# Patient Record
Sex: FEMALE | Race: WHITE | NOT HISPANIC OR LATINO | Employment: OTHER | URBAN - METROPOLITAN AREA
[De-identification: names, ages, dates, MRNs, and addresses within clinical notes are randomized per-mention and may not be internally consistent; named-entity substitution may affect disease eponyms.]

---

## 2017-01-17 RX ORDER — PANTOPRAZOLE SODIUM 40 MG/1
TABLET, DELAYED RELEASE ORAL
Qty: 30 TABLET | Refills: 6 | Status: SHIPPED | OUTPATIENT
Start: 2017-01-17 | End: 2017-08-24 | Stop reason: SDUPTHER

## 2017-03-13 ENCOUNTER — OFFICE VISIT (OUTPATIENT)
Dept: INTERNAL MEDICINE | Facility: CLINIC | Age: 72
End: 2017-03-13
Payer: MEDICARE

## 2017-03-13 ENCOUNTER — HOSPITAL ENCOUNTER (OUTPATIENT)
Dept: RADIOLOGY | Facility: HOSPITAL | Age: 72
Discharge: HOME | End: 2017-03-13
Attending: FAMILY MEDICINE
Payer: MEDICARE

## 2017-03-13 VITALS
BODY MASS INDEX: 37.28 KG/M2 | TEMPERATURE: 96 F | DIASTOLIC BLOOD PRESSURE: 70 MMHG | WEIGHT: 231.94 LBS | HEART RATE: 87 BPM | HEIGHT: 66 IN | SYSTOLIC BLOOD PRESSURE: 126 MMHG

## 2017-03-13 DIAGNOSIS — E11.9 CONTROLLED TYPE 2 DIABETES MELLITUS WITHOUT COMPLICATION, WITHOUT LONG-TERM CURRENT USE OF INSULIN: ICD-10-CM

## 2017-03-13 DIAGNOSIS — M25.562 ACUTE PAIN OF LEFT KNEE: ICD-10-CM

## 2017-03-13 DIAGNOSIS — M79.644 THUMB PAIN, RIGHT: Primary | ICD-10-CM

## 2017-03-13 DIAGNOSIS — E08.41 DIABETIC MONONEUROPATHY ASSOCIATED WITH DIABETES MELLITUS DUE TO UNDERLYING CONDITION: ICD-10-CM

## 2017-03-13 PROCEDURE — 1159F MED LIST DOCD IN RCRD: CPT | Mod: S$GLB,,, | Performed by: FAMILY MEDICINE

## 2017-03-13 PROCEDURE — 4010F ACE/ARB THERAPY RXD/TAKEN: CPT | Mod: S$GLB,,, | Performed by: FAMILY MEDICINE

## 2017-03-13 PROCEDURE — 73560 X-RAY EXAM OF KNEE 1 OR 2: CPT | Mod: TC,59,PO,RT

## 2017-03-13 PROCEDURE — 73562 X-RAY EXAM OF KNEE 3: CPT | Mod: 26,LT,, | Performed by: RADIOLOGY

## 2017-03-13 PROCEDURE — 99214 OFFICE O/P EST MOD 30 MIN: CPT | Mod: S$GLB,,, | Performed by: FAMILY MEDICINE

## 2017-03-13 PROCEDURE — 3044F HG A1C LEVEL LT 7.0%: CPT | Mod: S$GLB,,, | Performed by: FAMILY MEDICINE

## 2017-03-13 PROCEDURE — 3074F SYST BP LT 130 MM HG: CPT | Mod: S$GLB,,, | Performed by: FAMILY MEDICINE

## 2017-03-13 PROCEDURE — 99499 UNLISTED E&M SERVICE: CPT | Mod: S$GLB,,, | Performed by: FAMILY MEDICINE

## 2017-03-13 PROCEDURE — 1160F RVW MEDS BY RX/DR IN RCRD: CPT | Mod: S$GLB,,, | Performed by: FAMILY MEDICINE

## 2017-03-13 PROCEDURE — 3078F DIAST BP <80 MM HG: CPT | Mod: S$GLB,,, | Performed by: FAMILY MEDICINE

## 2017-03-13 PROCEDURE — 73560 X-RAY EXAM OF KNEE 1 OR 2: CPT | Mod: 26,59,RT, | Performed by: RADIOLOGY

## 2017-03-13 PROCEDURE — 99999 PR PBB SHADOW E&M-EST. PATIENT-LVL III: CPT | Mod: PBBFAC,,, | Performed by: FAMILY MEDICINE

## 2017-03-13 PROCEDURE — 1157F ADVNC CARE PLAN IN RCRD: CPT | Mod: S$GLB,,, | Performed by: FAMILY MEDICINE

## 2017-03-13 PROCEDURE — 1126F AMNT PAIN NOTED NONE PRSNT: CPT | Mod: S$GLB,,, | Performed by: FAMILY MEDICINE

## 2017-03-13 RX ORDER — MELOXICAM 15 MG/1
15 TABLET ORAL DAILY
Qty: 30 TABLET | Refills: 3 | Status: SHIPPED | OUTPATIENT
Start: 2017-03-13 | End: 2017-07-16 | Stop reason: SDUPTHER

## 2017-03-13 NOTE — PROGRESS NOTES
Subjective:       Patient ID: Kay Rosas is a 71 y.o. female.    Chief Complaint: Knee Pain (left) and thumb pain (right)    HPI Comments: Right Thumb pain:      Pt has has right thumb pain that when she  something it will elicit discomfort. Pt has not done anything trauma to her right thumb    Knee Pain    The incident occurred more than 1 week ago. The incident occurred at the gym. There was no injury mechanism. The pain is present in the left knee. The quality of the pain is described as aching. The pain is at a severity of 6/10. The pain is moderate. The pain has been constant since onset. Associated symptoms include an inability to bear weight. She reports no foreign bodies present. Nothing aggravates the symptoms. She has tried nothing for the symptoms. The treatment provided moderate relief.     Review of Systems   Constitutional: Negative.    Respiratory: Negative.    Cardiovascular: Negative.    Musculoskeletal: Positive for arthralgias (left knee pain).   Skin: Negative.    Neurological: Negative.    Psychiatric/Behavioral: Negative.        Objective:      Physical Exam   Constitutional: She is oriented to person, place, and time. She appears well-developed and well-nourished.   Cardiovascular: Normal rate and regular rhythm.    Pulmonary/Chest: Effort normal and breath sounds normal.   Musculoskeletal:        Left knee: She exhibits decreased range of motion.   Neurological: She is alert and oriented to person, place, and time.   Skin: Skin is warm and dry.   Psychiatric: Her behavior is normal.       Assessment:       1. Thumb pain, right    2. Acute pain of left knee    3. Controlled type 2 diabetes mellitus without complication, without long-term current use of insulin    4. Diabetic mononeuropathy associated with diabetes mellitus due to underlying condition        Plan:             Thumb pain, right  Comments:  Will do a right wrist brace and if not responding consider EMG    Acute pain of  left knee  Comments:  Will increase the meloxicam and get xray of left knee  Orders:  -     X-ray Knee Ortho Left; Future; Expected date: 3/13/17    Controlled type 2 diabetes mellitus without complication, without long-term current use of insulin  Comments:  Well controlled at this time.     Diabetic mononeuropathy associated with diabetes mellitus due to underlying condition  Comments:  Will continue with the gabapentin    Other orders  -     meloxicam (MOBIC) 15 MG tablet; Take 1 tablet (15 mg total) by mouth once daily.  Dispense: 30 tablet; Refill: 3

## 2017-03-13 NOTE — MR AVS SNAPSHOT
[facility]  [address]  [place] [address]  Phone: [phone]  Fax: [phone]                  Kay Rosas   3/13/2017 1:40 PM   Office Visit    Description:  Female : 1945   Provider:  Norris Swann MD   Department:  [facility]           Reason for Visit     Knee Pain     thumb pain           Diagnoses this Visit        Comments    Thumb pain, right    -  Primary Will do a right wrist brace and if not responding consider EMG    Acute pain of left knee     Will increase the meloxicam and get xray of left knee    Controlled type 2 diabetes mellitus without complication, without long-term current use of insulin     Well controlled at this time.     Diabetic mononeuropathy associated with diabetes mellitus due to underlying condition     Will continue with the gabapentin           To Do List           Future Appointments        Provider Department Dept Phone    3/13/2017 2:30 PM [facility] XR2 [facility] [phone]    2017 10:45 AM VANDANA Fitch OD [facility] Op[facility] [phone]      Goals (5 Years of Data)     None       These Medications        Disp Refills Start End    meloxicam (MOBIC) 15 MG tablet 30 tablet 3 3/13/2017     Take 1 tablet (15 mg total) by mouth once daily. - Oral    Pharmacy: [facility] #[id number] - [place]  [address] [place] [address].  #: [phone]         [facility]     [facility] -  Assistance  Registered nurses in the [facility] provide clinical advisement, health education, appointment booking, and other advisory services.  Call for this free service at [phone].             Medications           Message regarding Medications     Verify the changes and/or additions to your medication regime listed below are the same as discussed with your clinician today.  If any of these changes or additions are incorrect, please notify your healthcare provider.    "     START taking these NEW medications        Refills    meloxicam (MOBIC) 15 MG tablet 3    Sig: Take 1 tablet (15 mg total) by mouth once daily.    Class: Normal    Route: Oral           Verify that the below list of medications is an accurate representation of the medications you are currently taking.  If none reported, the list may be blank. If incorrect, please contact your healthcare provider. Carry this list with you in case of emergency.           Current Medications     albuterol 90 mcg/actuation inhaler Inhale 2 puffs into the lungs every 6 (six) hours as needed.    amoxicillin (AMOXIL) 500 MG Tab 2 tablet by oral route every 12 hours for 14 days    atorvastatin (LIPITOR) 20 MG tablet Take 1 tablet (20 mg total) by mouth once daily.    gabapentin (NEURONTIN) 300 MG capsule Take 1 capsule (300 mg total) by mouth 3 (three) times daily.    hyoscyamine (ANASPAZ,LEVSIN) 0.125 mg Tab Take 0.125 mg by mouth every 4 (four) hours as needed. 1 tablet every four hours as needed for cramping    levothyroxine (SYNTHROID) 50 MCG tablet TAKE 1 TABLET BY MOUTH DAILY.    losartan-hydrochlorothiazide 100-25 mg (HYZAAR) 100-25 mg per tablet Take 1 tablet by mouth once daily.    meloxicam (MOBIC) 7.5 MG tablet Take 1 tablet (7.5 mg total) by mouth once daily.    metformin (GLUCOPHAGE) 1000 MG tablet TAKE 1 TABLET BY MOUTH TWICE A DAY    pantoprazole (PROTONIX) 40 MG tablet TAKE 1 TABLET BY MOUTH DAILY.    sertraline (ZOLOFT) 100 MG tablet TAKE 1 TABLET BY MOUTH ONCE DAILY    trazodone (DESYREL) 100 MG tablet TAKE 1 TABLET (100 MG TOTAL) BY MOUTH EVERY EVENING.    meloxicam (MOBIC) 15 MG tablet Take 1 tablet (15 mg total) by mouth once daily.           Clinical Reference Information           Your Vitals Were     BP Pulse Temp    126/70 (BP Location: Right arm, Patient Position: Sitting, BP Method: Manual) 87 96.3 °F (35.7 °C) (Tympanic)    Height Weight BMI    5' 6" (1.676 m) 105.2 kg (231 lb 14.8 oz) 37.43 kg/m2      Blood " Pressure          Most Recent Value    BP  126/70      Allergies as of 3/13/2017     No Known Allergies      Immunizations Administered on Date of Encounter - 3/13/2017     None      Orders Placed During Today's Visit     Future Labs/Procedures Expected by Expires    X-ray Knee Ortho Left  3/13/2017 3/13/2018      [facility] Sign-Up     Activating your [facility] account is as easy as 1-2-3!     1) Visit [email or web address], select Sign Up Now, enter this activation code and your date of birth, then select Next.  [email or web address]  Expires: 4/27/2017  2:25 PM      2) Create a username and password to use when you visit [facility] in the future and select a security question in case you lose your password and select Next.    3) Enter your e-mail address and click Sign Up!    Additional Information  If you have questions, please e-mail [email or web address] or call [phone] to talk to our [facility] staff. Remember, [facility] is NOT to be used for urgent needs. For medical emergencies, dial 911.         Language Assistance Services     ATTENTION: Language assistance services are available, free of charge. Please call [phone].      ATENCIÓN: Si fransisco arian, tiene a pollard disposición servicios gratuitos de asistencia lingüística. Llame al [phone].     CHÚ Ý: N?u b?n nói Ti?ng Vi?t, có các d?ch v? h? tr? ngôn ng? mi?n phí dành cho b?n. G?i s? [phone].         [facility] complies with applicable Federal civil rights laws and does not discriminate on the basis of race, color, national origin, age, disability, or sex.

## 2017-03-15 ENCOUNTER — TELEPHONE (OUTPATIENT)
Dept: INTERNAL MEDICINE | Facility: CLINIC | Age: 72
End: 2017-03-15

## 2017-03-15 NOTE — TELEPHONE ENCOUNTER
----- Message from Effie Haley sent at 3/15/2017  2:15 PM CDT -----  Contact: pt  States she's returning a nurse call. Please call pt at [phone]. Thank you

## 2017-03-17 DIAGNOSIS — E11.9 TYPE 2 DIABETES MELLITUS WITHOUT COMPLICATION: ICD-10-CM

## 2017-03-27 RX ORDER — GABAPENTIN 300 MG/1
CAPSULE ORAL
Qty: 90 CAPSULE | Refills: 3 | Status: SHIPPED | OUTPATIENT
Start: 2017-03-27 | End: 2017-07-25 | Stop reason: SDUPTHER

## 2017-03-27 NOTE — TELEPHONE ENCOUNTER
----- Message from Jaimie Mercado sent at 3/27/2017  4:43 PM CDT -----  Call [facility] at [phone]//regarding a refill for trazadone and zolflot//tee starks

## 2017-03-28 RX ORDER — TRAZODONE HYDROCHLORIDE 100 MG/1
TABLET ORAL
Qty: 30 TABLET | Refills: 6 | Status: SHIPPED | OUTPATIENT
Start: 2017-03-28 | End: 2017-10-16 | Stop reason: SDUPTHER

## 2017-03-28 RX ORDER — SERTRALINE HYDROCHLORIDE 100 MG/1
100 TABLET, FILM COATED ORAL DAILY
Qty: 30 TABLET | Refills: 6 | Status: SHIPPED | OUTPATIENT
Start: 2017-03-28 | End: 2017-10-16 | Stop reason: SDUPTHER

## 2017-05-19 RX ORDER — LEVOTHYROXINE SODIUM 50 UG/1
50 TABLET ORAL DAILY
Qty: 90 TABLET | Refills: 3 | Status: SHIPPED | OUTPATIENT
Start: 2017-05-19 | End: 2018-05-02 | Stop reason: SDUPTHER

## 2017-06-14 ENCOUNTER — OFFICE VISIT (OUTPATIENT)
Dept: INTERNAL MEDICINE | Facility: CLINIC | Age: 72
End: 2017-06-14
Payer: MEDICARE

## 2017-06-14 ENCOUNTER — LAB VISIT (OUTPATIENT)
Dept: LAB | Facility: HOSPITAL | Age: 72
End: 2017-06-14
Attending: FAMILY MEDICINE
Payer: MEDICARE

## 2017-06-14 VITALS
TEMPERATURE: 98 F | BODY MASS INDEX: 37.28 KG/M2 | HEART RATE: 92 BPM | HEIGHT: 66 IN | OXYGEN SATURATION: 96 % | SYSTOLIC BLOOD PRESSURE: 126 MMHG | DIASTOLIC BLOOD PRESSURE: 64 MMHG | WEIGHT: 231.94 LBS

## 2017-06-14 DIAGNOSIS — R35.0 URINARY FREQUENCY: ICD-10-CM

## 2017-06-14 DIAGNOSIS — E08.41 DIABETIC MONONEUROPATHY ASSOCIATED WITH DIABETES MELLITUS DUE TO UNDERLYING CONDITION: Primary | ICD-10-CM

## 2017-06-14 DIAGNOSIS — E11.9 CONTROLLED TYPE 2 DIABETES MELLITUS WITHOUT COMPLICATION, WITHOUT LONG-TERM CURRENT USE OF INSULIN: ICD-10-CM

## 2017-06-14 DIAGNOSIS — R19.7 DIARRHEA, UNSPECIFIED TYPE: ICD-10-CM

## 2017-06-14 LAB
BILIRUB UR QL STRIP: NEGATIVE
CLARITY UR: CLEAR
COLOR UR: YELLOW
GLUCOSE UR QL STRIP: NEGATIVE
HGB UR QL STRIP: NEGATIVE
KETONES UR QL STRIP: NEGATIVE
LEUKOCYTE ESTERASE UR QL STRIP: ABNORMAL
MICROSCOPIC COMMENT: ABNORMAL
NITRITE UR QL STRIP: NEGATIVE
PH UR STRIP: 5 [PH] (ref 5–8)
PROT UR QL STRIP: NEGATIVE
SP GR UR STRIP: 1.01 (ref 1–1.03)
SQUAMOUS #/AREA URNS HPF: 6 /HPF
URN SPEC COLLECT METH UR: ABNORMAL
WBC #/AREA URNS HPF: 6 /HPF (ref 0–5)
WBC CLUMPS URNS QL MICRO: ABNORMAL

## 2017-06-14 PROCEDURE — 81000 URINALYSIS NONAUTO W/SCOPE: CPT | Mod: PO

## 2017-06-14 PROCEDURE — 1159F MED LIST DOCD IN RCRD: CPT | Mod: S$GLB,,, | Performed by: FAMILY MEDICINE

## 2017-06-14 PROCEDURE — 99999 PR PBB SHADOW E&M-EST. PATIENT-LVL III: CPT | Mod: PBBFAC,,, | Performed by: FAMILY MEDICINE

## 2017-06-14 PROCEDURE — 4010F ACE/ARB THERAPY RXD/TAKEN: CPT | Mod: S$GLB,,, | Performed by: FAMILY MEDICINE

## 2017-06-14 PROCEDURE — 1126F AMNT PAIN NOTED NONE PRSNT: CPT | Mod: S$GLB,,, | Performed by: FAMILY MEDICINE

## 2017-06-14 PROCEDURE — 3044F HG A1C LEVEL LT 7.0%: CPT | Mod: S$GLB,,, | Performed by: FAMILY MEDICINE

## 2017-06-14 PROCEDURE — 99213 OFFICE O/P EST LOW 20 MIN: CPT | Mod: S$GLB,,, | Performed by: FAMILY MEDICINE

## 2017-06-14 PROCEDURE — 87086 URINE CULTURE/COLONY COUNT: CPT

## 2017-06-14 PROCEDURE — 99499 UNLISTED E&M SERVICE: CPT | Mod: S$GLB,,, | Performed by: FAMILY MEDICINE

## 2017-06-14 NOTE — PROGRESS NOTES
Subjective:       Patient ID: Kay Rosas is a 71 y.o. female.    Chief Complaint: No chief complaint on file.    DM:       Pt is a 71 year old controlled DM with last HgA1C at 5.6 Pt is on metformin 1000 mg bid. Pt reports no real change in her sugars    Diarrhea:       Pt is having some loose stools like she had a year ago and found to have a H pylori infections. It is loose stool without blood. Pt is on Protonix 40 mg a day. Pt is reporting no association with food and reports no real abdominal pain.       Review of Systems   Constitutional: Negative.    Respiratory: Negative.    Gastrointestinal: Negative.    Genitourinary: Negative.    Neurological: Negative.    Psychiatric/Behavioral: Negative.        Objective:      Physical Exam   Constitutional: She is oriented to person, place, and time. She appears well-developed and well-nourished.   Cardiovascular: Normal rate and regular rhythm.    Pulmonary/Chest: Effort normal and breath sounds normal.   Neurological: She is alert and oriented to person, place, and time.       Assessment:       1. Diabetic mononeuropathy associated with diabetes mellitus due to underlying condition    2. Controlled type 2 diabetes mellitus without complication, without long-term current use of insulin    3. Diarrhea, unspecified type    4. Urinary frequency        Plan:       Diabetic mononeuropathy associated with diabetes mellitus due to underlying condition  Comments:  Will get a HgA1C and consider backing down on metfromin    Controlled type 2 diabetes mellitus without complication, without long-term current use of insulin  Comments:  Will get a HgA1C and reduce metfromin to 500 mg bid  Orders:  -     Hemoglobin A1c; Future; Expected date: 06/14/2017    Diarrhea, unspecified type  Comments:  Unclear etiology. Will have her back off on the metformin 1/2 twice a day    Urinary frequency  -     Urinalysis; Future; Expected date: 06/14/2017  -     Urine culture; Future; Expected  date: 06/14/2017

## 2017-06-15 RX ORDER — CIPROFLOXACIN 500 MG/1
500 TABLET ORAL EVERY 12 HOURS
Qty: 14 TABLET | Refills: 0 | Status: SHIPPED | OUTPATIENT
Start: 2017-06-15 | End: 2017-10-25 | Stop reason: ALTCHOICE

## 2017-06-16 ENCOUNTER — TELEPHONE (OUTPATIENT)
Dept: INTERNAL MEDICINE | Facility: CLINIC | Age: 72
End: 2017-06-16

## 2017-06-16 LAB — BACTERIA UR CULT: NO GROWTH

## 2017-06-16 NOTE — TELEPHONE ENCOUNTER
----- Message from Norris Swann MD sent at 6/15/2017  4:16 PM CDT -----  Inform pt that I am going to put her on 3 days of cipro 500 mg bid. Sent to pharmacy for UTI. She will be given 7 days worth but take for 3 and if sx gone than stop if not than finish the 7 days

## 2017-06-16 NOTE — TELEPHONE ENCOUNTER
----- Message from Norris Swann MD sent at 6/15/2017  4:15 PM CDT -----  Inform pt that A1C is very good still at 5.8

## 2017-06-23 ENCOUNTER — OFFICE VISIT (OUTPATIENT)
Dept: INTERNAL MEDICINE | Facility: CLINIC | Age: 72
End: 2017-06-23
Payer: MEDICARE

## 2017-06-23 ENCOUNTER — HOSPITAL ENCOUNTER (OUTPATIENT)
Dept: RADIOLOGY | Facility: HOSPITAL | Age: 72
Discharge: HOME | End: 2017-06-23
Attending: NURSE PRACTITIONER
Payer: MEDICARE

## 2017-06-23 VITALS
TEMPERATURE: 96 F | HEART RATE: 75 BPM | OXYGEN SATURATION: 97 % | WEIGHT: 235.44 LBS | BODY MASS INDEX: 37.84 KG/M2 | SYSTOLIC BLOOD PRESSURE: 148 MMHG | DIASTOLIC BLOOD PRESSURE: 72 MMHG | HEIGHT: 66 IN

## 2017-06-23 DIAGNOSIS — Z87.81 HISTORY OF FRACTURE OF RIB: ICD-10-CM

## 2017-06-23 DIAGNOSIS — R07.81 RIB PAIN ON LEFT SIDE: ICD-10-CM

## 2017-06-23 DIAGNOSIS — T14.8XXA PULLED MUSCLE: ICD-10-CM

## 2017-06-23 DIAGNOSIS — N39.0 URINARY TRACT INFECTION WITHOUT HEMATURIA, SITE UNSPECIFIED: ICD-10-CM

## 2017-06-23 DIAGNOSIS — E08.41 DIABETIC MONONEUROPATHY ASSOCIATED WITH DIABETES MELLITUS DUE TO UNDERLYING CONDITION: Primary | ICD-10-CM

## 2017-06-23 PROCEDURE — 1159F MED LIST DOCD IN RCRD: CPT | Mod: S$GLB,,, | Performed by: NURSE PRACTITIONER

## 2017-06-23 PROCEDURE — 71100 X-RAY EXAM RIBS UNI 2 VIEWS: CPT | Mod: TC,PO

## 2017-06-23 PROCEDURE — 99499 UNLISTED E&M SERVICE: CPT | Mod: S$GLB,,, | Performed by: NURSE PRACTITIONER

## 2017-06-23 PROCEDURE — 96372 THER/PROPH/DIAG INJ SC/IM: CPT | Mod: S$GLB,,, | Performed by: NURSE PRACTITIONER

## 2017-06-23 PROCEDURE — 71100 X-RAY EXAM RIBS UNI 2 VIEWS: CPT | Mod: 26,,, | Performed by: RADIOLOGY

## 2017-06-23 PROCEDURE — 71020 XR CHEST PA AND LATERAL: CPT | Mod: TC,PO

## 2017-06-23 PROCEDURE — 99214 OFFICE O/P EST MOD 30 MIN: CPT | Mod: 25,S$GLB,, | Performed by: NURSE PRACTITIONER

## 2017-06-23 PROCEDURE — 99999 PR PBB SHADOW E&M-EST. PATIENT-LVL IV: CPT | Mod: PBBFAC,,, | Performed by: NURSE PRACTITIONER

## 2017-06-23 PROCEDURE — 71020 XR CHEST PA AND LATERAL: CPT | Mod: 26,,, | Performed by: RADIOLOGY

## 2017-06-23 RX ORDER — METHYLPREDNISOLONE ACETATE 40 MG/ML
60 INJECTION, SUSPENSION INTRA-ARTICULAR; INTRALESIONAL; INTRAMUSCULAR; SOFT TISSUE
Status: DISCONTINUED | OUTPATIENT
Start: 2017-06-23 | End: 2017-06-23

## 2017-06-23 RX ORDER — TIZANIDINE 2 MG/1
2 TABLET ORAL NIGHTLY PRN
Qty: 7 TABLET | Refills: 0 | Status: SHIPPED | OUTPATIENT
Start: 2017-06-23 | End: 2017-07-03

## 2017-06-23 RX ORDER — METHYLPREDNISOLONE ACETATE 80 MG/ML
60 INJECTION, SUSPENSION INTRA-ARTICULAR; INTRALESIONAL; INTRAMUSCULAR; SOFT TISSUE
Status: COMPLETED | OUTPATIENT
Start: 2017-06-23 | End: 2017-06-23

## 2017-06-23 RX ADMIN — METHYLPREDNISOLONE ACETATE 60 MG: 80 INJECTION, SUSPENSION INTRA-ARTICULAR; INTRALESIONAL; INTRAMUSCULAR; SOFT TISSUE at 10:06

## 2017-06-23 NOTE — PROGRESS NOTES
"Subjective:       Patient ID: Kay Rosas is a 71 y.o. female.    Chief Complaint: Other (possible left rib fracture or break )    Left rib, left lateral side, left flank pain x 4 days       Pt reports that Tuesday she bent forward to adjust her loveseat and then she twisted while bending forward to adjust a floor rug and began immediately feeling pain in left upper chest and abdomen -radiating to lateral left side and left flank. Pain is 4/10 when taking a deep breath and on certain positioning now. She describes intermittent tightening with certain movement as well and the aforementioned area feeling "sore".  She takes mobic daily. No additional OTC meds have been taken. She has a hx of a left rib fx. No hx of osteopenia/osteoporosis. She did not hear a crack upon bending. She did not fall. She is currently being treated with cipro for a UTI.       Review of Systems   Constitutional: Positive for activity change.   HENT: Negative.    Eyes: Negative.    Respiratory: Negative for apnea, cough, choking, chest tightness, shortness of breath, wheezing and stridor.         Pain in muscular areas mentioned with deep inspiration    Cardiovascular: Negative for chest pain, palpitations and leg swelling.   Gastrointestinal: Negative.    Endocrine: Negative.    Genitourinary: Positive for flank pain (left ).   Musculoskeletal: Positive for myalgias (left chest well, left lateral side ). Negative for arthralgias, back pain, gait problem, joint swelling, neck pain and neck stiffness.   Skin: Negative.    Allergic/Immunologic: Negative.    Neurological: Negative.    Hematological: Negative.    Psychiatric/Behavioral: Negative.        Objective:      Physical Exam   Constitutional: She is oriented to person, place, and time. She appears well-developed and well-nourished.   HENT:   Head: Normocephalic and atraumatic.   Eyes: Conjunctivae and EOM are normal.   Neck: Normal range of motion. Neck supple.   Cardiovascular: " Normal rate, regular rhythm, normal heart sounds and intact distal pulses.    Pulmonary/Chest: Effort normal and breath sounds normal.   Abdominal: Soft. Bowel sounds are normal. There is tenderness in the right upper quadrant. There is CVA tenderness (left side ).       Musculoskeletal: Normal range of motion.   Neurological: She is alert and oriented to person, place, and time.   Skin: Skin is warm.   Psychiatric: She has a normal mood and affect.       Assessment:       1. Diabetic mononeuropathy associated with diabetes mellitus due to underlying condition    2. Urinary tract infection without hematuria, site unspecified    3. Rib pain on left side    4. Pulled muscle    5. History of fracture of rib        Plan:   Diabetic mononeuropathy associated with diabetes mellitus due to underlying condition  Comments:  monitor BG levels since getting steroid injection today     Urinary tract infection without hematuria, site unspecified  Comments:  currently being treated with cipro     Rib pain on left side  -     X-Ray Chest PA And Lateral; Future; Expected date: 06/23/2017  -     CBC auto differential; Future; Expected date: 06/23/2017  -     Comprehensive metabolic panel; Future; Expected date: 06/23/2017  -     X-Ray Ribs 2 View Left; Future; Expected date: 06/23/2017  -     methylPREDNISolone acetate injection 60 mg; Inject 0.75 mLs (60 mg total) into the muscle one time.    Pulled muscle  Comments:  left chest wall to left flank   Orders:  -     Discontinue: methylPREDNISolone acetate injection 60 mg; Inject 1.5 mLs (60 mg total) into the muscle one time.  -     tizanidine (ZANAFLEX) 2 MG tablet; Take 1 tablet (2 mg total) by mouth nightly as needed.  Dispense: 7 tablet; Refill: 0  -     methylPREDNISolone acetate injection 60 mg; Inject 0.75 mLs (60 mg total) into the muscle one time.    History of fracture of rib  -     X-Ray Chest PA And Lateral; Future; Expected date: 06/23/2017  -     X-Ray Ribs 2 View Left;  Future; Expected date: 06/23/2017    Other orders  -     Cancel: Urinalysis; Future; Expected date: 06/23/2017    as above  Chest xray with rib series left  Labs today   Depo medrol now-SE discussed in detail  zanaflex QHS PRN- sedation discussed- hold if excessive  Heat and ice rotate up to TID for 10-20 mins each  Will call with results and further mgt.

## 2017-07-17 RX ORDER — MELOXICAM 15 MG/1
15 TABLET ORAL DAILY
Qty: 90 TABLET | Refills: 3 | Status: SHIPPED | OUTPATIENT
Start: 2017-07-17 | End: 2018-07-02 | Stop reason: SDUPTHER

## 2017-07-20 RX ORDER — METFORMIN HYDROCHLORIDE 1000 MG/1
TABLET ORAL
Qty: 180 TABLET | Refills: 2 | Status: SHIPPED | OUTPATIENT
Start: 2017-07-20 | End: 2018-04-13 | Stop reason: SDUPTHER

## 2017-07-25 RX ORDER — GABAPENTIN 300 MG/1
CAPSULE ORAL
Qty: 90 CAPSULE | Refills: 3 | Status: SHIPPED | OUTPATIENT
Start: 2017-07-25 | End: 2017-11-17 | Stop reason: SDUPTHER

## 2017-08-24 RX ORDER — PANTOPRAZOLE SODIUM 40 MG/1
TABLET, DELAYED RELEASE ORAL
Qty: 30 TABLET | Refills: 6 | Status: SHIPPED | OUTPATIENT
Start: 2017-08-24 | End: 2018-03-06 | Stop reason: SDUPTHER

## 2017-10-17 RX ORDER — SERTRALINE HYDROCHLORIDE 100 MG/1
100 TABLET, FILM COATED ORAL DAILY
Qty: 30 TABLET | Refills: 6 | Status: SHIPPED | OUTPATIENT
Start: 2017-10-17 | End: 2018-04-30 | Stop reason: SDUPTHER

## 2017-10-17 RX ORDER — TRAZODONE HYDROCHLORIDE 100 MG/1
TABLET ORAL
Qty: 30 TABLET | Refills: 6 | Status: SHIPPED | OUTPATIENT
Start: 2017-10-17 | End: 2018-04-30 | Stop reason: SDUPTHER

## 2017-10-25 ENCOUNTER — OFFICE VISIT (OUTPATIENT)
Dept: INTERNAL MEDICINE | Facility: CLINIC | Age: 72
End: 2017-10-25
Payer: MEDICARE

## 2017-10-25 ENCOUNTER — LAB VISIT (OUTPATIENT)
Dept: LAB | Facility: HOSPITAL | Age: 72
End: 2017-10-25
Attending: FAMILY MEDICINE
Payer: MEDICARE

## 2017-10-25 VITALS
SYSTOLIC BLOOD PRESSURE: 116 MMHG | WEIGHT: 226.44 LBS | TEMPERATURE: 96 F | HEART RATE: 88 BPM | HEIGHT: 66 IN | BODY MASS INDEX: 36.39 KG/M2 | DIASTOLIC BLOOD PRESSURE: 62 MMHG

## 2017-10-25 DIAGNOSIS — I10 ESSENTIAL HYPERTENSION: Primary | ICD-10-CM

## 2017-10-25 DIAGNOSIS — I10 ESSENTIAL HYPERTENSION: ICD-10-CM

## 2017-10-25 DIAGNOSIS — R25.2 MUSCLE CRAMPS AT NIGHT: ICD-10-CM

## 2017-10-25 DIAGNOSIS — E78.00 PURE HYPERCHOLESTEROLEMIA: ICD-10-CM

## 2017-10-25 DIAGNOSIS — E08.41 DIABETIC MONONEUROPATHY ASSOCIATED WITH DIABETES MELLITUS DUE TO UNDERLYING CONDITION: ICD-10-CM

## 2017-10-25 LAB
ALBUMIN SERPL BCP-MCNC: 3.7 G/DL
ALP SERPL-CCNC: 71 U/L
ALT SERPL W/O P-5'-P-CCNC: 13 U/L
ANION GAP SERPL CALC-SCNC: 12 MMOL/L
AST SERPL-CCNC: 18 U/L
BASOPHILS # BLD AUTO: 0.04 K/UL
BASOPHILS NFR BLD: 0.6 %
BILIRUB SERPL-MCNC: 0.4 MG/DL
BUN SERPL-MCNC: 23 MG/DL
CALCIUM SERPL-MCNC: 9.7 MG/DL
CHLORIDE SERPL-SCNC: 103 MMOL/L
CHOLEST SERPL-MCNC: 144 MG/DL
CHOLEST/HDLC SERPL: 2.3 {RATIO}
CO2 SERPL-SCNC: 28 MMOL/L
CREAT SERPL-MCNC: 1.4 MG/DL
DIFFERENTIAL METHOD: ABNORMAL
EOSINOPHIL # BLD AUTO: 0.1 K/UL
EOSINOPHIL NFR BLD: 1.9 %
ERYTHROCYTE [DISTWIDTH] IN BLOOD BY AUTOMATED COUNT: 16.4 %
EST. GFR  (AFRICAN AMERICAN): 43.6 ML/MIN/1.73 M^2
EST. GFR  (NON AFRICAN AMERICAN): 37.8 ML/MIN/1.73 M^2
GLUCOSE SERPL-MCNC: 76 MG/DL
HCT VFR BLD AUTO: 32.9 %
HDLC SERPL-MCNC: 62 MG/DL
HDLC SERPL: 43.1 %
HGB BLD-MCNC: 10.3 G/DL
IMM GRANULOCYTES # BLD AUTO: 0.02 K/UL
IMM GRANULOCYTES NFR BLD AUTO: 0.3 %
LDLC SERPL CALC-MCNC: 67.6 MG/DL
LYMPHOCYTES # BLD AUTO: 2.5 K/UL
LYMPHOCYTES NFR BLD: 36.8 %
MCH RBC QN AUTO: 26.8 PG
MCHC RBC AUTO-ENTMCNC: 31.3 G/DL
MCV RBC AUTO: 86 FL
MONOCYTES # BLD AUTO: 0.5 K/UL
MONOCYTES NFR BLD: 7.2 %
NEUTROPHILS # BLD AUTO: 3.6 K/UL
NEUTROPHILS NFR BLD: 53.2 %
NONHDLC SERPL-MCNC: 82 MG/DL
NRBC BLD-RTO: 0 /100 WBC
PLATELET # BLD AUTO: 248 K/UL
PMV BLD AUTO: 11.5 FL
POTASSIUM SERPL-SCNC: 4.5 MMOL/L
PROT SERPL-MCNC: 7.4 G/DL
RBC # BLD AUTO: 3.85 M/UL
SODIUM SERPL-SCNC: 143 MMOL/L
TRIGL SERPL-MCNC: 72 MG/DL
WBC # BLD AUTO: 6.68 K/UL

## 2017-10-25 PROCEDURE — 36415 COLL VENOUS BLD VENIPUNCTURE: CPT | Mod: PO

## 2017-10-25 PROCEDURE — 80053 COMPREHEN METABOLIC PANEL: CPT

## 2017-10-25 PROCEDURE — 99999 PR PBB SHADOW E&M-EST. PATIENT-LVL III: CPT | Mod: PBBFAC,,, | Performed by: FAMILY MEDICINE

## 2017-10-25 PROCEDURE — 99499 UNLISTED E&M SERVICE: CPT | Mod: S$GLB,,, | Performed by: FAMILY MEDICINE

## 2017-10-25 PROCEDURE — 80061 LIPID PANEL: CPT

## 2017-10-25 PROCEDURE — 85025 COMPLETE CBC W/AUTO DIFF WBC: CPT

## 2017-10-25 PROCEDURE — 99214 OFFICE O/P EST MOD 30 MIN: CPT | Mod: S$GLB,,, | Performed by: FAMILY MEDICINE

## 2017-10-25 RX ORDER — TIZANIDINE 4 MG/1
4 TABLET ORAL NIGHTLY
Qty: 90 TABLET | Refills: 1 | Status: SHIPPED | OUTPATIENT
Start: 2017-10-25 | End: 2018-05-05 | Stop reason: SDUPTHER

## 2017-10-25 NOTE — PROGRESS NOTES
Subjective:       Patient ID: Kay Rosas is a 72 y.o. female.    Chief Complaint: leg cramping    Leg Cramp:      Pt is reporting increase leg cramps at night since about June. Pt has DM that is well controlled. She is experiencing cramps at night when in bed. Pt has no Vascular issues.       Review of Systems   Constitutional: Positive for activity change.   Respiratory: Negative.    Cardiovascular: Negative.    Genitourinary: Negative.    Musculoskeletal: Positive for arthralgias.   Neurological: Negative.    Hematological: Negative.    Psychiatric/Behavioral: Negative.        Objective:      Physical Exam   Constitutional: She is oriented to person, place, and time. She appears well-developed and well-nourished.   Cardiovascular: Normal rate and regular rhythm.    Pulmonary/Chest: Effort normal and breath sounds normal.   Abdominal: Soft. Bowel sounds are normal.   Neurological: She is alert and oriented to person, place, and time.   Skin: Skin is warm and dry.   Psychiatric: She has a normal mood and affect. Her behavior is normal.       Assessment:       1. Essential hypertension    2. Muscle cramps at night    3. Diabetic mononeuropathy associated with diabetes mellitus due to underlying condition    4. Pure hypercholesterolemia        Plan:       Essential hypertension  Comments:  BP is well controlled. Will get a CMP  Orders:  -     Comprehensive metabolic panel; Future; Expected date: 10/25/2017  -     CBC auto differential; Future; Expected date: 10/25/2017    Muscle cramps at night  Comments:  Maybe restless leg but Zanaflex helped    Diabetic mononeuropathy associated with diabetes mellitus due to underlying condition  Comments:  Will continue with the gabapentin.    Pure hypercholesterolemia  -     Lipid panel; Future; Expected date: 10/25/2017    Other orders  -     tiZANidine (ZANAFLEX) 4 MG tablet; Take 1 tablet (4 mg total) by mouth every evening.  Dispense: 90 tablet; Refill: 1

## 2017-10-30 RX ORDER — ATORVASTATIN CALCIUM 20 MG/1
20 TABLET, FILM COATED ORAL DAILY
Qty: 90 TABLET | Refills: 3 | Status: SHIPPED | OUTPATIENT
Start: 2017-10-30 | End: 2018-10-14 | Stop reason: SDUPTHER

## 2017-11-17 RX ORDER — GABAPENTIN 300 MG/1
CAPSULE ORAL
Qty: 90 CAPSULE | Refills: 3 | Status: SHIPPED | OUTPATIENT
Start: 2017-11-17 | End: 2018-03-06 | Stop reason: SDUPTHER

## 2017-12-11 RX ORDER — LOSARTAN POTASSIUM AND HYDROCHLOROTHIAZIDE 25; 100 MG/1; MG/1
1 TABLET ORAL DAILY
Qty: 90 TABLET | Refills: 3 | Status: SHIPPED | OUTPATIENT
Start: 2017-12-11 | End: 2018-11-26 | Stop reason: SDUPTHER

## 2017-12-26 ENCOUNTER — TELEPHONE (OUTPATIENT)
Dept: INTERNAL MEDICINE | Facility: CLINIC | Age: 72
End: 2017-12-26

## 2017-12-26 ENCOUNTER — HOSPITAL ENCOUNTER (OUTPATIENT)
Dept: RADIOLOGY | Facility: HOSPITAL | Age: 72
Discharge: HOME | End: 2017-12-26
Attending: FAMILY MEDICINE
Payer: MEDICARE

## 2017-12-26 VITALS — BODY MASS INDEX: 36.32 KG/M2 | HEIGHT: 66 IN | WEIGHT: 226 LBS

## 2017-12-26 DIAGNOSIS — Z12.31 ENCOUNTER FOR SCREENING MAMMOGRAM FOR HIGH-RISK PATIENT: ICD-10-CM

## 2017-12-26 DIAGNOSIS — Z12.31 ENCOUNTER FOR SCREENING MAMMOGRAM FOR HIGH-RISK PATIENT: Primary | ICD-10-CM

## 2017-12-26 PROCEDURE — 77067 SCR MAMMO BI INCL CAD: CPT | Mod: TC,PO

## 2017-12-26 PROCEDURE — 77067 SCR MAMMO BI INCL CAD: CPT | Mod: 26,,, | Performed by: RADIOLOGY

## 2017-12-26 PROCEDURE — 77063 BREAST TOMOSYNTHESIS BI: CPT | Mod: 26,,, | Performed by: RADIOLOGY

## 2018-01-01 ENCOUNTER — NURSE TRIAGE (OUTPATIENT)
Dept: ADMINISTRATIVE | Facility: CLINIC | Age: 73
End: 2018-01-01

## 2018-01-01 ENCOUNTER — OFFICE VISIT (OUTPATIENT)
Dept: URGENT CARE | Facility: CLINIC | Age: 73
End: 2018-01-01
Payer: MEDICARE

## 2018-01-01 VITALS
TEMPERATURE: 99 F | OXYGEN SATURATION: 95 % | DIASTOLIC BLOOD PRESSURE: 82 MMHG | HEIGHT: 66 IN | HEART RATE: 94 BPM | BODY MASS INDEX: 35.22 KG/M2 | SYSTOLIC BLOOD PRESSURE: 144 MMHG | WEIGHT: 219.13 LBS | RESPIRATION RATE: 12 BRPM

## 2018-01-01 DIAGNOSIS — J01.90 ACUTE NON-RECURRENT SINUSITIS, UNSPECIFIED LOCATION: Primary | ICD-10-CM

## 2018-01-01 PROCEDURE — 1159F MED LIST DOCD IN RCRD: CPT | Mod: ,,, | Performed by: PHYSICIAN ASSISTANT

## 2018-01-01 PROCEDURE — 99214 OFFICE O/P EST MOD 30 MIN: CPT | Mod: S$PBB,,, | Performed by: PHYSICIAN ASSISTANT

## 2018-01-01 PROCEDURE — 99999 PR PBB SHADOW E&M-EST. PATIENT-LVL III: CPT | Mod: PBBFAC,,,

## 2018-01-01 PROCEDURE — 1125F AMNT PAIN NOTED PAIN PRSNT: CPT | Mod: ,,, | Performed by: PHYSICIAN ASSISTANT

## 2018-01-01 PROCEDURE — 3008F BODY MASS INDEX DOCD: CPT | Mod: ,,, | Performed by: PHYSICIAN ASSISTANT

## 2018-01-01 RX ORDER — GUAIFENESIN 1200 MG/1
1 TABLET, EXTENDED RELEASE ORAL 2 TIMES DAILY
COMMUNITY
Start: 2018-01-01 | End: 2018-01-11

## 2018-01-01 RX ORDER — FLUTICASONE PROPIONATE 50 MCG
1 SPRAY, SUSPENSION (ML) NASAL DAILY
COMMUNITY
Start: 2018-01-01 | End: 2019-08-15

## 2018-01-01 RX ORDER — AMOXICILLIN AND CLAVULANATE POTASSIUM 875; 125 MG/1; MG/1
1 TABLET, FILM COATED ORAL 2 TIMES DAILY
Qty: 14 TABLET | Refills: 0 | Status: SHIPPED | OUTPATIENT
Start: 2018-01-01 | End: 2018-01-08

## 2018-01-01 RX ORDER — BENZONATATE 200 MG/1
200 CAPSULE ORAL 3 TIMES DAILY PRN
Qty: 30 CAPSULE | Refills: 0 | Status: SHIPPED | OUTPATIENT
Start: 2018-01-01 | End: 2019-04-25 | Stop reason: ALTCHOICE

## 2018-01-01 RX ORDER — PROMETHAZINE HYDROCHLORIDE AND DEXTROMETHORPHAN HYDROBROMIDE 6.25; 15 MG/5ML; MG/5ML
5 SYRUP ORAL EVERY 6 HOURS PRN
Qty: 120 ML | Refills: 0 | Status: SHIPPED | OUTPATIENT
Start: 2018-01-01 | End: 2019-02-18

## 2018-01-01 NOTE — PROGRESS NOTES
"Subjective:       Patient ID: Kay Rosas is a 72 y.o. female.    Chief Complaint: Sinusitis and Cough    Cough   This is a new problem. The current episode started in the past 7 days (3 days). The problem has been unchanged. The problem occurs constantly. The cough is non-productive. Associated symptoms include chills, nasal congestion, postnasal drip, rhinorrhea, a sore throat and sweats. Pertinent negatives include no chest pain, ear pain, fever, headaches, myalgias, rash, shortness of breath or wheezing. Nothing aggravates the symptoms. Risk factors: remote hx. Treatments tried: benadryl. The treatment provided no relief. There is no history of asthma or COPD.     Review of Systems   Constitutional: Positive for chills and fatigue. Negative for fever.   HENT: Positive for congestion, postnasal drip, rhinorrhea and sore throat. Negative for ear discharge, ear pain, sinus pressure and sneezing.    Eyes: Negative for pain and discharge.   Respiratory: Positive for cough. Negative for shortness of breath and wheezing.    Cardiovascular: Negative for chest pain and leg swelling.   Gastrointestinal: Negative for abdominal pain, nausea and vomiting.   Musculoskeletal: Negative for myalgias.   Skin: Negative for rash.   Neurological: Negative for headaches.       Objective:      BP (!) 144/82 (BP Location: Left arm, Patient Position: Sitting)   Pulse 94   Temp 99 °F (37.2 °C) (Tympanic)   Resp 12   Ht 5' 6" (1.676 m)   Wt 99.4 kg (219 lb 2.2 oz)   SpO2 95%   BMI 35.37 kg/m²   Physical Exam   Constitutional: She is oriented to person, place, and time. She appears well-developed and well-nourished. No distress.   HENT:   Head: Normocephalic and atraumatic.   Right Ear: Tympanic membrane, external ear and ear canal normal.   Left Ear: Tympanic membrane, external ear and ear canal normal.   Nose: Nose normal. Right sinus exhibits no maxillary sinus tenderness and no frontal sinus tenderness. Left sinus exhibits " no maxillary sinus tenderness and no frontal sinus tenderness.   Mouth/Throat: Oropharynx is clear and moist. No oropharyngeal exudate or posterior oropharyngeal erythema. No tonsillar exudate.   Eyes: Conjunctivae and EOM are normal. Pupils are equal, round, and reactive to light. Right eye exhibits no discharge. Left eye exhibits no discharge.   Neck: Normal range of motion. Neck supple.   Cardiovascular: Normal rate, regular rhythm, normal heart sounds and intact distal pulses.  Exam reveals no gallop and no friction rub.    No murmur heard.  Pulmonary/Chest: Effort normal and breath sounds normal. No stridor. No respiratory distress. She has no wheezes. She has no rales. She exhibits no tenderness.   Lymphadenopathy:     She has no cervical adenopathy.   Neurological: She is alert and oriented to person, place, and time. Coordination normal.   Skin: Skin is warm and dry. No rash noted. She is not diaphoretic. No erythema. No pallor.   Nursing note and vitals reviewed.      Assessment:       1. Acute non-recurrent sinusitis, unspecified location        Plan:       Acute non-recurrent sinusitis, unspecified location  -     promethazine-dextromethorphan (PROMETHAZINE-DM) 6.25-15 mg/5 mL Syrp; Take 5 mLs by mouth every 6 (six) hours as needed (congestion).  Dispense: 120 mL; Refill: 0  -     guaiFENesin 1,200 mg Ta12; Take 1 tablet by mouth 2 (two) times daily.  -     fluticasone (FLONASE) 50 mcg/actuation nasal spray; 1 spray by Each Nare route once daily.  -     benzonatate (TESSALON) 200 MG capsule; Take 1 capsule (200 mg total) by mouth 3 (three) times daily as needed for Cough.  Dispense: 30 capsule; Refill: 0  -     amoxicillin-clavulanate 875-125mg (AUGMENTIN) 875-125 mg per tablet; Take 1 tablet by mouth 2 (two) times daily.  Dispense: 14 tablet; Refill: 0    Likely viral URI, patient concerned about sinus infection given script for augmentin start in 2 days if no improvement after symptomatic  treatments.      Rest  Drink plenty of clear fluids--at least 64 ounces of water/juice  Normal saline nasal wash (example Ocean spray) to irrigate sinuses and for congestion/runny nose  Flonase or Nasonex to decrease inflammation  Tylenol or Ibuprofen for fever, headache and body aches  Mucinex to help thin secretions and reduce congestion  Tessalon pearls to help with cough during daytime  Promethazine-D to help with cough at night  Cool mist humidifier/vaporizer  Warm salt water gargles for throat comfort  Chloraseptic spray or lozenges for throat comfort  Warm tea with honey  Practice good handwashing      The cough associated with bronchitis can last for a long time, even as long as 2 weeks. However please see your PCP or go to ER if symptoms worsen, you develop fever, or begin to have shortness of breath.       Heather Trant PA-C [facility]

## 2018-01-01 NOTE — PATIENT INSTRUCTIONS
Sinusitis (No Antibiotics)    The sinuses are air-filled spaces within the bones of the face. They connect to the inside of the nose. Sinusitis is an inflammation of the tissue lining the sinus cavity. Sinus inflammation can occur during a cold. It can also be due to allergies to pollens and other particles in the air. It can cause symptoms such as sinus congestion, headache, sore throat, facial swelling and fullness. It may also cause a low-grade fever. No infection is present, and no antibiotic treatment is needed.  Home care  · Drink plenty of water, hot tea, and other liquids. This may help thin mucus. It also may promote sinus drainage.  · Heat may help soothe painful areas of the face. Use a towel soaked in hot water. Or,  the shower and direct the hot spray onto your face. Using a vaporizer along with a menthol rub at night may also help.   · An expectorant containing guaifenesin may help thin the mucus and promote drainage from the sinuses.  · Over-the-counter decongestants may be used unless a similar medicine was prescribed. Nasal sprays work the fastest. Use one that contains phenylephrine or oxymetazoline. First blow the nose gently. Then use the spray. Do not use these medicines more often than directed on the label or symptoms may get worse. You may also use tablets containing pseudoephedrine. Avoid products that combine ingredients, because side effects may be increased. Read labels. You can also ask the pharmacist for help. (NOTE: Persons with high blood pressure should not use decongestants. They can raise blood pressure.)  · Over-the-counter antihistamines may help if allergies contributed to your sinusitis.    · Use acetaminophen or ibuprofen to control pain, unless another pain medicine was prescribed. (If you have chronic liver or kidney disease or ever had a stomach ulcer, talk with your doctor before using these medicines. Aspirin should never be used in anyone under 18 years of age  who is ill with a fever. It may cause severe liver damage.)  · Use nasal rinses or irrigation as instructed by your health care provider.  · Don't smoke. This can worsen symptoms.  Follow-up care  Follow up with your healthcare provider or our staff if you are not improving within the next week.  When to seek medical advice  Call your healthcare provider if any of these occur:  · Green or yellow discharge from the nose or into the throat  · Facial pain or headache becoming more severe  · Stiff neck  · Unusual drowsiness or confusion  · Swelling of the forehead or eyelids  · Vision problems, including blurred or double vision  · Fever of 100.4ºF (38ºC) or higher, or as directed by your healthcare provider  · Seizure  · Breathing problems  · Symptoms not resolving within 10 days  Date Last Reviewed: 4/13/2015  © 3647-2700 [employer]. [address] [place] [address]. All rights reserved. This information is not intended as a substitute for professional medical care. Always follow your healthcare professional's instructions.        Rest  Drink plenty of clear fluids--at least 64 ounces of water/juice  Normal saline nasal wash (example Ocean spray) to irrigate sinuses and for congestion/runny nose  Flonase or Nasonex to decrease inflammation  Tylenol or Ibuprofen for fever, headache and body aches  Mucinex to help thin secretions and reduce congestion  Tessalon pearls to help with cough during daytime  Promethazine-D to help with cough at night  Cool mist humidifier/vaporizer  Warm salt water gargles for throat comfort  Chloraseptic spray or lozenges for throat comfort  Warm tea with honey  Practice good handwashing      The cough associated with bronchitis can last for a long time, even as long as 2 weeks. However please see your PCP or go to ER if symptoms worsen, you develop fever, or begin to have shortness of breath.

## 2018-01-01 NOTE — TELEPHONE ENCOUNTER
Reason for Disposition   Caller requesting a NON-URGENT new prescription or refill and triager unable to refill per unit policy    Protocols used: ST MEDICATION QUESTION CALL-A-    Requesting medication for sinus infection.  Advised the clinics are closed today. Hours for urgent care provided.  Please contact Kay to advise in AM.

## 2018-01-29 ENCOUNTER — LAB VISIT (OUTPATIENT)
Dept: LAB | Facility: HOSPITAL | Age: 73
End: 2018-01-29
Attending: FAMILY MEDICINE
Payer: MEDICARE

## 2018-01-29 ENCOUNTER — TELEPHONE (OUTPATIENT)
Dept: INTERNAL MEDICINE | Facility: CLINIC | Age: 73
End: 2018-01-29

## 2018-01-29 ENCOUNTER — OFFICE VISIT (OUTPATIENT)
Dept: INTERNAL MEDICINE | Facility: CLINIC | Age: 73
End: 2018-01-29
Payer: MEDICARE

## 2018-01-29 VITALS
BODY MASS INDEX: 35.29 KG/M2 | HEIGHT: 66 IN | WEIGHT: 219.56 LBS | DIASTOLIC BLOOD PRESSURE: 74 MMHG | SYSTOLIC BLOOD PRESSURE: 124 MMHG | TEMPERATURE: 96 F | HEART RATE: 69 BPM

## 2018-01-29 DIAGNOSIS — E03.9 ACQUIRED HYPOTHYROIDISM: ICD-10-CM

## 2018-01-29 DIAGNOSIS — E11.9 CONTROLLED TYPE 2 DIABETES MELLITUS WITHOUT COMPLICATION, WITHOUT LONG-TERM CURRENT USE OF INSULIN: Primary | ICD-10-CM

## 2018-01-29 DIAGNOSIS — J01.00 ACUTE NON-RECURRENT MAXILLARY SINUSITIS: ICD-10-CM

## 2018-01-29 DIAGNOSIS — E08.41 DIABETIC MONONEUROPATHY ASSOCIATED WITH DIABETES MELLITUS DUE TO UNDERLYING CONDITION: ICD-10-CM

## 2018-01-29 DIAGNOSIS — H61.23 BILATERAL IMPACTED CERUMEN: ICD-10-CM

## 2018-01-29 DIAGNOSIS — E11.9 CONTROLLED TYPE 2 DIABETES MELLITUS WITHOUT COMPLICATION, WITHOUT LONG-TERM CURRENT USE OF INSULIN: ICD-10-CM

## 2018-01-29 LAB
ESTIMATED AVG GLUCOSE: 105 MG/DL
HBA1C MFR BLD HPLC: 5.3 %
T4 FREE SERPL-MCNC: 1.21 NG/DL
TSH SERPL DL<=0.005 MIU/L-ACNC: 1.32 UIU/ML
VIT B12 SERPL-MCNC: 401 PG/ML

## 2018-01-29 PROCEDURE — 83036 HEMOGLOBIN GLYCOSYLATED A1C: CPT

## 2018-01-29 PROCEDURE — 36415 COLL VENOUS BLD VENIPUNCTURE: CPT | Mod: PO

## 2018-01-29 PROCEDURE — 82607 VITAMIN B-12: CPT

## 2018-01-29 PROCEDURE — 84443 ASSAY THYROID STIM HORMONE: CPT

## 2018-01-29 PROCEDURE — 99499 UNLISTED E&M SERVICE: CPT | Mod: S$GLB,,, | Performed by: FAMILY MEDICINE

## 2018-01-29 PROCEDURE — 99214 OFFICE O/P EST MOD 30 MIN: CPT | Mod: S$GLB,,, | Performed by: FAMILY MEDICINE

## 2018-01-29 PROCEDURE — 84439 ASSAY OF FREE THYROXINE: CPT

## 2018-01-29 PROCEDURE — 99999 PR PBB SHADOW E&M-EST. PATIENT-LVL III: CPT | Mod: PBBFAC,,, | Performed by: FAMILY MEDICINE

## 2018-01-29 RX ORDER — METHYLPREDNISOLONE 4 MG/1
TABLET ORAL
Qty: 1 PACKAGE | Refills: 0 | Status: SHIPPED | OUTPATIENT
Start: 2018-01-29 | End: 2018-02-05

## 2018-01-29 NOTE — PROGRESS NOTES
Subjective:       Patient ID: Kay Rosas is a 72 y.o. female.    Chief Complaint: Sinus Problem    Pt is a 72 year old who has complained of sinus drip that is just not going away.      Sinus Problem   This is a new problem. The current episode started in the past 7 days. The problem is unchanged. There has been no fever. Associated symptoms include congestion and sinus pressure. Pertinent negatives include no ear pain, headaches, shortness of breath or swollen glands. Past treatments include nothing. The treatment provided no relief.     Review of Systems   HENT: Positive for congestion and sinus pressure. Negative for ear pain.    Respiratory: Negative.  Negative for shortness of breath.    Cardiovascular: Negative.    Neurological: Negative for headaches.   Psychiatric/Behavioral: Negative.        Objective:      Physical Exam   Constitutional: She appears well-developed and well-nourished.   HENT:   Right Ear: Tympanic membrane is erythematous. No middle ear effusion.   Left Ear:  No middle ear effusion.   Nose: Mucosal edema and rhinorrhea present.   Mouth/Throat: Posterior oropharyngeal erythema present.   Cardiovascular: Normal rate and regular rhythm.    Pulmonary/Chest: Effort normal and breath sounds normal.       Assessment:       1. Controlled type 2 diabetes mellitus without complication, without long-term current use of insulin    2. Acute non-recurrent maxillary sinusitis    3. Acquired hypothyroidism    4. Diabetic mononeuropathy associated with diabetes mellitus due to underlying condition        Plan:       Controlled type 2 diabetes mellitus without complication, without long-term current use of insulin  Comments:  Will recheck her HgA1C  Orders:  -     Hemoglobin A1c; Future; Expected date: 01/29/2018  -     Vitamin B12; Future; Expected date: 01/29/2018    Acute non-recurrent maxillary sinusitis  Comments:  Stable but will start pt on medrol dose pack.    Acquired  hypothyroidism  Comments:  Will recheck her thyroid  Orders:  -     TSH; Future; Expected date: 01/29/2018  -     T4, free; Future; Expected date: 01/29/2018    Diabetic mononeuropathy associated with diabetes mellitus due to underlying condition  Comments:  stable    Other orders  -     methylPREDNISolone (MEDROL DOSEPACK) 4 mg tablet; use as directed  Dispense: 1 Package; Refill: 0

## 2018-01-29 NOTE — PROGRESS NOTES
Patient, Kay Rosas (MRN #[id number]), presented with a recorded BMI of 35.44 kg/m^2 and a documented comorbidity(s):  - Diabetes Mellitus Type 2  to which the severe obesity is a contributing factor. This is consistent with the definition of severe obesity (BMI 35.0-35.9) with comorbidity (ICD-10 E66.01, Z68.35). The patient's severe obesity was monitored, evaluated, addressed and/or treated. This addendum to the medical record is made on 01/29/2018.

## 2018-02-05 ENCOUNTER — OFFICE VISIT (OUTPATIENT)
Dept: OTOLARYNGOLOGY | Facility: CLINIC | Age: 73
End: 2018-02-05
Payer: MEDICARE

## 2018-02-05 VITALS
SYSTOLIC BLOOD PRESSURE: 130 MMHG | RESPIRATION RATE: 16 BRPM | HEIGHT: 66 IN | DIASTOLIC BLOOD PRESSURE: 64 MMHG | WEIGHT: 220.88 LBS | TEMPERATURE: 97 F | BODY MASS INDEX: 35.5 KG/M2

## 2018-02-05 DIAGNOSIS — J30.89 CHRONIC NON-SEASONAL ALLERGIC RHINITIS, UNSPECIFIED TRIGGER: ICD-10-CM

## 2018-02-05 DIAGNOSIS — H61.22 IMPACTED CERUMEN OF LEFT EAR: Primary | ICD-10-CM

## 2018-02-05 PROCEDURE — 69210 REMOVE IMPACTED EAR WAX UNI: CPT | Mod: S$GLB,,, | Performed by: OTOLARYNGOLOGY

## 2018-02-05 PROCEDURE — 3008F BODY MASS INDEX DOCD: CPT | Mod: S$GLB,,, | Performed by: OTOLARYNGOLOGY

## 2018-02-05 PROCEDURE — 1159F MED LIST DOCD IN RCRD: CPT | Mod: S$GLB,,, | Performed by: OTOLARYNGOLOGY

## 2018-02-05 PROCEDURE — 99999 PR PBB SHADOW E&M-EST. PATIENT-LVL IV: CPT | Mod: PBBFAC,,, | Performed by: OTOLARYNGOLOGY

## 2018-02-05 PROCEDURE — 99203 OFFICE O/P NEW LOW 30 MIN: CPT | Mod: 25,S$GLB,, | Performed by: OTOLARYNGOLOGY

## 2018-02-05 PROCEDURE — 1126F AMNT PAIN NOTED NONE PRSNT: CPT | Mod: S$GLB,,, | Performed by: OTOLARYNGOLOGY

## 2018-02-05 RX ORDER — LEVOCETIRIZINE DIHYDROCHLORIDE 5 MG/1
5 TABLET, FILM COATED ORAL NIGHTLY
Qty: 30 TABLET | Refills: 11 | Status: SHIPPED | OUTPATIENT
Start: 2018-02-05 | End: 2019-01-23 | Stop reason: SDUPTHER

## 2018-02-05 RX ORDER — AZELASTINE 1 MG/ML
2 SPRAY, METERED NASAL 2 TIMES DAILY
Qty: 30 ML | Refills: 11 | Status: SHIPPED | OUTPATIENT
Start: 2018-02-05 | End: 2021-02-01 | Stop reason: SDUPTHER

## 2018-02-05 NOTE — LETTER
February 5, 2018      Norris Swann MD  [address]  [place] [address]           [facility]  [address] [place] [address]  Phone: [phone]  Fax: [phone]          Patient: Kay Rosas   MR Number: [id number]   YOB: 1945   Date of Visit: 2/5/2018       Dear Dr. Norris Swann:    Thank you for referring Kay Rosas to me for evaluation. Attached you will find relevant portions of my assessment and plan of care.    If you have questions, please do not hesitate to call me. I look forward to following Kay Rosas along with you.    Sincerely,    Capo Grant MD    Enclosure  CC:  No Recipients    If you would like to receive this communication electronically, please contact [email or web address] or [phone] to request more information on [employer] Link access.    For providers and/or their staff who would like to refer a patient to [facility], please contact us through our one-stop-shop provider referral line, [facility] , at [phone].    If you feel you have received this communication in error or would no longer like to receive these types of communications, please e-mail [email or web address]

## 2018-02-05 NOTE — PROGRESS NOTES
Referring Provider:    Norris Swann Md  [address] [place] [address]  Subjective:   Patient: Kay Rosas [id number], :1945   Visit date:2018 2:43 PM    Chief Complaint:  Other    HPI:  Kay is a 72 y.o. female who I was asked to see in consultation for evaluation of the following issue(s):     Allergy  History of AR.  Was on SCIT but no longer.  Uses flonase.  Watery drainage.  Bilateral congestion.  No facial pressure or pain.  Not on oral antihistamines.  Worse with weather changes.    Ear-  Left ear pressure/discomfort. No change in hearing.  2 weeks.     Review of Systems:  -     Allergic/Immunologic: has No Known Allergies..  -     Constitutional: Current temp: 96.9 °F (36.1 °C) (Tympanic)      Her meds, allergies, medical, surgical, social & family histories were reviewed & updated:  -     She has a current medication list which includes the following prescription(s): albuterol, atorvastatin, fluticasone, gabapentin, levothyroxine, losartan-hydrochlorothiazide 100-25 mg, meloxicam, metformin, pantoprazole, sertraline, trazodone, azelastine, benzonatate, hyoscyamine, levocetirizine, and promethazine-dextromethorphan.  -     She  has a past medical history of Arthritis; Degenerative disc disease, cervical; Diabetes mellitus, type 2; Emphysema of lung; MVP (mitral valve prolapse); Osteoporosis; and Thyroid condition.   -     She  does not have any pertinent problems on file.   -     She  has a past surgical history that includes cataract surgery  (Bilateral, 10/ 2012); elbow spur removed (Left); Hysterectomy (Left, ); Ovarian cyst removal; Vascular surgery (Right); Back surgery (); and Oophorectomy.  -     She  reports that she quit smoking about 2 years ago. She has never used smokeless tobacco. She reports that she does not drink alcohol or use drugs.  -     Her family history includes Alzheimer's disease in her sister; Arthritis in her mother; Breast cancer in her mother;  "Cancer in her father and sister; Heart disease in her mother; Kidney disease in her mother; Ovarian cancer in her sister.  -     She has No Known Allergies.    Objective:     Physical Exam:  Vitals:  /64 (BP Location: Right arm, Patient Position: Sitting)   Temp 96.9 °F (36.1 °C) (Tympanic)   Resp 16   Ht 5' 6" (1.676 m)   Wt 100.2 kg (220 lb 14.4 oz)   BMI 35.65 kg/m²   General appearance:  Well developed, well nourished    Eyes:  Extraocular motions intact, PERRL    Communication:  no hoarseness, no dysphonia    Ears:  Left EAC completely filled with cerumen.  Right EAC and TM wnl.  Nose:  No masses/lesions of external nose, nasal mucosa, septum, and turbinates were within normal limits.  Mouth:  No mass/lesion of lips, teeth, gums, hard/soft palate, tongue, tonsils, or oropharynx.    Cardiovascular:  No pedal edema; Radial Pulses +2     Neck & Lymphatics:  No cervical lymphadenopathy, no neck mass/crepitus/ asymmetry, trachea is midline, no thyroid enlargement/tenderness/mass.    Psych: Oriented x3,  Alert with normal mood and affect.     Respiration/Chest:  Symmetric expansion during respiration, normal respiratory effort.    Skin:  Warm and intact. No ulcerations of face, scalp, neck.      Assessment & Plan:       Wax removed, instructions given  AR- trial of astelin and xyzal    We discussed her medical conditions, treatments and plan.  Kay should return to clinic if any issues arise (symptoms worsen or persist), otherwise we will see her back in the clinic only as needed.      Thank you for allowing me to participate in the care of Kay.    Capo Grant MD      Patient: Kay Rosas [id number], :1945  Procedure date:2018  Patient's medications, allergies, past medical, surgical, social and family histories were reviewed and updated as appropriate.  Chief Complaint:  Other    HPI:  Kay is a 72 y.o. female with the history of present illness as discussed in the clinic note from " today.  During this unrelated patient encounter I observed impacted cerumen.  The otoscopic examination of the tympanic membrane was not possible due to copious cerumen impaction.      Procedure: The patient was in agreement with the examination and debridement of the ears. Removal of the cerumen required a high level of expertise and use of an operating microscope and multiple micro-instruments.     With the patient in the supine position, we used the operating microscope to examine both ears with the appropriate sized ear speculum.  A variety of sterile, micro-instruments were utilized to remove the cerumen atraumatically.  I performed the procedure which required a significant amount of time and effort. The tympanic membrane was then well visualized.  The patient tolerated the procedure well and there were no complications.    Findings:   Right ear had little wax, the EAC was normal, and the tympanic membrane was intact with no evidence of middle ear fluid.    Left ear had significant wax, the EAC was normal, and the tympanic membrane was intact with no evidence of middle ear fluid.

## 2018-03-06 ENCOUNTER — PATIENT OUTREACH (OUTPATIENT)
Dept: ADMINISTRATIVE | Facility: HOSPITAL | Age: 73
End: 2018-03-06

## 2018-03-06 RX ORDER — GABAPENTIN 300 MG/1
CAPSULE ORAL
Qty: 90 CAPSULE | Refills: 3 | Status: SHIPPED | OUTPATIENT
Start: 2018-03-06 | End: 2018-07-21 | Stop reason: SDUPTHER

## 2018-03-06 RX ORDER — PANTOPRAZOLE SODIUM 40 MG/1
TABLET, DELAYED RELEASE ORAL
Qty: 30 TABLET | Refills: 6 | Status: SHIPPED | OUTPATIENT
Start: 2018-03-06 | End: 2018-10-13 | Stop reason: SDUPTHER

## 2018-03-06 NOTE — PROGRESS NOTES
Attempted to schedule diabetic eye exam and other health maintenance. Patient not available, left voicemail.

## 2018-03-09 DIAGNOSIS — E11.9 TYPE 2 DIABETES MELLITUS WITHOUT COMPLICATION: ICD-10-CM

## 2018-04-13 RX ORDER — METFORMIN HYDROCHLORIDE 1000 MG/1
TABLET ORAL
Qty: 180 TABLET | Refills: 2 | Status: SHIPPED | OUTPATIENT
Start: 2018-04-13 | End: 2019-01-02 | Stop reason: SDUPTHER

## 2018-04-30 PROBLEM — J01.00 ACUTE NON-RECURRENT MAXILLARY SINUSITIS: Status: RESOLVED | Noted: 2018-01-29 | Resolved: 2018-04-30

## 2018-04-30 RX ORDER — SERTRALINE HYDROCHLORIDE 100 MG/1
100 TABLET, FILM COATED ORAL DAILY
Qty: 30 TABLET | Refills: 6 | Status: SHIPPED | OUTPATIENT
Start: 2018-04-30 | End: 2018-11-10 | Stop reason: SDUPTHER

## 2018-04-30 RX ORDER — TRAZODONE HYDROCHLORIDE 100 MG/1
TABLET ORAL
Qty: 30 TABLET | Refills: 6 | Status: SHIPPED | OUTPATIENT
Start: 2018-04-30 | End: 2018-11-13 | Stop reason: SDUPTHER

## 2018-05-03 RX ORDER — LEVOTHYROXINE SODIUM 50 UG/1
50 TABLET ORAL DAILY
Qty: 90 TABLET | Refills: 3 | Status: SHIPPED | OUTPATIENT
Start: 2018-05-03 | End: 2019-04-22 | Stop reason: SDUPTHER

## 2018-05-06 RX ORDER — TIZANIDINE 4 MG/1
4 TABLET ORAL NIGHTLY
Qty: 90 TABLET | Refills: 1 | Status: SHIPPED | OUTPATIENT
Start: 2018-05-06 | End: 2018-10-26 | Stop reason: SDUPTHER

## 2018-07-02 RX ORDER — MELOXICAM 15 MG/1
15 TABLET ORAL DAILY
Qty: 90 TABLET | Refills: 3 | Status: SHIPPED | OUTPATIENT
Start: 2018-07-02 | End: 2019-08-15

## 2018-07-23 RX ORDER — GABAPENTIN 300 MG/1
CAPSULE ORAL
Qty: 90 CAPSULE | Refills: 3 | Status: SHIPPED | OUTPATIENT
Start: 2018-07-23 | End: 2018-11-10 | Stop reason: SDUPTHER

## 2018-08-17 DIAGNOSIS — E11.9 TYPE 2 DIABETES MELLITUS WITHOUT COMPLICATION: ICD-10-CM

## 2018-10-15 RX ORDER — PANTOPRAZOLE SODIUM 40 MG/1
TABLET, DELAYED RELEASE ORAL
Qty: 30 TABLET | Refills: 6 | Status: SHIPPED | OUTPATIENT
Start: 2018-10-15 | End: 2019-05-14 | Stop reason: SDUPTHER

## 2018-10-15 RX ORDER — ATORVASTATIN CALCIUM 20 MG/1
20 TABLET, FILM COATED ORAL DAILY
Qty: 90 TABLET | Refills: 3 | Status: SHIPPED | OUTPATIENT
Start: 2018-10-15 | End: 2019-08-15

## 2018-10-26 DIAGNOSIS — E11.9 TYPE 2 DIABETES MELLITUS WITHOUT COMPLICATION: ICD-10-CM

## 2018-10-26 RX ORDER — TIZANIDINE 4 MG/1
4 TABLET ORAL NIGHTLY
Qty: 90 TABLET | Refills: 1 | Status: SHIPPED | OUTPATIENT
Start: 2018-10-26 | End: 2018-11-05

## 2018-11-10 RX ORDER — GABAPENTIN 300 MG/1
CAPSULE ORAL
Qty: 90 CAPSULE | Refills: 3 | Status: SHIPPED | OUTPATIENT
Start: 2018-11-10 | End: 2019-09-13

## 2018-11-10 RX ORDER — SERTRALINE HYDROCHLORIDE 100 MG/1
100 TABLET, FILM COATED ORAL DAILY
Qty: 30 TABLET | Refills: 6 | Status: SHIPPED | OUTPATIENT
Start: 2018-11-10 | End: 2021-02-01 | Stop reason: SDUPTHER

## 2018-11-13 RX ORDER — TRAZODONE HYDROCHLORIDE 100 MG/1
TABLET ORAL
Qty: 30 TABLET | Refills: 6 | Status: SHIPPED | OUTPATIENT
Start: 2018-11-13 | End: 2020-02-13 | Stop reason: SDUPTHER

## 2018-11-27 RX ORDER — LOSARTAN POTASSIUM AND HYDROCHLOROTHIAZIDE 25; 100 MG/1; MG/1
1 TABLET ORAL DAILY
Qty: 90 TABLET | Refills: 3 | Status: SHIPPED | OUTPATIENT
Start: 2018-11-27 | End: 2019-11-20 | Stop reason: SDUPTHER

## 2019-01-02 RX ORDER — METFORMIN HYDROCHLORIDE 1000 MG/1
TABLET ORAL
Qty: 180 TABLET | Refills: 2 | Status: SHIPPED | OUTPATIENT
Start: 2019-01-02 | End: 2020-02-07

## 2019-01-24 RX ORDER — LEVOCETIRIZINE DIHYDROCHLORIDE 5 MG/1
5 TABLET, FILM COATED ORAL NIGHTLY
Qty: 30 TABLET | Refills: 11 | Status: SHIPPED | OUTPATIENT
Start: 2019-01-24 | End: 2019-03-13 | Stop reason: SDUPTHER

## 2019-02-18 ENCOUNTER — LAB VISIT (OUTPATIENT)
Dept: LAB | Facility: HOSPITAL | Age: 74
End: 2019-02-18
Attending: FAMILY MEDICINE
Payer: MEDICARE

## 2019-02-18 ENCOUNTER — OFFICE VISIT (OUTPATIENT)
Dept: INTERNAL MEDICINE | Facility: CLINIC | Age: 74
End: 2019-02-18
Payer: MEDICARE

## 2019-02-18 VITALS
WEIGHT: 234.13 LBS | SYSTOLIC BLOOD PRESSURE: 134 MMHG | TEMPERATURE: 98 F | HEART RATE: 105 BPM | DIASTOLIC BLOOD PRESSURE: 78 MMHG | HEIGHT: 66 IN | BODY MASS INDEX: 37.63 KG/M2

## 2019-02-18 DIAGNOSIS — E03.9 ACQUIRED HYPOTHYROIDISM: ICD-10-CM

## 2019-02-18 DIAGNOSIS — E78.00 PURE HYPERCHOLESTEROLEMIA: ICD-10-CM

## 2019-02-18 DIAGNOSIS — I10 ESSENTIAL HYPERTENSION: ICD-10-CM

## 2019-02-18 DIAGNOSIS — E11.9 CONTROLLED TYPE 2 DIABETES MELLITUS WITHOUT COMPLICATION, WITHOUT LONG-TERM CURRENT USE OF INSULIN: ICD-10-CM

## 2019-02-18 DIAGNOSIS — E08.41 DIABETIC MONONEUROPATHY ASSOCIATED WITH DIABETES MELLITUS DUE TO UNDERLYING CONDITION: Primary | ICD-10-CM

## 2019-02-18 DIAGNOSIS — R39.15 URINARY URGENCY: ICD-10-CM

## 2019-02-18 DIAGNOSIS — N30.00 ACUTE CYSTITIS WITHOUT HEMATURIA: ICD-10-CM

## 2019-02-18 DIAGNOSIS — E08.41 DIABETIC MONONEUROPATHY ASSOCIATED WITH DIABETES MELLITUS DUE TO UNDERLYING CONDITION: ICD-10-CM

## 2019-02-18 LAB
BASOPHILS # BLD AUTO: 0.04 K/UL
BASOPHILS NFR BLD: 0.7 %
DIFFERENTIAL METHOD: ABNORMAL
EOSINOPHIL # BLD AUTO: 0.1 K/UL
EOSINOPHIL NFR BLD: 2.4 %
ERYTHROCYTE [DISTWIDTH] IN BLOOD BY AUTOMATED COUNT: 16.5 %
ESTIMATED AVG GLUCOSE: 114 MG/DL
HBA1C MFR BLD HPLC: 5.6 %
HCT VFR BLD AUTO: 33.5 %
HGB BLD-MCNC: 10.1 G/DL
IMM GRANULOCYTES # BLD AUTO: 0.01 K/UL
IMM GRANULOCYTES NFR BLD AUTO: 0.2 %
LYMPHOCYTES # BLD AUTO: 2.2 K/UL
LYMPHOCYTES NFR BLD: 38.4 %
MCH RBC QN AUTO: 27 PG
MCHC RBC AUTO-ENTMCNC: 30.1 G/DL
MCV RBC AUTO: 90 FL
MONOCYTES # BLD AUTO: 0.4 K/UL
MONOCYTES NFR BLD: 7.2 %
NEUTROPHILS # BLD AUTO: 3 K/UL
NEUTROPHILS NFR BLD: 51.1 %
NRBC BLD-RTO: 0 /100 WBC
PLATELET # BLD AUTO: 233 K/UL
PMV BLD AUTO: 11.2 FL
RBC # BLD AUTO: 3.74 M/UL
T4 FREE SERPL-MCNC: 0.98 NG/DL
TSH SERPL DL<=0.005 MIU/L-ACNC: 1.84 UIU/ML
WBC # BLD AUTO: 5.83 K/UL

## 2019-02-18 PROCEDURE — 1101F PT FALLS ASSESS-DOCD LE1/YR: CPT | Mod: CPTII,S$GLB,, | Performed by: FAMILY MEDICINE

## 2019-02-18 PROCEDURE — 99214 PR OFFICE/OUTPT VISIT, EST, LEVL IV, 30-39 MIN: ICD-10-PCS | Mod: S$GLB,,, | Performed by: FAMILY MEDICINE

## 2019-02-18 PROCEDURE — 3075F PR MOST RECENT SYSTOLIC BLOOD PRESS GE 130-139MM HG: ICD-10-PCS | Mod: CPTII,S$GLB,, | Performed by: FAMILY MEDICINE

## 2019-02-18 PROCEDURE — 3078F DIAST BP <80 MM HG: CPT | Mod: CPTII,S$GLB,, | Performed by: FAMILY MEDICINE

## 2019-02-18 PROCEDURE — 99999 PR PBB SHADOW E&M-EST. PATIENT-LVL III: ICD-10-PCS | Mod: PBBFAC,,, | Performed by: FAMILY MEDICINE

## 2019-02-18 PROCEDURE — 99499 RISK ADDL DX/OHS AUDIT: ICD-10-PCS | Mod: S$GLB,,, | Performed by: FAMILY MEDICINE

## 2019-02-18 PROCEDURE — 99499 UNLISTED E&M SERVICE: CPT | Mod: S$GLB,,, | Performed by: FAMILY MEDICINE

## 2019-02-18 PROCEDURE — 80053 COMPREHEN METABOLIC PANEL: CPT

## 2019-02-18 PROCEDURE — 84443 ASSAY THYROID STIM HORMONE: CPT

## 2019-02-18 PROCEDURE — 3075F SYST BP GE 130 - 139MM HG: CPT | Mod: CPTII,S$GLB,, | Performed by: FAMILY MEDICINE

## 2019-02-18 PROCEDURE — 99999 PR PBB SHADOW E&M-EST. PATIENT-LVL III: CPT | Mod: PBBFAC,,, | Performed by: FAMILY MEDICINE

## 2019-02-18 PROCEDURE — 1101F PR PT FALLS ASSESS DOC 0-1 FALLS W/OUT INJ PAST YR: ICD-10-PCS | Mod: CPTII,S$GLB,, | Performed by: FAMILY MEDICINE

## 2019-02-18 PROCEDURE — 3078F PR MOST RECENT DIASTOLIC BLOOD PRESSURE < 80 MM HG: ICD-10-PCS | Mod: CPTII,S$GLB,, | Performed by: FAMILY MEDICINE

## 2019-02-18 PROCEDURE — 36415 COLL VENOUS BLD VENIPUNCTURE: CPT

## 2019-02-18 PROCEDURE — 84439 ASSAY OF FREE THYROXINE: CPT

## 2019-02-18 PROCEDURE — 85025 COMPLETE CBC W/AUTO DIFF WBC: CPT

## 2019-02-18 PROCEDURE — 82607 VITAMIN B-12: CPT

## 2019-02-18 PROCEDURE — 83036 HEMOGLOBIN GLYCOSYLATED A1C: CPT

## 2019-02-18 PROCEDURE — 80061 LIPID PANEL: CPT

## 2019-02-18 PROCEDURE — 99214 OFFICE O/P EST MOD 30 MIN: CPT | Mod: S$GLB,,, | Performed by: FAMILY MEDICINE

## 2019-02-18 RX ORDER — ARIPIPRAZOLE 2 MG/1
2 TABLET ORAL DAILY
Qty: 30 TABLET | Refills: 0 | Status: SHIPPED | OUTPATIENT
Start: 2019-02-18 | End: 2019-03-13 | Stop reason: SDUPTHER

## 2019-02-18 NOTE — PROGRESS NOTES
Subjective:       Patient ID: Kay Rosas is a 73 y.o. female.    Chief Complaint: Urinary Tract Infection and Foot Pain    F/U:      Pt is a 73 year old who has multiple medical issues of which all are controlled. Pt is a controlled HgA1C and feels low energy and is just not doing good nutrition.       Review of Systems   Constitutional: Negative.    Respiratory: Negative.    Cardiovascular: Negative.    Genitourinary: Negative.    Musculoskeletal: Negative.    Neurological: Negative.    Hematological: Negative.    Psychiatric/Behavioral: Negative.        Objective:      Physical Exam   Constitutional: She is oriented to person, place, and time. She appears well-developed and well-nourished.   Cardiovascular: Normal rate and regular rhythm. Exam reveals no friction rub.   No murmur heard.  Pulmonary/Chest: Effort normal and breath sounds normal. She has no wheezes.   Neurological: She is alert and oriented to person, place, and time.   Skin: Skin is warm and dry.       Assessment:       1. Diabetic mononeuropathy associated with diabetes mellitus due to underlying condition    2. Essential hypertension    3. Pure hypercholesterolemia    4. Controlled type 2 diabetes mellitus without complication, without long-term current use of insulin    5. Acquired hypothyroidism    6. Acute cystitis without hematuria        Plan:       Diabetic mononeuropathy associated with diabetes mellitus due to underlying condition  Comments:  Will continue with gabapentin and check her HgA1C  Orders:  -     Comprehensive metabolic panel; Future; Expected date: 02/18/2019  -     Hemoglobin A1c; Future; Expected date: 02/18/2019  -     Microalbumin/creatinine urine ratio; Future; Expected date: 02/18/2019    Essential hypertension  Comments:  BP is well controlled  Orders:  -     CBC auto differential; Future; Expected date: 02/18/2019    Pure hypercholesterolemia  Comments:  Will do Lipid panel.   Orders:  -     Lipid panel; Future;  Expected date: 02/18/2019    Controlled type 2 diabetes mellitus without complication, without long-term current use of insulin  Comments:  Will check  Orders:  -     Vitamin B12; Future; Expected date: 02/18/2019    Acquired hypothyroidism  Comments:  Will get TSH and free T4  Orders:  -     TSH; Future; Expected date: 02/18/2019  -     T4, free; Future; Expected date: 02/18/2019    Acute cystitis without hematuria  Comments:  Will UA and culture  Orders:  -     Urine culture; Future; Expected date: 02/18/2019  -     Urinalysis; Future; Expected date: 02/18/2019    Other orders  -     ARIPiprazole (ABILIFY) 2 MG Tab; Take 1 tablet (2 mg total) by mouth once daily.  Dispense: 30 tablet; Refill: 0

## 2019-02-19 LAB
ALBUMIN SERPL BCP-MCNC: 3.8 G/DL
ALP SERPL-CCNC: 80 U/L
ALT SERPL W/O P-5'-P-CCNC: 14 U/L
ANION GAP SERPL CALC-SCNC: 11 MMOL/L
AST SERPL-CCNC: 20 U/L
BILIRUB SERPL-MCNC: 0.3 MG/DL
BUN SERPL-MCNC: 27 MG/DL
CALCIUM SERPL-MCNC: 9.8 MG/DL
CHLORIDE SERPL-SCNC: 108 MMOL/L
CHOLEST SERPL-MCNC: 172 MG/DL
CHOLEST/HDLC SERPL: 3 {RATIO}
CO2 SERPL-SCNC: 24 MMOL/L
CREAT SERPL-MCNC: 1.6 MG/DL
EST. GFR  (AFRICAN AMERICAN): 36.8 ML/MIN/1.73 M^2
EST. GFR  (NON AFRICAN AMERICAN): 32 ML/MIN/1.73 M^2
GLUCOSE SERPL-MCNC: 100 MG/DL
HDLC SERPL-MCNC: 57 MG/DL
HDLC SERPL: 33.1 %
LDLC SERPL CALC-MCNC: 85.2 MG/DL
NONHDLC SERPL-MCNC: 115 MG/DL
POTASSIUM SERPL-SCNC: 4.8 MMOL/L
PROT SERPL-MCNC: 7.3 G/DL
SODIUM SERPL-SCNC: 143 MMOL/L
TRIGL SERPL-MCNC: 149 MG/DL
VIT B12 SERPL-MCNC: 283 PG/ML

## 2019-02-19 NOTE — PROGRESS NOTES
Patient, Kay Rosas (MRN #[id number]), presented with a recent Estimated Glumerular Filtration Rate (EGFR) between 30 and 45 consistent with the definition of chronic kidney disease stage 3 - moderate (ICD10 - N18.3).    eGFR if non    Date Value Ref Range Status   02/18/2019 32.0 (A) >60 mL/min/1.73 m^2 Final     Comment:     Calculation used to obtain the estimated glomerular filtration  rate (eGFR) is the CKD-EPI equation.          The patient's chronic kidney disease stage 3 was monitored, evaluated, addressed and/or treated. This addendum to the medical record is made on 02/19/2019.

## 2019-02-19 NOTE — PROGRESS NOTES
Patient, Kay Rosas (MRN #[id number]), presented with a recorded BMI of 37.79 kg/m^2 and a documented comorbidity(s):  - Diabetes Mellitus Type 2  - Hypertension  - Hyperlipidemia  to which the severe obesity is a contributing factor. This is consistent with the definition of severe obesity (BMI 35.0-39.9) with comorbidity (ICD-10 E66.01, Z68.35). The patient's severe obesity was monitored, evaluated, addressed and/or treated. This addendum to the medical record is made on 02/19/2019.

## 2019-02-20 ENCOUNTER — CLINICAL SUPPORT (OUTPATIENT)
Dept: INTERNAL MEDICINE | Facility: CLINIC | Age: 74
End: 2019-02-20
Payer: MEDICARE

## 2019-02-20 DIAGNOSIS — E53.8 B12 DEFICIENCY: Primary | ICD-10-CM

## 2019-02-20 PROCEDURE — 96372 PR INJECTION,THERAP/PROPH/DIAG2ST, IM OR SUBCUT: ICD-10-PCS | Mod: S$GLB,,, | Performed by: FAMILY MEDICINE

## 2019-02-20 PROCEDURE — 99999 PR PBB SHADOW E&M-EST. PATIENT-LVL II: CPT | Mod: PBBFAC,,,

## 2019-02-20 PROCEDURE — 96372 THER/PROPH/DIAG INJ SC/IM: CPT | Mod: S$GLB,,, | Performed by: FAMILY MEDICINE

## 2019-02-20 PROCEDURE — 99999 PR PBB SHADOW E&M-EST. PATIENT-LVL II: ICD-10-PCS | Mod: PBBFAC,,,

## 2019-02-20 RX ORDER — CYANOCOBALAMIN 1000 UG/ML
1000 INJECTION, SOLUTION INTRAMUSCULAR; SUBCUTANEOUS ONCE
Status: COMPLETED | OUTPATIENT
Start: 2019-02-20 | End: 2019-02-20

## 2019-02-20 RX ADMIN — CYANOCOBALAMIN 1000 MCG: 1000 INJECTION, SOLUTION INTRAMUSCULAR; SUBCUTANEOUS at 01:02

## 2019-02-20 NOTE — PROGRESS NOTES
Patient presents to clinic for B-12 injection IM in the right Deltoid. Patient informed that she will remain in clinic for 15 minutes after injection. Patient tolerated well, voiced no complaints

## 2019-02-27 ENCOUNTER — CLINICAL SUPPORT (OUTPATIENT)
Dept: INTERNAL MEDICINE | Facility: CLINIC | Age: 74
End: 2019-02-27
Payer: MEDICARE

## 2019-02-27 ENCOUNTER — TELEPHONE (OUTPATIENT)
Dept: INTERNAL MEDICINE | Facility: CLINIC | Age: 74
End: 2019-02-27

## 2019-02-27 DIAGNOSIS — E53.8 VITAMIN B12 DEFICIENCY: Primary | ICD-10-CM

## 2019-02-27 PROCEDURE — 96372 THER/PROPH/DIAG INJ SC/IM: CPT | Mod: S$GLB,,, | Performed by: FAMILY MEDICINE

## 2019-02-27 PROCEDURE — 99999 PR PBB SHADOW E&M-EST. PATIENT-LVL II: CPT | Mod: PBBFAC,,,

## 2019-02-27 PROCEDURE — 99999 PR PBB SHADOW E&M-EST. PATIENT-LVL II: ICD-10-PCS | Mod: PBBFAC,,,

## 2019-02-27 PROCEDURE — 96372 PR INJECTION,THERAP/PROPH/DIAG2ST, IM OR SUBCUT: ICD-10-PCS | Mod: S$GLB,,, | Performed by: FAMILY MEDICINE

## 2019-02-27 RX ORDER — CYANOCOBALAMIN 1000 UG/ML
1000 INJECTION, SOLUTION INTRAMUSCULAR; SUBCUTANEOUS ONCE
Status: COMPLETED | OUTPATIENT
Start: 2019-02-27 | End: 2019-02-27

## 2019-02-27 RX ADMIN — CYANOCOBALAMIN 1000 MCG: 1000 INJECTION, SOLUTION INTRAMUSCULAR; SUBCUTANEOUS at 01:02

## 2019-02-27 NOTE — PROGRESS NOTES
Patient here for cyanocobalamin injection 1,000 mcg as per current standing MD order. After reviewing allergies and meds, patient received cyanocobalamin injection 1,000 mcg to right deltoid. Patient was advised to remain in clinic for 15 minutes post administration and to report any signs and symptoms of allergic reaction. Patient verbalized understanding and exited exam room ambulating without assist.

## 2019-03-01 DIAGNOSIS — E11.9 TYPE 2 DIABETES MELLITUS WITHOUT COMPLICATION, UNSPECIFIED WHETHER LONG TERM INSULIN USE: ICD-10-CM

## 2019-03-06 ENCOUNTER — TELEPHONE (OUTPATIENT)
Dept: INTERNAL MEDICINE | Facility: CLINIC | Age: 74
End: 2019-03-06

## 2019-03-06 ENCOUNTER — CLINICAL SUPPORT (OUTPATIENT)
Dept: INTERNAL MEDICINE | Facility: CLINIC | Age: 74
End: 2019-03-06
Payer: MEDICARE

## 2019-03-06 DIAGNOSIS — E53.8 VITAMIN B12 DEFICIENCY: ICD-10-CM

## 2019-03-06 PROCEDURE — 96372 PR INJECTION,THERAP/PROPH/DIAG2ST, IM OR SUBCUT: ICD-10-PCS | Mod: S$GLB,,, | Performed by: FAMILY MEDICINE

## 2019-03-06 PROCEDURE — 99999 PR PBB SHADOW E&M-EST. PATIENT-LVL II: ICD-10-PCS | Mod: PBBFAC,,,

## 2019-03-06 PROCEDURE — 99999 PR PBB SHADOW E&M-EST. PATIENT-LVL II: CPT | Mod: PBBFAC,,,

## 2019-03-06 PROCEDURE — 96372 THER/PROPH/DIAG INJ SC/IM: CPT | Mod: S$GLB,,, | Performed by: FAMILY MEDICINE

## 2019-03-06 RX ORDER — CYANOCOBALAMIN 1000 UG/ML
1000 INJECTION, SOLUTION INTRAMUSCULAR; SUBCUTANEOUS ONCE
Status: COMPLETED | OUTPATIENT
Start: 2019-03-06 | End: 2019-03-06

## 2019-03-06 RX ADMIN — CYANOCOBALAMIN 1000 MCG: 1000 INJECTION, SOLUTION INTRAMUSCULAR; SUBCUTANEOUS at 01:03

## 2019-03-06 NOTE — PROGRESS NOTES
Patient presents to clinic for B-12 injection IM in the right Deltoid. Patient informed that after injection she will remain in clinic for 15 minutes after injection. Patient tolerated well, voiced no complaints

## 2019-03-13 ENCOUNTER — TELEPHONE (OUTPATIENT)
Dept: INTERNAL MEDICINE | Facility: CLINIC | Age: 74
End: 2019-03-13

## 2019-03-13 ENCOUNTER — CLINICAL SUPPORT (OUTPATIENT)
Dept: INTERNAL MEDICINE | Facility: CLINIC | Age: 74
End: 2019-03-13
Payer: MEDICARE

## 2019-03-13 DIAGNOSIS — E53.8 B12 DEFICIENCY: Primary | ICD-10-CM

## 2019-03-13 PROCEDURE — 96372 PR INJECTION,THERAP/PROPH/DIAG2ST, IM OR SUBCUT: ICD-10-PCS | Mod: S$GLB,,, | Performed by: FAMILY MEDICINE

## 2019-03-13 PROCEDURE — 99999 PR PBB SHADOW E&M-EST. PATIENT-LVL II: CPT | Mod: PBBFAC,,,

## 2019-03-13 PROCEDURE — 96372 THER/PROPH/DIAG INJ SC/IM: CPT | Mod: S$GLB,,, | Performed by: FAMILY MEDICINE

## 2019-03-13 PROCEDURE — 99999 PR PBB SHADOW E&M-EST. PATIENT-LVL II: ICD-10-PCS | Mod: PBBFAC,,,

## 2019-03-13 RX ORDER — CYANOCOBALAMIN 1000 UG/ML
1000 INJECTION, SOLUTION INTRAMUSCULAR; SUBCUTANEOUS ONCE
Status: COMPLETED | OUTPATIENT
Start: 2019-03-13 | End: 2019-03-13

## 2019-03-13 RX ADMIN — CYANOCOBALAMIN 1000 MCG: 1000 INJECTION, SOLUTION INTRAMUSCULAR; SUBCUTANEOUS at 01:03

## 2019-03-13 NOTE — PROGRESS NOTES
Patient presents to clinic for 1,000 mcg B-12 injection IM in the Right Deltoid. Patient informed that after injection she will remain in clinic for 15 minutes after. Patient tolerated well voiced no complaints

## 2019-03-14 RX ORDER — LEVOCETIRIZINE DIHYDROCHLORIDE 5 MG/1
5 TABLET, FILM COATED ORAL NIGHTLY
Qty: 90 TABLET | Refills: 2 | Status: SHIPPED | OUTPATIENT
Start: 2019-03-14 | End: 2021-02-01

## 2019-03-14 RX ORDER — ARIPIPRAZOLE 2 MG/1
2 TABLET ORAL DAILY
Qty: 90 TABLET | Refills: 2 | Status: SHIPPED | OUTPATIENT
Start: 2019-03-14 | End: 2019-12-01 | Stop reason: SDUPTHER

## 2019-03-14 RX ORDER — TIZANIDINE 4 MG/1
4 TABLET ORAL DAILY
Qty: 30 TABLET | Refills: 6 | Status: SHIPPED | OUTPATIENT
Start: 2019-03-14 | End: 2019-03-24

## 2019-03-27 ENCOUNTER — LAB VISIT (OUTPATIENT)
Dept: LAB | Facility: HOSPITAL | Age: 74
End: 2019-03-27
Attending: FAMILY MEDICINE
Payer: MEDICARE

## 2019-03-27 DIAGNOSIS — E53.8 B12 DEFICIENCY: ICD-10-CM

## 2019-03-27 LAB — VIT B12 SERPL-MCNC: 874 PG/ML (ref 210–950)

## 2019-03-27 PROCEDURE — 36415 COLL VENOUS BLD VENIPUNCTURE: CPT

## 2019-03-27 PROCEDURE — 82607 VITAMIN B-12: CPT

## 2019-03-28 ENCOUNTER — OFFICE VISIT (OUTPATIENT)
Dept: UROLOGY | Facility: CLINIC | Age: 74
End: 2019-03-28
Payer: MEDICARE

## 2019-03-28 ENCOUNTER — HOSPITAL ENCOUNTER (OUTPATIENT)
Dept: RADIOLOGY | Facility: HOSPITAL | Age: 74
Discharge: HOME | End: 2019-03-28
Attending: UROLOGY
Payer: MEDICARE

## 2019-03-28 VITALS
WEIGHT: 225.44 LBS | SYSTOLIC BLOOD PRESSURE: 128 MMHG | BODY MASS INDEX: 36.23 KG/M2 | HEART RATE: 68 BPM | DIASTOLIC BLOOD PRESSURE: 72 MMHG | HEIGHT: 66 IN

## 2019-03-28 DIAGNOSIS — R35.0 URINARY FREQUENCY: Primary | ICD-10-CM

## 2019-03-28 DIAGNOSIS — R35.0 URINARY FREQUENCY: ICD-10-CM

## 2019-03-28 DIAGNOSIS — G47.10 HYPERSOMNIA: ICD-10-CM

## 2019-03-28 LAB
BACTERIA #/AREA URNS AUTO: NORMAL /HPF
BILIRUB SERPL-MCNC: NORMAL MG/DL
BLOOD URINE, POC: NORMAL
COLOR, POC UA: YELLOW
GLUCOSE UR QL STRIP: NORMAL
HYALINE CASTS UR QL AUTO: 0 /LPF
KETONES UR QL STRIP: NORMAL
LEUKOCYTE ESTERASE URINE, POC: NORMAL
MICROSCOPIC COMMENT: NORMAL
NITRITE, POC UA: NORMAL
PH, POC UA: 8
POC RESIDUAL URINE VOLUME: 0 ML (ref 0–100)
PROTEIN, POC: NORMAL
RBC #/AREA URNS AUTO: 0 /HPF (ref 0–4)
SPECIFIC GRAVITY, POC UA: 1
SQUAMOUS #/AREA URNS AUTO: 0 /HPF
UROBILINOGEN, POC UA: NORMAL
WBC #/AREA URNS AUTO: 0 /HPF (ref 0–5)

## 2019-03-28 PROCEDURE — 99204 OFFICE O/P NEW MOD 45 MIN: CPT | Mod: 25,S$GLB,, | Performed by: UROLOGY

## 2019-03-28 PROCEDURE — 1101F PT FALLS ASSESS-DOCD LE1/YR: CPT | Mod: CPTII,S$GLB,, | Performed by: UROLOGY

## 2019-03-28 PROCEDURE — 87086 URINE CULTURE/COLONY COUNT: CPT

## 2019-03-28 PROCEDURE — 74018 XR ABDOMEN AP 1 VIEW: ICD-10-PCS | Mod: 26,,, | Performed by: RADIOLOGY

## 2019-03-28 PROCEDURE — 3074F SYST BP LT 130 MM HG: CPT | Mod: CPTII,S$GLB,, | Performed by: UROLOGY

## 2019-03-28 PROCEDURE — 3078F PR MOST RECENT DIASTOLIC BLOOD PRESSURE < 80 MM HG: ICD-10-PCS | Mod: CPTII,S$GLB,, | Performed by: UROLOGY

## 2019-03-28 PROCEDURE — 3074F PR MOST RECENT SYSTOLIC BLOOD PRESSURE < 130 MM HG: ICD-10-PCS | Mod: CPTII,S$GLB,, | Performed by: UROLOGY

## 2019-03-28 PROCEDURE — 1101F PR PT FALLS ASSESS DOC 0-1 FALLS W/OUT INJ PAST YR: ICD-10-PCS | Mod: CPTII,S$GLB,, | Performed by: UROLOGY

## 2019-03-28 PROCEDURE — 3078F DIAST BP <80 MM HG: CPT | Mod: CPTII,S$GLB,, | Performed by: UROLOGY

## 2019-03-28 PROCEDURE — 81002 URINALYSIS NONAUTO W/O SCOPE: CPT | Mod: S$GLB,,, | Performed by: UROLOGY

## 2019-03-28 PROCEDURE — 81002 POCT URINE DIPSTICK WITHOUT MICROSCOPE: ICD-10-PCS | Mod: S$GLB,,, | Performed by: UROLOGY

## 2019-03-28 PROCEDURE — 74018 RADEX ABDOMEN 1 VIEW: CPT | Mod: TC

## 2019-03-28 PROCEDURE — 81001 URINALYSIS AUTO W/SCOPE: CPT

## 2019-03-28 PROCEDURE — 99999 PR PBB SHADOW E&M-EST. PATIENT-LVL IV: ICD-10-PCS | Mod: PBBFAC,,, | Performed by: UROLOGY

## 2019-03-28 PROCEDURE — 99999 PR PBB SHADOW E&M-EST. PATIENT-LVL IV: CPT | Mod: PBBFAC,,, | Performed by: UROLOGY

## 2019-03-28 PROCEDURE — 99204 PR OFFICE/OUTPT VISIT, NEW, LEVL IV, 45-59 MIN: ICD-10-PCS | Mod: 25,S$GLB,, | Performed by: UROLOGY

## 2019-03-28 PROCEDURE — 74018 RADEX ABDOMEN 1 VIEW: CPT | Mod: 26,,, | Performed by: RADIOLOGY

## 2019-03-28 RX ORDER — TIZANIDINE HYDROCHLORIDE 4 MG/1
4 CAPSULE, GELATIN COATED ORAL DAILY
COMMUNITY
End: 2019-06-17 | Stop reason: SDUPTHER

## 2019-03-28 NOTE — LETTER
March 28, 2019      Norris Swann MD  [address]  [place] [address]           [facility]  [address] [place] [address]  Phone: [phone]  Fax: [phone]          Patient: Kay Rosas   MR Number: [id number]   YOB: 1945   Date of Visit: 3/28/2019       Dear Dr. Norris Swann:    Thank you for referring Kay Rosas to me for evaluation. Attached you will find relevant portions of my assessment and plan of care.    If you have questions, please do not hesitate to call me. I look forward to following Kay Rosas along with you.    Sincerely,    Mayco Lynn IV, MD    Enclosure  CC:  No Recipients    If you would like to receive this communication electronically, please contact [email or web address] or [phone] to request more information on [employer] access.    For providers and/or their staff who would like to refer a patient to [facility], please contact us through our one-stop-shop provider referral line, [facility], at [phone].    If you feel you have received this communication in error or would no longer like to receive these types of communications, please e-mail [email or web address]

## 2019-03-29 ENCOUNTER — TELEPHONE (OUTPATIENT)
Dept: PULMONOLOGY | Facility: HOSPITAL | Age: 74
End: 2019-03-29

## 2019-03-30 LAB — BACTERIA UR CULT: NO GROWTH

## 2019-04-05 ENCOUNTER — HOSPITAL ENCOUNTER (OUTPATIENT)
Dept: RADIOLOGY | Facility: HOSPITAL | Age: 74
Discharge: HOME | End: 2019-04-05
Attending: UROLOGY
Payer: MEDICARE

## 2019-04-05 DIAGNOSIS — G47.10 HYPERSOMNIA: ICD-10-CM

## 2019-04-05 DIAGNOSIS — R35.0 URINARY FREQUENCY: ICD-10-CM

## 2019-04-05 DIAGNOSIS — Z12.11 COLON CANCER SCREENING: ICD-10-CM

## 2019-04-05 PROCEDURE — 76770 US EXAM ABDO BACK WALL COMP: CPT | Mod: TC

## 2019-04-05 PROCEDURE — 76770 US RETROPERITONEAL COMPLETE: ICD-10-PCS | Mod: 26,,, | Performed by: RADIOLOGY

## 2019-04-05 PROCEDURE — 76770 US EXAM ABDO BACK WALL COMP: CPT | Mod: 26,,, | Performed by: RADIOLOGY

## 2019-04-15 ENCOUNTER — PROCEDURE VISIT (OUTPATIENT)
Dept: SLEEP MEDICINE | Facility: CLINIC | Age: 74
End: 2019-04-15
Payer: MEDICARE

## 2019-04-15 DIAGNOSIS — G47.10 HYPERSOMNIA: ICD-10-CM

## 2019-04-15 DIAGNOSIS — R35.0 URINARY FREQUENCY: ICD-10-CM

## 2019-04-15 DIAGNOSIS — G47.33 OBSTRUCTIVE SLEEP APNEA SYNDROME: Primary | ICD-10-CM

## 2019-04-15 PROCEDURE — 99499 NO LOS: ICD-10-PCS | Mod: S$GLB,,, | Performed by: INTERNAL MEDICINE

## 2019-04-15 PROCEDURE — 95800 SLP STDY UNATTENDED: CPT

## 2019-04-15 PROCEDURE — 99499 UNLISTED E&M SERVICE: CPT | Mod: S$GLB,,, | Performed by: INTERNAL MEDICINE

## 2019-04-17 PROCEDURE — 95800 SLP STDY UNATTENDED: CPT | Mod: 26,,, | Performed by: INTERNAL MEDICINE

## 2019-04-17 PROCEDURE — 95800 PR SLEEP STUDY, UNATTENDED, RECORD HEART RATE/O2 SAT/RESP ANAL/SLEEP TIME: ICD-10-PCS | Mod: 26,,, | Performed by: INTERNAL MEDICINE

## 2019-04-17 NOTE — PROGRESS NOTES
PHYSICIAN INTERPRETATION AND COMMENTS: Findings are consistent with mild, positional obstructive sleep  apnea (JOSEFINA). AHI was 5.0/hr with 6.1 hours sleep. Loud snoring.  CLINICAL HISTORY: 73 year old female presented with: SNORING AND WITNESSED APNEA. 15.8 inch neck, BMI  of 37, an [place] sleepiness score of 6, and history of hypertension, diabetes, depression. Based on the clinical history, the  patient has a high pre-test probability of having moderate JOSEFINA.  SLEEP STUDY FINDINGS: Patient underwent a one night Home Sleep Test and by behavioral criteria, slept for  approximately 6.1 hours, with a sleep latency of 3 minutes and a sleep efficiency of 43.4%. The patient slept supine 42.9% of  the night based on valid recording time of 6.08 hours and is 10 times as likely to have apneas/hypopneas when supine. When  considering more subtle measures of sleep disordered breathing, the overall respiratory disturbance index is mild (RDI=19)  based on a 1% hypopnea desaturation criteria with confirmation by surrogate arousal indicators. The apneas/hypopneas are  accompanied by minimal oxygen desaturation (percent time below 90% SpO2: 3.9%, Min SpO2: 86.8%). The average  desaturation across all sleep disordered breathing events is 3.1%. Snoring occurs for 43.0% (30 dB) of the study, 34.3% is very  loud. The mean pulse rate is 60 BPM, with very frequent pulse rate variability (83 events with >= 6 BPM increase/decrease per  hour).  TREATMENT CONSIDERATIONS: For a patient with mild asymptomatic JOSEFINA, nasal continuous positive airway pressure  (CPAP/AutoPAP) therapy or alternative therapies for treatment should be considered, i.e., Mandibular advancement splint  (MAS), referral to an ENT for surgical modifications to the airway, and/or weight loss or behavioral therapy to reduce the  potential risk of daytime somnolence, hypertension, cardiovascular disease, stroke and diabetes. A Mandibular Advancement  Splint (MAS) will likely  provide treatment benefit independent of JOSEFINA severity. The patient should avoid sleeping supine given  non-supine AHI is in the normal range.  DISEASE MANAGEMENT CONSIDERATIONS: None.

## 2019-04-17 NOTE — PROCEDURES
Home Sleep Studies  Date/Time: 4/17/2019 6:33 AM  Performed by: Jayy Tam MD  Authorized by: Mayco Lynn IV, MD       PHYSICIAN INTERPRETATION AND COMMENTS: Findings are consistent with mild, positional obstructive sleep  apnea (JOSEFINA). AHI was 5.0/hr with 6.1 hours sleep. Loud snoring.  CLINICAL HISTORY: 73 year old female presented with: SNORING AND WITNESSED APNEA. 15.8 inch neck, BMI  of 37, an [place] sleepiness score of 6, and history of hypertension, diabetes, depression. Based on the clinical history, the  patient has a high pre-test probability of having moderate JOSEFINA.  SLEEP STUDY FINDINGS: Patient underwent a one night Home Sleep Test and by behavioral criteria, slept for  approximately 6.1 hours, with a sleep latency of 3 minutes and a sleep efficiency of 43.4%. The patient slept supine 42.9% of  the night based on valid recording time of 6.08 hours and is 10 times as likely to have apneas/hypopneas when supine. When  considering more subtle measures of sleep disordered breathing, the overall respiratory disturbance index is mild (RDI=19)  based on a 1% hypopnea desaturation criteria with confirmation by surrogate arousal indicators. The apneas/hypopneas are  accompanied by minimal oxygen desaturation (percent time below 90% SpO2: 3.9%, Min SpO2: 86.8%). The average  desaturation across all sleep disordered breathing events is 3.1%. Snoring occurs for 43.0% (30 dB) of the study, 34.3% is very  loud. The mean pulse rate is 60 BPM, with very frequent pulse rate variability (83 events with >= 6 BPM increase/decrease per  hour).  TREATMENT CONSIDERATIONS: For a patient with mild asymptomatic JOSEFINA, nasal continuous positive airway pressure  (CPAP/AutoPAP) therapy or alternative therapies for treatment should be considered, i.e., Mandibular advancement splint  (MAS), referral to an ENT for surgical modifications to the airway, and/or weight loss or behavioral therapy to reduce the  potential risk  of daytime somnolence, hypertension, cardiovascular disease, stroke and diabetes. A Mandibular Advancement  Splint (MAS) will likely provide treatment benefit independent of JOSEFINA severity. The patient should avoid sleeping supine given  non-supine AHI is in the normal range.  DISEASE MANAGEMENT CONSIDERATIONS: None.

## 2019-04-23 RX ORDER — LEVOTHYROXINE SODIUM 50 UG/1
50 TABLET ORAL DAILY
Qty: 90 TABLET | Refills: 3 | Status: SHIPPED | OUTPATIENT
Start: 2019-04-23 | End: 2020-06-09 | Stop reason: SDUPTHER

## 2019-04-25 ENCOUNTER — OFFICE VISIT (OUTPATIENT)
Dept: INTERNAL MEDICINE | Facility: CLINIC | Age: 74
End: 2019-04-25
Payer: MEDICARE

## 2019-04-25 VITALS
HEART RATE: 71 BPM | BODY MASS INDEX: 36.72 KG/M2 | TEMPERATURE: 99 F | OXYGEN SATURATION: 95 % | DIASTOLIC BLOOD PRESSURE: 70 MMHG | WEIGHT: 227.5 LBS | SYSTOLIC BLOOD PRESSURE: 139 MMHG

## 2019-04-25 DIAGNOSIS — I10 ESSENTIAL HYPERTENSION: Primary | ICD-10-CM

## 2019-04-25 DIAGNOSIS — F32.A DEPRESSION, UNSPECIFIED DEPRESSION TYPE: ICD-10-CM

## 2019-04-25 DIAGNOSIS — L72.9 CYST OF SKIN: ICD-10-CM

## 2019-04-25 DIAGNOSIS — N30.00 ACUTE CYSTITIS WITHOUT HEMATURIA: ICD-10-CM

## 2019-04-25 PROCEDURE — 3078F DIAST BP <80 MM HG: CPT | Mod: CPTII,S$GLB,, | Performed by: FAMILY MEDICINE

## 2019-04-25 PROCEDURE — 99214 PR OFFICE/OUTPT VISIT, EST, LEVL IV, 30-39 MIN: ICD-10-PCS | Mod: S$GLB,,, | Performed by: FAMILY MEDICINE

## 2019-04-25 PROCEDURE — 3077F PR MOST RECENT SYSTOLIC BLOOD PRESSURE >= 140 MM HG: ICD-10-PCS | Mod: CPTII,S$GLB,, | Performed by: FAMILY MEDICINE

## 2019-04-25 PROCEDURE — 99214 OFFICE O/P EST MOD 30 MIN: CPT | Mod: S$GLB,,, | Performed by: FAMILY MEDICINE

## 2019-04-25 PROCEDURE — 1101F PT FALLS ASSESS-DOCD LE1/YR: CPT | Mod: CPTII,S$GLB,, | Performed by: FAMILY MEDICINE

## 2019-04-25 PROCEDURE — 3078F PR MOST RECENT DIASTOLIC BLOOD PRESSURE < 80 MM HG: ICD-10-PCS | Mod: CPTII,S$GLB,, | Performed by: FAMILY MEDICINE

## 2019-04-25 PROCEDURE — 1101F PR PT FALLS ASSESS DOC 0-1 FALLS W/OUT INJ PAST YR: ICD-10-PCS | Mod: CPTII,S$GLB,, | Performed by: FAMILY MEDICINE

## 2019-04-25 PROCEDURE — 99999 PR PBB SHADOW E&M-EST. PATIENT-LVL III: ICD-10-PCS | Mod: PBBFAC,,, | Performed by: FAMILY MEDICINE

## 2019-04-25 PROCEDURE — 99999 PR PBB SHADOW E&M-EST. PATIENT-LVL III: CPT | Mod: PBBFAC,,, | Performed by: FAMILY MEDICINE

## 2019-04-25 PROCEDURE — 3077F SYST BP >= 140 MM HG: CPT | Mod: CPTII,S$GLB,, | Performed by: FAMILY MEDICINE

## 2019-04-25 RX ORDER — SERTRALINE HYDROCHLORIDE 100 MG/1
100 TABLET, FILM COATED ORAL DAILY
Qty: 90 TABLET | Refills: 1 | Status: SHIPPED | OUTPATIENT
Start: 2019-04-25 | End: 2019-08-15 | Stop reason: SDUPTHER

## 2019-04-25 RX ORDER — SERTRALINE HYDROCHLORIDE 50 MG/1
50 TABLET, FILM COATED ORAL DAILY
Qty: 90 TABLET | Refills: 1 | Status: SHIPPED | OUTPATIENT
Start: 2019-04-25 | End: 2019-10-16 | Stop reason: SDUPTHER

## 2019-04-25 NOTE — PROGRESS NOTES
Subjective:       Patient ID: Kay Rosas is a 73 y.o. female.    Chief Complaint: back lesion (lower right back; approx x2 months; drainage absent per pt)    Cyst:      Pt has a right sided cyst in the lower mid back. Hard and at times demonstrated discomfort when laying down.     Review of Systems   Constitutional: Negative.    Respiratory: Negative.    Cardiovascular: Negative.    Genitourinary: Negative.    Musculoskeletal: Negative.    Skin:        Cyst     Neurological: Negative.    Hematological: Negative.    Psychiatric/Behavioral: Negative.        Objective:      Physical Exam   Constitutional: She is oriented to person, place, and time. She appears well-developed and well-nourished.   Cardiovascular: Normal rate and regular rhythm. Exam reveals no friction rub.   No murmur heard.  Pulmonary/Chest: Effort normal and breath sounds normal. No stridor. No respiratory distress.   Abdominal: Soft. Bowel sounds are normal.   Neurological: She is alert and oriented to person, place, and time.   Psychiatric: Her behavior is normal. Judgment and thought content normal. Her mood appears anxious. Cognition and memory are normal. She exhibits a depressed mood.       Assessment:       1. Essential hypertension    2. Cyst of skin    3. Acute cystitis without hematuria    4. Depression, unspecified depression type        Plan:       Essential hypertension  Comments:  BP is well controlled    Cyst of skin  Comments:  Deep enough that like Gen surgery would remove  Orders:  -     Ambulatory consult to General Surgery    Acute cystitis without hematuria  Comments:  Will do Urine and culture  Orders:  -     Urinalysis; Future; Expected date: 04/25/2019  -     Urine culture; Future; Expected date: 04/25/2019    Depression, unspecified depression type  Comments:  Pt has some situational depresion

## 2019-04-26 RX ORDER — SULFAMETHOXAZOLE AND TRIMETHOPRIM 800; 160 MG/1; MG/1
1 TABLET ORAL 2 TIMES DAILY
Qty: 20 TABLET | Refills: 0 | Status: SHIPPED | OUTPATIENT
Start: 2019-04-26 | End: 2019-05-08

## 2019-04-29 ENCOUNTER — OFFICE VISIT (OUTPATIENT)
Dept: SURGERY | Facility: CLINIC | Age: 74
End: 2019-04-29
Payer: MEDICARE

## 2019-04-29 VITALS
HEART RATE: 60 BPM | BODY MASS INDEX: 36.53 KG/M2 | HEIGHT: 66 IN | DIASTOLIC BLOOD PRESSURE: 69 MMHG | WEIGHT: 227.31 LBS | TEMPERATURE: 98 F | SYSTOLIC BLOOD PRESSURE: 123 MMHG

## 2019-04-29 DIAGNOSIS — L72.9 CYST OF SKIN: Primary | ICD-10-CM

## 2019-04-29 PROCEDURE — 3074F PR MOST RECENT SYSTOLIC BLOOD PRESSURE < 130 MM HG: ICD-10-PCS | Mod: CPTII,S$GLB,, | Performed by: SURGERY

## 2019-04-29 PROCEDURE — 99999 PR PBB SHADOW E&M-EST. PATIENT-LVL III: ICD-10-PCS | Mod: PBBFAC,,, | Performed by: SURGERY

## 2019-04-29 PROCEDURE — 3074F SYST BP LT 130 MM HG: CPT | Mod: CPTII,S$GLB,, | Performed by: SURGERY

## 2019-04-29 PROCEDURE — 1101F PR PT FALLS ASSESS DOC 0-1 FALLS W/OUT INJ PAST YR: ICD-10-PCS | Mod: CPTII,S$GLB,, | Performed by: SURGERY

## 2019-04-29 PROCEDURE — 99203 PR OFFICE/OUTPT VISIT, NEW, LEVL III, 30-44 MIN: ICD-10-PCS | Mod: S$GLB,,, | Performed by: SURGERY

## 2019-04-29 PROCEDURE — 99999 PR PBB SHADOW E&M-EST. PATIENT-LVL III: CPT | Mod: PBBFAC,,, | Performed by: SURGERY

## 2019-04-29 PROCEDURE — 3078F PR MOST RECENT DIASTOLIC BLOOD PRESSURE < 80 MM HG: ICD-10-PCS | Mod: CPTII,S$GLB,, | Performed by: SURGERY

## 2019-04-29 PROCEDURE — 1101F PT FALLS ASSESS-DOCD LE1/YR: CPT | Mod: CPTII,S$GLB,, | Performed by: SURGERY

## 2019-04-29 PROCEDURE — 3078F DIAST BP <80 MM HG: CPT | Mod: CPTII,S$GLB,, | Performed by: SURGERY

## 2019-04-29 PROCEDURE — 99203 OFFICE O/P NEW LOW 30 MIN: CPT | Mod: S$GLB,,, | Performed by: SURGERY

## 2019-04-29 NOTE — LETTER
April 29, 2019      Norris Swann MD  [address] [place] [address]           [facility]  [address] [place] [address]  Phone: [phone]  Fax: [phone]          Patient: Kay Rosas   MR Number: [id number]   YOB: 1945   Date of Visit: 4/29/2019       Dear Dr. Norris Swann:    Thank you for referring Kay Rosas to me for evaluation. Attached you will find relevant portions of my assessment and plan of care.    If you have questions, please do not hesitate to call me. I look forward to following Kay Rosas along with you.    Sincerely,    Ton Barnhart MD    Enclosure  CC:  No Recipients    If you would like to receive this communication electronically, please contact [email or web address] or [phone] to request more information on [employer] access.    For providers and/or their staff who would like to refer a patient to [facility], please contact us through our one-stop-shop provider referral line, [facility], at [phone].    If you feel you have received this communication in error or would no longer like to receive these types of communications, please e-mail [email or web address]

## 2019-04-30 NOTE — PROGRESS NOTES
History & Physical    SUBJECTIVE:     History of Present Illness:  Patient is a 73 y.o. female referred for cyst of back and axilla.  Patient reports having cyst under her right axilla that has required drainage previously secondary to infection.  She occasionally is able to squeeze drainage out of it. She also notes a smaller cyst on her back that does not drain.      Chief Complaint   Patient presents with    Consult       Review of patient's allergies indicates:  No Known Allergies    Current Outpatient Medications   Medication Sig Dispense Refill    albuterol 90 mcg/actuation inhaler Inhale 2 puffs into the lungs every 6 (six) hours as needed.      ARIPiprazole (ABILIFY) 2 MG Tab Take 1 tablet (2 mg total) by mouth once daily. 90 tablet 2    atorvastatin (LIPITOR) 20 MG tablet TAKE 1 TABLET (20 MG TOTAL) BY MOUTH ONCE DAILY. 90 tablet 3    azelastine (ASTELIN) 137 mcg (0.1 %) nasal spray 2 sprays (274 mcg total) by Nasal route 2 (two) times daily. 30 mL 11    fluticasone (FLONASE) 50 mcg/actuation nasal spray 1 spray by Each Nare route once daily.      gabapentin (NEURONTIN) 300 MG capsule TAKE 1 CAPSULE (300 MG TOTAL) BY MOUTH 3 (THREE) TIMES DAILY. 90 capsule 3    levocetirizine (XYZAL) 5 MG tablet Take 1 tablet (5 mg total) by mouth every evening. 90 tablet 2    levothyroxine (SYNTHROID) 50 MCG tablet TAKE 1 TABLET (50 MCG TOTAL) BY MOUTH ONCE DAILY. 90 tablet 3    losartan-hydrochlorothiazide 100-25 mg (HYZAAR) 100-25 mg per tablet TAKE 1 TABLET BY MOUTH ONCE DAILY. 90 tablet 3    meloxicam (MOBIC) 15 MG tablet TAKE 1 TABLET (15 MG TOTAL) BY MOUTH ONCE DAILY. 90 tablet 3    metFORMIN (GLUCOPHAGE) 1000 MG tablet TAKE 1 TABLET BY MOUTH TWICE A DAY (Patient taking differently: TAKE 0.5 TABLET BY MOUTH TWICE A DAY) 180 tablet 2    pantoprazole (PROTONIX) 40 MG tablet TAKE 1 TABLET BY MOUTH DAILY. 30 tablet 6    sertraline (ZOLOFT) 100 MG tablet TAKE 1 TABLET (100 MG TOTAL) BY MOUTH ONCE DAILY. 30  tablet 6    sertraline (ZOLOFT) 100 MG tablet Take 1 tablet (100 mg total) by mouth once daily. 90 tablet 1    sertraline (ZOLOFT) 50 MG tablet Take 1 tablet (50 mg total) by mouth once daily. 90 tablet 1    sulfamethoxazole-trimethoprim 800-160mg (BACTRIM DS) 800-160 mg Tab Take 1 tablet by mouth 2 (two) times daily. 20 tablet 0    tiZANidine 4 mg Cap Take 4 mg by mouth once daily.      traZODone (DESYREL) 100 MG tablet TAKE 1 TABLET (100 MG TOTAL) BY MOUTH EVERY EVENING. 30 tablet 6     No current facility-administered medications for this visit.        Past Medical History:   Diagnosis Date    Arthritis     Degenerative disc disease, cervical     Diabetes mellitus, type 2     Emphysema of lung     MVP (mitral valve prolapse)     Osteoporosis     feet    Thyroid condition      Past Surgical History:   Procedure Laterality Date    BACK SURGERY  2012    cataract surgery  Bilateral 10/ 2012    elbow spur removed Left     HYSTERECTOMY Left 1978    OOPHORECTOMY      OVARIAN CYST REMOVAL      two from back, one from axilla    VASCULAR SURGERY Right     high ligation due to blood clot      Family History   Problem Relation Age of Onset    Heart disease Mother     Kidney disease Mother     Arthritis Mother     Breast cancer Mother     Cancer Father     Cancer Sister     Ovarian cancer Sister     Alzheimer's disease Sister      Social History     Tobacco Use    Smoking status: Former Smoker     Last attempt to quit: 6/20/2015     Years since quitting: 3.8    Smokeless tobacco: Never Used   Substance Use Topics    Alcohol use: No     Alcohol/week: 0.0 oz    Drug use: No        Review of Systems:  Review of Systems   Constitutional: Negative for chills, fatigue, fever and unexpected weight change.   Respiratory: Negative for cough, shortness of breath, wheezing and stridor.    Cardiovascular: Negative for chest pain, palpitations and leg swelling.   Gastrointestinal: Negative for abdominal  "distention, abdominal pain, constipation, diarrhea, nausea and vomiting.   Genitourinary: Negative for difficulty urinating, dysuria, frequency, hematuria and urgency.   Skin: Negative for color change, pallor, rash and wound.   Hematological: Does not bruise/bleed easily.       OBJECTIVE:     Vital Signs (Most Recent)  Temp: 98.4 °F (36.9 °C) (04/29/19 1007)  Pulse: 60 (04/29/19 1007)  BP: 123/69 (04/29/19 1007)  5' 6" (1.676 m)  103.1 kg (227 lb 4.7 oz)     Physical Exam:  Physical Exam   Constitutional: She is oriented to person, place, and time. She appears well-developed and well-nourished.   HENT:   Head: Normocephalic and atraumatic.   Eyes: EOM are normal.   Neck: Neck supple.   Cardiovascular: Normal rate and regular rhythm.   Pulmonary/Chest: Effort normal and breath sounds normal.   Abdominal: Soft. Bowel sounds are normal. She exhibits no distension. There is no tenderness.   Neurological: She is alert and oriented to person, place, and time.   Skin: Skin is warm and dry.        Vitals reviewed.        ASSESSMENT/PLAN:     73-year-old female with cyst of the right axilla and back    PLAN:Plan     Schedule for removal in minor procedure room       "

## 2019-05-08 ENCOUNTER — PROCEDURE VISIT (OUTPATIENT)
Dept: SURGERY | Facility: CLINIC | Age: 74
End: 2019-05-08
Payer: MEDICARE

## 2019-05-08 VITALS
BODY MASS INDEX: 36.6 KG/M2 | WEIGHT: 227.75 LBS | TEMPERATURE: 99 F | HEART RATE: 65 BPM | HEIGHT: 66 IN | SYSTOLIC BLOOD PRESSURE: 130 MMHG | DIASTOLIC BLOOD PRESSURE: 63 MMHG

## 2019-05-08 DIAGNOSIS — D17.1 LIPOMA OF TORSO: ICD-10-CM

## 2019-05-08 DIAGNOSIS — L72.0 CYST OF SKIN AND SUBCUTANEOUS TISSUE: Primary | ICD-10-CM

## 2019-05-08 PROCEDURE — 21931 EXC, TUMOR SOFT TISSUE: ICD-10-PCS | Mod: S$GLB,,, | Performed by: SURGERY

## 2019-05-08 PROCEDURE — 11403 EXC TR-EXT B9+MARG 2.1-3CM: CPT | Mod: 51,S$GLB,, | Performed by: SURGERY

## 2019-05-08 PROCEDURE — 12032 INTMD RPR S/A/T/EXT 2.6-7.5: CPT | Mod: 59,S$GLB,, | Performed by: SURGERY

## 2019-05-08 PROCEDURE — 12032 EXC, CYST: ICD-10-PCS | Mod: 59,S$GLB,, | Performed by: SURGERY

## 2019-05-08 PROCEDURE — 21931 EXC BACK LES SC 3 CM/>: CPT | Mod: S$GLB,,, | Performed by: SURGERY

## 2019-05-08 PROCEDURE — 11403 PR EXC SKIN BENIG 2.1-3 CM TRUNK,ARM,LEG: ICD-10-PCS | Mod: 51,S$GLB,, | Performed by: SURGERY

## 2019-05-08 NOTE — PROCEDURES
"Exc, Tumor Soft Tissue  Date/Time: 5/8/2019 10:01 AM  Performed by: Ton Barnhart MD  Authorized by: Ton Barnhart MD     Consent Done?:  Yes (Written)  Time out: Immediately prior to procedure a "time out" was called to verify the correct patient, procedure, equipment, support staff and site/side marked as required.      STAFF:  Assistants?: No    I was present for the entire procedure.  LOCATION:  Body area:  Back / flankright    PREP:Position:  Prone    ANESTHESIA:  Anesthesia:  Local infiltration  Local anesthetic:  Lidocaine 1% without epinephrine    PROCEDURE DETAILS:  Excision type:  Tumor  Excision depth:  Subcutaneous  Excision size (cm):  3  Scalpel size:  15  Incision type:  Single straight  Specimens?: No      Hemostasis was obtained.  Estimated blood loss (cc):  3  Wound closure:  Intermediate layered  Wound repair size (cm):  3  Sutures:  3-0 Vicryl and 4-0 Monocryl  Sterile dressings:  Gauze, Mastisol, Steri-strips and Tegaderm  Needle, instrument, and sponge counts were correct.  Patient tolerated the procedure well with no immediate complications.  Post-operative instructions were provided for the patient.  Patient was discharged and will follow up for wound check.    Exc, Cyst  Date/Time: 5/8/2019 10:03 AM  Performed by: Ton Barnhart MD  Authorized by: Ton Barnhart MD     Consent Done?:  Yes (Written)  Time out: Immediately prior to procedure a "time out" was called to verify the correct patient, procedure, equipment, support staff and site/side marked as required.      STAFF:  Assistants?: No    INDICATIONS:cyst  LOCATION:  Body area:  Chest (axilla)right    PREP:Position:  Supine    ANESTHESIA:  Anesthesia:  Local infiltration    PROCEDURE DETAILS:  Excision type:  Skin  Malignancy:  Benign  Excision size (cm):  3  Scalpel size:  15  Incision type:  Elliptical  Specimens?: No      Hemostasis was obtained.  Estimated blood loss (cc):  3  Wound closure:  Intermediate layered  Wound repair size " (cm):  3  Sutures:  3-0 Vicryl and 4-0 Monocryl  Sterile dressings:  Steri-strips and other (comments) (bandaid)  Post-op diagnosis: Same as pre-op diagnosis.  Needle, instrument, and sponge counts were correct.  Patient tolerated the procedure well with no immediate complications.  Post-operative instructions were provided for the patient.  Patient was discharged and will follow up for wound check.

## 2019-05-14 RX ORDER — PANTOPRAZOLE SODIUM 40 MG/1
TABLET, DELAYED RELEASE ORAL
Qty: 90 TABLET | Refills: 1 | Status: SHIPPED | OUTPATIENT
Start: 2019-05-14 | End: 2019-11-03 | Stop reason: SDUPTHER

## 2019-05-23 ENCOUNTER — OFFICE VISIT (OUTPATIENT)
Dept: SURGERY | Facility: CLINIC | Age: 74
End: 2019-05-23
Payer: MEDICARE

## 2019-05-23 VITALS
TEMPERATURE: 98 F | WEIGHT: 227.94 LBS | DIASTOLIC BLOOD PRESSURE: 62 MMHG | HEART RATE: 78 BPM | BODY MASS INDEX: 36.79 KG/M2 | SYSTOLIC BLOOD PRESSURE: 144 MMHG

## 2019-05-23 DIAGNOSIS — Z98.890 POST-OPERATIVE STATE: Primary | ICD-10-CM

## 2019-05-23 PROCEDURE — 99024 PR POST-OP FOLLOW-UP VISIT: ICD-10-PCS | Mod: S$GLB,,, | Performed by: PHYSICIAN ASSISTANT

## 2019-05-23 PROCEDURE — 99024 POSTOP FOLLOW-UP VISIT: CPT | Mod: S$GLB,,, | Performed by: PHYSICIAN ASSISTANT

## 2019-05-23 PROCEDURE — 99999 PR PBB SHADOW E&M-EST. PATIENT-LVL III: ICD-10-PCS | Mod: PBBFAC,,, | Performed by: PHYSICIAN ASSISTANT

## 2019-05-23 PROCEDURE — 99999 PR PBB SHADOW E&M-EST. PATIENT-LVL III: CPT | Mod: PBBFAC,,, | Performed by: PHYSICIAN ASSISTANT

## 2019-05-23 NOTE — PROGRESS NOTES
Kay Rosas is status post excision of a right axillary and right low back sebaceous cyst and presents today for follow-up care.  She is doing well and has no complaints.     PE: low back incisions clean, dry, and intact. Right axilla incision partially open likely due to suture reaction. Suture knot was removed.     A/P:  Normal post-operative course.  Return to clinic as needed.

## 2019-06-17 ENCOUNTER — OFFICE VISIT (OUTPATIENT)
Dept: INTERNAL MEDICINE | Facility: CLINIC | Age: 74
End: 2019-06-17
Payer: MEDICARE

## 2019-06-17 VITALS
DIASTOLIC BLOOD PRESSURE: 70 MMHG | OXYGEN SATURATION: 97 % | HEART RATE: 69 BPM | WEIGHT: 230.38 LBS | BODY MASS INDEX: 37.18 KG/M2 | TEMPERATURE: 98 F | SYSTOLIC BLOOD PRESSURE: 134 MMHG

## 2019-06-17 DIAGNOSIS — R39.15 URINARY URGENCY: ICD-10-CM

## 2019-06-17 DIAGNOSIS — M19.90 ARTHRITIS: ICD-10-CM

## 2019-06-17 DIAGNOSIS — I10 ESSENTIAL HYPERTENSION: Primary | ICD-10-CM

## 2019-06-17 PROCEDURE — 3075F SYST BP GE 130 - 139MM HG: CPT | Mod: CPTII,S$GLB,, | Performed by: FAMILY MEDICINE

## 2019-06-17 PROCEDURE — 99999 PR PBB SHADOW E&M-EST. PATIENT-LVL III: CPT | Mod: PBBFAC,,, | Performed by: FAMILY MEDICINE

## 2019-06-17 PROCEDURE — 99214 OFFICE O/P EST MOD 30 MIN: CPT | Mod: S$GLB,,, | Performed by: FAMILY MEDICINE

## 2019-06-17 PROCEDURE — 1101F PT FALLS ASSESS-DOCD LE1/YR: CPT | Mod: CPTII,S$GLB,, | Performed by: FAMILY MEDICINE

## 2019-06-17 PROCEDURE — 3075F PR MOST RECENT SYSTOLIC BLOOD PRESS GE 130-139MM HG: ICD-10-PCS | Mod: CPTII,S$GLB,, | Performed by: FAMILY MEDICINE

## 2019-06-17 PROCEDURE — 99214 PR OFFICE/OUTPT VISIT, EST, LEVL IV, 30-39 MIN: ICD-10-PCS | Mod: S$GLB,,, | Performed by: FAMILY MEDICINE

## 2019-06-17 PROCEDURE — 3078F DIAST BP <80 MM HG: CPT | Mod: CPTII,S$GLB,, | Performed by: FAMILY MEDICINE

## 2019-06-17 PROCEDURE — 99999 PR PBB SHADOW E&M-EST. PATIENT-LVL III: ICD-10-PCS | Mod: PBBFAC,,, | Performed by: FAMILY MEDICINE

## 2019-06-17 PROCEDURE — 3078F PR MOST RECENT DIASTOLIC BLOOD PRESSURE < 80 MM HG: ICD-10-PCS | Mod: CPTII,S$GLB,, | Performed by: FAMILY MEDICINE

## 2019-06-17 PROCEDURE — 1101F PR PT FALLS ASSESS DOC 0-1 FALLS W/OUT INJ PAST YR: ICD-10-PCS | Mod: CPTII,S$GLB,, | Performed by: FAMILY MEDICINE

## 2019-06-17 RX ORDER — OXYBUTYNIN CHLORIDE 5 MG/1
5 TABLET, EXTENDED RELEASE ORAL DAILY
Qty: 90 TABLET | Refills: 1 | Status: SHIPPED | OUTPATIENT
Start: 2019-06-17 | End: 2019-12-06 | Stop reason: SDUPTHER

## 2019-06-17 RX ORDER — TIZANIDINE 4 MG/1
TABLET ORAL
Refills: 6 | COMMUNITY
Start: 2019-06-09 | End: 2019-12-05 | Stop reason: SDUPTHER

## 2019-06-17 NOTE — PROGRESS NOTES
Subjective:       Patient ID: Kay Rosas is a 73 y.o. female.    Chief Complaint: Leg Pain (bilateral leg pain); Knee Pain (right knee pain (constant)); Flank Pain (lower mid back pain); Urinary Frequency; and Urinary Urgency    Urine:      Pt was seen for this in April with unclear recommendations for Urology. No follow-up made. Pt is having increase urination throughout the night and day as well.     Myalgia:       Pt is a 73 year old who describes muscle aches a fatigues. Over all pt chronic conditions are intact. Pt is on lipitor.    Review of Systems   Constitutional: Negative.    HENT: Negative.    Respiratory: Negative.    Genitourinary: Negative.    Neurological: Negative.    Hematological: Negative.    Psychiatric/Behavioral: Negative.        Objective:      Physical Exam   Constitutional: She appears well-developed and well-nourished.   Cardiovascular: Normal rate and regular rhythm.   Pulmonary/Chest: Effort normal and breath sounds normal. She has no wheezes.   Abdominal: Soft. Bowel sounds are normal.   Skin: Skin is warm and dry.   Psychiatric: She has a normal mood and affect. Her behavior is normal.       Assessment:       1. Essential hypertension    2. Urinary urgency    3. Arthritis        Plan:       Essential hypertension  Comments:  pt BP is well controlled    Urinary urgency  Comments:  No follow-up appt by urology. Will start on oxybutynin and if not helping will refer to outside Urology  Orders:  -     Urinalysis; Future; Expected date: 06/17/2019  -     Urine culture; Future; Expected date: 06/17/2019    Arthritis  Comments:  Muscle aches may be due to the liptor. Stop lipitor and see if makes a different    Other orders  -     oxybutynin (DITROPAN-XL) 5 MG TR24; Take 1 tablet (5 mg total) by mouth once daily.  Dispense: 90 tablet; Refill: 1

## 2019-07-31 ENCOUNTER — HOSPITAL ENCOUNTER (OUTPATIENT)
Dept: RADIOLOGY | Facility: HOSPITAL | Age: 74
Discharge: HOME | End: 2019-07-31
Attending: FAMILY MEDICINE
Payer: MEDICARE

## 2019-07-31 ENCOUNTER — OFFICE VISIT (OUTPATIENT)
Dept: INTERNAL MEDICINE | Facility: CLINIC | Age: 74
End: 2019-07-31
Payer: MEDICARE

## 2019-07-31 ENCOUNTER — PATIENT MESSAGE (OUTPATIENT)
Dept: INTERNAL MEDICINE | Facility: CLINIC | Age: 74
End: 2019-07-31

## 2019-07-31 VITALS
SYSTOLIC BLOOD PRESSURE: 134 MMHG | DIASTOLIC BLOOD PRESSURE: 76 MMHG | WEIGHT: 235.88 LBS | HEART RATE: 82 BPM | OXYGEN SATURATION: 93 % | HEIGHT: 66 IN | BODY MASS INDEX: 37.91 KG/M2 | TEMPERATURE: 99 F

## 2019-07-31 DIAGNOSIS — N18.30 CKD STAGE 3 DUE TO TYPE 2 DIABETES MELLITUS: ICD-10-CM

## 2019-07-31 DIAGNOSIS — E08.41 DIABETIC MONONEUROPATHY ASSOCIATED WITH DIABETES MELLITUS DUE TO UNDERLYING CONDITION: ICD-10-CM

## 2019-07-31 DIAGNOSIS — M79.605 PAIN IN BOTH LOWER EXTREMITIES: Primary | ICD-10-CM

## 2019-07-31 DIAGNOSIS — M79.604 PAIN IN BOTH LOWER EXTREMITIES: Primary | ICD-10-CM

## 2019-07-31 DIAGNOSIS — J44.9 COPD, MODERATE: ICD-10-CM

## 2019-07-31 DIAGNOSIS — E11.22 CKD STAGE 3 DUE TO TYPE 2 DIABETES MELLITUS: ICD-10-CM

## 2019-07-31 DIAGNOSIS — M79.605 PAIN IN BOTH LOWER EXTREMITIES: ICD-10-CM

## 2019-07-31 DIAGNOSIS — M79.604 PAIN IN BOTH LOWER EXTREMITIES: ICD-10-CM

## 2019-07-31 PROCEDURE — 3078F DIAST BP <80 MM HG: CPT | Mod: CPTII,S$GLB,, | Performed by: FAMILY MEDICINE

## 2019-07-31 PROCEDURE — 3075F SYST BP GE 130 - 139MM HG: CPT | Mod: CPTII,S$GLB,, | Performed by: FAMILY MEDICINE

## 2019-07-31 PROCEDURE — 3044F HG A1C LEVEL LT 7.0%: CPT | Mod: CPTII,S$GLB,, | Performed by: FAMILY MEDICINE

## 2019-07-31 PROCEDURE — 99999 PR PBB SHADOW E&M-EST. PATIENT-LVL IV: ICD-10-PCS | Mod: PBBFAC,,, | Performed by: FAMILY MEDICINE

## 2019-07-31 PROCEDURE — 99214 OFFICE O/P EST MOD 30 MIN: CPT | Mod: S$GLB,,, | Performed by: FAMILY MEDICINE

## 2019-07-31 PROCEDURE — 3075F PR MOST RECENT SYSTOLIC BLOOD PRESS GE 130-139MM HG: ICD-10-PCS | Mod: CPTII,S$GLB,, | Performed by: FAMILY MEDICINE

## 2019-07-31 PROCEDURE — 73560 X-RAY EXAM OF KNEE 1 OR 2: CPT | Mod: 59,TC,LT

## 2019-07-31 PROCEDURE — 99499 RISK ADDL DX/OHS AUDIT: ICD-10-PCS | Mod: S$GLB,,, | Performed by: FAMILY MEDICINE

## 2019-07-31 PROCEDURE — 73560 XR KNEE ORTHO RIGHT: ICD-10-PCS | Mod: 26,59,LT, | Performed by: RADIOLOGY

## 2019-07-31 PROCEDURE — 1101F PR PT FALLS ASSESS DOC 0-1 FALLS W/OUT INJ PAST YR: ICD-10-PCS | Mod: CPTII,S$GLB,, | Performed by: FAMILY MEDICINE

## 2019-07-31 PROCEDURE — 73560 X-RAY EXAM OF KNEE 1 OR 2: CPT | Mod: 26,59,LT, | Performed by: RADIOLOGY

## 2019-07-31 PROCEDURE — 1101F PT FALLS ASSESS-DOCD LE1/YR: CPT | Mod: CPTII,S$GLB,, | Performed by: FAMILY MEDICINE

## 2019-07-31 PROCEDURE — 3078F PR MOST RECENT DIASTOLIC BLOOD PRESSURE < 80 MM HG: ICD-10-PCS | Mod: CPTII,S$GLB,, | Performed by: FAMILY MEDICINE

## 2019-07-31 PROCEDURE — 99999 PR PBB SHADOW E&M-EST. PATIENT-LVL IV: CPT | Mod: PBBFAC,,, | Performed by: FAMILY MEDICINE

## 2019-07-31 PROCEDURE — 73562 X-RAY EXAM OF KNEE 3: CPT | Mod: 26,RT,, | Performed by: RADIOLOGY

## 2019-07-31 PROCEDURE — 99214 PR OFFICE/OUTPT VISIT, EST, LEVL IV, 30-39 MIN: ICD-10-PCS | Mod: S$GLB,,, | Performed by: FAMILY MEDICINE

## 2019-07-31 PROCEDURE — 73562 XR KNEE ORTHO RIGHT: ICD-10-PCS | Mod: 26,RT,, | Performed by: RADIOLOGY

## 2019-07-31 PROCEDURE — 99499 UNLISTED E&M SERVICE: CPT | Mod: S$GLB,,, | Performed by: FAMILY MEDICINE

## 2019-07-31 PROCEDURE — 3044F PR MOST RECENT HEMOGLOBIN A1C LEVEL <7.0%: ICD-10-PCS | Mod: CPTII,S$GLB,, | Performed by: FAMILY MEDICINE

## 2019-07-31 RX ORDER — TRAMADOL HYDROCHLORIDE 50 MG/1
50 TABLET ORAL EVERY 6 HOURS PRN
Qty: 30 TABLET | Refills: 1 | Status: SHIPPED | OUTPATIENT
Start: 2019-07-31 | End: 2021-03-30

## 2019-07-31 NOTE — PROGRESS NOTES
Subjective:       Patient ID: Kay Rosas is a 73 y.o. female.    Chief Complaint: Leg Pain    74 yo female here with pain in both of her legs when she walks from the knee down. States that after a distance, she gets pain in her hips and low back. Today she woke up with severe right knee pain which is new. Tried stopping lipitor which made no difference.     does not have any pertinent problems on file.  Past Medical History:   Diagnosis Date    Arthritis     Degenerative disc disease, cervical     Diabetes mellitus, type 2     Emphysema of lung     MVP (mitral valve prolapse)     Osteoporosis     feet    Thyroid condition      Past Surgical History:   Procedure Laterality Date    BACK SURGERY      cataract surgery  Bilateral 10/ 2012    elbow spur removed Left     HYSTERECTOMY Left 1978    OOPHORECTOMY      OVARIAN CYST REMOVAL      two from back, one from axilla    VASCULAR SURGERY Right     high ligation due to blood clot      Family History   Problem Relation Age of Onset    Heart disease Mother     Kidney disease Mother     Arthritis Mother     Breast cancer Mother     Cancer Father     Cancer Sister     Ovarian cancer Sister     Alzheimer's disease Sister      Social History     Socioeconomic History    Marital status:      Spouse name: Not on file    Number of children: Not on file    Years of education: Not on file    Highest education level: Not on file   Occupational History    Not on file   Social Needs    Financial resource strain: Not on file    Food insecurity:     Worry: Not on file     Inability: Not on file    Transportation needs:     Medical: Not on file     Non-medical: Not on file   Tobacco Use    Smoking status: Former Smoker     Last attempt to quit: 2015     Years since quittin.1    Smokeless tobacco: Never Used   Substance and Sexual Activity    Alcohol use: No     Alcohol/week: 0.0 oz    Drug use: No    Sexual activity: Not on file    Lifestyle    Physical activity:     Days per week: Not on file     Minutes per session: Not on file    Stress: Not on file   Relationships    Social connections:     Talks on phone: Not on file     Gets together: Not on file     Attends [employer] service: Not on file     Active member of club or organization: Not on file     Attends meetings of clubs or organizations: Not on file     Relationship status: Not on file   Other Topics Concern    Not on file   Social History Narrative    Not on file     Review of Systems   Constitutional: Positive for activity change and fatigue. Negative for fever.   Respiratory: Negative for cough and shortness of breath.    Musculoskeletal: Positive for arthralgias, gait problem, joint swelling and myalgias.   Neurological: Negative for dizziness, syncope, weakness and light-headedness.   Hematological: Negative for adenopathy. Does not bruise/bleed easily.   Psychiatric/Behavioral: Negative for dysphoric mood. The patient is not nervous/anxious.    All other systems reviewed and are negative.      Objective:     Vitals:    07/31/19 1221   BP: 134/76   Pulse: 82   Temp: 98.7 °F (37.1 °C)        Physical Exam   Constitutional: She is oriented to person, place, and time. She appears well-developed and well-nourished.   HENT:   Head: Normocephalic and atraumatic.   Eyes: Pupils are equal, round, and reactive to light. EOM are normal.   Neck: Normal range of motion. Neck supple.   Cardiovascular: Normal rate and regular rhythm.   Pulmonary/Chest: Effort normal and breath sounds normal.   Musculoskeletal: Normal range of motion. She exhibits no tenderness or deformity.   No obvious joint effusion; no overlying edema or warmth   Neurological: She is alert and oriented to person, place, and time.   Skin: Skin is warm and dry.   Psychiatric: She has a normal mood and affect. Her behavior is normal.   Nursing note and vitals reviewed.      Assessment:       1. Pain in both lower  extremities    2. Diabetic mononeuropathy associated with diabetes mellitus due to underlying condition    3. CKD stage 3 due to type 2 diabetes mellitus    4. COPD, moderate        Plan:           Problem List Items Addressed This Visit        Neuro    Diabetic mononeuropathy associated with diabetes mellitus due to underlying condition    Relevant Orders    Hemoglobin A1c (Completed)       Pulmonary    COPD, moderate    Current Assessment & Plan     Symptoms stable           Other Visit Diagnoses     Pain in both lower extremities    -  Primary    Relevant Orders    Cardiology Lab Ankle Brachial Indices W Stress    X-ray Knee Ortho Right (Completed)    CKD stage 3 due to type 2 diabetes mellitus        Relevant Orders    Basic metabolic panel (Completed)      F/u results with PCP

## 2019-08-01 ENCOUNTER — HOSPITAL ENCOUNTER (OUTPATIENT)
Dept: RADIOLOGY | Facility: HOSPITAL | Age: 74
Discharge: HOME | End: 2019-08-01
Attending: FAMILY MEDICINE
Payer: MEDICARE

## 2019-08-01 DIAGNOSIS — E11.22 CKD STAGE 3 DUE TO TYPE 2 DIABETES MELLITUS: Primary | ICD-10-CM

## 2019-08-01 DIAGNOSIS — N18.30 CKD STAGE 3 DUE TO TYPE 2 DIABETES MELLITUS: Primary | ICD-10-CM

## 2019-08-01 DIAGNOSIS — Z12.39 BREAST CANCER SCREENING: ICD-10-CM

## 2019-08-01 PROCEDURE — 77063 BREAST TOMOSYNTHESIS BI: CPT | Mod: 26,,, | Performed by: RADIOLOGY

## 2019-08-01 PROCEDURE — 77067 MAMMO DIGITAL SCREENING BILAT WITH TOMOSYNTHESIS_CAD: ICD-10-PCS | Mod: 26,,, | Performed by: RADIOLOGY

## 2019-08-01 PROCEDURE — 77067 SCR MAMMO BI INCL CAD: CPT | Mod: TC

## 2019-08-01 PROCEDURE — 77063 MAMMO DIGITAL SCREENING BILAT WITH TOMOSYNTHESIS_CAD: ICD-10-PCS | Mod: 26,,, | Performed by: RADIOLOGY

## 2019-08-01 PROCEDURE — 77067 SCR MAMMO BI INCL CAD: CPT | Mod: 26,,, | Performed by: RADIOLOGY

## 2019-08-08 NOTE — PROGRESS NOTES
Patient, Kay Rosas (MRN #[id number]), presented with a recent Estimated Glumerular Filtration Rate (EGFR) between 15 and 29 consistent with the definition of chronic kidney disease stage 4 (ICD10 - N18.4).    eGFR if non    Date Value Ref Range Status   07/31/2019 29.7 (A) >60 mL/min/1.73 m^2 Final     Comment:     Calculation used to obtain the estimated glomerular filtration  rate (eGFR) is the CKD-EPI equation.          The patient's chronic kidney disease stage 4 was monitored, evaluated, addressed and/or treated. This addendum to the medical record is made on 08/08/2019.

## 2019-08-08 NOTE — PATIENT INSTRUCTIONS
Reducing Knee Pain and Swelling    Many treatments can help reduce pain and swelling in your knee. Your healthcare provider or physical therapist may suggest one or more of the following treatments:  · Icing your knee helps reduce swelling. You may be asked to ice your knee once a day or more. Apply ice for about 15 to 20 minutes at a time, with at least 40 minutes between sessions. Always keep a towel between the ice and your skin.   · Keeping your leg raised above your heart helps excess fluid flow out of your knee joint. This reduces swelling.  · Compression means wrapping an elastic bandage or neoprene sleeve snugly around your knees. This keeps fluid from collecting in your knee joint.  · Electrical stimulation, done by a physical therapist or , can help reduce excess fluid in your knee joint.  · Anti-inflammatory medicines may be prescribed by your healthcare provider. You may take pills or receive injections in your knee.  · Isometric (rosemary) exercises strengthen the muscles that support your knee joint. They also help reduce excess fluid in your knee.  · Massage helps fluid drain away from your knee.  Date Last Reviewed: 10/13/2015  © 0370-0097 [employer]. [address] [place] [address], [place] [address]. All rights reserved. This information is not intended as a substitute for professional medical care. Always follow your healthcare professional's instructions.

## 2019-08-12 ENCOUNTER — CLINICAL SUPPORT (OUTPATIENT)
Dept: CARDIOLOGY | Facility: CLINIC | Age: 74
End: 2019-08-12
Attending: FAMILY MEDICINE
Payer: MEDICARE

## 2019-08-12 DIAGNOSIS — M79.604 PAIN IN BOTH LOWER EXTREMITIES: ICD-10-CM

## 2019-08-12 DIAGNOSIS — M79.605 PAIN IN BOTH LOWER EXTREMITIES: ICD-10-CM

## 2019-08-12 PROCEDURE — 93924 LWR XTR VASC STDY BILAT: CPT | Mod: S$GLB,,, | Performed by: INTERNAL MEDICINE

## 2019-08-12 PROCEDURE — 93924 CAR ANKLE BRACHIAL INDICES W STRESS: ICD-10-PCS | Mod: S$GLB,,, | Performed by: INTERNAL MEDICINE

## 2019-08-13 LAB — VASCULAR ANKLE BRACHIAL INDEX (ABI) LEFT: 1.16 (ref 0.9–1.2)

## 2019-08-14 DIAGNOSIS — I73.9 PAD (PERIPHERAL ARTERY DISEASE): Primary | ICD-10-CM

## 2019-08-15 ENCOUNTER — OFFICE VISIT (OUTPATIENT)
Dept: NEPHROLOGY | Facility: CLINIC | Age: 74
End: 2019-08-15
Payer: MEDICARE

## 2019-08-15 VITALS
HEIGHT: 66 IN | HEART RATE: 62 BPM | WEIGHT: 236.56 LBS | RESPIRATION RATE: 20 BRPM | BODY MASS INDEX: 38.02 KG/M2 | DIASTOLIC BLOOD PRESSURE: 70 MMHG | SYSTOLIC BLOOD PRESSURE: 136 MMHG

## 2019-08-15 DIAGNOSIS — N18.30 CHRONIC KIDNEY DISEASE (CKD), STAGE III (MODERATE): Primary | ICD-10-CM

## 2019-08-15 PROCEDURE — 99204 OFFICE O/P NEW MOD 45 MIN: CPT | Mod: S$GLB,,, | Performed by: INTERNAL MEDICINE

## 2019-08-15 PROCEDURE — 99999 PR PBB SHADOW E&M-EST. PATIENT-LVL III: ICD-10-PCS | Mod: PBBFAC,,, | Performed by: INTERNAL MEDICINE

## 2019-08-15 PROCEDURE — 99204 PR OFFICE/OUTPT VISIT, NEW, LEVL IV, 45-59 MIN: ICD-10-PCS | Mod: S$GLB,,, | Performed by: INTERNAL MEDICINE

## 2019-08-15 PROCEDURE — 3075F SYST BP GE 130 - 139MM HG: CPT | Mod: CPTII,S$GLB,, | Performed by: INTERNAL MEDICINE

## 2019-08-15 PROCEDURE — 3078F PR MOST RECENT DIASTOLIC BLOOD PRESSURE < 80 MM HG: ICD-10-PCS | Mod: CPTII,S$GLB,, | Performed by: INTERNAL MEDICINE

## 2019-08-15 PROCEDURE — 99999 PR PBB SHADOW E&M-EST. PATIENT-LVL III: CPT | Mod: PBBFAC,,, | Performed by: INTERNAL MEDICINE

## 2019-08-15 PROCEDURE — 1101F PR PT FALLS ASSESS DOC 0-1 FALLS W/OUT INJ PAST YR: ICD-10-PCS | Mod: CPTII,S$GLB,, | Performed by: INTERNAL MEDICINE

## 2019-08-15 PROCEDURE — 3075F PR MOST RECENT SYSTOLIC BLOOD PRESS GE 130-139MM HG: ICD-10-PCS | Mod: CPTII,S$GLB,, | Performed by: INTERNAL MEDICINE

## 2019-08-15 PROCEDURE — 99499 UNLISTED E&M SERVICE: CPT | Mod: S$GLB,,, | Performed by: INTERNAL MEDICINE

## 2019-08-15 PROCEDURE — 3078F DIAST BP <80 MM HG: CPT | Mod: CPTII,S$GLB,, | Performed by: INTERNAL MEDICINE

## 2019-08-15 PROCEDURE — 1101F PT FALLS ASSESS-DOCD LE1/YR: CPT | Mod: CPTII,S$GLB,, | Performed by: INTERNAL MEDICINE

## 2019-08-15 PROCEDURE — 99499 RISK ADDL DX/OHS AUDIT: ICD-10-PCS | Mod: S$GLB,,, | Performed by: INTERNAL MEDICINE

## 2019-08-15 NOTE — PROGRESS NOTES
Subjective:       Patient ID: Kay Rosas is a 73 y.o. White female who presents for new evaluation of BROCK, CKD stage 3 , HTN     Norris Swann MD      HPI:  Kay Rosas is a pleasant 73-year-old  woman with longstanding history of hypertension, type 2 diabetes, chronic kidney disease stage 3, seen in office today in consultation for above medical problems on referral from primary MD.  Pertinent previous provider notes and lab results were reviewed.  Patient's serum creatinine increased from 1.4 mg/dL about a year ago to 1.7 on recent labs.  Patient states she has been taking Mobic on a daily basis for the last 1 year for chronic arthritis.  Worsening renal function can be due to same.  Patient denies recent hospitalizations or ER visits.  She has some peripheral edema due to poor circulation and has been restricting her fluid intake.  So can be due to dehydration.  No family history of kidney disease.          Past Medical History:   Diagnosis Date    Arthritis     Degenerative disc disease, cervical     Diabetes mellitus, type 2     Emphysema of lung     MVP (mitral valve prolapse)     Osteoporosis     feet    Thyroid condition        Past Surgical History:   Procedure Laterality Date    BACK SURGERY  2012    cataract surgery  Bilateral 10/ 2012    elbow spur removed Left     HYSTERECTOMY Left 1978    OOPHORECTOMY      OVARIAN CYST REMOVAL      two from back, one from axilla    VASCULAR SURGERY Right     high ligation due to blood clot        Review of patient's allergies indicates:  No Known Allergies    Current Outpatient Medications on File Prior to Visit   Medication Sig Dispense Refill    albuterol 90 mcg/actuation inhaler Inhale 2 puffs into the lungs every 6 (six) hours as needed.      ARIPiprazole (ABILIFY) 2 MG Tab Take 1 tablet (2 mg total) by mouth once daily. 90 tablet 2    azelastine (ASTELIN) 137 mcg (0.1 %) nasal spray 2 sprays (274 mcg total) by Nasal route 2  (two) times daily. 30 mL 11    gabapentin (NEURONTIN) 300 MG capsule TAKE 1 CAPSULE (300 MG TOTAL) BY MOUTH 3 (THREE) TIMES DAILY. 90 capsule 3    levocetirizine (XYZAL) 5 MG tablet Take 1 tablet (5 mg total) by mouth every evening. 90 tablet 2    levothyroxine (SYNTHROID) 50 MCG tablet TAKE 1 TABLET (50 MCG TOTAL) BY MOUTH ONCE DAILY. 90 tablet 3    losartan-hydrochlorothiazide 100-25 mg (HYZAAR) 100-25 mg per tablet TAKE 1 TABLET BY MOUTH ONCE DAILY. 90 tablet 3    oxybutynin (DITROPAN-XL) 5 MG TR24 Take 1 tablet (5 mg total) by mouth once daily. 90 tablet 1    pantoprazole (PROTONIX) 40 MG tablet TAKE 1 TABLET BY MOUTH DAILY. 90 tablet 1    sertraline (ZOLOFT) 100 MG tablet TAKE 1 TABLET (100 MG TOTAL) BY MOUTH ONCE DAILY. 30 tablet 6    tiZANidine (ZANAFLEX) 4 MG tablet TAKE 1 TABLET (4 MG TOTAL) BY MOUTH DAILY. FOR 10 DAYS  6    traMADol (ULTRAM) 50 mg tablet Take 1 tablet (50 mg total) by mouth every 6 (six) hours as needed for Pain. 30 tablet 1    traZODone (DESYREL) 100 MG tablet TAKE 1 TABLET (100 MG TOTAL) BY MOUTH EVERY EVENING. 30 tablet 6    [DISCONTINUED] atorvastatin (LIPITOR) 20 MG tablet TAKE 1 TABLET (20 MG TOTAL) BY MOUTH ONCE DAILY. 90 tablet 3    [DISCONTINUED] fluticasone (FLONASE) 50 mcg/actuation nasal spray 1 spray by Each Nare route once daily.      [DISCONTINUED] meloxicam (MOBIC) 15 MG tablet TAKE 1 TABLET (15 MG TOTAL) BY MOUTH ONCE DAILY. 90 tablet 3    [DISCONTINUED] sertraline (ZOLOFT) 100 MG tablet Take 1 tablet (100 mg total) by mouth once daily. 90 tablet 1    metFORMIN (GLUCOPHAGE) 1000 MG tablet TAKE 1 TABLET BY MOUTH TWICE A DAY (Patient taking differently: TAKE 0.5 TABLET BY MOUTH TWICE A DAY) 180 tablet 2    sertraline (ZOLOFT) 50 MG tablet Take 1 tablet (50 mg total) by mouth once daily. 90 tablet 1     No current facility-administered medications on file prior to visit.        FH :  Denies family history of kidney disease    SH : Lives at home, reformed  smoker, no alcohol,       Review of Systems   Constitutional: Negative for activity change, appetite change, fatigue and fever.   HENT: Negative for congestion, facial swelling, sore throat, trouble swallowing and voice change.    Eyes: Negative for redness and visual disturbance.   Respiratory: Negative for apnea, cough, chest tightness, shortness of breath and wheezing.    Cardiovascular: Negative for chest pain, palpitations and leg swelling.   Gastrointestinal: Negative for abdominal distention, abdominal pain, blood in stool, constipation, diarrhea, nausea and vomiting.   Genitourinary: Negative for decreased urine volume, difficulty urinating, dysuria, flank pain, frequency, hematuria, pelvic pain and urgency.   Musculoskeletal: Positive for arthralgias. Negative for back pain, gait problem and joint swelling.   Skin: Negative for color change and rash.   Neurological: Negative for dizziness, syncope, weakness and headaches.   Hematological: Does not bruise/bleed easily.   Psychiatric/Behavioral: Negative for agitation, behavioral problems and confusion. The patient is not nervous/anxious.      :            Objective:         Vitals:    08/15/19 1539   BP: 136/70   Pulse: 62   Resp: 20       Weight 236 lbs     Physical Exam   Constitutional: She is oriented to person, place, and time. She appears well-developed and well-nourished. No distress.   HENT:   Head: Normocephalic and atraumatic.   Mouth/Throat: Oropharynx is clear and moist. No oropharyngeal exudate.   Eyes: Pupils are equal, round, and reactive to light. Conjunctivae and EOM are normal. No scleral icterus.   Neck: Normal range of motion. Neck supple. No JVD present. Carotid bruit is not present. No tracheal deviation present. No thyroid mass and no thyromegaly present.   Cardiovascular: Normal rate, regular rhythm, normal heart sounds and intact distal pulses. Exam reveals no gallop and no friction rub.   No murmur heard.  Pulmonary/Chest: Effort  normal and breath sounds normal. No respiratory distress. She has no wheezes. She has no rales. She exhibits no tenderness.   Abdominal: Soft. Bowel sounds are normal. She exhibits no distension, no abdominal bruit, no ascites and no mass. There is no hepatosplenomegaly. There is no tenderness. There is no rebound, no guarding and no CVA tenderness.   Musculoskeletal: Normal range of motion. She exhibits edema. She exhibits no tenderness.   Trace edema noted in right lower extremity,   Lymphadenopathy:     She has no cervical adenopathy.   Neurological: She is alert and oriented to person, place, and time. She has normal reflexes. She displays normal reflexes. No cranial nerve deficit. She exhibits normal muscle tone. Coordination normal.   Skin: Skin is warm and intact. No rash noted. No erythema. No pallor.   Psychiatric: She has a normal mood and affect. Her behavior is normal. Judgment normal.         Labs:    Lab Results   Component Value Date    CREATININE 1.7 (H) 07/31/2019    BUN 40 (H) 07/31/2019     07/31/2019    K 5.1 07/31/2019     07/31/2019    CO2 27 07/31/2019       Lab Results   Component Value Date    WBC 5.83 02/18/2019    HGB 10.1 (L) 02/18/2019    HCT 33.5 (L) 02/18/2019    MCV 90 02/18/2019     02/18/2019       Lab Results   Component Value Date    CALCIUM 10.3 07/31/2019       Lab Results   Component Value Date    ALBUMIN 3.8 02/18/2019     Lab Results   Component Value Date    HGBA1C 5.8 (H) 07/31/2019 4/2019    EXAMINATION:  US RETROPERITONEAL COMPLETE    CLINICAL HISTORY:  Frequency of micturition    TECHNIQUE:  Ultrasound of the kidneys and urinary bladder was performed including color flow and Doppler evaluation of the kidneys.    COMPARISON:  None.    FINDINGS:  The right kidney measures small at 8.3 cm in length.  Normal renal echotexture.  Mild parenchymal thinning is seen.  No hydronephrosis or nephrolithiasis.  Resistive index within a segmental branch  measures 0.83.    Left kidney measures low normal at 9.5 cm in length.  No hydronephrosis or nephrolithiasis.  Normal renal echotexture.  Resistive index measures 0.76 in a segmental branch.    Urinary bladder is unremarkable.      Impression       Right kidney measures small at 8.3 cm with mild parenchymal thinning.  No hydronephrosis or other abnormality.         Impression and Plan :  73-year-old  woman seen in office today in consultation for following medical problems    1. BROCK on CKD stage 3 :  Serum creatinine increased from 1.4 mg/dL about a year ago to 1.7 on recent labs, likely due to daily NSAID use, advised patient to discontinue Mobic, handout on NSAID information given to the patient and advised patient to avoid in future.  Also discussed adequate fluid intake.    2.  Hypertension - discussed adherence with medications, low-salt diet,    3.  Obesity - discuss calorie restriction, exercise, weight loss,    4. Anemia- multifactorial, check labs before next visit    5.  Check urinalysis to evaluate for proteinuria and hematuria    6.  Recent renal ultrasound results discussed with the patient,    Return to clinic in about 4 weeks, more than 40 min of face-to-face time was spent with the patient discussing labs and plan of care.  Also time was spent educating her about chronic kidney disease.  Thank you for involving us in the care of this pleasant woman.    Sincerely,    Dmitri Alonso MD    Cc to Norris Swann MD

## 2019-08-15 NOTE — PATIENT INSTRUCTIONS
Avoid NSAID pain medications such as advil, aleve, motrin, ibuprofen, naprosyn, meloxicam, diclofenac, mobic.     Stop Mobic     Low salt diet as discussed

## 2019-08-20 DIAGNOSIS — I10 HTN (HYPERTENSION): Primary | ICD-10-CM

## 2019-08-23 ENCOUNTER — INITIAL CONSULT (OUTPATIENT)
Dept: CARDIOLOGY | Facility: CLINIC | Age: 74
End: 2019-08-23
Payer: MEDICARE

## 2019-08-23 ENCOUNTER — CLINICAL SUPPORT (OUTPATIENT)
Dept: CARDIOLOGY | Facility: CLINIC | Age: 74
End: 2019-08-23
Payer: MEDICARE

## 2019-08-23 VITALS
DIASTOLIC BLOOD PRESSURE: 66 MMHG | WEIGHT: 235 LBS | BODY MASS INDEX: 37.77 KG/M2 | SYSTOLIC BLOOD PRESSURE: 132 MMHG | HEART RATE: 59 BPM | HEIGHT: 66 IN

## 2019-08-23 DIAGNOSIS — J44.9 COPD, MODERATE: ICD-10-CM

## 2019-08-23 DIAGNOSIS — E78.00 PURE HYPERCHOLESTEROLEMIA: ICD-10-CM

## 2019-08-23 DIAGNOSIS — M19.90 ARTHRITIS: ICD-10-CM

## 2019-08-23 DIAGNOSIS — G47.33 OBSTRUCTIVE SLEEP APNEA SYNDROME: ICD-10-CM

## 2019-08-23 DIAGNOSIS — E03.9 ACQUIRED HYPOTHYROIDISM: ICD-10-CM

## 2019-08-23 DIAGNOSIS — I73.9 PAD (PERIPHERAL ARTERY DISEASE): ICD-10-CM

## 2019-08-23 DIAGNOSIS — I10 ESSENTIAL HYPERTENSION: Primary | ICD-10-CM

## 2019-08-23 DIAGNOSIS — E11.9 CONTROLLED TYPE 2 DIABETES MELLITUS WITHOUT COMPLICATION, WITHOUT LONG-TERM CURRENT USE OF INSULIN: ICD-10-CM

## 2019-08-23 DIAGNOSIS — I10 HTN (HYPERTENSION): ICD-10-CM

## 2019-08-23 DIAGNOSIS — E08.41 DIABETIC MONONEUROPATHY ASSOCIATED WITH DIABETES MELLITUS DUE TO UNDERLYING CONDITION: ICD-10-CM

## 2019-08-23 PROCEDURE — 99999 PR PBB SHADOW E&M-EST. PATIENT-LVL III: ICD-10-PCS | Mod: PBBFAC,,, | Performed by: INTERNAL MEDICINE

## 2019-08-23 PROCEDURE — 99999 PR PBB SHADOW E&M-EST. PATIENT-LVL III: CPT | Mod: PBBFAC,,, | Performed by: INTERNAL MEDICINE

## 2019-08-23 PROCEDURE — 3044F PR MOST RECENT HEMOGLOBIN A1C LEVEL <7.0%: ICD-10-PCS | Mod: CPTII,S$GLB,, | Performed by: INTERNAL MEDICINE

## 2019-08-23 PROCEDURE — 3078F DIAST BP <80 MM HG: CPT | Mod: CPTII,S$GLB,, | Performed by: INTERNAL MEDICINE

## 2019-08-23 PROCEDURE — 99499 UNLISTED E&M SERVICE: CPT | Mod: S$GLB,,, | Performed by: INTERNAL MEDICINE

## 2019-08-23 PROCEDURE — 1101F PR PT FALLS ASSESS DOC 0-1 FALLS W/OUT INJ PAST YR: ICD-10-PCS | Mod: CPTII,S$GLB,, | Performed by: INTERNAL MEDICINE

## 2019-08-23 PROCEDURE — 3075F SYST BP GE 130 - 139MM HG: CPT | Mod: CPTII,S$GLB,, | Performed by: INTERNAL MEDICINE

## 2019-08-23 PROCEDURE — 93010 ELECTROCARDIOGRAM REPORT: CPT | Mod: S$GLB,,, | Performed by: INTERNAL MEDICINE

## 2019-08-23 PROCEDURE — 99204 OFFICE O/P NEW MOD 45 MIN: CPT | Mod: S$GLB,,, | Performed by: INTERNAL MEDICINE

## 2019-08-23 PROCEDURE — 3044F HG A1C LEVEL LT 7.0%: CPT | Mod: CPTII,S$GLB,, | Performed by: INTERNAL MEDICINE

## 2019-08-23 PROCEDURE — 99499 RISK ADDL DX/OHS AUDIT: ICD-10-PCS | Mod: S$GLB,,, | Performed by: INTERNAL MEDICINE

## 2019-08-23 PROCEDURE — 93005 ELECTROCARDIOGRAM TRACING: CPT | Mod: S$GLB,,, | Performed by: INTERNAL MEDICINE

## 2019-08-23 PROCEDURE — 99204 PR OFFICE/OUTPT VISIT, NEW, LEVL IV, 45-59 MIN: ICD-10-PCS | Mod: S$GLB,,, | Performed by: INTERNAL MEDICINE

## 2019-08-23 PROCEDURE — 3078F PR MOST RECENT DIASTOLIC BLOOD PRESSURE < 80 MM HG: ICD-10-PCS | Mod: CPTII,S$GLB,, | Performed by: INTERNAL MEDICINE

## 2019-08-23 PROCEDURE — 93005 EKG 12-LEAD: ICD-10-PCS | Mod: S$GLB,,, | Performed by: INTERNAL MEDICINE

## 2019-08-23 PROCEDURE — 1101F PT FALLS ASSESS-DOCD LE1/YR: CPT | Mod: CPTII,S$GLB,, | Performed by: INTERNAL MEDICINE

## 2019-08-23 PROCEDURE — 93010 EKG 12-LEAD: ICD-10-PCS | Mod: S$GLB,,, | Performed by: INTERNAL MEDICINE

## 2019-08-23 PROCEDURE — 3075F PR MOST RECENT SYSTOLIC BLOOD PRESS GE 130-139MM HG: ICD-10-PCS | Mod: CPTII,S$GLB,, | Performed by: INTERNAL MEDICINE

## 2019-08-23 NOTE — LETTER
August 23, 2019      Loida Christiansen MD  [address]  [place] [address]           [facility]  [address] [place] [address]  Phone: [phone]  Fax: [phone]          Patient: Kay Rosas   MR Number: [id number]   YOB: 1945   Date of Visit: 8/23/2019       Dear Dr. Loida Christiansen:    Thank you for referring Kay Rosas to me for evaluation. Attached you will find relevant portions of my assessment and plan of care.    If you have questions, please do not hesitate to call me. I look forward to following Kay Rosas along with you.    Sincerely,    Vaughn Luna MD    Enclosure  CC:  No Recipients    If you would like to receive this communication electronically, please contact [email or web address] or [phone] to request more information on [employer] access.    For providers and/or their staff who would like to refer a patient to [facility], please contact us through our one-stop-shop provider referral line, [facility] [place], at [phone].    If you feel you have received this communication in error or would no longer like to receive these types of communications, please e-mail [email or web address]

## 2019-08-26 ENCOUNTER — OFFICE VISIT (OUTPATIENT)
Dept: INTERNAL MEDICINE | Facility: CLINIC | Age: 74
End: 2019-08-26
Payer: MEDICARE

## 2019-08-26 ENCOUNTER — HOSPITAL ENCOUNTER (OUTPATIENT)
Dept: RADIOLOGY | Facility: HOSPITAL | Age: 74
Discharge: HOME | End: 2019-08-26
Attending: FAMILY MEDICINE
Payer: MEDICARE

## 2019-08-26 ENCOUNTER — PATIENT OUTREACH (OUTPATIENT)
Dept: ADMINISTRATIVE | Facility: HOSPITAL | Age: 74
End: 2019-08-26

## 2019-08-26 VITALS
SYSTOLIC BLOOD PRESSURE: 132 MMHG | BODY MASS INDEX: 37.6 KG/M2 | TEMPERATURE: 97 F | HEIGHT: 66 IN | DIASTOLIC BLOOD PRESSURE: 52 MMHG | OXYGEN SATURATION: 97 % | WEIGHT: 233.94 LBS | HEART RATE: 80 BPM

## 2019-08-26 DIAGNOSIS — I73.9 PAD (PERIPHERAL ARTERY DISEASE): ICD-10-CM

## 2019-08-26 DIAGNOSIS — G89.29 CHRONIC PAIN OF BOTH KNEES: Primary | ICD-10-CM

## 2019-08-26 DIAGNOSIS — M25.562 CHRONIC PAIN OF BOTH KNEES: Primary | ICD-10-CM

## 2019-08-26 DIAGNOSIS — I10 ESSENTIAL HYPERTENSION: ICD-10-CM

## 2019-08-26 DIAGNOSIS — G89.29 CHRONIC BILATERAL LOW BACK PAIN WITHOUT SCIATICA: ICD-10-CM

## 2019-08-26 DIAGNOSIS — M54.50 CHRONIC BILATERAL LOW BACK PAIN WITHOUT SCIATICA: ICD-10-CM

## 2019-08-26 DIAGNOSIS — M25.561 CHRONIC PAIN OF BOTH KNEES: Primary | ICD-10-CM

## 2019-08-26 PROCEDURE — 1101F PR PT FALLS ASSESS DOC 0-1 FALLS W/OUT INJ PAST YR: ICD-10-PCS | Mod: CPTII,S$GLB,, | Performed by: FAMILY MEDICINE

## 2019-08-26 PROCEDURE — 99214 PR OFFICE/OUTPT VISIT, EST, LEVL IV, 30-39 MIN: ICD-10-PCS | Mod: S$GLB,,, | Performed by: FAMILY MEDICINE

## 2019-08-26 PROCEDURE — 1101F PT FALLS ASSESS-DOCD LE1/YR: CPT | Mod: CPTII,S$GLB,, | Performed by: FAMILY MEDICINE

## 2019-08-26 PROCEDURE — 72110 X-RAY EXAM L-2 SPINE 4/>VWS: CPT | Mod: 26,,, | Performed by: RADIOLOGY

## 2019-08-26 PROCEDURE — 99999 PR PBB SHADOW E&M-EST. PATIENT-LVL IV: ICD-10-PCS | Mod: PBBFAC,,, | Performed by: FAMILY MEDICINE

## 2019-08-26 PROCEDURE — 72110 XR LUMBAR SPINE COMPLETE 5 VIEW: ICD-10-PCS | Mod: 26,,, | Performed by: RADIOLOGY

## 2019-08-26 PROCEDURE — 72110 X-RAY EXAM L-2 SPINE 4/>VWS: CPT | Mod: TC

## 2019-08-26 PROCEDURE — 99214 OFFICE O/P EST MOD 30 MIN: CPT | Mod: S$GLB,,, | Performed by: FAMILY MEDICINE

## 2019-08-26 PROCEDURE — 3075F SYST BP GE 130 - 139MM HG: CPT | Mod: CPTII,S$GLB,, | Performed by: FAMILY MEDICINE

## 2019-08-26 PROCEDURE — 3078F DIAST BP <80 MM HG: CPT | Mod: CPTII,S$GLB,, | Performed by: FAMILY MEDICINE

## 2019-08-26 PROCEDURE — 3075F PR MOST RECENT SYSTOLIC BLOOD PRESS GE 130-139MM HG: ICD-10-PCS | Mod: CPTII,S$GLB,, | Performed by: FAMILY MEDICINE

## 2019-08-26 PROCEDURE — 99999 PR PBB SHADOW E&M-EST. PATIENT-LVL IV: CPT | Mod: PBBFAC,,, | Performed by: FAMILY MEDICINE

## 2019-08-26 PROCEDURE — 3078F PR MOST RECENT DIASTOLIC BLOOD PRESSURE < 80 MM HG: ICD-10-PCS | Mod: CPTII,S$GLB,, | Performed by: FAMILY MEDICINE

## 2019-08-26 NOTE — PROGRESS NOTES
Subjective:       Patient ID: Kay Rosas is a 73 y.o. female.    Chief Complaint: Follow-up (4 mon)    Pt is a 73 year old who has bilateral knee pain. Pt has been told multiple time that PT is mode but pt does not have time. Xray shows minimal arthritis.     Review of Systems   Constitutional: Negative.    Respiratory: Negative.    Cardiovascular: Negative.    Gastrointestinal: Negative.    Musculoskeletal: Negative.    Psychiatric/Behavioral: Negative.        Objective:      Physical Exam   Constitutional: She is oriented to person, place, and time. She appears well-developed and well-nourished.   Cardiovascular: Normal rate and regular rhythm. Exam reveals no friction rub.   No murmur heard.  Pulmonary/Chest: Effort normal and breath sounds normal. No stridor. She has no rales.   Neurological: She is alert and oriented to person, place, and time.       Assessment:       1. Chronic pain of both knees    2. Essential hypertension    3. PAD (peripheral artery disease)    4. Chronic bilateral low back pain without sciatica        Plan:       Chronic pain of both knees  Comments:  Will send pt to ortho. Try cream 3-5 times a day.  Orders:  -     Ambulatory consult to Orthopedics    Essential hypertension  Comments:  Pt BP is well controlled    PAD (peripheral artery disease)  Comments:  Cardiology does not feel there is any signficant PAD    Chronic bilateral low back pain without sciatica  Comments:  Will get xray lower back.  Orders:  -     X-Ray Lumbar Spine Complete 5 View; Future; Expected date: 08/26/2019

## 2019-08-27 DIAGNOSIS — M47.816 LUMBAR SPONDYLOSIS: Primary | ICD-10-CM

## 2019-08-28 ENCOUNTER — OFFICE VISIT (OUTPATIENT)
Dept: ORTHOPEDICS | Facility: CLINIC | Age: 74
End: 2019-08-28
Payer: MEDICARE

## 2019-08-28 ENCOUNTER — HOSPITAL ENCOUNTER (OUTPATIENT)
Dept: RADIOLOGY | Facility: HOSPITAL | Age: 74
Discharge: HOME | End: 2019-08-28
Attending: FAMILY MEDICINE
Payer: MEDICARE

## 2019-08-28 VITALS
HEIGHT: 66 IN | WEIGHT: 233 LBS | HEART RATE: 61 BPM | DIASTOLIC BLOOD PRESSURE: 66 MMHG | BODY MASS INDEX: 37.45 KG/M2 | SYSTOLIC BLOOD PRESSURE: 144 MMHG

## 2019-08-28 DIAGNOSIS — M25.562 PAIN IN BOTH KNEES, UNSPECIFIED CHRONICITY: Primary | ICD-10-CM

## 2019-08-28 DIAGNOSIS — M25.561 PAIN IN BOTH KNEES, UNSPECIFIED CHRONICITY: Primary | ICD-10-CM

## 2019-08-28 DIAGNOSIS — E08.41 DIABETIC MONONEUROPATHY ASSOCIATED WITH DIABETES MELLITUS DUE TO UNDERLYING CONDITION: ICD-10-CM

## 2019-08-28 DIAGNOSIS — M25.562 PAIN IN BOTH KNEES, UNSPECIFIED CHRONICITY: ICD-10-CM

## 2019-08-28 DIAGNOSIS — M47.816 LUMBAR SPONDYLOSIS: ICD-10-CM

## 2019-08-28 DIAGNOSIS — M25.561 PAIN IN BOTH KNEES, UNSPECIFIED CHRONICITY: ICD-10-CM

## 2019-08-28 PROCEDURE — 20610 DRAIN/INJ JOINT/BURSA W/O US: CPT | Mod: RT,S$GLB,, | Performed by: FAMILY MEDICINE

## 2019-08-28 PROCEDURE — 20610 LARGE JOINT ASPIRATION/INJECTION: R KNEE: ICD-10-PCS | Mod: RT,S$GLB,, | Performed by: FAMILY MEDICINE

## 2019-08-28 PROCEDURE — 3078F PR MOST RECENT DIASTOLIC BLOOD PRESSURE < 80 MM HG: ICD-10-PCS | Mod: CPTII,S$GLB,, | Performed by: FAMILY MEDICINE

## 2019-08-28 PROCEDURE — 99499 UNLISTED E&M SERVICE: CPT | Mod: S$GLB,,, | Performed by: FAMILY MEDICINE

## 2019-08-28 PROCEDURE — 99499 RISK ADDL DX/OHS AUDIT: ICD-10-PCS | Mod: S$GLB,,, | Performed by: FAMILY MEDICINE

## 2019-08-28 PROCEDURE — 99214 OFFICE O/P EST MOD 30 MIN: CPT | Mod: 25,S$GLB,, | Performed by: FAMILY MEDICINE

## 2019-08-28 PROCEDURE — 1101F PT FALLS ASSESS-DOCD LE1/YR: CPT | Mod: CPTII,S$GLB,, | Performed by: FAMILY MEDICINE

## 2019-08-28 PROCEDURE — 3077F SYST BP >= 140 MM HG: CPT | Mod: CPTII,S$GLB,, | Performed by: FAMILY MEDICINE

## 2019-08-28 PROCEDURE — 99214 PR OFFICE/OUTPT VISIT, EST, LEVL IV, 30-39 MIN: ICD-10-PCS | Mod: 25,S$GLB,, | Performed by: FAMILY MEDICINE

## 2019-08-28 PROCEDURE — 99999 PR PBB SHADOW E&M-EST. PATIENT-LVL III: ICD-10-PCS | Mod: PBBFAC,,, | Performed by: FAMILY MEDICINE

## 2019-08-28 PROCEDURE — 73564 X-RAY EXAM KNEE 4 OR MORE: CPT | Mod: TC,50

## 2019-08-28 PROCEDURE — 3078F DIAST BP <80 MM HG: CPT | Mod: CPTII,S$GLB,, | Performed by: FAMILY MEDICINE

## 2019-08-28 PROCEDURE — 3077F PR MOST RECENT SYSTOLIC BLOOD PRESSURE >= 140 MM HG: ICD-10-PCS | Mod: CPTII,S$GLB,, | Performed by: FAMILY MEDICINE

## 2019-08-28 PROCEDURE — 73564 XR KNEE ORTHO BILAT WITH FLEXION: ICD-10-PCS | Mod: 26,50,, | Performed by: RADIOLOGY

## 2019-08-28 PROCEDURE — 99999 PR PBB SHADOW E&M-EST. PATIENT-LVL III: CPT | Mod: PBBFAC,,, | Performed by: FAMILY MEDICINE

## 2019-08-28 PROCEDURE — 1101F PR PT FALLS ASSESS DOC 0-1 FALLS W/OUT INJ PAST YR: ICD-10-PCS | Mod: CPTII,S$GLB,, | Performed by: FAMILY MEDICINE

## 2019-08-28 PROCEDURE — 73564 X-RAY EXAM KNEE 4 OR MORE: CPT | Mod: 26,50,, | Performed by: RADIOLOGY

## 2019-08-28 RX ORDER — BETAMETHASONE SODIUM PHOSPHATE AND BETAMETHASONE ACETATE 3; 3 MG/ML; MG/ML
6 INJECTION, SUSPENSION INTRA-ARTICULAR; INTRALESIONAL; INTRAMUSCULAR; SOFT TISSUE
Status: DISCONTINUED | OUTPATIENT
Start: 2019-08-28 | End: 2019-08-28 | Stop reason: HOSPADM

## 2019-08-28 RX ADMIN — BETAMETHASONE SODIUM PHOSPHATE AND BETAMETHASONE ACETATE 6 MG: 3; 3 INJECTION, SUSPENSION INTRA-ARTICULAR; INTRALESIONAL; INTRAMUSCULAR; SOFT TISSUE at 09:08

## 2019-08-28 NOTE — LETTER
August 28, 2019      Norris Swann MD  [address] [place] [address]           [facility]  [address] [place] [address]  Phone: [phone]  Fax: [phone]          Patient: Kay Rosas   MR Number: [id number]   YOB: 1945   Date of Visit: 8/28/2019       Dear Dr. Norris Swann:    Thank you for referring Kay Rosas to me for evaluation. Attached you will find relevant portions of my assessment and plan of care.    If you have questions, please do not hesitate to call me. I look forward to following Kay Rosas along with you.    Sincerely,    Fabio Tiwari MD    Enclosure  CC:  No Recipients    If you would like to receive this communication electronically, please contact [email or web address] or [phone] to request more information on [employer] Link access.    For providers and/or their staff who would like to refer a patient to [facility], please contact us through our one-stop-shop provider referral line, [facility], at [phone].    If you feel you have received this communication in error or would no longer like to receive these types of communications, please e-mail [email or web address]

## 2019-08-28 NOTE — PATIENT INSTRUCTIONS
Ice to right knee for 30 min at a time 3 times a day the next 2 days    Decrease activity on right knee for the next 48 hr and elevate when possible    Follow up with pain management visit next week and discuss possible physical therapy with him and possible referral to spine specialist

## 2019-08-28 NOTE — PROCEDURES
Large Joint Aspiration/Injection: R knee  Date/Time: 8/28/2019 9:28 AM  Performed by: Fabio Tiwari MD  Authorized by: Fabio Tiwari MD     Consent Done?:  Yes (Verbal)  Indications:  Pain  Procedure site marked: Yes    Timeout: Prior to procedure the correct patient, procedure, and site was verified    Anesthesia    Anesthetic: bupivacaine 0.25% without epinephrine and lidocaine 1% without epinephrine (3cc Lidoc. , 2cc Bupiv.)    Location:  Knee  Site:  R knee  Prep: Patient was prepped and draped in usual sterile fashion    Approach:  Anterolateral  Medications:  6 mg betamethasone acetate-betamethasone sodium phosphate 6 mg/mL  Patient tolerance:  Patient tolerated the procedure well with no immediate complications

## 2019-08-28 NOTE — PROGRESS NOTES
Subjective:     Patient ID: Kay Rosas is a 73 y.o. female.    Chief Complaint: Pain of the Right Knee and Pain of the Left Knee      HPI:  Patient states that she has had pain in both knees for several months up to year and that the pain is primarily in the right knee.  She occasionally has swelling in the right knee has given out on her and buckle.  She has not experienced locking of either knee.  She has more pain when walking and sitting and says that the pain in her knees and legs improves after sitting but it does not immediately resolve after walking.    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  The patient does not experience radiating pain but says that her low back does hurt at times sometimes worse than the leg pain and sometimes the leg pain is worse than the back.  She states that she had low back surgery with Dr. Louis Avila about 5 years ago which did help her low back and right leg pain especially the 1st few years after the surgery.      She has not attended physical therapy and says that she is unable to do that because of her responsibilities with her mother.  She occasionally takes Tylenol or ibuprofen and uses heat.    The patient would like to try cortisone injection to the right knee today to hopefully improve pain and function.    Past Medical History:   Diagnosis Date    Arthritis     Degenerative disc disease, cervical     Diabetes mellitus, type 2     Emphysema of lung     Hyperlipidemia     Hypertension     MVP (mitral valve prolapse)     Osteoporosis     feet    Thyroid condition      Past Surgical History:   Procedure Laterality Date    BACK SURGERY  2012    cataract surgery  Bilateral 10/ 2012    elbow spur removed Left     HYSTERECTOMY Left 1978    OOPHORECTOMY      OVARIAN CYST REMOVAL      two from back, one from axilla    VASCULAR SURGERY Right     high ligation due to blood  clot      Family History   Problem Relation Age of Onset    Heart disease Mother     Kidney disease Mother     Arthritis Mother     Breast cancer Mother     Cancer Father     Heart disease Father     Cancer Sister     Ovarian cancer Sister     Alzheimer's disease Sister      Social History     Socioeconomic History    Marital status:      Spouse name: Not on file    Number of children: Not on file    Years of education: Not on file    Highest education level: Not on file   Occupational History    Not on file   Social Needs    Financial resource strain: Not on file    Food insecurity:     Worry: Not on file     Inability: Not on file    Transportation needs:     Medical: Not on file     Non-medical: Not on file   Tobacco Use    Smoking status: Former Smoker     Last attempt to quit: 2015     Years since quittin.1    Smokeless tobacco: Never Used   Substance and Sexual Activity    Alcohol use: No     Alcohol/week: 0.0 oz    Drug use: No    Sexual activity: Not on file   Lifestyle    Physical activity:     Days per week: Not on file     Minutes per session: Not on file    Stress: Not on file   Relationships    Social connections:     Talks on phone: Not on file     Gets together: Not on file     Attends Yazidism service: Not on file     Active member of club or organization: Not on file     Attends meetings of clubs or organizations: Not on file     Relationship status: Not on file   Other Topics Concern    Not on file   Social History Narrative    Not on file       Current Outpatient Medications:     albuterol 90 mcg/actuation inhaler, Inhale 2 puffs into the lungs every 6 (six) hours as needed., Disp: , Rfl:     ARIPiprazole (ABILIFY) 2 MG Tab, Take 1 tablet (2 mg total) by mouth once daily., Disp: 90 tablet, Rfl: 2    gabapentin (NEURONTIN) 300 MG capsule, TAKE 1 CAPSULE (300 MG TOTAL) BY MOUTH 3 (THREE) TIMES DAILY., Disp: 90 capsule, Rfl: 3    levocetirizine (XYZAL)  5 MG tablet, Take 1 tablet (5 mg total) by mouth every evening., Disp: 90 tablet, Rfl: 2    levothyroxine (SYNTHROID) 50 MCG tablet, TAKE 1 TABLET (50 MCG TOTAL) BY MOUTH ONCE DAILY., Disp: 90 tablet, Rfl: 3    losartan-hydrochlorothiazide 100-25 mg (HYZAAR) 100-25 mg per tablet, TAKE 1 TABLET BY MOUTH ONCE DAILY., Disp: 90 tablet, Rfl: 3    metFORMIN (GLUCOPHAGE) 1000 MG tablet, TAKE 1 TABLET BY MOUTH TWICE A DAY (Patient taking differently: TAKE 0.5 TABLET BY MOUTH TWICE A DAY), Disp: 180 tablet, Rfl: 2    oxybutynin (DITROPAN-XL) 5 MG TR24, Take 1 tablet (5 mg total) by mouth once daily., Disp: 90 tablet, Rfl: 1    pantoprazole (PROTONIX) 40 MG tablet, TAKE 1 TABLET BY MOUTH DAILY., Disp: 90 tablet, Rfl: 1    sertraline (ZOLOFT) 100 MG tablet, TAKE 1 TABLET (100 MG TOTAL) BY MOUTH ONCE DAILY., Disp: 30 tablet, Rfl: 6    sertraline (ZOLOFT) 50 MG tablet, Take 1 tablet (50 mg total) by mouth once daily., Disp: 90 tablet, Rfl: 1    tiZANidine (ZANAFLEX) 4 MG tablet, TAKE 1 TABLET DAILY, Disp: , Rfl: 6    traMADol (ULTRAM) 50 mg tablet, Take 1 tablet (50 mg total) by mouth every 6 (six) hours as needed for Pain., Disp: 30 tablet, Rfl: 1    traZODone (DESYREL) 100 MG tablet, TAKE 1 TABLET (100 MG TOTAL) BY MOUTH EVERY EVENING., Disp: 30 tablet, Rfl: 6    azelastine (ASTELIN) 137 mcg (0.1 %) nasal spray, 2 sprays (274 mcg total) by Nasal route 2 (two) times daily., Disp: 30 mL, Rfl: 11  Review of patient's allergies indicates:  No Known Allergies  Review of Systems   Constitutional: Negative for chills, fever and weight loss.   Respiratory: Negative for shortness of breath.    Cardiovascular: Negative for chest pain and palpitations.   Musculoskeletal: Positive for back pain and joint pain.       Objective:   Body mass index is 37.61 kg/m².  Vitals:    08/28/19 0857   BP: (!) 144/66   Pulse: 61           Ortho/SPM Exam-well-nourished well-developed alert and oriented, ambulatory with antalgic gait and in no  acute distress    Lumbar exam-limited range of motion but nontender to palpation  Negative straight leg raise bilaterally to 70°    Knee jerk and ankle jerk reflexes 1+ bilaterally    Right knee exam-chronic arthritic changes to joint noted but no effusion no discoloration or warmth    Moderate tenderness to palpation at the lateral joint line    Range of motion 0-100 degrees with suprapatellar crepitus.  Patient has discomfort trying to flex more than 100°    Neurovascular intact    Strength of history and quadriceps musculature is 3 to 4/5    Joint stability is good with no valgus or valgus laxity and Lachman's negative    IMAGING: X-ray Knee Ortho Bilateral with Flexion  Narrative: EXAMINATION:  XR KNEE ORTHO BILAT WITH FLEXION    CLINICAL HISTORY:  Pain in right knee    TECHNIQUE:  AP standing of both knees, PA flexion standing views of both knees, and Merchant views of both knees were performed.  Lateral views of both knees were also performed.    COMPARISON:  07/31/2019    FINDINGS:  No acute osseous abnormality.  Joint spaces maintained.  Bilateral superior patellar enthesophyte formation noted.  No large joint effusion.  Impression: As above    Electronically signed by: Atif Murphy MD  Date:    08/28/2019  Time:    08:51       Radiographs / Imaging : Relevant imaging results reviewed by me and interpreted by me, discussed with the patient and / or family -mild to moderate degenerative arthritic changes but no acute fracture dislocation    25 min spent face-to-face with patient over half that time spent in consultation starting proper evaluation and management of her knee problem including stretches, heat and ice, over-the-counter medication and cortisone injection.  Patient verbalizes good understanding and appreciation of the care provided today.  Assessment:     Encounter Diagnoses   Name Primary?    Pain in both knees, unspecified chronicity Yes    Lumbar spondylosis     Diabetic mononeuropathy  associated with diabetes mellitus due to underlying condition         Plan:   Pain in both knees, unspecified chronicity  -     Large Joint Aspiration/Injection: R knee    Lumbar spondylosis    Diabetic mononeuropathy associated with diabetes mellitus due to underlying condition        The patient was encouraged to participate in appropriate physical activity.      Fabio Tiwari M.D.  [facility]

## 2019-09-12 ENCOUNTER — TELEPHONE (OUTPATIENT)
Dept: PAIN MEDICINE | Facility: CLINIC | Age: 74
End: 2019-09-12

## 2019-09-13 ENCOUNTER — OFFICE VISIT (OUTPATIENT)
Dept: PAIN MEDICINE | Facility: CLINIC | Age: 74
End: 2019-09-13
Payer: MEDICARE

## 2019-09-13 VITALS
SYSTOLIC BLOOD PRESSURE: 181 MMHG | HEIGHT: 66 IN | BODY MASS INDEX: 38.62 KG/M2 | RESPIRATION RATE: 18 BRPM | WEIGHT: 240.31 LBS | HEART RATE: 65 BPM | DIASTOLIC BLOOD PRESSURE: 78 MMHG

## 2019-09-13 DIAGNOSIS — M47.816 LUMBAR SPONDYLOSIS: Primary | ICD-10-CM

## 2019-09-13 DIAGNOSIS — M54.16 RIGHT LUMBAR RADICULITIS: ICD-10-CM

## 2019-09-13 DIAGNOSIS — M96.1 POSTLAMINECTOMY SYNDROME OF LUMBAR REGION: ICD-10-CM

## 2019-09-13 DIAGNOSIS — M48.061 SPINAL STENOSIS OF LUMBAR REGION WITHOUT NEUROGENIC CLAUDICATION: ICD-10-CM

## 2019-09-13 PROCEDURE — 1101F PT FALLS ASSESS-DOCD LE1/YR: CPT | Mod: CPTII,S$GLB,, | Performed by: PHYSICAL MEDICINE & REHABILITATION

## 2019-09-13 PROCEDURE — 3077F PR MOST RECENT SYSTOLIC BLOOD PRESSURE >= 140 MM HG: ICD-10-PCS | Mod: CPTII,S$GLB,, | Performed by: PHYSICAL MEDICINE & REHABILITATION

## 2019-09-13 PROCEDURE — 99204 PR OFFICE/OUTPT VISIT, NEW, LEVL IV, 45-59 MIN: ICD-10-PCS | Mod: S$GLB,,, | Performed by: PHYSICAL MEDICINE & REHABILITATION

## 2019-09-13 PROCEDURE — 3077F SYST BP >= 140 MM HG: CPT | Mod: CPTII,S$GLB,, | Performed by: PHYSICAL MEDICINE & REHABILITATION

## 2019-09-13 PROCEDURE — 99999 PR PBB SHADOW E&M-EST. PATIENT-LVL III: CPT | Mod: PBBFAC,,, | Performed by: PHYSICAL MEDICINE & REHABILITATION

## 2019-09-13 PROCEDURE — 99204 OFFICE O/P NEW MOD 45 MIN: CPT | Mod: S$GLB,,, | Performed by: PHYSICAL MEDICINE & REHABILITATION

## 2019-09-13 PROCEDURE — 3078F DIAST BP <80 MM HG: CPT | Mod: CPTII,S$GLB,, | Performed by: PHYSICAL MEDICINE & REHABILITATION

## 2019-09-13 PROCEDURE — 3078F PR MOST RECENT DIASTOLIC BLOOD PRESSURE < 80 MM HG: ICD-10-PCS | Mod: CPTII,S$GLB,, | Performed by: PHYSICAL MEDICINE & REHABILITATION

## 2019-09-13 PROCEDURE — 99999 PR PBB SHADOW E&M-EST. PATIENT-LVL III: ICD-10-PCS | Mod: PBBFAC,,, | Performed by: PHYSICAL MEDICINE & REHABILITATION

## 2019-09-13 PROCEDURE — 1101F PR PT FALLS ASSESS DOC 0-1 FALLS W/OUT INJ PAST YR: ICD-10-PCS | Mod: CPTII,S$GLB,, | Performed by: PHYSICAL MEDICINE & REHABILITATION

## 2019-09-13 RX ORDER — GABAPENTIN 300 MG/1
600 CAPSULE ORAL 3 TIMES DAILY
Qty: 90 CAPSULE | Refills: 3 | Status: SHIPPED | OUTPATIENT
Start: 2019-09-13 | End: 2020-04-17 | Stop reason: SDUPTHER

## 2019-09-13 NOTE — PATIENT INSTRUCTIONS
- referral to PT for lumbar exercises - once weekly for 4 weeks for educational purposes and hands on treatment  - increase gabapentin to 600mg TID gradually (AM/MID/PM - 300/300/600 x 3 days 600/300/600 x 3 days and 600/600/600 and continue)  - follow up in 6 weeks

## 2019-09-13 NOTE — LETTER
September 13, 2019      Norris Swann MD  [address] [facility] [place] [address]           [facility]  [address] [facility] [place] [address]  Phone: [phone]  Fax: [phone]          Patient: Kay Rosas   MR Number: [id number]   YOB: 1945   Date of Visit: 9/13/2019       Dear Dr. Norris Swann:    Thank you for referring Kay Rosas to me for evaluation. Attached you will find relevant portions of my assessment and plan of care.    If you have questions, please do not hesitate to call me. I look forward to following Kay Rosas along with you.    Sincerely,    Noe Pleitez MD    Enclosure  CC:  No Recipients    If you would like to receive this communication electronically, please contact [email or web address] or [phone] to request more information on [employer] access.    For providers and/or their staff who would like to refer a patient to [facility], please contact us through our one-stop-shop provider referral line, [facility], at [phone].    If you feel you have received this communication in error or would no longer like to receive these types of communications, please e-mail [email or web address]

## 2019-09-13 NOTE — PROGRESS NOTES
New Patient Chronic Pain Note (Initial Visit)    Referring Physician: Norris Swann MD    PCP: Norris Swann MD    Chief Complaint:   Chief Complaint   Patient presents with    Consult    Knee Pain     bilateral    Leg Pain     bilateral        SUBJECTIVE:    Kay Rosas is a 73 y.o. female who presents to the clinic for the evaluation of low back and knee pain. The pain started 6 years ago following a lumbar spine surgery (fusion at L4-5 in 2013) and symptoms have been worsening.The pain is located in the low back area and radiates to the right leg.  The pain is described as aching and stabbing and is rated as 3/10. The pain is rated with a score of  2/10 on the BEST day and a score of 9/10 on the WORST day.  Symptoms interfere with daily activity. The pain is exacerbated by Walking.  The pain is mitigated by laying down. The patient reports spending 2-4 hours per day reclining. The patient reports 5-7 hours of uninterrupted sleep per night.    Patient denies night fever/night sweats, urinary incontinence, bowel incontinence, significant weight loss and loss of sensations. She does feel that her right leg could give out at times and uses a walker for longer distances while ambulating    Non-Pharmacologic Treatments:  Physical Therapy/Home Exercise: no formal PT  Ice/Heat:no  TENS: no  Acupuncture: no  Massage: no  Chiropractic: no    Other: no      Pain Medications:    - Opioids: Ultram (Tramadol HCL)  - Adjuvant Medications: Neurontin (Gabapentin), Zanaflex ( Tizanidine) and Tylenol     report:  Reviewed and consistent with medication use as prescribed.    Pain Procedures: Lumbar EVY years ago prior to surgery      Imaging:   Narrative     EXAMINATION:  XR LUMBAR SPINE COMPLETE 5 VIEW    CLINICAL HISTORY:  Low back pain, >6wks conservative tx, persistent-progressive sx, surgical candidate;Low back pain    TECHNIQUE:  AP, lateral, spot and bilateral oblique views of the lumbar spine were  performed.    COMPARISON:  None    FINDINGS:  The vertebral bodies demonstrate a normal height.  There is grade 1 spondylolisthesis of L4 on L5.  Pedicle screws and fixation rods noted bilaterally at the L4-5 level with an intradiscal spacer also present at this level.  There is moderate to severe disc space narrowing and spondylosis present at the L5-S1 level.  More mild disc space narrowing and spondylosis noted at the L1-2 through the L3-4 levels.  Vascular calcifications are noted.  Prominent facet arthropathy multilevel bilateral facet arthropathy noted..      Impression       As above      Electronically signed by: Mariano Rojsa DO  Date: 2019  Time: 14:28         Past Medical History:   Diagnosis Date    Arthritis     Degenerative disc disease, cervical     Diabetes mellitus, type 2     Emphysema of lung     Hyperlipidemia     Hypertension     MVP (mitral valve prolapse)     Osteoporosis     feet    Thyroid condition      Past Surgical History:   Procedure Laterality Date    BACK SURGERY      cataract surgery  Bilateral 10/ 2012    elbow spur removed Left     HYSTERECTOMY Left 1978    OOPHORECTOMY      OVARIAN CYST REMOVAL      two from back, one from axilla    VASCULAR SURGERY Right     high ligation due to blood clot      Social History     Socioeconomic History    Marital status:      Spouse name: Not on file    Number of children: Not on file    Years of education: Not on file    Highest education level: Not on file   Occupational History    Not on file   Social Needs    Financial resource strain: Not on file    Food insecurity:     Worry: Not on file     Inability: Not on file    Transportation needs:     Medical: Not on file     Non-medical: Not on file   Tobacco Use    Smoking status: Former Smoker     Last attempt to quit: 2015     Years since quittin.2    Smokeless tobacco: Never Used   Substance and Sexual Activity    Alcohol use: No      Alcohol/week: 0.0 oz    Drug use: No    Sexual activity: Not on file   Lifestyle    Physical activity:     Days per week: Not on file     Minutes per session: Not on file    Stress: Not on file   Relationships    Social connections:     Talks on phone: Not on file     Gets together: Not on file     Attends Holiness service: Not on file     Active member of club or organization: Not on file     Attends meetings of clubs or organizations: Not on file     Relationship status: Not on file   Other Topics Concern    Not on file   Social History Narrative    Not on file     Family History   Problem Relation Age of Onset    Heart disease Mother     Kidney disease Mother     Arthritis Mother     Breast cancer Mother     Cancer Father     Heart disease Father     Cancer Sister     Ovarian cancer Sister     Alzheimer's disease Sister        Review of patient's allergies indicates:  No Known Allergies    Current Outpatient Medications   Medication Sig    albuterol 90 mcg/actuation inhaler Inhale 2 puffs into the lungs every 6 (six) hours as needed.    ARIPiprazole (ABILIFY) 2 MG Tab Take 1 tablet (2 mg total) by mouth once daily.    gabapentin (NEURONTIN) 300 MG capsule Take 2 capsules (600 mg total) by mouth 3 (three) times daily.    levocetirizine (XYZAL) 5 MG tablet Take 1 tablet (5 mg total) by mouth every evening.    levothyroxine (SYNTHROID) 50 MCG tablet TAKE 1 TABLET (50 MCG TOTAL) BY MOUTH ONCE DAILY.    losartan-hydrochlorothiazide 100-25 mg (HYZAAR) 100-25 mg per tablet TAKE 1 TABLET BY MOUTH ONCE DAILY.    metFORMIN (GLUCOPHAGE) 1000 MG tablet TAKE 1 TABLET BY MOUTH TWICE A DAY (Patient taking differently: TAKE 0.5 TABLET BY MOUTH TWICE A DAY)    oxybutynin (DITROPAN-XL) 5 MG TR24 Take 1 tablet (5 mg total) by mouth once daily.    pantoprazole (PROTONIX) 40 MG tablet TAKE 1 TABLET BY MOUTH DAILY.    sertraline (ZOLOFT) 100 MG tablet TAKE 1 TABLET (100 MG TOTAL) BY MOUTH ONCE DAILY.     "sertraline (ZOLOFT) 50 MG tablet Take 1 tablet (50 mg total) by mouth once daily.    tiZANidine (ZANAFLEX) 4 MG tablet TAKE 1 TABLET DAILY    traMADol (ULTRAM) 50 mg tablet Take 1 tablet (50 mg total) by mouth every 6 (six) hours as needed for Pain.    traZODone (DESYREL) 100 MG tablet TAKE 1 TABLET (100 MG TOTAL) BY MOUTH EVERY EVENING.    azelastine (ASTELIN) 137 mcg (0.1 %) nasal spray 2 sprays (274 mcg total) by Nasal route 2 (two) times daily.     No current facility-administered medications for this visit.        Review of Systems       GENERAL:  No weight loss, malaise or fevers.  HEENT:   No recent changes in vision or hearing  NECK:  Negative for lumps, no difficulty with swallowing.  RESPIRATORY:  Negative for cough, wheezing or shortness of breath, patient denies any recent URI.  CARDIOVASCULAR:  Negative for chest pain, leg swelling or palpitations.  GI:  Negative for abdominal discomfort, blood in stools or black stools or change in bowel habits.  MUSCULOSKELETAL:  See HPI.  SKIN:  Negative for lesions, rash, and itching.  PSYCH:  No mood disorder or recent psychosocial stressors.  Patients sleep is not disturbed secondary to pain.  HEMATOLOGY/LYMPHOLOGY:  Negative for prolonged bleeding, bruising easily or swollen nodes.  Patient is not currently taking any anti-coagulants  NEURO:   No history of headaches, syncope, paralysis, seizures or tremors.  All other reviewed and negative other than HPI.    OBJECTIVE:    BP (!) 181/78 (BP Location: Left arm, Patient Position: Sitting, BP Method: Medium (Automatic))   Pulse 65   Resp 18   Ht 5' 6" (1.676 m)   Wt 109 kg (240 lb 4.8 oz)   BMI 38.79 kg/m²         Physical Exam      GENERAL: Well appearing, in no acute distress, alert and oriented x3.  PSYCH:  Mood and affect appropriate.  SKIN: Skin color, texture, turgor normal, no rashes or lesions.  HEAD/FACE:  Normocephalic, atraumatic. Cranial nerves grossly intact.  NECK: No pain to palpation over " the cervical paraspinous muscles. Spurling Negative. No pain with neck flexion, extension, or lateral flexion.   CV: RRR with palpation of the radial artery.  PULM: No evidence of respiratory difficulty, symmetric chest rise.  GI:  Soft and non-tender.  BACK: Straight leg raising in the sitting and supine positions is negative to radicular pain. No pain to palpation over the facet joints of the lumbar spine or spinous processes. Normal range of motion without pain reproduction.  EXTREMITIES: Peripheral joint ROM is full and pain free without obvious instability or laxity in all four extremities. No deformities, edema, or skin discoloration. Good capillary refill.  MUSCULOSKELETAL: Shoulder, hip, and knee provocative maneuvers are negative.  There is no pain with palpation over the sacroiliac joints bilaterally.  Mild tendeness over the GTB on the right. FABERs test is negative.  FADIRs test is negative.   Bilateral upper and lower extremity strength is normal and symmetric.  No atrophy or tone abnormalities are noted.  NEURO: Bilateral upper and lower extremity coordination and muscle stretch reflexes are physiologic and symmetric.  Plantar response are downgoing. No clonus.  No loss of sensation is noted.  GAIT: normal.      LABS:  Lab Results   Component Value Date    WBC 5.83 02/18/2019    HGB 10.1 (L) 02/18/2019    HCT 33.5 (L) 02/18/2019    MCV 90 02/18/2019     02/18/2019       CMP  Sodium   Date Value Ref Range Status   07/31/2019 141 136 - 145 mmol/L Final     Potassium   Date Value Ref Range Status   07/31/2019 5.1 3.5 - 5.1 mmol/L Final     Chloride   Date Value Ref Range Status   07/31/2019 105 95 - 110 mmol/L Final     CO2   Date Value Ref Range Status   07/31/2019 27 23 - 29 mmol/L Final     Glucose   Date Value Ref Range Status   07/31/2019 81 70 - 110 mg/dL Final     BUN, Bld   Date Value Ref Range Status   07/31/2019 40 (H) 8 - 23 mg/dL Final     Creatinine   Date Value Ref Range Status    07/31/2019 1.7 (H) 0.5 - 1.4 mg/dL Final     Calcium   Date Value Ref Range Status   07/31/2019 10.3 8.7 - 10.5 mg/dL Final     Total Protein   Date Value Ref Range Status   02/18/2019 7.3 6.0 - 8.4 g/dL Final     Albumin   Date Value Ref Range Status   02/18/2019 3.8 3.5 - 5.2 g/dL Final     Total Bilirubin   Date Value Ref Range Status   02/18/2019 0.3 0.1 - 1.0 mg/dL Final     Comment:     For infants and newborns, interpretation of results should be based  on gestational age, weight and in agreement with clinical  observations.  Premature Infant recommended reference ranges:  Up to 24 hours.............<8.0 mg/dL  Up to 48 hours............<12.0 mg/dL  3-5 days..................<15.0 mg/dL  6-29 days.................<15.0 mg/dL       Alkaline Phosphatase   Date Value Ref Range Status   02/18/2019 80 55 - 135 U/L Final     AST   Date Value Ref Range Status   02/18/2019 20 10 - 40 U/L Final     ALT   Date Value Ref Range Status   02/18/2019 14 10 - 44 U/L Final     Anion Gap   Date Value Ref Range Status   07/31/2019 9 8 - 16 mmol/L Final     eGFR if    Date Value Ref Range Status   07/31/2019 34.2 (A) >60 mL/min/1.73 m^2 Final     eGFR if non    Date Value Ref Range Status   07/31/2019 29.7 (A) >60 mL/min/1.73 m^2 Final     Comment:     Calculation used to obtain the estimated glomerular filtration  rate (eGFR) is the CKD-EPI equation.          Lab Results   Component Value Date    HGBA1C 5.8 (H) 07/31/2019             ASSESSMENT: 73 y.o. year old female with low back pain, consistent with     1. Lumbar spondylosis  gabapentin (NEURONTIN) 300 MG capsule    Ambulatory referral to Physical Therapy   2. Right lumbar radiculitis  gabapentin (NEURONTIN) 300 MG capsule    Ambulatory referral to Physical Therapy   3. Postlaminectomy syndrome of lumbar region     4. Spinal stenosis of lumbar region without neurogenic claudication           PLAN:   - Interventions: In the future, will  consider SI joint injection on the right if conservative measures fail  - Anticoagulation use: no  - Medications: Increase Neurontin to 600mg gradually to three times a day to help with radicular pain.   - Therapy: Referral to Physical therapy for Lumbar stabilization, core strengthening, and a home exercise program.  - Labs: reviewed  - Imaging: Reviewed available imaging with patient and answered any questions they had regarding study.  - Consults/Referrals: none at this time  - Records: Reviewed  - Follow up visit: return to clinic in 6 weeks  -- Counseled patient regarding the importance of activity modification and physical therapy  - Patient Questions: Answered all of the patient's questions regarding diagnosis, therapy, and treatment    The above plan and management options were discussed at length with patient. Patient is in agreement with the above and verbalized understanding.    I discussed the goals of interventional chronic pain management with the patient on today's visit.  I explained the utility of injections for diagnostic and therapeutic purposes.  We discussed a multimodal approach to pain including treating the patient's given worst pain at any given time.  We will use a systematic approach to addressing pain.  We will also adopt a multimodal approach that includes injections, adjuvant medications, physical therapy, at times psychiatry.  There may be a limited role for opioid use intermittently in the treatment of pain, more particularly for acute pain although no one approach can be used as a sole treatment modality.    I emphasized the importance of regular exercise, core strengthening and stretching, diet and weight loss as a cornerstone of long-term pain management.      Noe Pleitez MD  Interventional Pain Management  [facility]

## 2019-09-25 ENCOUNTER — OFFICE VISIT (OUTPATIENT)
Dept: ORTHOPEDICS | Facility: CLINIC | Age: 74
End: 2019-09-25
Payer: MEDICARE

## 2019-09-25 VITALS
WEIGHT: 240 LBS | DIASTOLIC BLOOD PRESSURE: 62 MMHG | HEIGHT: 66 IN | SYSTOLIC BLOOD PRESSURE: 146 MMHG | HEART RATE: 61 BPM | BODY MASS INDEX: 38.57 KG/M2

## 2019-09-25 DIAGNOSIS — M25.561 RIGHT KNEE PAIN, UNSPECIFIED CHRONICITY: Primary | ICD-10-CM

## 2019-09-25 DIAGNOSIS — E11.9 CONTROLLED TYPE 2 DIABETES MELLITUS WITHOUT COMPLICATION, WITHOUT LONG-TERM CURRENT USE OF INSULIN: ICD-10-CM

## 2019-09-25 DIAGNOSIS — M17.0 OSTEOARTHRITIS OF BOTH KNEES, UNSPECIFIED OSTEOARTHRITIS TYPE: ICD-10-CM

## 2019-09-25 DIAGNOSIS — M17.11 ARTHRITIS OF RIGHT KNEE: Primary | ICD-10-CM

## 2019-09-25 PROCEDURE — 99999 PR PBB SHADOW E&M-EST. PATIENT-LVL III: CPT | Mod: PBBFAC,,, | Performed by: FAMILY MEDICINE

## 2019-09-25 PROCEDURE — 99214 OFFICE O/P EST MOD 30 MIN: CPT | Mod: 25,S$GLB,, | Performed by: FAMILY MEDICINE

## 2019-09-25 PROCEDURE — 3044F PR MOST RECENT HEMOGLOBIN A1C LEVEL <7.0%: ICD-10-PCS | Mod: CPTII,S$GLB,, | Performed by: FAMILY MEDICINE

## 2019-09-25 PROCEDURE — 3077F SYST BP >= 140 MM HG: CPT | Mod: CPTII,S$GLB,, | Performed by: FAMILY MEDICINE

## 2019-09-25 PROCEDURE — 1101F PT FALLS ASSESS-DOCD LE1/YR: CPT | Mod: CPTII,S$GLB,, | Performed by: FAMILY MEDICINE

## 2019-09-25 PROCEDURE — 20610 DRAIN/INJ JOINT/BURSA W/O US: CPT | Mod: RT,S$GLB,, | Performed by: FAMILY MEDICINE

## 2019-09-25 PROCEDURE — 3077F PR MOST RECENT SYSTOLIC BLOOD PRESSURE >= 140 MM HG: ICD-10-PCS | Mod: CPTII,S$GLB,, | Performed by: FAMILY MEDICINE

## 2019-09-25 PROCEDURE — 99499 UNLISTED E&M SERVICE: CPT | Mod: S$GLB,,, | Performed by: FAMILY MEDICINE

## 2019-09-25 PROCEDURE — 3078F DIAST BP <80 MM HG: CPT | Mod: CPTII,S$GLB,, | Performed by: FAMILY MEDICINE

## 2019-09-25 PROCEDURE — 99999 PR PBB SHADOW E&M-EST. PATIENT-LVL III: ICD-10-PCS | Mod: PBBFAC,,, | Performed by: FAMILY MEDICINE

## 2019-09-25 PROCEDURE — 3044F HG A1C LEVEL LT 7.0%: CPT | Mod: CPTII,S$GLB,, | Performed by: FAMILY MEDICINE

## 2019-09-25 PROCEDURE — 3078F PR MOST RECENT DIASTOLIC BLOOD PRESSURE < 80 MM HG: ICD-10-PCS | Mod: CPTII,S$GLB,, | Performed by: FAMILY MEDICINE

## 2019-09-25 PROCEDURE — 99214 PR OFFICE/OUTPT VISIT, EST, LEVL IV, 30-39 MIN: ICD-10-PCS | Mod: 25,S$GLB,, | Performed by: FAMILY MEDICINE

## 2019-09-25 PROCEDURE — 99499 RISK ADDL DX/OHS AUDIT: ICD-10-PCS | Mod: S$GLB,,, | Performed by: FAMILY MEDICINE

## 2019-09-25 PROCEDURE — 20610 LARGE JOINT ASPIRATION/INJECTION: R KNEE: ICD-10-PCS | Mod: RT,S$GLB,, | Performed by: FAMILY MEDICINE

## 2019-09-25 PROCEDURE — 1101F PR PT FALLS ASSESS DOC 0-1 FALLS W/OUT INJ PAST YR: ICD-10-PCS | Mod: CPTII,S$GLB,, | Performed by: FAMILY MEDICINE

## 2019-09-25 RX ORDER — BETAMETHASONE SODIUM PHOSPHATE AND BETAMETHASONE ACETATE 3; 3 MG/ML; MG/ML
12 INJECTION, SUSPENSION INTRA-ARTICULAR; INTRALESIONAL; INTRAMUSCULAR; SOFT TISSUE
Status: DISCONTINUED | OUTPATIENT
Start: 2019-09-25 | End: 2019-09-25 | Stop reason: HOSPADM

## 2019-09-25 RX ORDER — MELOXICAM 7.5 MG/1
TABLET ORAL
COMMUNITY
Start: 2015-08-24 | End: 2019-10-11

## 2019-09-25 RX ORDER — METFORMIN HYDROCHLORIDE 1000 MG/1
TABLET ORAL
COMMUNITY
Start: 2016-02-03 | End: 2019-09-25

## 2019-09-25 RX ADMIN — BETAMETHASONE SODIUM PHOSPHATE AND BETAMETHASONE ACETATE 12 MG: 3; 3 INJECTION, SUSPENSION INTRA-ARTICULAR; INTRALESIONAL; INTRAMUSCULAR; SOFT TISSUE at 11:09

## 2019-09-25 NOTE — LETTER
September 25, 2019      Norris Swann MD  [address] [facility] [place] [address]           [facility]  [address] [facility] [place] [address]  Phone: [phone]  Fax: [phone]          Patient: Kay Rosas   MR Number: [id number]   YOB: 1945   Date of Visit: 9/25/2019       Dear Dr. Norris Swann:    Thank you for referring Kay Rosas to me for evaluation. Attached you will find relevant portions of my assessment and plan of care.    If you have questions, please do not hesitate to call me. I look forward to following Kay Rosas along with you.    Sincerely,    Fabio Tiwari MD    Enclosure  CC:  No Recipients    If you would like to receive this communication electronically, please contact [email or web address] or [phone] to request more information on [employer] Link access.    For providers and/or their staff who would like to refer a patient to [facility], please contact us through our one-stop-shop provider referral line, [facility], at [phone].    If you feel you have received this communication in error or would no longer like to receive these types of communications, please e-mail [email or web address]

## 2019-09-25 NOTE — PROGRESS NOTES
Subjective:     Patient ID: Kay Rosas is a 74 y.o. female.    Chief Complaint: Pain of the Left Knee and Pain of the Right Knee      HPI:  Patient states that the injection of her right knee helped for about 3 weeks from the last visit but that now she has recurring pain which affects her sleep and ADLs.  She continues to have constant left knee pain but it is not as severe as the right.  No visible swelling but she says both knees feel like they are we can want to give way and feel tight although she does not have locking specifically    Has difficulty with going up and down stairs and says that sometimes the pain seems to also be present and the right low back.  No fever weight loss or chills.  Past Medical History:   Diagnosis Date    Arthritis     Degenerative disc disease, cervical     Diabetes mellitus, type 2     Emphysema of lung     Hyperlipidemia     Hypertension     MVP (mitral valve prolapse)     Osteoporosis     feet    Thyroid condition      Past Surgical History:   Procedure Laterality Date    BACK SURGERY  2012    cataract surgery  Bilateral 10/ 2012    elbow spur removed Left     HYSTERECTOMY Left 1978    OOPHORECTOMY      OVARIAN CYST REMOVAL      two from back, one from axilla    VASCULAR SURGERY Right     high ligation due to blood clot      Family History   Problem Relation Age of Onset    Heart disease Mother     Kidney disease Mother     Arthritis Mother     Breast cancer Mother     Cancer Father     Heart disease Father     Cancer Sister     Ovarian cancer Sister     Alzheimer's disease Sister      Social History     Socioeconomic History    Marital status:      Spouse name: Not on file    Number of children: Not on file    Years of education: Not on file    Highest education level: Not on file   Occupational History    Not on file   Social Needs    Financial resource strain: Not on file    Food insecurity:     Worry: Not on file     Inability:  Not on file    Transportation needs:     Medical: Not on file     Non-medical: Not on file   Tobacco Use    Smoking status: Former Smoker     Last attempt to quit: 2015     Years since quittin.2    Smokeless tobacco: Never Used   Substance and Sexual Activity    Alcohol use: No     Alcohol/week: 0.0 standard drinks    Drug use: No    Sexual activity: Not on file   Lifestyle    Physical activity:     Days per week: Not on file     Minutes per session: Not on file    Stress: Not on file   Relationships    Social connections:     Talks on phone: Not on file     Gets together: Not on file     Attends Restorationism service: Not on file     Active member of club or organization: Not on file     Attends meetings of clubs or organizations: Not on file     Relationship status: Not on file   Other Topics Concern    Not on file   Social History Narrative    Not on file       Current Outpatient Medications:     albuterol 90 mcg/actuation inhaler, Inhale 2 puffs into the lungs every 6 (six) hours as needed., Disp: , Rfl:     ARIPiprazole (ABILIFY) 2 MG Tab, Take 1 tablet (2 mg total) by mouth once daily., Disp: 90 tablet, Rfl: 2    gabapentin (NEURONTIN) 300 MG capsule, Take 2 capsules (600 mg total) by mouth 3 (three) times daily., Disp: 90 capsule, Rfl: 3    levocetirizine (XYZAL) 5 MG tablet, Take 1 tablet (5 mg total) by mouth every evening., Disp: 90 tablet, Rfl: 2    levothyroxine (SYNTHROID) 50 MCG tablet, TAKE 1 TABLET (50 MCG TOTAL) BY MOUTH ONCE DAILY., Disp: 90 tablet, Rfl: 3    losartan-hydrochlorothiazide 100-25 mg (HYZAAR) 100-25 mg per tablet, TAKE 1 TABLET BY MOUTH ONCE DAILY., Disp: 90 tablet, Rfl: 3    meloxicam (MOBIC) 7.5 MG tablet, TAKE 1 TABLET BY MOUTH DAILY., Disp: , Rfl:     metFORMIN (GLUCOPHAGE) 1000 MG tablet, TAKE 1 TABLET BY MOUTH TWICE A DAY (Patient taking differently: TAKE 0.5 TABLET BY MOUTH TWICE A DAY), Disp: 180 tablet, Rfl: 2    oxybutynin (DITROPAN-XL) 5 MG TR24,  Take 1 tablet (5 mg total) by mouth once daily., Disp: 90 tablet, Rfl: 1    pantoprazole (PROTONIX) 40 MG tablet, TAKE 1 TABLET BY MOUTH DAILY., Disp: 90 tablet, Rfl: 1    sertraline (ZOLOFT) 100 MG tablet, TAKE 1 TABLET (100 MG TOTAL) BY MOUTH ONCE DAILY., Disp: 30 tablet, Rfl: 6    sertraline (ZOLOFT) 50 MG tablet, Take 1 tablet (50 mg total) by mouth once daily., Disp: 90 tablet, Rfl: 1    tiZANidine (ZANAFLEX) 4 MG tablet, TAKE 1 TABLET DAILY, Disp: , Rfl: 6    traMADol (ULTRAM) 50 mg tablet, Take 1 tablet (50 mg total) by mouth every 6 (six) hours as needed for Pain., Disp: 30 tablet, Rfl: 1    traZODone (DESYREL) 100 MG tablet, TAKE 1 TABLET (100 MG TOTAL) BY MOUTH EVERY EVENING., Disp: 30 tablet, Rfl: 6    azelastine (ASTELIN) 137 mcg (0.1 %) nasal spray, 2 sprays (274 mcg total) by Nasal route 2 (two) times daily., Disp: 30 mL, Rfl: 11  Review of patient's allergies indicates:  No Known Allergies  Review of Systems   Constitutional: Negative for chills, fever and weight loss.   Respiratory: Negative for shortness of breath.    Cardiovascular: Negative for chest pain and palpitations.   Musculoskeletal: Positive for back pain and joint pain.       Objective:   Body mass index is 38.74 kg/m².  Vitals:    09/25/19 1037   BP: (!) 146/62   Pulse: 61           Ortho/SPM Exam alert and oriented well-nourished well-developed ambulatory adult female in no acute distress    Lumbar-nontender to palpation    Negative straight leg raise bilateral    Knee jerk ankle l jerk 1+ bilateral throughout    Normal range of motion for age      Right knee-trace anterior edema    Tenderness to palpation at the medial and lateral joint lines    Range of motion 0-110 degrees with crepitus    Joint is stable with negative Lachman's    Neurovascular intact    Left knee mild chronic bony enlargement  Neurovascular intact  Nontender to palpation  Stable with negative Lachman's    The patient indicates good understanding of the  medical issue of osteoarthritis of the knee and prefers getting a cortisone shot today and in trying Synvisc 3 in the near future.  She verbalizes appreciation for the care today.    25 min spent face-to-face with patient over half that time in counseling regarding the pathophysiology of knee pain and osteoarthritis and the various treatments available.  IMAGING: X-ray Knee Ortho Bilateral with Flexion  Narrative: EXAMINATION:  XR KNEE ORTHO BILAT WITH FLEXION    CLINICAL HISTORY:  Pain in right knee    TECHNIQUE:  AP standing of both knees, PA flexion standing views of both knees, and Merchant views of both knees were performed.  Lateral views of both knees were also performed.    COMPARISON:  07/31/2019    FINDINGS:  No acute osseous abnormality.  Joint spaces maintained.  Bilateral superior patellar enthesophyte formation noted.  No large joint effusion.  Impression: As above    Electronically signed by: Atif Murphy MD  Date:    08/28/2019  Time:    08:51       Radiographs / Imaging : Relevant imaging results reviewed by me and interpreted by me, discussed with the patient and / or family       Assessment:     Encounter Diagnoses   Name Primary?    Right knee pain, unspecified chronicity Yes    Osteoarthritis of both knees, unspecified osteoarthritis type     Controlled type 2 diabetes mellitus without complication, without long-term current use of insulin         Plan:   Right knee pain, unspecified chronicity  -     Large Joint Aspiration/Injection: R knee    Osteoarthritis of both knees, unspecified osteoarthritis type    Controlled type 2 diabetes mellitus without complication, without long-term current use of insulin        The patient was encouraged to participate in appropriate physical activity.      Fabio Tiwari M.D.  [facility]

## 2019-09-25 NOTE — PROCEDURES
Large Joint Aspiration/Injection: R knee  Date/Time: 9/25/2019 11:00 AM  Performed by: Fabio Tiwari MD  Authorized by: Fabio Tiwari MD     Consent Done?:  Yes (Verbal)  Indications:  Pain  Procedure site marked: Yes    Timeout: Prior to procedure the correct patient, procedure, and site was verified    Anesthesia    Anesthetic: bupivacaine 0.25% without epinephrine and lidocaine 1% without epinephrine (3cc Lidoc. , 2cc Bupiv.)    Location:  Knee  Site:  R knee  Prep: Patient was prepped and draped in usual sterile fashion    Approach:  Anterolateral  Medications:  12 mg betamethasone acetate-betamethasone sodium phosphate 6 mg/mL (Celestone 1.5 cc, 9 mg)  Patient tolerance:  Patient tolerated the procedure well with no immediate complications

## 2019-09-29 RX ORDER — ATORVASTATIN CALCIUM 20 MG/1
20 TABLET, FILM COATED ORAL DAILY
Qty: 90 TABLET | Refills: 3 | Status: SHIPPED | OUTPATIENT
Start: 2019-09-29 | End: 2020-02-07

## 2019-10-02 ENCOUNTER — CLINICAL SUPPORT (OUTPATIENT)
Dept: REHABILITATION | Facility: HOSPITAL | Age: 74
End: 2019-10-02
Attending: PHYSICAL MEDICINE & REHABILITATION
Payer: MEDICARE

## 2019-10-02 DIAGNOSIS — M54.41 CHRONIC BILATERAL LOW BACK PAIN WITH RIGHT-SIDED SCIATICA: ICD-10-CM

## 2019-10-02 DIAGNOSIS — M47.816 LUMBAR SPONDYLOSIS: ICD-10-CM

## 2019-10-02 DIAGNOSIS — R26.9 ABNORMALITY OF GAIT: Primary | ICD-10-CM

## 2019-10-02 DIAGNOSIS — M54.16 LUMBAR RADICULOPATHY: ICD-10-CM

## 2019-10-02 DIAGNOSIS — G89.29 CHRONIC BILATERAL LOW BACK PAIN WITH RIGHT-SIDED SCIATICA: ICD-10-CM

## 2019-10-02 PROCEDURE — 97161 PT EVAL LOW COMPLEX 20 MIN: CPT | Performed by: PHYSICAL THERAPIST
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
## 2019-10-07 ENCOUNTER — LAB VISIT (OUTPATIENT)
Dept: LAB | Facility: HOSPITAL | Age: 74
End: 2019-10-07
Attending: INTERNAL MEDICINE
Payer: MEDICARE

## 2019-10-07 DIAGNOSIS — N18.30 CHRONIC KIDNEY DISEASE (CKD), STAGE III (MODERATE): ICD-10-CM

## 2019-10-07 LAB
ALBUMIN SERPL BCP-MCNC: 3.9 G/DL (ref 3.5–5.2)
ANION GAP SERPL CALC-SCNC: 9 MMOL/L (ref 8–16)
BASOPHILS # BLD AUTO: 0.03 K/UL (ref 0–0.2)
BASOPHILS NFR BLD: 0.5 % (ref 0–1.9)
BUN SERPL-MCNC: 51 MG/DL (ref 8–23)
CALCIUM SERPL-MCNC: 9.7 MG/DL (ref 8.7–10.5)
CHLORIDE SERPL-SCNC: 106 MMOL/L (ref 95–110)
CO2 SERPL-SCNC: 28 MMOL/L (ref 23–29)
CREAT SERPL-MCNC: 1.7 MG/DL (ref 0.5–1.4)
DIFFERENTIAL METHOD: ABNORMAL
EOSINOPHIL # BLD AUTO: 0.2 K/UL (ref 0–0.5)
EOSINOPHIL NFR BLD: 3.2 % (ref 0–8)
ERYTHROCYTE [DISTWIDTH] IN BLOOD BY AUTOMATED COUNT: 16.8 % (ref 11.5–14.5)
EST. GFR  (AFRICAN AMERICAN): 34 ML/MIN/1.73 M^2
EST. GFR  (NON AFRICAN AMERICAN): 29.5 ML/MIN/1.73 M^2
GLUCOSE SERPL-MCNC: 81 MG/DL (ref 70–110)
HCT VFR BLD AUTO: 33.4 % (ref 37–48.5)
HGB BLD-MCNC: 9.4 G/DL (ref 12–16)
IMM GRANULOCYTES # BLD AUTO: 0.03 K/UL (ref 0–0.04)
IMM GRANULOCYTES NFR BLD AUTO: 0.5 % (ref 0–0.5)
LYMPHOCYTES # BLD AUTO: 1.9 K/UL (ref 1–4.8)
LYMPHOCYTES NFR BLD: 29.4 % (ref 18–48)
MCH RBC QN AUTO: 23.8 PG (ref 27–31)
MCHC RBC AUTO-ENTMCNC: 28.1 G/DL (ref 32–36)
MCV RBC AUTO: 85 FL (ref 82–98)
MONOCYTES # BLD AUTO: 0.5 K/UL (ref 0.3–1)
MONOCYTES NFR BLD: 7.8 % (ref 4–15)
NEUTROPHILS # BLD AUTO: 3.7 K/UL (ref 1.8–7.7)
NEUTROPHILS NFR BLD: 58.6 % (ref 38–73)
NRBC BLD-RTO: 0 /100 WBC
PHOSPHATE SERPL-MCNC: 3.3 MG/DL (ref 2.7–4.5)
PLATELET # BLD AUTO: 229 K/UL (ref 150–350)
PMV BLD AUTO: 12.1 FL (ref 9.2–12.9)
POTASSIUM SERPL-SCNC: 4.1 MMOL/L (ref 3.5–5.1)
PTH-INTACT SERPL-MCNC: 103 PG/ML (ref 9–77)
RBC # BLD AUTO: 3.95 M/UL (ref 4–5.4)
SODIUM SERPL-SCNC: 143 MMOL/L (ref 136–145)
URATE SERPL-MCNC: 5.1 MG/DL (ref 2.4–5.7)
WBC # BLD AUTO: 6.3 K/UL (ref 3.9–12.7)

## 2019-10-07 PROCEDURE — 83970 ASSAY OF PARATHORMONE: CPT

## 2019-10-07 PROCEDURE — 84550 ASSAY OF BLOOD/URIC ACID: CPT

## 2019-10-07 PROCEDURE — 36415 COLL VENOUS BLD VENIPUNCTURE: CPT

## 2019-10-07 PROCEDURE — 85025 COMPLETE CBC W/AUTO DIFF WBC: CPT

## 2019-10-07 PROCEDURE — 82306 VITAMIN D 25 HYDROXY: CPT

## 2019-10-07 PROCEDURE — 80069 RENAL FUNCTION PANEL: CPT

## 2019-10-07 PROCEDURE — 83520 IMMUNOASSAY QUANT NOS NONAB: CPT | Mod: 59

## 2019-10-08 PROBLEM — R26.9 ABNORMALITY OF GAIT: Status: ACTIVE | Noted: 2019-10-08

## 2019-10-08 PROBLEM — G89.29 CHRONIC BILATERAL LOW BACK PAIN WITH RIGHT-SIDED SCIATICA: Status: ACTIVE | Noted: 2019-10-08

## 2019-10-08 PROBLEM — M54.16 LUMBAR RADICULOPATHY: Status: ACTIVE | Noted: 2019-10-08

## 2019-10-08 PROBLEM — M54.41 CHRONIC BILATERAL LOW BACK PAIN WITH RIGHT-SIDED SCIATICA: Status: ACTIVE | Noted: 2019-10-08

## 2019-10-08 LAB
25(OH)D3+25(OH)D2 SERPL-MCNC: 34 NG/ML (ref 30–96)
KAPPA LC SER QL IA: 4.02 MG/DL (ref 0.33–1.94)
KAPPA LC/LAMBDA SER IA: 0.98 (ref 0.26–1.65)
LAMBDA LC SER QL IA: 4.12 MG/DL (ref 0.57–2.63)

## 2019-10-09 ENCOUNTER — CLINICAL SUPPORT (OUTPATIENT)
Dept: REHABILITATION | Facility: HOSPITAL | Age: 74
End: 2019-10-09
Payer: MEDICARE

## 2019-10-09 DIAGNOSIS — G89.29 CHRONIC BILATERAL LOW BACK PAIN WITH RIGHT-SIDED SCIATICA: ICD-10-CM

## 2019-10-09 DIAGNOSIS — M47.816 LUMBAR SPONDYLOSIS: ICD-10-CM

## 2019-10-09 DIAGNOSIS — M54.41 CHRONIC BILATERAL LOW BACK PAIN WITH RIGHT-SIDED SCIATICA: ICD-10-CM

## 2019-10-09 DIAGNOSIS — M54.16 LUMBAR RADICULOPATHY: ICD-10-CM

## 2019-10-09 DIAGNOSIS — R26.9 ABNORMALITY OF GAIT: ICD-10-CM

## 2019-10-09 PROCEDURE — 97140 MANUAL THERAPY 1/> REGIONS: CPT | Performed by: PHYSICAL THERAPIST

## 2019-10-09 PROCEDURE — 97110 THERAPEUTIC EXERCISES: CPT | Performed by: PHYSICAL THERAPIST

## 2019-10-09 PROCEDURE — 97112 NEUROMUSCULAR REEDUCATION: CPT | Performed by: PHYSICAL THERAPIST

## 2019-10-10 ENCOUNTER — CLINICAL SUPPORT (OUTPATIENT)
Dept: REHABILITATION | Facility: HOSPITAL | Age: 74
End: 2019-10-10
Payer: MEDICARE

## 2019-10-10 DIAGNOSIS — M54.41 CHRONIC BILATERAL LOW BACK PAIN WITH RIGHT-SIDED SCIATICA: ICD-10-CM

## 2019-10-10 DIAGNOSIS — R26.9 ABNORMALITY OF GAIT: ICD-10-CM

## 2019-10-10 DIAGNOSIS — M54.16 LUMBAR RADICULOPATHY: ICD-10-CM

## 2019-10-10 DIAGNOSIS — M47.816 LUMBAR SPONDYLOSIS: ICD-10-CM

## 2019-10-10 DIAGNOSIS — G89.29 CHRONIC BILATERAL LOW BACK PAIN WITH RIGHT-SIDED SCIATICA: ICD-10-CM

## 2019-10-10 PROCEDURE — 97140 MANUAL THERAPY 1/> REGIONS: CPT | Performed by: PHYSICAL THERAPIST

## 2019-10-10 PROCEDURE — 97110 THERAPEUTIC EXERCISES: CPT | Performed by: PHYSICAL THERAPIST

## 2019-10-10 PROCEDURE — 97112 NEUROMUSCULAR REEDUCATION: CPT | Performed by: PHYSICAL THERAPIST

## 2019-10-10 NOTE — PROGRESS NOTES
"  Physical Therapy Daily Treatment Note     Name: Kay Dueñaswell  Clinic Number: [id number]    Therapy Diagnosis:   Encounter Diagnoses   Name Primary?    Chronic bilateral low back pain with right-sided sciatica     Abnormality of gait     Lumbar radiculopathy     Lumbar spondylosis      Physician: Noe Pleitez MD    Visit Date: 10/10/2019    Physician Orders: PT Eval and Treat  Medical Diagnosis from Referral: lumbar spondylosis, right lumbar radiculopathy   Evaluation Date: 10/2/2019  Authorization Period Expiration: 9/12/2020  Plan of Care Expiration: 11/1/2019  Visit # / Visits authorized: 3/6    Precautions: none    Time In: 1:00pm  Time Out: 2:00pm  Total Billable Time: 60 minutes    SUBJECTIVE     Pt reports: that she felt a little better again today. She really didn't have much soreness following yesterday's visit.     She was compliant with home exercise program.  Response to previous treatment: Good  Functional change: N/A    Pain:  Current 3/10, worst 8/10, best 3/10   Location: back - lumbar/sacral  Radiation?: yes - down lateral R LE into foot at times.   Loss of bowel/urine control?  no  Description: Aching, Sharp and Shooting  Aggravating Factors: Bending, Walking and prolonged positioning   Easing Factors: pain medication        Pts goals: Pt reported goals are to return to walking without pain, weakness, or limitation.       TREATMENT     Kay received therapeutic exercises to develop strength, endurance, ROM, flexibility, posture and core stabilization for 30 minutes including:    Exercise 10/10/2019   Bike for strength and muscular endurance 8'   Pelvic tilts 3 x 10   Piriformis stretch 5 x 20" holds   Ball squeezes 3 x 10   Hip abd with TBand 3 x 10, RTB   Hamstring stretch 5 x 20" holds               Kay received the following manual therapy techniques: for 10 minutes  STM to B lumbosacral region, gluteal mm, and piriformis       Kay participated in neuromuscular re-education " activities to improve: Balance, Sense, Proprioception and Posture for 20 minutes. The following activities were included:  NMR performed with cueing to improve postural awareness and prevent postural compensatory mechanisms.   Exercise 10/10/2019   bridges 20x   SLR 10x B   Standing hip abd 2 x 10 B   Standing hip ext 2 x 10 B                         ASSESSMENT     Patient did well with treatment again today. She demonstrated less difficulty with exercises and was able to be progressed without issue. Patient again responded well to manual therapy, reporting good relief following. She would benefit from continued core strength and stabilization.     Kay is progressing well towards her goals.   Pt prognosis is Good.     Pt will continue to benefit from skilled outpatient physical therapy to address the deficits listed in the problem list box on initial evaluation, provide pt/family education and to maximize pt's level of independence in the home and community environment.     Pt's spiritual, cultural and educational needs considered and pt agreeable to plan of care and goals.     Anticipated Barriers for therapy: chronicity of condition    GOALS:     Short Term Goals:  2 weeks     1. Pain: Pt will demonstrate improved pain by reports of less than or equal to 6/10 worst pain on the verbal rating scale in order to progress toward maximal functional ability and improve QOL.     2. Function: Patient will demonstrate improved function as indicated by a score of less than or equal to 40% on the Modified Oswestry Low Back Pain Questionnaire     3. Mobility: Patient will improve AROM to 50% of stated goals, listed in objective measures above, in order to progress towards independence with functional activities.      4. Strength: Patient will improve strength to 50% of stated goals, listed in objective measures above, in order to progress towards independence with functional activities.      5. Gait: Patient will  demonstrate improved gait mechanics in order to improve functional mobility, improve quality of life, and decrease risk of further injury or fall.      6. HEP: Patient will demonstrate independence with HEP in order to progress toward functional independence.     7. Improve postural awareness.         Long Term Goals:  4 weeks     1. Pain: Pt will demonstrate improved pain by reports of less than or equal to 4/10 worst pain on the verbal rating scale in order to progress toward maximal functional ability and improve QOL.       2. Function: Patient will demonstrate improved function as indicated by a score of less than or equal to 25% on the Modified Oswestry Low Back Pain Questionnaire     3. Mobility: Patient will improve AROM to stated goals, listed in objective measures above, in order to return to maximal functional potential and improve quality of life.     4. Strength: Patient will improve strength to stated goals, listed in objective measures above, in order to improve functional independence and quality of life.     5. Gait: Patient will demonstrate normalized gait mechanics with minimal compensation in order to return to PLOF.     6. Patient will return to normal ADL's, IADL's, community involvement, recreational activities, and work-related activities with less than or equal to 2/10 pain and maximal function          PLAN   Continue Plan of Care (POC) and progress per patient tolerance.    INITIAL (10/2/2019): Outpatient Physical Therapy 2 times weekly for 4 weeks to include any combination of the following interventions: dry needling, modalities, electrical stimulation (IFC, Pre-Mod, Attended with Functional Dry Needling), Cervical/Lumbar Traction, Gait Training, Manual Therapy, Neuromuscular Re-ed, Patient Education, Self Care, Therapeutic Activites and Therapeutic Exercise     Halle Boston, PT, DPT

## 2019-10-10 NOTE — PROGRESS NOTES
"  Physical Therapy Daily Treatment Note     Name: Kay Dueñaswell  Clinic Number: [id number]    Therapy Diagnosis:   Encounter Diagnoses   Name Primary?    Chronic bilateral low back pain with right-sided sciatica     Abnormality of gait     Lumbar radiculopathy     Lumbar spondylosis      Physician: Noe Pleitez MD    Visit Date: 10/9/2019    Physician Orders: PT Eval and Treat  Medical Diagnosis from Referral: lumbar spondylosis, right lumbar radiculopathy   Evaluation Date: 10/2/2019  Authorization Period Expiration: 9/12/2020  Plan of Care Expiration: 11/1/2019  Visit # / Visits authorized: 2/6    Precautions: none    Time In: 2:00pm  Time Out: 2:54pm  Total Billable Time: 54 minutes    SUBJECTIVE     Pt reports: that she actually feels a little better today for some reason. She woke up feeling a little better; however, she also did not do much walking today.     She was compliant with home exercise program.  Response to previous treatment: Good  Functional change: N/A    Pain:  Current 3/10, worst 8/10, best 3/10   Location: back - lumbar/sacral  Radiation?: yes - down lateral R LE into foot at times.   Loss of bowel/urine control?  no  Description: Aching, Sharp and Shooting  Aggravating Factors: Bending, Walking and prolonged positioning   Easing Factors: pain medication        Pts goals: Pt reported goals are to return to walking without pain, weakness, or limitation.       TREATMENT     Kay received therapeutic exercises to develop strength, endurance, ROM, flexibility, posture and core stabilization for 25 minutes including:    Exercise 10/9/2019   Bike for strength and muscular endurance 8'   Pelvic tilts 3 x 10   Piriformis stretch 5 x 20" holds   Ball squeezes 3 x 10   Hip abd with TBand 3 x 10, RTB                   Kay received the following manual therapy techniques: for 10 minutes  STM to B lumbosacral region, gluteal mm, and piriformis       Kay participated in neuromuscular " re-education activities to improve: Balance, Sense, Proprioception and Posture for 19 minutes. The following activities were included:  NMR performed with cueing to improve postural awareness and prevent postural compensatory mechanisms.   Exercise 10/9/2019   bridges 20x   SLR 10x B   Standing hip abd 2 x 10 B   Standing hip ext 2 x 10 B                         ASSESSMENT     Patient tolerated treatment well. She completed exercises with only minimal difficulty and without complaint. She did require occasional VC to maintain proper form. Patient reported good relief with manual therapy.     Kay is progressing well towards her goals.   Pt prognosis is Good.     Pt will continue to benefit from skilled outpatient physical therapy to address the deficits listed in the problem list box on initial evaluation, provide pt/family education and to maximize pt's level of independence in the home and community environment.     Pt's spiritual, cultural and educational needs considered and pt agreeable to plan of care and goals.     Anticipated Barriers for therapy: chronicity of condition    GOALS:     Short Term Goals:  2 weeks     1. Pain: Pt will demonstrate improved pain by reports of less than or equal to 6/10 worst pain on the verbal rating scale in order to progress toward maximal functional ability and improve QOL.     2. Function: Patient will demonstrate improved function as indicated by a score of less than or equal to 40% on the Modified Oswestry Low Back Pain Questionnaire     3. Mobility: Patient will improve AROM to 50% of stated goals, listed in objective measures above, in order to progress towards independence with functional activities.      4. Strength: Patient will improve strength to 50% of stated goals, listed in objective measures above, in order to progress towards independence with functional activities.      5. Gait: Patient will demonstrate improved gait mechanics in order to improve functional  mobility, improve quality of life, and decrease risk of further injury or fall.      6. HEP: Patient will demonstrate independence with HEP in order to progress toward functional independence.     7. Improve postural awareness.         Long Term Goals:  4 weeks     1. Pain: Pt will demonstrate improved pain by reports of less than or equal to 4/10 worst pain on the verbal rating scale in order to progress toward maximal functional ability and improve QOL.       2. Function: Patient will demonstrate improved function as indicated by a score of less than or equal to 25% on the Modified Oswestry Low Back Pain Questionnaire     3. Mobility: Patient will improve AROM to stated goals, listed in objective measures above, in order to return to maximal functional potential and improve quality of life.     4. Strength: Patient will improve strength to stated goals, listed in objective measures above, in order to improve functional independence and quality of life.     5. Gait: Patient will demonstrate normalized gait mechanics with minimal compensation in order to return to PLOF.     6. Patient will return to normal ADL's, IADL's, community involvement, recreational activities, and work-related activities with less than or equal to 2/10 pain and maximal function          PLAN   Continue Plan of Care (POC) and progress per patient tolerance.    INITIAL (10/2/2019): Outpatient Physical Therapy 2 times weekly for 4 weeks to include any combination of the following interventions: dry needling, modalities, electrical stimulation (IFC, Pre-Mod, Attended with Functional Dry Needling), Cervical/Lumbar Traction, Gait Training, Manual Therapy, Neuromuscular Re-ed, Patient Education, Self Care, Therapeutic Activites and Therapeutic Exercise     Halle Boston, PT, DPT

## 2019-10-11 ENCOUNTER — OFFICE VISIT (OUTPATIENT)
Dept: NEPHROLOGY | Facility: CLINIC | Age: 74
End: 2019-10-11
Payer: MEDICARE

## 2019-10-11 VITALS
HEART RATE: 66 BPM | BODY MASS INDEX: 38.51 KG/M2 | SYSTOLIC BLOOD PRESSURE: 110 MMHG | HEIGHT: 66 IN | WEIGHT: 239.63 LBS | DIASTOLIC BLOOD PRESSURE: 62 MMHG

## 2019-10-11 DIAGNOSIS — E53.8 B12 DEFICIENCY: ICD-10-CM

## 2019-10-11 DIAGNOSIS — D50.8 OTHER IRON DEFICIENCY ANEMIA: ICD-10-CM

## 2019-10-11 DIAGNOSIS — N18.30 CHRONIC KIDNEY DISEASE (CKD), STAGE III (MODERATE): Primary | ICD-10-CM

## 2019-10-11 DIAGNOSIS — E53.8 FOLATE DEFICIENCY: ICD-10-CM

## 2019-10-11 PROCEDURE — 3074F SYST BP LT 130 MM HG: CPT | Mod: CPTII,S$GLB,, | Performed by: INTERNAL MEDICINE

## 2019-10-11 PROCEDURE — 3078F PR MOST RECENT DIASTOLIC BLOOD PRESSURE < 80 MM HG: ICD-10-PCS | Mod: CPTII,S$GLB,, | Performed by: INTERNAL MEDICINE

## 2019-10-11 PROCEDURE — 99999 PR PBB SHADOW E&M-EST. PATIENT-LVL III: CPT | Mod: PBBFAC,,, | Performed by: INTERNAL MEDICINE

## 2019-10-11 PROCEDURE — 3074F PR MOST RECENT SYSTOLIC BLOOD PRESSURE < 130 MM HG: ICD-10-PCS | Mod: CPTII,S$GLB,, | Performed by: INTERNAL MEDICINE

## 2019-10-11 PROCEDURE — 99214 PR OFFICE/OUTPT VISIT, EST, LEVL IV, 30-39 MIN: ICD-10-PCS | Mod: S$GLB,,, | Performed by: INTERNAL MEDICINE

## 2019-10-11 PROCEDURE — 99214 OFFICE O/P EST MOD 30 MIN: CPT | Mod: S$GLB,,, | Performed by: INTERNAL MEDICINE

## 2019-10-11 PROCEDURE — 1101F PT FALLS ASSESS-DOCD LE1/YR: CPT | Mod: CPTII,S$GLB,, | Performed by: INTERNAL MEDICINE

## 2019-10-11 PROCEDURE — 99499 UNLISTED E&M SERVICE: CPT | Mod: S$GLB,,, | Performed by: INTERNAL MEDICINE

## 2019-10-11 PROCEDURE — 3078F DIAST BP <80 MM HG: CPT | Mod: CPTII,S$GLB,, | Performed by: INTERNAL MEDICINE

## 2019-10-11 PROCEDURE — 99499 RISK ADDL DX/OHS AUDIT: ICD-10-PCS | Mod: S$GLB,,, | Performed by: INTERNAL MEDICINE

## 2019-10-11 PROCEDURE — 1101F PR PT FALLS ASSESS DOC 0-1 FALLS W/OUT INJ PAST YR: ICD-10-PCS | Mod: CPTII,S$GLB,, | Performed by: INTERNAL MEDICINE

## 2019-10-11 PROCEDURE — 99999 PR PBB SHADOW E&M-EST. PATIENT-LVL III: ICD-10-PCS | Mod: PBBFAC,,, | Performed by: INTERNAL MEDICINE

## 2019-10-11 NOTE — PATIENT INSTRUCTIONS
Avoid NSAID pain medications such as advil, aleve, motrin, ibuprofen, naprosyn, meloxicam, diclofenac, mobic.     Ferrous sulfate 325 mg one a day - iron tablet

## 2019-10-11 NOTE — PROGRESS NOTES
Subjective:       Patient ID: Kay Rosas is a 74 y.o. White female who presents for follow-up evaluation of HTN, CKD stage 3     Norris Swann MD      HPI : Kay Rosas is a pleasant 74-year-old  woman with longstanding history of hypertension, type 2 diabetes, chronic kidney disease stage 3, seen in office today in f/u  for above medical problems. I saw her in clinic about 2 months ago.  Recent labs reviewed and discussed with the patient.  Her renal function is stable with a serum creatinine of 1.7 mg/dL, creatinine fluctuates between 1.5 and 1.8 mg/dL, again advised patient to avoid NSAID use, advised patient to discontinue Mobic,         Past Medical History:   Diagnosis Date    Arthritis     Degenerative disc disease, cervical     Diabetes mellitus, type 2     Emphysema of lung     Hyperlipidemia     Hypertension     MVP (mitral valve prolapse)     Osteoporosis     feet    Thyroid condition        Current Outpatient Medications on File Prior to Visit   Medication Sig Dispense Refill    albuterol 90 mcg/actuation inhaler Inhale 2 puffs into the lungs every 6 (six) hours as needed.      ARIPiprazole (ABILIFY) 2 MG Tab Take 1 tablet (2 mg total) by mouth once daily. 90 tablet 2    atorvastatin (LIPITOR) 20 MG tablet TAKE 1 TABLET (20 MG TOTAL) BY MOUTH ONCE DAILY. 90 tablet 3    azelastine (ASTELIN) 137 mcg (0.1 %) nasal spray 2 sprays (274 mcg total) by Nasal route 2 (two) times daily. 30 mL 11    gabapentin (NEURONTIN) 300 MG capsule Take 2 capsules (600 mg total) by mouth 3 (three) times daily. 90 capsule 3    levocetirizine (XYZAL) 5 MG tablet Take 1 tablet (5 mg total) by mouth every evening. 90 tablet 2    levothyroxine (SYNTHROID) 50 MCG tablet TAKE 1 TABLET (50 MCG TOTAL) BY MOUTH ONCE DAILY. 90 tablet 3    losartan-hydrochlorothiazide 100-25 mg (HYZAAR) 100-25 mg per tablet TAKE 1 TABLET BY MOUTH ONCE DAILY. 90 tablet 3    meloxicam (MOBIC) 7.5 MG tablet TAKE 1  TABLET BY MOUTH DAILY.      metFORMIN (GLUCOPHAGE) 1000 MG tablet TAKE 1 TABLET BY MOUTH TWICE A DAY (Patient taking differently: TAKE 0.5 TABLET BY MOUTH TWICE A DAY) 180 tablet 2    oxybutynin (DITROPAN-XL) 5 MG TR24 Take 1 tablet (5 mg total) by mouth once daily. 90 tablet 1    pantoprazole (PROTONIX) 40 MG tablet TAKE 1 TABLET BY MOUTH DAILY. 90 tablet 1    sertraline (ZOLOFT) 100 MG tablet TAKE 1 TABLET (100 MG TOTAL) BY MOUTH ONCE DAILY. 30 tablet 6    sertraline (ZOLOFT) 50 MG tablet Take 1 tablet (50 mg total) by mouth once daily. 90 tablet 1    tiZANidine (ZANAFLEX) 4 MG tablet TAKE 1 TABLET DAILY  6    traMADol (ULTRAM) 50 mg tablet Take 1 tablet (50 mg total) by mouth every 6 (six) hours as needed for Pain. 30 tablet 1    traZODone (DESYREL) 100 MG tablet TAKE 1 TABLET (100 MG TOTAL) BY MOUTH EVERY EVENING. 30 tablet 6     No current facility-administered medications on file prior to visit.      FH :  Denies family history of kidney disease except for age associated CKD      SH : Lives at home, reformed smoker, no alcohol,     Review of Systems  :    Constitutional: Negative for activity change, appetite change, fatigue and fever.   HENT: Negative for congestion, facial swelling, sore throat, trouble swallowing and voice change.    Eyes: Negative for redness and visual disturbance.   Respiratory: Negative for apnea, cough, chest tightness, shortness of breath and wheezing.    Cardiovascular: Negative for chest pain, palpitations and leg swelling.   Gastrointestinal: Negative for abdominal distention, abdominal pain, blood in stool, constipation, diarrhea, nausea and vomiting.   Genitourinary: Negative for decreased urine volume, difficulty urinating, dysuria, flank pain, frequency, hematuria, pelvic pain and urgency.   Musculoskeletal: Positive for arthralgias. Negative for back pain, gait problem and joint swelling.   Skin: Negative for color change and rash.   Neurological: Negative for  dizziness, syncope, weakness and headaches.   Hematological: Does not bruise/bleed easily.   Psychiatric/Behavioral: Negative for agitation, behavioral problems and confusion. The patient is not nervous/anxious.      :      Objective:         Vitals:    10/11/19 1519   BP: 110/62   Pulse: 66       Weight 239 lb, stable    Physical Exam  :    Constitutional: She is oriented to person, place, and time. She appears well-developed and well-nourished. No distress.   HENT: Head: Normocephalic and atraumatic.   Mouth/Throat: Oropharynx is clear and moist. No oropharyngeal exudate.   Eyes: Pupils are equal, round, and reactive to light. Conjunctivae and EOM are normal. No scleral icterus.   Neck: Normal range of motion. Neck supple. No JVD present. Carotid bruit is not present. No tracheal deviation present. No thyroid mass and no thyromegaly present.   Cardiovascular: Normal rate, regular rhythm, normal heart sounds and intact distal pulses. Exam reveals no gallop and no friction rub.   No murmur heard.  Pulmonary/Chest: Effort normal and breath sounds normal. No respiratory distress. She has no wheezes. She has no rales. She exhibits no tenderness.   Abdominal: Soft. Bowel sounds are normal. She exhibits no distension, no abdominal bruit, no ascites and no mass. There is no hepatosplenomegaly. There is no tenderness. There is no rebound, no guarding and no CVA tenderness.   Musculoskeletal: Normal range of motion. She exhibits edema. She exhibits no tenderness. no edema    Lymphadenopathy:   She has no cervical adenopathy.   Neurological: She is alert and oriented to person, place, and time. no cranial nerve deficit. She exhibits normal muscle tone. Coordination normal.   Skin: Skin is warm and intact. No rash noted. No erythema. No pallor.   Psychiatric: She has a normal mood and affect. Her behavior is normal. Judgment normal.               Labs:    Lab Results   Component Value Date    CREATININE 1.7 (H) 10/07/2019     BUN 51 (H) 10/07/2019     10/07/2019    K 4.1 10/07/2019     10/07/2019    CO2 28 10/07/2019       Lab Results   Component Value Date    WBC 6.30 10/07/2019    HGB 9.4 (L) 10/07/2019    HCT 33.4 (L) 10/07/2019    MCV 85 10/07/2019     10/07/2019       Lab Results   Component Value Date    .0 (H) 10/07/2019    CALCIUM 9.7 10/07/2019    PHOS 3.3 10/07/2019       Lab Results   Component Value Date    ALBUMIN 3.9 10/07/2019       Lab Results   Component Value Date    URICACID 5.1 10/07/2019       Lab Results   Component Value Date    HGBA1C 5.8 (H) 07/31/2019       Impression and Plan :  74-year-old  woman seen in office today in consultation for following medical problems     1. CKD stage 3 :  Serum creatinine fluctuates between 1.4 and 1.8 mg/dL, recent serum creatinine is 1.7, again advised patient to avoid NSAIDs, discontinue Mobic,       2.  Hypertension - discussed adherence with medications, low-salt diet, blood pressure is controlled,     3.  Obesity - discuss calorie restriction, exercise, weight loss,     4. Anemia- multifactorial, check labs before next visit, patient states she never had a colonoscopy,     will start her on iron supplements,     5.  Urinalysis negative for proteinuria or hematuria     6.  Recent renal ultrasound results discussed with the patient,    7.  Secondary hyperparathyroidism - PTH and vitamin-D levels acceptable,    8.  Type 2 diabetes - well controlled, discussed adherence with diet and medications,     Return to clinic in about 4 months, more than 30 min of face-to-face time was spent with the patient discussing labs and plan of care.         Dmitri Alonso MD

## 2019-10-14 RX ORDER — GABAPENTIN 300 MG/1
CAPSULE ORAL
Qty: 270 CAPSULE | Refills: 1 | Status: SHIPPED | OUTPATIENT
Start: 2019-10-14 | End: 2020-02-07 | Stop reason: SDUPTHER

## 2019-10-16 RX ORDER — SERTRALINE HYDROCHLORIDE 50 MG/1
TABLET, FILM COATED ORAL
Qty: 90 TABLET | Refills: 1 | Status: SHIPPED | OUTPATIENT
Start: 2019-10-16 | End: 2021-02-01 | Stop reason: SDUPTHER

## 2019-10-28 ENCOUNTER — OFFICE VISIT (OUTPATIENT)
Dept: PAIN MEDICINE | Facility: CLINIC | Age: 74
End: 2019-10-28
Payer: MEDICARE

## 2019-10-28 VITALS
BODY MASS INDEX: 38.57 KG/M2 | WEIGHT: 240 LBS | RESPIRATION RATE: 20 BRPM | SYSTOLIC BLOOD PRESSURE: 133 MMHG | DIASTOLIC BLOOD PRESSURE: 62 MMHG | HEIGHT: 66 IN | HEART RATE: 58 BPM

## 2019-10-28 DIAGNOSIS — M96.1 POSTLAMINECTOMY SYNDROME OF LUMBAR REGION: ICD-10-CM

## 2019-10-28 DIAGNOSIS — M48.061 SPINAL STENOSIS OF LUMBAR REGION WITHOUT NEUROGENIC CLAUDICATION: ICD-10-CM

## 2019-10-28 DIAGNOSIS — M70.61 GREATER TROCHANTERIC BURSITIS OF BOTH HIPS: ICD-10-CM

## 2019-10-28 DIAGNOSIS — M54.16 RIGHT LUMBAR RADICULITIS: ICD-10-CM

## 2019-10-28 DIAGNOSIS — M46.1 SACROILIITIS: ICD-10-CM

## 2019-10-28 DIAGNOSIS — M70.62 GREATER TROCHANTERIC BURSITIS OF BOTH HIPS: ICD-10-CM

## 2019-10-28 DIAGNOSIS — M47.816 LUMBAR SPONDYLOSIS: Primary | ICD-10-CM

## 2019-10-28 PROCEDURE — 99999 PR PBB SHADOW E&M-EST. PATIENT-LVL IV: ICD-10-PCS | Mod: PBBFAC,,, | Performed by: PHYSICIAN ASSISTANT

## 2019-10-28 PROCEDURE — 3075F SYST BP GE 130 - 139MM HG: CPT | Mod: CPTII,S$GLB,, | Performed by: PHYSICIAN ASSISTANT

## 2019-10-28 PROCEDURE — 99214 OFFICE O/P EST MOD 30 MIN: CPT | Mod: S$GLB,,, | Performed by: PHYSICIAN ASSISTANT

## 2019-10-28 PROCEDURE — 3078F PR MOST RECENT DIASTOLIC BLOOD PRESSURE < 80 MM HG: ICD-10-PCS | Mod: CPTII,S$GLB,, | Performed by: PHYSICIAN ASSISTANT

## 2019-10-28 PROCEDURE — 99999 PR PBB SHADOW E&M-EST. PATIENT-LVL IV: CPT | Mod: PBBFAC,,, | Performed by: PHYSICIAN ASSISTANT

## 2019-10-28 PROCEDURE — 3078F DIAST BP <80 MM HG: CPT | Mod: CPTII,S$GLB,, | Performed by: PHYSICIAN ASSISTANT

## 2019-10-28 PROCEDURE — 3075F PR MOST RECENT SYSTOLIC BLOOD PRESS GE 130-139MM HG: ICD-10-PCS | Mod: CPTII,S$GLB,, | Performed by: PHYSICIAN ASSISTANT

## 2019-10-28 PROCEDURE — 99214 PR OFFICE/OUTPT VISIT, EST, LEVL IV, 30-39 MIN: ICD-10-PCS | Mod: S$GLB,,, | Performed by: PHYSICIAN ASSISTANT

## 2019-10-28 PROCEDURE — 1101F PR PT FALLS ASSESS DOC 0-1 FALLS W/OUT INJ PAST YR: ICD-10-PCS | Mod: CPTII,S$GLB,, | Performed by: PHYSICIAN ASSISTANT

## 2019-10-28 PROCEDURE — 1101F PT FALLS ASSESS-DOCD LE1/YR: CPT | Mod: CPTII,S$GLB,, | Performed by: PHYSICIAN ASSISTANT

## 2019-10-28 NOTE — PROGRESS NOTES
Chief Complaint:   Chief Complaint   Patient presents with    Low-back Pain        SUBJECTIVE:    Interval History: Patient was last seen on 2019. At that visit, the plan was to start physical therapy. She completed with limited relief.     Initial HPI:   Kay Rosas is a 74 y.o. female who presents to the clinic for the evaluation of low back and knee pain. The pain started 6 years ago following a lumbar spine surgery (fusion at L4-5 in ) and symptoms have been worsening.The pain is located in the low back area and radiates to the right leg.  The pain is described as aching and stabbing and is rated as 3/10. The pain is rated with a score of  2/10 on the BEST day and a score of 9/10 on the WORST day.  Symptoms interfere with daily activity. The pain is exacerbated by Walking.  The pain is mitigated by laying down. The patient reports spending 2-4 hours per day reclining. The patient reports 5-7 hours of uninterrupted sleep per night.    Patient denies night fever/night sweats, urinary incontinence, bowel incontinence, significant weight loss and loss of sensations. She does feel that her right leg could give out at times and uses a walker for longer distances while ambulating    Non-Pharmacologic Treatments:  Physical Therapy/Home Exercise: no formal PT  Ice/Heat:no  TENS: no  Acupuncture: no  Massage: no  Chiropractic: no    Other: no      Pain Medications:    - Opioids: Ultram (Tramadol HCL)  - Adjuvant Medications: Neurontin (Gabapentin), Zanaflex ( Tizanidine) and Tylenol     report:  Reviewed and consistent with medication use as prescribed.    Pain Procedures: Lumbar EVY years ago prior to surgery      Imagin/26/19 lumbar x-ray  TECHNIQUE:  AP, lateral, spot and bilateral oblique views of the lumbar spine were performed.  COMPARISON:  None  FINDINGS:  The vertebral bodies demonstrate a normal height.  There is grade 1 spondylolisthesis of L4 on L5.  Pedicle screws and fixation rods  noted bilaterally at the L4-5 level with an intradiscal spacer also present at this level.  There is moderate to severe disc space narrowing and spondylosis present at the L5-S1 level.  More mild disc space narrowing and spondylosis noted at the L1-2 through the L3-4 levels.  Vascular calcifications are noted.  Prominent facet arthropathy multilevel bilateral facet arthropathy noted..           Past Medical History:   Diagnosis Date    Arthritis     Degenerative disc disease, cervical     Diabetes mellitus, type 2     Emphysema of lung     Hyperlipidemia     Hypertension     MVP (mitral valve prolapse)     Osteoporosis     feet    Thyroid condition      Past Surgical History:   Procedure Laterality Date    BACK SURGERY  2012    cataract surgery  Bilateral 10/ 2012    elbow spur removed Left     HYSTERECTOMY Left 1978    OOPHORECTOMY      OVARIAN CYST REMOVAL      two from back, one from axilla    VASCULAR SURGERY Right     high ligation due to blood clot      Review of patient's allergies indicates:   Allergen Reactions    Nsaids (non-steroidal anti-inflammatory drug)      CKD       Current Outpatient Medications   Medication Sig    albuterol 90 mcg/actuation inhaler Inhale 2 puffs into the lungs every 6 (six) hours as needed.    ARIPiprazole (ABILIFY) 2 MG Tab Take 1 tablet (2 mg total) by mouth once daily.    atorvastatin (LIPITOR) 20 MG tablet TAKE 1 TABLET (20 MG TOTAL) BY MOUTH ONCE DAILY.    gabapentin (NEURONTIN) 300 MG capsule Take 2 capsules (600 mg total) by mouth 3 (three) times daily.    gabapentin (NEURONTIN) 300 MG capsule TAKE 1 CAPSULE (300 MG TOTAL) BY MOUTH 3 (THREE) TIMES DAILY.    levocetirizine (XYZAL) 5 MG tablet Take 1 tablet (5 mg total) by mouth every evening.    levothyroxine (SYNTHROID) 50 MCG tablet TAKE 1 TABLET (50 MCG TOTAL) BY MOUTH ONCE DAILY.    losartan-hydrochlorothiazide 100-25 mg (HYZAAR) 100-25 mg per tablet TAKE 1 TABLET BY MOUTH ONCE DAILY.     metFORMIN (GLUCOPHAGE) 1000 MG tablet TAKE 1 TABLET BY MOUTH TWICE A DAY (Patient taking differently: TAKE 0.5 TABLET BY MOUTH TWICE A DAY)    oxybutynin (DITROPAN-XL) 5 MG TR24 Take 1 tablet (5 mg total) by mouth once daily.    pantoprazole (PROTONIX) 40 MG tablet TAKE 1 TABLET BY MOUTH DAILY.    sertraline (ZOLOFT) 100 MG tablet TAKE 1 TABLET (100 MG TOTAL) BY MOUTH ONCE DAILY.    sertraline (ZOLOFT) 50 MG tablet TAKE 1 TABLET BY MOUTH EVERY DAY    tiZANidine (ZANAFLEX) 4 MG tablet TAKE 1 TABLET DAILY    traMADol (ULTRAM) 50 mg tablet Take 1 tablet (50 mg total) by mouth every 6 (six) hours as needed for Pain.    traZODone (DESYREL) 100 MG tablet TAKE 1 TABLET (100 MG TOTAL) BY MOUTH EVERY EVENING.    azelastine (ASTELIN) 137 mcg (0.1 %) nasal spray 2 sprays (274 mcg total) by Nasal route 2 (two) times daily.     No current facility-administered medications for this visit.            ROS:  GENERAL:  No weight loss, malaise or fevers.  HEENT:   No recent changes in vision or hearing  NECK:  Negative for lumps, no difficulty with swallowing.  RESPIRATORY:  Negative for cough, wheezing or shortness of breath, patient denies any recent URI.  CARDIOVASCULAR:  Negative for chest pain, leg swelling or palpitations.  GI:  Negative for abdominal discomfort, blood in stools or black stools or change in bowel habits.  MUSCULOSKELETAL:  See HPI.  SKIN:  Negative for lesions, rash, and itching.  PSYCH:  No mood disorder or recent psychosocial stressors.  Patients sleep is not disturbed secondary to pain.  HEMATOLOGY/LYMPHOLOGY:  Negative for prolonged bleeding, bruising easily or swollen nodes.  Patient is not currently taking any anti-coagulants  NEURO:   No history of headaches, syncope, paralysis, seizures or tremors.  All other reviewed and negative other than HPI.    OBJECTIVE:    Vitals:  /62 (BP Location: Left arm, Patient Position: Sitting, BP Method: Medium (Automatic))   Pulse (!) 58   Resp 20   Ht  "5' 6" (1.676 m)   Wt 108.9 kg (240 lb)   BMI 38.74 kg/m²   (reviewed on 10/28/2019)    General: alert and oriented, in no apparent distress.  Gait: normal gait.  Skin: no rashes, no discoloration, no obvious lesions  HEENT: normocephalic, atraumatic. Pupils equal and round.  Cardiovascular: no significant peripheral edema present.  Respiratory: without use of accessory muscles of respiration.    Musculoskeletal - Lumbar Spine:  - Pain on flexion of lumbar spine: Absent   - Pain on extension of lumbar spine: Absent        - Lumbar facet loading: Absent   - TTP over the lumbar facet joints: Absent  - TTP over the lumbar paraspinals: Present, less so than over SIJ  - TTP over the SI joints: Present bilaterally  - TTP over GT bursa: Present bilaterally, L>R  - Straight Leg Raise: Negative  - GUEVARA: Negative    Neuro - Lower Extremities:  - RLE Strength:     >> 5/5 strength with right hip flexion/ extension    >> 5/5 strength with right knee flexion/ extension    >> 5/5 strength in right ankle with plantar and dorsiflexion  - LLE Strength:     >> 5/5 strength with left hip flexion/ extension    >> 5/5 strength with knee flexion extension on the left     >> 5/5 strength in left ankle with plantar and dorsiflexion  - Extremity Reflexes: Brisk and symmetric throughout  - Sensory: Sensation to light touch intact bilaterally      Psych:  Mood and affect is appropriate           ASSESSMENT: 74 y.o. year old female with low back pain, consistent with     1. Lumbar spondylosis     2. Postlaminectomy syndrome of lumbar region     3. Right lumbar radiculitis     4. Spinal stenosis of lumbar region without neurogenic claudication         PLAN:   - Anticoagulation use: no    1. Interventional: Schedule bilateral SIJ + GT bursa injection with RN IV sedation.     2. Pharmacologic: Continue gabapentin 600mg TID.     3. Rehabilitative: Encouraged regular exercise.  She completed physical therapy with limited relief     4. Diagnostic: " None for now.    5. Follow up: 4 weeks post-injection    - I discussed the risks, benefits, and alternatives to potential treatment options. All questions and concerns were fully addressed today in clinic. Dr. Pleitez was consulted regarding the patient plan and agrees.

## 2019-10-28 NOTE — PATIENT INSTRUCTIONS
Pain Management Pre-Procedure Instructions  (also available in your [facility] account)    Patient Name:___Kay Rosas____MRN: [id number] you are scheduled to have the following procedure:__ Joint Injection  _with______Noe Pleitez MD on: ____November 11,2019___ at: [facility]    You will be contacted the day before your procedure to be given an arrival and procedure time                                                                                                            Day of Procedure   Ensure you have obtained arrival time from the Pain Management department  o We will call 48 hours in advance with your arrival time. Please check any voicemails you may have  o If you arrive past your scheduled procedure time, you may be asked to reschedule your procedure.   For your safety, ensure you have a  with you to remain present throughout your procedure   o If you arrive without a responsible adult to stay with you and drive you home, you may be asked to reschedule your procedure   Take all of your prescribed medications (exceptions noted below) with a small amount of water    o [x] Nothing by mouth after midnight the night before your procedure.  It is ok to take your regular medications with a small sip of water.     Wear loose, comfortable clothing    You may wear glasses, dentures, contact lenses and/or hearing aids. Please leave all valuable items at home.   Contact the Pain Management department at [phone] or via [facility] if you are:  o Running a fever above 100 degrees  o Feel ill, have any type of infection, or are taking antibiotics now or have in the past 2 weeks  o Have had any outpatient procedures in the past 2 weeks (colonoscopy, major dental work, etc.)  o If you are allergic to iodine, IVP dye or shellfish.      Contact Information: [phone], ask to speak to the pain management department with any questions or concerns or send a message via [facility]

## 2019-10-28 NOTE — LETTER
October 28, 2019      Noe Pleitez MD  [id number] [place] [address] [address] Rajendra [place] [id number]           [facility]  [address] [facility]  RAJEN[place]A [place] [address]  Phone: [phone]  Fax: [phone]          Patient: Kay Rosas   MR Number: [id number]   YOB: 1945   Date of Visit: 10/28/2019       Dear Dr. Noe Pleitez:    Thank you for referring Kay Rosas to me for evaluation. Attached you will find relevant portions of my assessment and plan of care.    If you have questions, please do not hesitate to call me. I look forward to following Kay Rosas along with you.    Sincerely,    Adry Sarkar PA-C    Enclosure  CC:  No Recipients    If you would like to receive this communication electronically, please contact [email or web address] or [phone] to request more information on [employer] Link access.    For providers and/or their staff who would like to refer a patient to [facility], please contact us through our one-stop-shop provider referral line, [facility] , at [phone].    If you feel you have received this communication in error or would no longer like to receive these types of communications, please e-mail [email or web address]

## 2019-11-04 RX ORDER — PANTOPRAZOLE SODIUM 40 MG/1
TABLET, DELAYED RELEASE ORAL
Qty: 90 TABLET | Refills: 1 | Status: SHIPPED | OUTPATIENT
Start: 2019-11-04 | End: 2020-07-02

## 2019-11-07 ENCOUNTER — TELEPHONE (OUTPATIENT)
Dept: INTERNAL MEDICINE | Facility: CLINIC | Age: 74
End: 2019-11-07

## 2019-11-07 NOTE — PRE-PROCEDURE INSTRUCTIONS
Spoke with patient     regarding procedure scheduled on 11/11/19  Has patient been sick with fever or on antibiotics within the last 7 days? no  Has patient received a vaccination within the last 7 days? no  Has the patient stopped all medications as directed? NA  Does patient have a pacemaker and or defibrillator? no  Does the patient have any chest pain or SOB? no  Does the patient have a ride to and from procedure and someone reliable to remain with patient? Yes, Shasha  Is the patient diabetic? yes  Does the patient have sleep apnea? No Or use O2 at home?no  Is the patient receiving sedation? yes  Is the patient instructed to remain NPO beginning at midnight the night before their procedure? yes   patient spoke with Nehal in Financial services to confirm what her payment would be the day of procedure in order to proceed with injection, patient satisfied.

## 2019-11-07 NOTE — TELEPHONE ENCOUNTER
----- Message from Kirti Lara sent at 11/7/2019  9:40 AM CST -----  Contact: Pt  Pt asked that nurse contact her, pt would like to cancel upcoming appointments.

## 2019-11-11 ENCOUNTER — HOSPITAL ENCOUNTER (OUTPATIENT)
Facility: HOSPITAL | Age: 74
Discharge: HOME | End: 2019-11-11
Attending: PHYSICAL MEDICINE & REHABILITATION | Admitting: PHYSICAL MEDICINE & REHABILITATION
Payer: MEDICARE

## 2019-11-11 VITALS
BODY MASS INDEX: 37.91 KG/M2 | SYSTOLIC BLOOD PRESSURE: 152 MMHG | TEMPERATURE: 98 F | RESPIRATION RATE: 16 BRPM | WEIGHT: 235.88 LBS | OXYGEN SATURATION: 96 % | DIASTOLIC BLOOD PRESSURE: 68 MMHG | HEART RATE: 64 BPM | HEIGHT: 66 IN

## 2019-11-11 DIAGNOSIS — M70.61 GREATER TROCHANTERIC BURSITIS OF BOTH HIPS: ICD-10-CM

## 2019-11-11 DIAGNOSIS — M70.62 GREATER TROCHANTERIC BURSITIS OF BOTH HIPS: ICD-10-CM

## 2019-11-11 DIAGNOSIS — M46.1 SACROILIITIS: ICD-10-CM

## 2019-11-11 LAB — POCT GLUCOSE: 97 MG/DL (ref 70–110)

## 2019-11-11 PROCEDURE — 99152 PR MOD CONSCIOUS SEDATION, SAME PHYS, 5+ YRS, FIRST 15 MIN: ICD-10-PCS | Mod: ,,, | Performed by: PHYSICAL MEDICINE & REHABILITATION

## 2019-11-11 PROCEDURE — 27096 PR INJECTION,SACROILIAC JOINT: ICD-10-PCS | Mod: 50,,, | Performed by: PHYSICAL MEDICINE & REHABILITATION

## 2019-11-11 PROCEDURE — 27096 INJECT SACROILIAC JOINT: CPT | Mod: 50 | Performed by: PHYSICAL MEDICINE & REHABILITATION

## 2019-11-11 PROCEDURE — 99152 MOD SED SAME PHYS/QHP 5/>YRS: CPT | Mod: ,,, | Performed by: PHYSICAL MEDICINE & REHABILITATION

## 2019-11-11 PROCEDURE — 25000003 PHARM REV CODE 250: Performed by: PHYSICAL MEDICINE & REHABILITATION

## 2019-11-11 PROCEDURE — 20610 PR DRAIN/INJECT LARGE JOINT/BURSA: ICD-10-PCS | Mod: 50,59,, | Performed by: PHYSICAL MEDICINE & REHABILITATION

## 2019-11-11 PROCEDURE — 20610 DRAIN/INJ JOINT/BURSA W/O US: CPT | Mod: 50 | Performed by: PHYSICAL MEDICINE & REHABILITATION

## 2019-11-11 PROCEDURE — 82962 GLUCOSE BLOOD TEST: CPT | Performed by: PHYSICAL MEDICINE & REHABILITATION

## 2019-11-11 PROCEDURE — 25500020 PHARM REV CODE 255: Performed by: PHYSICAL MEDICINE & REHABILITATION

## 2019-11-11 PROCEDURE — 27096 INJECT SACROILIAC JOINT: CPT | Mod: 50,,, | Performed by: PHYSICAL MEDICINE & REHABILITATION

## 2019-11-11 PROCEDURE — 20610 DRAIN/INJ JOINT/BURSA W/O US: CPT | Mod: 50,59,, | Performed by: PHYSICAL MEDICINE & REHABILITATION

## 2019-11-11 PROCEDURE — 63600175 PHARM REV CODE 636 W HCPCS: Performed by: PHYSICAL MEDICINE & REHABILITATION

## 2019-11-11 RX ORDER — LIDOCAINE HYDROCHLORIDE 10 MG/ML
INJECTION, SOLUTION EPIDURAL; INFILTRATION; INTRACAUDAL; PERINEURAL
Status: DISCONTINUED | OUTPATIENT
Start: 2019-11-11 | End: 2019-11-11 | Stop reason: HOSPADM

## 2019-11-11 RX ORDER — BUPIVACAINE HYDROCHLORIDE 2.5 MG/ML
INJECTION, SOLUTION EPIDURAL; INFILTRATION; INTRACAUDAL
Status: DISCONTINUED | OUTPATIENT
Start: 2019-11-11 | End: 2019-11-11 | Stop reason: HOSPADM

## 2019-11-11 RX ORDER — MIDAZOLAM HYDROCHLORIDE 1 MG/ML
INJECTION, SOLUTION INTRAMUSCULAR; INTRAVENOUS
Status: DISCONTINUED | OUTPATIENT
Start: 2019-11-11 | End: 2019-11-11 | Stop reason: HOSPADM

## 2019-11-11 RX ORDER — HYDRALAZINE HYDROCHLORIDE 20 MG/ML
10 INJECTION INTRAMUSCULAR; INTRAVENOUS ONCE
Status: COMPLETED | OUTPATIENT
Start: 2019-11-11 | End: 2019-11-11

## 2019-11-11 RX ORDER — ONDANSETRON 2 MG/ML
4 INJECTION INTRAMUSCULAR; INTRAVENOUS ONCE AS NEEDED
Status: DISCONTINUED | OUTPATIENT
Start: 2019-11-11 | End: 2019-11-11 | Stop reason: HOSPADM

## 2019-11-11 RX ORDER — FENTANYL CITRATE 50 UG/ML
INJECTION, SOLUTION INTRAMUSCULAR; INTRAVENOUS
Status: DISCONTINUED | OUTPATIENT
Start: 2019-11-11 | End: 2019-11-11 | Stop reason: HOSPADM

## 2019-11-11 RX ORDER — METHYLPREDNISOLONE ACETATE 40 MG/ML
INJECTION, SUSPENSION INTRA-ARTICULAR; INTRALESIONAL; INTRAMUSCULAR; SOFT TISSUE
Status: DISCONTINUED | OUTPATIENT
Start: 2019-11-11 | End: 2019-11-11 | Stop reason: HOSPADM

## 2019-11-11 RX ADMIN — HYDRALAZINE HYDROCHLORIDE 10 MG: 20 INJECTION INTRAMUSCULAR; INTRAVENOUS at 09:11

## 2019-11-11 NOTE — PLAN OF CARE
Discharge instructions reviewed with patient, verbalized understanding. Bandaids to lower back are clean, dry and intact. Denies pain, no other complaints at this time. Patient stood at side of bed and walked several steps without difficulty. Discharging to home with ride.

## 2019-11-11 NOTE — OP NOTE
Time-out taken to identify patient and procedure side prior to starting the procedure.     11/11/2019    PROCEDURE:  1) Bilateral greater trochanteric bursa injection  2) Bilateral sacroiliac joint  injection                            REASON FOR PROCEDURE:   Bilateral Sacroiliitis [M46.1]  Greater trochanteric bursitis of both hips [M70.61, M70.62]    PHYSICIAN: Noe Pleitez MD  ASSISTANTS: None    SEDATION:  Conscious sedation provided by M.D    The patient was monitored with continuous pulse oximetry, EKG, and intermittent blood pressure monitors.  The patient was hemodynamically stable throughout the entire process was responsive to voice, and breathing spontaneously.  Supplemental O2 was provided at 2L/min via nasal cannula.  Patient was comfortable for the duration of the procedure. (See nurse documentation and case log for sedation time)    There was a total of 1mg IV Midazolam and 25mcg Fentanyl titrated for the procedure      MEDICATIONS INJECTED: 1mL 40mg/ml Depo-Medrol and 4mL Bupivacaine 0.25% into each site    LOCAL ANESTHETIC USED: Xylocaine 1% 6ml   ESTIMATED BLOOD LOSS: None.   COMPLICATIONS: None.       TECHNIQUE:   Greater trochanteric bursa injection:  The area overlying the greater trochanteric bursa was identified using fluoroscopy, and the area overlying the skin was prepped and draped in usual sterile fashion. Local Xylocaine was injected by raising a wheel and going down to the periosteum using a 27-gauge hypodermic needle. A 5 inch 22-gauge spinal needle was introduce into the left greater trochanteric bursa Negative pressure applied to confirm no intravascular placement. Omnipaque was injected to confirm placement and to confirm that there was no vascular runoff. 4 mL of 0.25% bupivacaine + 1mL of 40mg/mL depomedrol was then injected slowly.  Displacement of the contrast after injection of the medication confirmed that the medication went into the area of the greater trochanteric  bursa. The same was done for the opposite side.     Sacroiliac joint injection                     Laying in the prone position, the patient was prepped and draped in the usual sterile fashion using ChloraPrep and fenestrated drape.  The area was determined under fluoroscopy.  Local Xylocaine was injected by raising a wheel and going down to the periosteum using a 27-gauge hypodermic needle.  The 5 inch 22-gauge spinal needle was introduce into the bilateral sacroiliac joint.  Negative pressure applied to confirm no intravascular placement.  Omnipaque was injected to confirm placement and to confirm that there was no vascular runoff.  The medication (1mL 40mg/ml Depo-Medrol and 4mL Bupivacaine 0.25% into each side) was then injected slowly.  The patient tolerated the procedure well.    The patient was monitored for approximately 30 minutes after the procedure. Patient was given post procedure and discharge instructions to follow at home. We will see the patient back in two weeks or the patient may call to inform of status. The patient was discharged in a stable condition

## 2019-11-11 NOTE — DISCHARGE INSTRUCTIONS
Pain Post Injection     1. Side effects may include: headache, restlessness at night, weakness to upper or lower extremities (arms or legs), flushing of the face and/ or neck. None are life threatening and will subside within 2-3 days.   2. If you have SEVERE headache with nausea and extreme pain, please call office ([phone]). Unless you usually have this type of migraine headache.   3. If you develop fever (greater than 101 F) or have any redness, swelling, or drainage at the injection site(s), please call off ([phone]). This may be a sign of infection.   4. If a rash or whelps (like hives) occur, please call the office ([phone]).  5. If you are a heart patient, please watch for symptoms such as swelling in your hands and feet and shortness of breath. If any of these symptoms occur, please notify your primary care/ heart doctor and go to the nearest emergency room.  6. If you have high blood pressure, you may experience an increase in your blood pressure over the next two weeks. Please continue to monitor your blood pressure and contact your primary care provider if your blood pressure does not return to baseline.    7. If you are a diabetic or you monitor your blood sugar, you may have an increase in your blood sugar over the next two weeks. If your blood sugar does not return to your baseline, please contact your primary care provider.  8. NO HEAT TO THE INJECTION SITE for the next 24 hours including: bath or shower, heating pad, moist heat, and / or hot tubs.   9. May use ice pack to injection site for any pain or discomfort. Apply ice pack for 20 minutes then remove for 20 minutes before re- applying to site. (recipe for homemade frozen gel pack below)  10. DO NOT DRIVE OR OPERATE HEAVY MACHINERY UNTIL TOMORROW MORNING.   11. OK to resume all medications unless otherwise directed by your  doctor.  12. Injection(s) may take up to two weeks until full effects are noted.  13. You may return to normal activity/ work the following day.  14. Please do not receive a flu or pneumonia vaccine until one week after your procedure.  .  Homemade Frozen Gel Ice Pack:  1 bottle of rubbing alcohol  3 bottles of water (use rubbing alcohol bottle to measure)  2 large zip lock bags    Instructions:  1. Pour alcohol and water into zip lock bag. Make sure bag is large enough to allow for expansion.  2. Try to get as much air out of the freezer bag before sealing it shut.   3. Place the bag and its contents inside a second freezer bag to contain any leakage.   4. Leave the bag in the freezer for at least one hour.  5. When it's ready, place a towel between the gel pack and bare skin to avoid burning the skin.         Recovery After Procedural Sedation (Adult)  You have been given medicine by vein to make you sleep during your surgery. This may have included both a pain medicine and sleeping medicine. Most of the effects have worn off. But you may still have some drowsiness for the next 6 to 8 hours.  Home care  Follow these guidelines when you get home:  · For the next 8 hours, you should be watched by a responsible adult. This person should make sure your condition is not getting worse.  · Don't drink any alcohol for the next 24 hours.  · Don't drive, operate dangerous machinery, or make important business or personal decisions during the next 24 hours.  Note: Your healthcare provider may tell you not to take any medicine by mouth for pain or sleep in the next 4 hours. These medicines may react with the medicines you were given in the hospital. This could cause a much stronger response than usual.  Follow-up care  Follow up with your healthcare provider if you are not alert and back to your usual level of activity within 12 hours.  When to seek medical advice  Call your healthcare provider right away if any of these  occur:  · Drowsiness gets worse  · Weakness or dizziness gets worse  · Repeated vomiting  · You can't be awakened   Date Last Reviewed: 10/18/2016  © 7328-9612 [employer]. [address] [place] [address], [place] [address]. All rights reserved. This information is not intended as a substitute for professional medical care. Always follow your healthcare professional's instructions.

## 2019-11-11 NOTE — DISCHARGE SUMMARY
Discharge Note  Short Stay      SUMMARY     Admit Date: 11/11/2019    Attending Physician: Noe Pleitez MD        Discharge Physician: Noe Pleitez MD        Discharge Date: 11/11/2019 10:50 AM    Procedure(s) (LRB):  Bilateral GT bursa + SIJ injection (Bilateral)  Bilateral GT bursa + SIJ injection (Bilateral)    Final Diagnosis: Sacroiliitis [M46.1]  Greater trochanteric bursitis of both hips [M70.61, M70.62]    Disposition: Home or self care    Patient Instructions:   Current Discharge Medication List      CONTINUE these medications which have NOT CHANGED    Details   albuterol 90 mcg/actuation inhaler Inhale 2 puffs into the lungs every 6 (six) hours as needed.      ARIPiprazole (ABILIFY) 2 MG Tab Take 1 tablet (2 mg total) by mouth once daily.  Qty: 90 tablet, Refills: 2      !! gabapentin (NEURONTIN) 300 MG capsule Take 2 capsules (600 mg total) by mouth 3 (three) times daily.  Qty: 90 capsule, Refills: 3    Associated Diagnoses: Lumbar spondylosis; Right lumbar radiculitis      levothyroxine (SYNTHROID) 50 MCG tablet TAKE 1 TABLET (50 MCG TOTAL) BY MOUTH ONCE DAILY.  Qty: 90 tablet, Refills: 3      losartan-hydrochlorothiazide 100-25 mg (HYZAAR) 100-25 mg per tablet TAKE 1 TABLET BY MOUTH ONCE DAILY.  Qty: 90 tablet, Refills: 3      metFORMIN (GLUCOPHAGE) 1000 MG tablet TAKE 1 TABLET BY MOUTH TWICE A DAY  Qty: 180 tablet, Refills: 2      oxybutynin (DITROPAN-XL) 5 MG TR24 Take 1 tablet (5 mg total) by mouth once daily.  Qty: 90 tablet, Refills: 1      pantoprazole (PROTONIX) 40 MG tablet TAKE 1 TABLET BY MOUTH EVERY DAY  Qty: 90 tablet, Refills: 1      !! sertraline (ZOLOFT) 100 MG tablet TAKE 1 TABLET (100 MG TOTAL) BY MOUTH ONCE DAILY.  Qty: 30 tablet, Refills: 6      !! sertraline (ZOLOFT) 50 MG tablet TAKE 1 TABLET BY MOUTH EVERY DAY  Qty: 90 tablet, Refills: 1      tiZANidine (ZANAFLEX) 4 MG tablet TAKE 1 TABLET DAILY  Refills: 6      traMADol (ULTRAM) 50 mg tablet Take 1 tablet (50 mg total)  by mouth every 6 (six) hours as needed for Pain.  Qty: 30 tablet, Refills: 1      traZODone (DESYREL) 100 MG tablet TAKE 1 TABLET (100 MG TOTAL) BY MOUTH EVERY EVENING.  Qty: 30 tablet, Refills: 6      atorvastatin (LIPITOR) 20 MG tablet TAKE 1 TABLET (20 MG TOTAL) BY MOUTH ONCE DAILY.  Qty: 90 tablet, Refills: 3      azelastine (ASTELIN) 137 mcg (0.1 %) nasal spray 2 sprays (274 mcg total) by Nasal route 2 (two) times daily.  Qty: 30 mL, Refills: 11      !! gabapentin (NEURONTIN) 300 MG capsule TAKE 1 CAPSULE (300 MG TOTAL) BY MOUTH 3 (THREE) TIMES DAILY.  Qty: 270 capsule, Refills: 1      levocetirizine (XYZAL) 5 MG tablet Take 1 tablet (5 mg total) by mouth every evening.  Qty: 90 tablet, Refills: 2       !! - Potential duplicate medications found. Please discuss with provider.              Discharge Diagnosis: Sacroiliitis [M46.1]  Greater trochanteric bursitis of both hips [M70.61, M70.62]  Condition on Discharge: Stable with no complications to procedure   Diet on Discharge: Same as before.  Activity: as per instruction sheet.  Discharge to: Home with a responsible adult.  Follow up: 2-4 weeks       Please call the office if you experience any weakness or loss of sensation, fever > 101.5, pain uncontrolled with oral medications, persistent nausea/vomiting/or diarrhea, redness or drainage from the incisions, or any other worrisome concerns. If physician on call was not reached or could not communicate with our office for any reason please go to the nearest emergency department

## 2019-11-11 NOTE — INTERVAL H&P NOTE
The patient has been examined and the H&P has been reviewed:    I concur with the findings and no changes have occurred since H&P was written.    Anesthesia/Surgery risks, benefits and alternative options discussed and understood by patient/family.          Active Hospital Problems    Diagnosis  POA    Sacroiliitis [M46.1]  Yes    Greater trochanteric bursitis of both hips [M70.61, M70.62]  Unknown      Resolved Hospital Problems   No resolved problems to display.

## 2019-11-11 NOTE — PROGRESS NOTES
Patient's BP on admit 178/72, asymptomatic; notified Dr. Pleitez. 10 mg hydralazine given per order. Will continue to monitor.

## 2019-11-20 RX ORDER — LOSARTAN POTASSIUM AND HYDROCHLOROTHIAZIDE 25; 100 MG/1; MG/1
1 TABLET ORAL DAILY
Qty: 90 TABLET | Refills: 3 | Status: SHIPPED | OUTPATIENT
Start: 2019-11-20 | End: 2021-02-01 | Stop reason: SDUPTHER

## 2019-12-02 RX ORDER — ARIPIPRAZOLE 2 MG/1
2 TABLET ORAL DAILY
Qty: 90 TABLET | Refills: 2 | Status: SHIPPED | OUTPATIENT
Start: 2019-12-02 | End: 2021-02-01

## 2019-12-04 ENCOUNTER — TELEPHONE (OUTPATIENT)
Dept: INTERNAL MEDICINE | Facility: CLINIC | Age: 74
End: 2019-12-04

## 2019-12-04 NOTE — TELEPHONE ENCOUNTER
I returned pt's call in regards to possible sinus infection. I was able to get pt an appointment with a provider on 12/05/2019. Pt thanked me for my assistance and ended call. //BJ

## 2019-12-05 ENCOUNTER — OFFICE VISIT (OUTPATIENT)
Dept: INTERNAL MEDICINE | Facility: CLINIC | Age: 74
End: 2019-12-05
Payer: MEDICARE

## 2019-12-05 VITALS
HEIGHT: 66 IN | DIASTOLIC BLOOD PRESSURE: 70 MMHG | SYSTOLIC BLOOD PRESSURE: 118 MMHG | WEIGHT: 248.44 LBS | OXYGEN SATURATION: 93 % | HEART RATE: 70 BPM | BODY MASS INDEX: 39.93 KG/M2 | TEMPERATURE: 98 F

## 2019-12-05 DIAGNOSIS — J06.9 VIRAL URI WITH COUGH: Primary | ICD-10-CM

## 2019-12-05 DIAGNOSIS — M46.1 SACROILIITIS: ICD-10-CM

## 2019-12-05 PROCEDURE — 1101F PR PT FALLS ASSESS DOC 0-1 FALLS W/OUT INJ PAST YR: ICD-10-PCS | Mod: CPTII,S$GLB,, | Performed by: PHYSICIAN ASSISTANT

## 2019-12-05 PROCEDURE — 99999 PR PBB SHADOW E&M-EST. PATIENT-LVL V: ICD-10-PCS | Mod: PBBFAC,,, | Performed by: PHYSICIAN ASSISTANT

## 2019-12-05 PROCEDURE — 1101F PT FALLS ASSESS-DOCD LE1/YR: CPT | Mod: CPTII,S$GLB,, | Performed by: PHYSICIAN ASSISTANT

## 2019-12-05 PROCEDURE — 99999 PR PBB SHADOW E&M-EST. PATIENT-LVL V: CPT | Mod: PBBFAC,,, | Performed by: PHYSICIAN ASSISTANT

## 2019-12-05 PROCEDURE — 1126F PR PAIN SEVERITY QUANTIFIED, NO PAIN PRESENT: ICD-10-PCS | Mod: S$GLB,,, | Performed by: PHYSICIAN ASSISTANT

## 2019-12-05 PROCEDURE — 99499 UNLISTED E&M SERVICE: CPT | Mod: S$GLB,,, | Performed by: PHYSICIAN ASSISTANT

## 2019-12-05 PROCEDURE — 1159F PR MEDICATION LIST DOCUMENTED IN MEDICAL RECORD: ICD-10-PCS | Mod: S$GLB,,, | Performed by: PHYSICIAN ASSISTANT

## 2019-12-05 PROCEDURE — 3074F PR MOST RECENT SYSTOLIC BLOOD PRESSURE < 130 MM HG: ICD-10-PCS | Mod: CPTII,S$GLB,, | Performed by: PHYSICIAN ASSISTANT

## 2019-12-05 PROCEDURE — 99214 PR OFFICE/OUTPT VISIT, EST, LEVL IV, 30-39 MIN: ICD-10-PCS | Mod: S$GLB,,, | Performed by: PHYSICIAN ASSISTANT

## 2019-12-05 PROCEDURE — 3078F PR MOST RECENT DIASTOLIC BLOOD PRESSURE < 80 MM HG: ICD-10-PCS | Mod: CPTII,S$GLB,, | Performed by: PHYSICIAN ASSISTANT

## 2019-12-05 PROCEDURE — 99214 OFFICE O/P EST MOD 30 MIN: CPT | Mod: S$GLB,,, | Performed by: PHYSICIAN ASSISTANT

## 2019-12-05 PROCEDURE — 99499 RISK ADDL DX/OHS AUDIT: ICD-10-PCS | Mod: S$GLB,,, | Performed by: PHYSICIAN ASSISTANT

## 2019-12-05 PROCEDURE — 3078F DIAST BP <80 MM HG: CPT | Mod: CPTII,S$GLB,, | Performed by: PHYSICIAN ASSISTANT

## 2019-12-05 PROCEDURE — 3074F SYST BP LT 130 MM HG: CPT | Mod: CPTII,S$GLB,, | Performed by: PHYSICIAN ASSISTANT

## 2019-12-05 PROCEDURE — 1126F AMNT PAIN NOTED NONE PRSNT: CPT | Mod: S$GLB,,, | Performed by: PHYSICIAN ASSISTANT

## 2019-12-05 PROCEDURE — 1159F MED LIST DOCD IN RCRD: CPT | Mod: S$GLB,,, | Performed by: PHYSICIAN ASSISTANT

## 2019-12-05 RX ORDER — TIZANIDINE 4 MG/1
4 TABLET ORAL DAILY PRN
Qty: 20 TABLET | Refills: 0 | Status: SHIPPED | OUTPATIENT
Start: 2019-12-05 | End: 2019-12-25

## 2019-12-05 RX ORDER — BENZONATATE 200 MG/1
200 CAPSULE ORAL 3 TIMES DAILY PRN
Qty: 30 CAPSULE | Refills: 0 | Status: SHIPPED | OUTPATIENT
Start: 2019-12-05 | End: 2019-12-05 | Stop reason: SDUPTHER

## 2019-12-05 RX ORDER — PROMETHAZINE HYDROCHLORIDE AND DEXTROMETHORPHAN HYDROBROMIDE 6.25; 15 MG/5ML; MG/5ML
5 SYRUP ORAL NIGHTLY
Qty: 118 ML | Refills: 0 | Status: SHIPPED | OUTPATIENT
Start: 2019-12-05 | End: 2019-12-10

## 2019-12-05 RX ORDER — PROMETHAZINE HYDROCHLORIDE AND DEXTROMETHORPHAN HYDROBROMIDE 6.25; 15 MG/5ML; MG/5ML
5 SYRUP ORAL NIGHTLY
Qty: 118 ML | Refills: 0 | Status: SHIPPED | OUTPATIENT
Start: 2019-12-05 | End: 2019-12-05 | Stop reason: SDUPTHER

## 2019-12-05 RX ORDER — METHYLPREDNISOLONE 4 MG/1
TABLET ORAL
Qty: 1 PACKAGE | Refills: 0 | Status: SHIPPED | OUTPATIENT
Start: 2019-12-05 | End: 2020-02-07

## 2019-12-05 RX ORDER — BENZONATATE 200 MG/1
200 CAPSULE ORAL 3 TIMES DAILY PRN
Qty: 30 CAPSULE | Refills: 0 | Status: SHIPPED | OUTPATIENT
Start: 2019-12-05 | End: 2019-12-15

## 2019-12-05 NOTE — PROGRESS NOTES
"  Subjective:      Patient ID: Kay Rosas is a 74 y.o. female.    Chief Complaint: Sinusitis    Sinus Problem   This is a new problem. Episode onset: 3 DAYS. The problem has been gradually worsening since onset. There has been no fever. Associated symptoms include chills, congestion, coughing, sinus pressure and a sore throat. Pertinent negatives include no diaphoresis, ear pain, headaches, hoarse voice, neck pain, shortness of breath, sneezing or swollen glands. Treatments tried: flonase. The treatment provided no relief.       Review of Systems   Constitutional: Positive for activity change, appetite change, chills and fatigue. Negative for diaphoresis, fever and unexpected weight change.   HENT: Positive for congestion, postnasal drip, rhinorrhea, sinus pressure and sore throat. Negative for dental problem, drooling, ear discharge, ear pain, facial swelling, hearing loss, hoarse voice, mouth sores, nosebleeds, sneezing and trouble swallowing.    Eyes: Negative for pain, discharge, redness, itching and visual disturbance.   Respiratory: Positive for cough. Negative for chest tightness, shortness of breath and wheezing.    Cardiovascular: Negative for chest pain, palpitations and leg swelling.   Gastrointestinal: Negative for abdominal pain, constipation, diarrhea, nausea and vomiting.   Endocrine: Negative.    Genitourinary: Negative.  Negative for difficulty urinating.   Musculoskeletal: Positive for myalgias. Negative for neck pain and neck stiffness.   Skin: Negative for rash.   Allergic/Immunologic: Negative for environmental allergies, food allergies and immunocompromised state.   Neurological: Negative for dizziness, weakness and headaches.     Objective:   /70 (BP Location: Left arm, Patient Position: Sitting, BP Method: Large (Manual))   Pulse 70   Temp 97.8 °F (36.6 °C) (Tympanic)   Ht 5' 6" (1.676 m)   Wt 112.7 kg (248 lb 7.3 oz)   SpO2 (!) 93%   BMI 40.10 kg/m²     Physical Exam "   Constitutional: She is oriented to person, place, and time. She appears well-developed and well-nourished.   HENT:   Head: Normocephalic and atraumatic.   Right Ear: Hearing, tympanic membrane, external ear and ear canal normal. No tenderness.   Left Ear: Hearing, tympanic membrane, external ear and ear canal normal. No tenderness.   Nose: Mucosal edema and rhinorrhea present. No sinus tenderness. Right sinus exhibits maxillary sinus tenderness and frontal sinus tenderness. Left sinus exhibits maxillary sinus tenderness and frontal sinus tenderness.   Mouth/Throat: Uvula is midline and mucous membranes are normal. No oral lesions. Normal dentition. No dental abscesses, uvula swelling or dental caries. Oropharyngeal exudate and posterior oropharyngeal erythema present. No posterior oropharyngeal edema or tonsillar abscesses. No tonsillar exudate.   Eyes: Pupils are equal, round, and reactive to light. Conjunctivae and EOM are normal. Right eye exhibits no discharge. Left eye exhibits no discharge.   Neck: Normal range of motion. Neck supple.   Cardiovascular: Normal rate, regular rhythm and normal heart sounds. Exam reveals no gallop and no friction rub.   No murmur heard.  Pulmonary/Chest: Effort normal and breath sounds normal. No respiratory distress. She has no wheezes. She has no rales.   Lymphadenopathy:     She has no cervical adenopathy.   Neurological: She is alert and oriented to person, place, and time.   Skin: Skin is warm. No rash noted. No erythema. No pallor.   Psychiatric: She has a normal mood and affect. Her behavior is normal. Judgment and thought content normal.   Nursing note and vitals reviewed.      Assessment:     1. Viral URI with cough    2. Sacroiliitis      Plan:   Viral URI with cough  -     methylPREDNISolone (MEDROL DOSEPACK) 4 mg tablet; use as directed  Dispense: 1 Package; Refill: 0  -     benzonatate (TESSALON) 200 MG capsule; Take 1 capsule (200 mg total) by mouth 3 (three) times  daily as needed for Cough.  Dispense: 30 capsule; Refill: 0  -     promethazine-dextromethorphan (PROMETHAZINE-DM) 6.25-15 mg/5 mL Syrp; Take 5 mLs by mouth every evening. for 5 days  Dispense: 118 mL; Refill: 0    Sacroiliitis  -     tiZANidine (ZANAFLEX) 4 MG tablet; Take 1 tablet (4 mg total) by mouth daily as needed (pain).  Dispense: 20 tablet; Refill: 0  -     methylPREDNISolone (MEDROL DOSEPACK) 4 mg tablet; use as directed  Dispense: 1 Package; Refill: 0    -start coricidin HBP    Follow up if symptoms worsen or fail to improve.

## 2019-12-05 NOTE — PATIENT INSTRUCTIONS
-start coricidin HBP        Sacroiliitis  The sacrum is the triangle-shaped bone at the base of the spine. It is linked to the other pelvis bones by the sacroiliac joints, also called SI joints. Sacroiliitis is when one or both SI joints are hurt or inflamed. It can make small movements of the lower back and pelvis very painful.        This condition has been linked to other diseases. They include ankylosing spondylitis, rheumatoid arthritis, psoriasis, and Crohns disease or colitis. Symptoms may include pain or stiffness in the hips, lower back, thighs, or buttocks. Pain occurs most often in the morning or after sitting for long periods of time. The pain may get worse when you walk. The swinging motion of the hips strains the SI joints.  Sacroiliitis is caused by many factors such as:  · Heavy lifting, especially if not done the right way  · Severe injury, such as a fall or car accident  · Osteoarthritis  · Pregnancy  · Infection of the joint  This condition is hard to diagnose. It may be confused with other causes of low back pain. To confirm the diagnosis, you may be given a shot of numbing medicine in the SI joint. Treatment includes rest, physical therapy, and anti-inflammatory medicines. If another health problem is the cause, then that must also be treated. More testing may be needed if your symptoms dont get better.  Home care  · If your healthcare provider has prescribed medicines, take all of them as directed.  · You may use over-the-counter pain medicine to control pain, unless another medicine was prescribed. If prednisone was prescribed, dont use NSAIDs, or nonsteroidal anti-inflammatory drugs, such as ibuprofen or naproxen. Talk with your provider before using this medicine if you have chronic liver or kidney disease or ever had a stomach ulcer or GI bleeding.  · If you were referred to physical therapy, make an appointment. Be sure to do any prescribed exercises.  · Dont smoke. Smoking reduces  blood flow to the inflamed area. This makes it harder to treat.  Follow-up care  Follow up with your healthcare provider, or as advised.  If you had an X-ray or an MRI, you will be notified of any new findings that may affect your care.  When to seek medical advice  Contact your healthcare provider right away if any of these occur:  · Increasing low back pain  · Inflammation of the eyes  · Skin rash or redness  · Weakness or numbness in one or both legs  · Loss of bowel or bladder control  · Numbness in the groin area  Date Last Reviewed: 11/24/2015 © 2000-2017 [employer]. [address] [place] [address]. All rights reserved. This information is not intended as a substitute for professional medical care. Always follow your healthcare professional's instructions.        Viral Upper Respiratory Illness (Adult)  You have a viral upper respiratory illness (URI), which is another term for the common cold. This illness is contagious during the first few days. It is spread through the air by coughing and sneezing. It may also be spread by direct contact (touching the sick person and then touching your own eyes, nose, or mouth). Frequent handwashing will decrease risk of spread. Most viral illnesses go away within 7 to 10 days with rest and simple home remedies. Sometimes the illness may last for several weeks. Antibiotics will not kill a virus, and they are generally not prescribed for this condition.    Home care  · If symptoms are severe, rest at home for the first 2 to 3 days. When you resume activity, don't let yourself get too tired.  · Avoid being exposed to cigarette smoke (yours or others).  · You may use acetaminophen or ibuprofen to control pain and fever, unless another medicine was prescribed. (Note: If you have chronic liver or kidney disease, have ever had a stomach ulcer or gastrointestinal bleeding, or are taking blood-thinning medicines, talk with your healthcare provider before  using these medicines.) Aspirin should never be given to anyone under 18 years of age who is ill with a viral infection or fever. It may cause severe liver or brain damage.  · Your appetite may be poor, so a light diet is fine. Avoid dehydration by drinking 6 to 8 glasses of fluids per day (water, soft drinks, juices, tea, or soup). Extra fluids will help loosen secretions in the nose and lungs.  · Over-the-counter cold medicines will not shorten the length of time youre sick, but they may be helpful for the following symptoms: cough, sore throat, and nasal and sinus congestion. (Note: Do not use decongestants if you have high blood pressure.)  Follow-up care  Follow up with your healthcare provider, or as advised.  When to seek medical advice  Call your healthcare provider right away if any of these occur:  · Cough with lots of colored sputum (mucus)  · Severe headache; face, neck, or ear pain  · Difficulty swallowing due to throat pain  · Fever of 100.4°F (38°C)  Call 911, or get immediate medical care  Call emergency services right away if any of these occur:  · Chest pain, shortness of breath, wheezing, or difficulty breathing  · Coughing up blood  · Inability to swallow due to throat pain  Date Last Reviewed: 9/13/2015 © 2000-2017 [employer]. [address] [place] [address]. All rights reserved. This information is not intended as a substitute for professional medical care. Always follow your healthcare professional's instructions.        Diabetes: Activity Tips    Being more active can help you manage your diabetes. The tips on this sheet can help you get the most from your exercise. They can also help you stay safe.  Staying Active  Its important for adults to spend less time sitting and being inactive. This is especially true if you have type 2 diabetes. When you are sitting for long periods of time, get up for short sessions of light activity every 30 minutes.  You should aim for  at least 150 minutes a week of exercise or physical activity. Dont let more than 2 days go by without being active.  Benefit from briskness  Brisk activity gets your heart beating faster. This can help you increase your fitness, lose extra weight, and manage your blood sugar level. Try brisk walking. Or, if you have foot or leg problems, you can try swimming or bike riding. You can break up your exercise into chunks throughout the day. Work up to at least 30 minutes of steady, brisk exercise on most days.  Warm up and cool down  Warming up and cooling down reduce your risk of injury. They also help limit muscle soreness. Do a mild version of your activity for 5 minutes before and after your routine. You can also learn stretches that will help keep your muscles loose. Your healthcare provider may show you good ways to warm up and stretch.  Do the talk-sing test  The talk-sing test is a simple way to tell how hard youre exercising. If you can talk while exercising, youre in a safe range. If youre out of breath, slow down. If you can carry a tune, its time to  the pace. Walk up a hill. Increase the resistance on your stationary bike. Or swim faster.  What about eating?  You may be told to plan your exercise for 1 to 2 hours after a meal. In most cases, you dont need to eat while being active. If you take insulin or medicine that can cause low blood sugar, test your blood sugar before exercising. And carry a fast-acting sugar that will raise your blood sugar level quickly. This includes glucose tablets or hard candy. Use it if you feel low blood sugar symptoms.  Safety tips  These tips can help you stay safe as you become fit:  · Exercise with a friend or carry a cell phone if you have one.  · Carry or wear identification, such as a necklace or bracelet, that says you have diabetes.  · Use the proper footwear and safety equipment for your activity.  · Drink water before, during, and after exercise.  · Dress  properly for the weather.  · Dont exercise in very hot or very cold weather.  · Dont exercise if you are sick.  · If you are instructed to do so, test your blood sugar before and after you exercise. Have a small carbohydrate snack if your blood sugar is low before you start exercising.   When to stop exercising and call your healthcare provider  Stop exercising and call your healthcare provider right away if you notice any of the following:  · Pain, pressure, tightness, or heaviness in the chest  · Pain or heaviness in the neck, shoulders, back, arms, legs, or feet  · Unusual shortness of breath  · Dizziness or lightheadedness  · Unusually rapid or slow pulse  · Increased joint or muscle pain  · Nausea or vomiting  Date Last Reviewed: 5/1/2016  © 9037-3661 [employer]. [address] [place] [address], [place] [address]. All rights reserved. This information is not intended as a substitute for professional medical care. Always follow your healthcare professional's instructions.

## 2019-12-06 RX ORDER — OXYBUTYNIN CHLORIDE 5 MG/1
TABLET, EXTENDED RELEASE ORAL
Qty: 90 TABLET | Refills: 1 | Status: SHIPPED | OUTPATIENT
Start: 2019-12-06 | End: 2021-03-30

## 2019-12-10 ENCOUNTER — OFFICE VISIT (OUTPATIENT)
Dept: PAIN MEDICINE | Facility: CLINIC | Age: 74
End: 2019-12-10
Payer: MEDICARE

## 2019-12-10 VITALS
HEIGHT: 66 IN | DIASTOLIC BLOOD PRESSURE: 83 MMHG | SYSTOLIC BLOOD PRESSURE: 165 MMHG | BODY MASS INDEX: 40.06 KG/M2 | HEART RATE: 61 BPM | WEIGHT: 249.25 LBS

## 2019-12-10 DIAGNOSIS — M46.1 SACROILIITIS: ICD-10-CM

## 2019-12-10 DIAGNOSIS — M47.816 LUMBAR SPONDYLOSIS: Primary | ICD-10-CM

## 2019-12-10 DIAGNOSIS — M54.16 RIGHT LUMBAR RADICULITIS: ICD-10-CM

## 2019-12-10 DIAGNOSIS — M96.1 POSTLAMINECTOMY SYNDROME OF LUMBAR REGION: ICD-10-CM

## 2019-12-10 PROCEDURE — 1125F PR PAIN SEVERITY QUANTIFIED, PAIN PRESENT: ICD-10-PCS | Mod: S$GLB,,, | Performed by: PHYSICIAN ASSISTANT

## 2019-12-10 PROCEDURE — 99999 PR PBB SHADOW E&M-EST. PATIENT-LVL IV: CPT | Mod: PBBFAC,,, | Performed by: PHYSICIAN ASSISTANT

## 2019-12-10 PROCEDURE — 1101F PR PT FALLS ASSESS DOC 0-1 FALLS W/OUT INJ PAST YR: ICD-10-PCS | Mod: CPTII,S$GLB,, | Performed by: PHYSICIAN ASSISTANT

## 2019-12-10 PROCEDURE — 3079F DIAST BP 80-89 MM HG: CPT | Mod: CPTII,S$GLB,, | Performed by: PHYSICIAN ASSISTANT

## 2019-12-10 PROCEDURE — 1159F PR MEDICATION LIST DOCUMENTED IN MEDICAL RECORD: ICD-10-PCS | Mod: S$GLB,,, | Performed by: PHYSICIAN ASSISTANT

## 2019-12-10 PROCEDURE — 1101F PT FALLS ASSESS-DOCD LE1/YR: CPT | Mod: CPTII,S$GLB,, | Performed by: PHYSICIAN ASSISTANT

## 2019-12-10 PROCEDURE — 99499 RISK ADDL DX/OHS AUDIT: ICD-10-PCS | Mod: S$GLB,,, | Performed by: PHYSICIAN ASSISTANT

## 2019-12-10 PROCEDURE — 99499 UNLISTED E&M SERVICE: CPT | Mod: S$GLB,,, | Performed by: PHYSICIAN ASSISTANT

## 2019-12-10 PROCEDURE — 1125F AMNT PAIN NOTED PAIN PRSNT: CPT | Mod: S$GLB,,, | Performed by: PHYSICIAN ASSISTANT

## 2019-12-10 PROCEDURE — 99999 PR PBB SHADOW E&M-EST. PATIENT-LVL IV: ICD-10-PCS | Mod: PBBFAC,,, | Performed by: PHYSICIAN ASSISTANT

## 2019-12-10 PROCEDURE — 3079F PR MOST RECENT DIASTOLIC BLOOD PRESSURE 80-89 MM HG: ICD-10-PCS | Mod: CPTII,S$GLB,, | Performed by: PHYSICIAN ASSISTANT

## 2019-12-10 PROCEDURE — 99214 PR OFFICE/OUTPT VISIT, EST, LEVL IV, 30-39 MIN: ICD-10-PCS | Mod: S$GLB,,, | Performed by: PHYSICIAN ASSISTANT

## 2019-12-10 PROCEDURE — 3077F SYST BP >= 140 MM HG: CPT | Mod: CPTII,S$GLB,, | Performed by: PHYSICIAN ASSISTANT

## 2019-12-10 PROCEDURE — 1159F MED LIST DOCD IN RCRD: CPT | Mod: S$GLB,,, | Performed by: PHYSICIAN ASSISTANT

## 2019-12-10 PROCEDURE — 3077F PR MOST RECENT SYSTOLIC BLOOD PRESSURE >= 140 MM HG: ICD-10-PCS | Mod: CPTII,S$GLB,, | Performed by: PHYSICIAN ASSISTANT

## 2019-12-10 PROCEDURE — 99214 OFFICE O/P EST MOD 30 MIN: CPT | Mod: S$GLB,,, | Performed by: PHYSICIAN ASSISTANT

## 2019-12-11 ENCOUNTER — DOCUMENTATION ONLY (OUTPATIENT)
Dept: REHABILITATION | Facility: HOSPITAL | Age: 74
End: 2019-12-11

## 2019-12-11 NOTE — PROGRESS NOTES
Chief Complaint:   Chief Complaint   Patient presents with    Follow-up   back pain     SUBJECTIVE:    Interval History (12/11/2019): Patient was seen on 11/11/19. At that time she underwent bilateral SIJ + GT bursa injection with RN IV sedation.  The patient reports that she is/was better following the procedure.  she reports 100% pain relief.  The changes lasted 4 weeks so far.  The changes have continued through this visit.  She does report some pain with walking, but it is still improved from previous.     Interval History (10/28/2019): Patient was last seen on 9/13/2019. At that visit, the plan was to start physical therapy. She completed with limited relief.     Initial HPI (9/3/19):   Kay Rosas is a 74 y.o. female who presents to the clinic for the evaluation of low back and knee pain. The pain started 6 years ago following a lumbar spine surgery (fusion at L4-5 in 2013) and symptoms have been worsening.The pain is located in the low back area and radiates to the right leg.  The pain is described as aching and stabbing and is rated as 3/10. The pain is rated with a score of  2/10 on the BEST day and a score of 9/10 on the WORST day.  Symptoms interfere with daily activity. The pain is exacerbated by Walking.  The pain is mitigated by laying down. The patient reports spending 2-4 hours per day reclining. The patient reports 5-7 hours of uninterrupted sleep per night.    Patient denies night fever/night sweats, urinary incontinence, bowel incontinence, significant weight loss and loss of sensations. She does feel that her right leg could give out at times and uses a walker for longer distances while ambulating    Non-Pharmacologic Treatments:  Physical Therapy/Home Exercise: no formal PT  Ice/Heat:no  TENS: no  Acupuncture: no  Massage: no  Chiropractic: no    Other: no      Pain Medications:    - Opioids: Ultram (Tramadol HCL)  - Adjuvant Medications: Neurontin (Gabapentin), Zanaflex ( Tizanidine) and  Tylenol     report:  Reviewed and consistent with medication use as prescribed.    Pain Procedures:   - Lumbar EVY years ago prior to surgery   - bilateral SIJ + GT bursa injection with RN IV sedation on 11/11/19 with 100% pain relief    Imaging (reviewed on 12/11/2019):     8/26/19 lumbar x-ray  TECHNIQUE:  AP, lateral, spot and bilateral oblique views of the lumbar spine were performed.  COMPARISON:  None  FINDINGS:  The vertebral bodies demonstrate a normal height.  There is grade 1 spondylolisthesis of L4 on L5.  Pedicle screws and fixation rods noted bilaterally at the L4-5 level with an intradiscal spacer also present at this level.  There is moderate to severe disc space narrowing and spondylosis present at the L5-S1 level.  More mild disc space narrowing and spondylosis noted at the L1-2 through the L3-4 levels.  Vascular calcifications are noted.  Prominent facet arthropathy multilevel bilateral facet arthropathy noted..           Past Medical History:   Diagnosis Date    Arthritis     Degenerative disc disease, cervical     Diabetes mellitus, type 2     Emphysema of lung     Hyperlipidemia     Hypertension     MVP (mitral valve prolapse)     Osteoporosis     feet    Thyroid condition      Past Surgical History:   Procedure Laterality Date    BACK SURGERY  2012    cataract surgery  Bilateral 10/ 2012    elbow spur removed Left     HYSTERECTOMY Left 1978    INJECTION OF ANESTHETIC AGENT INTO SACROILIAC JOINT Bilateral 11/11/2019    Procedure: Bilateral GT bursa + SIJ injection;  Surgeon: Noe Pleitez MD;  Location: [facility];  Service: Pain Management;  Laterality: Bilateral;    INJECTION OF JOINT Bilateral 11/11/2019    Procedure: Bilateral GT bursa + SIJ injection;  Surgeon: Noe Pleitez MD;  Location: [facility];  Service: Pain Management;  Laterality: Bilateral;    OOPHORECTOMY      OVARIAN CYST REMOVAL      two from back, one from axilla    VASCULAR SURGERY Right      high ligation due to blood clot      Review of patient's allergies indicates:   Allergen Reactions    Nsaids (non-steroidal anti-inflammatory drug)      CKD       Current Outpatient Medications   Medication Sig    albuterol 90 mcg/actuation inhaler Inhale 2 puffs into the lungs every 6 (six) hours as needed.    ARIPiprazole (ABILIFY) 2 MG Tab TAKE 1 TABLET (2 MG TOTAL) BY MOUTH ONCE DAILY.    atorvastatin (LIPITOR) 20 MG tablet TAKE 1 TABLET (20 MG TOTAL) BY MOUTH ONCE DAILY.    benzonatate (TESSALON) 200 MG capsule Take 1 capsule (200 mg total) by mouth 3 (three) times daily as needed for Cough.    gabapentin (NEURONTIN) 300 MG capsule Take 2 capsules (600 mg total) by mouth 3 (three) times daily.    gabapentin (NEURONTIN) 300 MG capsule TAKE 1 CAPSULE (300 MG TOTAL) BY MOUTH 3 (THREE) TIMES DAILY.    levocetirizine (XYZAL) 5 MG tablet Take 1 tablet (5 mg total) by mouth every evening.    levothyroxine (SYNTHROID) 50 MCG tablet TAKE 1 TABLET (50 MCG TOTAL) BY MOUTH ONCE DAILY.    losartan-hydrochlorothiazide 100-25 mg (HYZAAR) 100-25 mg per tablet TAKE 1 TABLET BY MOUTH ONCE DAILY.    metFORMIN (GLUCOPHAGE) 1000 MG tablet TAKE 1 TABLET BY MOUTH TWICE A DAY (Patient taking differently: TAKE 0.5 TABLET BY MOUTH TWICE A DAY)    methylPREDNISolone (MEDROL DOSEPACK) 4 mg tablet use as directed    oxybutynin (DITROPAN-XL) 5 MG TR24 TAKE 1 TABLET BY MOUTH EVERY DAY    pantoprazole (PROTONIX) 40 MG tablet TAKE 1 TABLET BY MOUTH EVERY DAY    sertraline (ZOLOFT) 100 MG tablet TAKE 1 TABLET (100 MG TOTAL) BY MOUTH ONCE DAILY.    sertraline (ZOLOFT) 50 MG tablet TAKE 1 TABLET BY MOUTH EVERY DAY    tiZANidine (ZANAFLEX) 4 MG tablet Take 1 tablet (4 mg total) by mouth daily as needed (pain).    traMADol (ULTRAM) 50 mg tablet Take 1 tablet (50 mg total) by mouth every 6 (six) hours as needed for Pain.    traZODone (DESYREL) 100 MG tablet TAKE 1 TABLET (100 MG TOTAL) BY MOUTH EVERY EVENING.    azelastine  "(ASTELIN) 137 mcg (0.1 %) nasal spray 2 sprays (274 mcg total) by Nasal route 2 (two) times daily.     No current facility-administered medications for this visit.            ROS:  GENERAL:  No weight loss, malaise or fevers.  HEENT:   No recent changes in vision or hearing  NECK:  Negative for lumps, no difficulty with swallowing.  RESPIRATORY:  Negative for cough, wheezing or shortness of breath, patient denies any recent URI.  CARDIOVASCULAR:  Negative for chest pain, leg swelling or palpitations.  GI:  Negative for abdominal discomfort, blood in stools or black stools or change in bowel habits.  MUSCULOSKELETAL:  See HPI.  SKIN:  Negative for lesions, rash, and itching.  PSYCH:  No mood disorder or recent psychosocial stressors.  Patients sleep is not disturbed secondary to pain.  HEMATOLOGY/LYMPHOLOGY:  Negative for prolonged bleeding, bruising easily or swollen nodes.  Patient is not currently taking any anti-coagulants  NEURO:   No history of headaches, syncope, paralysis, seizures or tremors.  All other reviewed and negative other than HPI.    OBJECTIVE:    Vitals:  BP (!) 165/83   Pulse 61   Ht 5' 6" (1.676 m)   Wt 113 kg (249 lb 3.7 oz)   BMI 40.23 kg/m²   (reviewed on 12/11/2019)    General: alert and oriented, in no apparent distress.  Gait: normal gait.  Skin: no rashes, no discoloration, no obvious lesions  HEENT: normocephalic, atraumatic. Pupils equal and round.  Cardiovascular: no significant peripheral edema present.  Respiratory: without use of accessory muscles of respiration.    Musculoskeletal - Lumbar Spine:  - Pain on flexion of lumbar spine: Absent   - Pain on extension of lumbar spine: Absent        - Lumbar facet loading: Absent   - TTP over the lumbar facet joints: Absent  - TTP over the lumbar paraspinals: Present, less so than over SIJ  - TTP over the SI joints: Present bilaterally  - TTP over GT bursa: Present bilaterally, L>R  - Straight Leg Raise: Negative  - GUEVARA: " Negative    Neuro - Lower Extremities:  - RLE Strength:     >> 5/5 strength with right hip flexion/ extension    >> 5/5 strength with right knee flexion/ extension    >> 5/5 strength in right ankle with plantar and dorsiflexion  - LLE Strength:     >> 5/5 strength with left hip flexion/ extension    >> 5/5 strength with knee flexion extension on the left     >> 5/5 strength in left ankle with plantar and dorsiflexion  - Extremity Reflexes: Brisk and symmetric throughout  - Sensory: Sensation to light touch intact bilaterally      Psych:  Mood and affect is appropriate           ASSESSMENT: 74 y.o. year old female with low back pain, consistent with     1. Lumbar spondylosis     2. Postlaminectomy syndrome of lumbar region     3. Right lumbar radiculitis     4. Sacroiliitis         PLAN:   - Anticoagulation use: no    1. Interventional: S/p bilateral SIJ + GT bursa injection with RN IV sedation on 11/11/19 with 100% pain relief.     2. Pharmacologic: Continue gabapentin 600mg TID.     3. Rehabilitative: Encouraged regular exercise.  She completed physical therapy with limited relief     4. Diagnostic: None for now.    5. Follow up: PRN     - I discussed the risks, benefits, and alternatives to potential treatment options. All questions and concerns were fully addressed today in clinic. Dr. Pleitez was consulted regarding the patient plan and agrees.

## 2019-12-11 NOTE — PROGRESS NOTES
Outpatient Therapy Discharge Summary     Name: Kay Rosas  [facility] Number: [id number]    Therapy Diagnosis:   Encounter Diagnoses   Name Primary?    Chronic bilateral low back pain with right-sided sciatica      Abnormality of gait Yes    Lumbar radiculopathy      Lumbar spondylosis        Physician: Noe Pleitez MD     Physician Orders: PT Eval and Treat  Medical Diagnosis from Referral: lumbar spondylosis, right lumbar radiculopathy   Evaluation Date: 10/2/2019        Date of Last visit: 10/10/2019  Total Visits Received: 3  Cancelled Visits: 0  No Show Visits: 0    Assessment      Patient did not return for additional visits and is considered DC at this time.    GOALS:     Short Term Goals:  2 weeks     1. Pain: Pt will demonstrate improved pain by reports of less than or equal to 6/10 worst pain on the verbal rating scale in order to progress toward maximal functional ability and improve QOL.     2. Function: Patient will demonstrate improved function as indicated by a score of less than or equal to 40% on the Modified Oswestry Low Back Pain Questionnaire     3. Mobility: Patient will improve AROM to 50% of stated goals, listed in objective measures above, in order to progress towards independence with functional activities.      4. Strength: Patient will improve strength to 50% of stated goals, listed in objective measures above, in order to progress towards independence with functional activities.      5. Gait: Patient will demonstrate improved gait mechanics in order to improve functional mobility, improve quality of life, and decrease risk of further injury or fall.      6. HEP: Patient will demonstrate independence with HEP in order to progress toward functional independence.     7. Improve postural awareness.         Long Term Goals:  4 weeks     1. Pain: Pt will demonstrate improved pain by reports of less than or equal to 4/10 worst pain on the verbal rating scale in order to progress  toward maximal functional ability and improve QOL.       2. Function: Patient will demonstrate improved function as indicated by a score of less than or equal to 25% on the Modified Oswestry Low Back Pain Questionnaire     3. Mobility: Patient will improve AROM to stated goals, listed in objective measures above, in order to return to maximal functional potential and improve quality of life.     4. Strength: Patient will improve strength to stated goals, listed in objective measures above, in order to improve functional independence and quality of life.     5. Gait: Patient will demonstrate normalized gait mechanics with minimal compensation in order to return to PLOF.     6. Patient will return to normal ADL's, IADL's, community involvement, recreational activities, and work-related activities with less than or equal to 2/10 pain and maximal function.           Discharge reason: Patient has not attended therapy since 10/10/2019.    Plan   This patient is discharged from Physical Therapy

## 2019-12-23 RX ORDER — TRAZODONE HYDROCHLORIDE 100 MG/1
TABLET ORAL
Qty: 90 TABLET | Refills: 2 | OUTPATIENT
Start: 2019-12-23

## 2020-01-02 RX ORDER — TIZANIDINE 4 MG/1
TABLET ORAL
Qty: 20 TABLET | Refills: 0 | OUTPATIENT
Start: 2020-01-02

## 2020-01-08 RX ORDER — TIZANIDINE 4 MG/1
TABLET ORAL
Qty: 20 TABLET | Refills: 0 | OUTPATIENT
Start: 2020-01-08

## 2020-01-20 RX ORDER — TIZANIDINE 4 MG/1
TABLET ORAL
Qty: 20 TABLET | Refills: 0 | Status: SHIPPED | OUTPATIENT
Start: 2020-01-20 | End: 2020-03-02

## 2020-01-22 RX ORDER — ALBUTEROL SULFATE 90 UG/1
2 AEROSOL, METERED RESPIRATORY (INHALATION) EVERY 6 HOURS PRN
Qty: 18 G | Refills: 4 | Status: SHIPPED | OUTPATIENT
Start: 2020-01-22 | End: 2021-05-07

## 2020-02-07 ENCOUNTER — TELEPHONE (OUTPATIENT)
Dept: URGENT CARE | Facility: CLINIC | Age: 75
End: 2020-02-07

## 2020-02-07 ENCOUNTER — HOSPITAL ENCOUNTER (OUTPATIENT)
Dept: RADIOLOGY | Facility: HOSPITAL | Age: 75
Discharge: HOME | End: 2020-02-07
Attending: NURSE PRACTITIONER
Payer: MEDICARE

## 2020-02-07 ENCOUNTER — OFFICE VISIT (OUTPATIENT)
Dept: INTERNAL MEDICINE | Facility: CLINIC | Age: 75
End: 2020-02-07
Payer: MEDICARE

## 2020-02-07 VITALS
SYSTOLIC BLOOD PRESSURE: 144 MMHG | HEART RATE: 74 BPM | BODY MASS INDEX: 39.08 KG/M2 | WEIGHT: 243.19 LBS | HEIGHT: 66 IN | TEMPERATURE: 98 F | OXYGEN SATURATION: 91 % | DIASTOLIC BLOOD PRESSURE: 70 MMHG

## 2020-02-07 DIAGNOSIS — I82.90 THROMBOSIS: ICD-10-CM

## 2020-02-07 DIAGNOSIS — M79.89 PAIN AND SWELLING OF LOWER LEG, LEFT: ICD-10-CM

## 2020-02-07 DIAGNOSIS — M79.662 PAIN AND SWELLING OF LOWER LEG, LEFT: Primary | ICD-10-CM

## 2020-02-07 DIAGNOSIS — M79.662 PAIN AND SWELLING OF LOWER LEG, LEFT: ICD-10-CM

## 2020-02-07 DIAGNOSIS — M79.89 PAIN AND SWELLING OF LOWER LEG, LEFT: Primary | ICD-10-CM

## 2020-02-07 PROCEDURE — 99213 PR OFFICE/OUTPT VISIT, EST, LEVL III, 20-29 MIN: ICD-10-PCS | Mod: S$GLB,,, | Performed by: NURSE PRACTITIONER

## 2020-02-07 PROCEDURE — 93971 EXTREMITY STUDY: CPT | Mod: TC,LT

## 2020-02-07 PROCEDURE — 1159F PR MEDICATION LIST DOCUMENTED IN MEDICAL RECORD: ICD-10-PCS | Mod: S$GLB,,, | Performed by: NURSE PRACTITIONER

## 2020-02-07 PROCEDURE — 99999 PR PBB SHADOW E&M-EST. PATIENT-LVL V: ICD-10-PCS | Mod: PBBFAC,,, | Performed by: NURSE PRACTITIONER

## 2020-02-07 PROCEDURE — 99999 PR PBB SHADOW E&M-EST. PATIENT-LVL V: CPT | Mod: PBBFAC,,, | Performed by: NURSE PRACTITIONER

## 2020-02-07 PROCEDURE — 3077F PR MOST RECENT SYSTOLIC BLOOD PRESSURE >= 140 MM HG: ICD-10-PCS | Mod: CPTII,S$GLB,, | Performed by: NURSE PRACTITIONER

## 2020-02-07 PROCEDURE — 1101F PR PT FALLS ASSESS DOC 0-1 FALLS W/OUT INJ PAST YR: ICD-10-PCS | Mod: CPTII,S$GLB,, | Performed by: NURSE PRACTITIONER

## 2020-02-07 PROCEDURE — 1159F MED LIST DOCD IN RCRD: CPT | Mod: S$GLB,,, | Performed by: NURSE PRACTITIONER

## 2020-02-07 PROCEDURE — 99213 OFFICE O/P EST LOW 20 MIN: CPT | Mod: S$GLB,,, | Performed by: NURSE PRACTITIONER

## 2020-02-07 PROCEDURE — 1101F PT FALLS ASSESS-DOCD LE1/YR: CPT | Mod: CPTII,S$GLB,, | Performed by: NURSE PRACTITIONER

## 2020-02-07 PROCEDURE — 3078F PR MOST RECENT DIASTOLIC BLOOD PRESSURE < 80 MM HG: ICD-10-PCS | Mod: CPTII,S$GLB,, | Performed by: NURSE PRACTITIONER

## 2020-02-07 PROCEDURE — 93971 EXTREMITY STUDY: CPT | Mod: 26,LT,, | Performed by: RADIOLOGY

## 2020-02-07 PROCEDURE — 1125F PR PAIN SEVERITY QUANTIFIED, PAIN PRESENT: ICD-10-PCS | Mod: S$GLB,,, | Performed by: NURSE PRACTITIONER

## 2020-02-07 PROCEDURE — 93971 US LOWER EXTREMITY VEINS LEFT: ICD-10-PCS | Mod: 26,LT,, | Performed by: RADIOLOGY

## 2020-02-07 PROCEDURE — 3078F DIAST BP <80 MM HG: CPT | Mod: CPTII,S$GLB,, | Performed by: NURSE PRACTITIONER

## 2020-02-07 PROCEDURE — 3077F SYST BP >= 140 MM HG: CPT | Mod: CPTII,S$GLB,, | Performed by: NURSE PRACTITIONER

## 2020-02-07 PROCEDURE — 1125F AMNT PAIN NOTED PAIN PRSNT: CPT | Mod: S$GLB,,, | Performed by: NURSE PRACTITIONER

## 2020-02-07 NOTE — PROGRESS NOTES
Subjective:       Patient ID: Kay Rosas is a 74 y.o. female.    Chief Complaint: Leg Pain (left calf x 3 days)    Patient presents with left lower leg pain and swelling that started about 3 days ago.  Denies injury or trauma.  Taking Tylenol at night.      Review of Systems   Constitutional: Negative for chills and fatigue.   Respiratory: Negative for cough and shortness of breath.    Cardiovascular: Negative for chest pain.   Musculoskeletal: Positive for arthralgias and joint swelling.   Skin: Negative for color change and rash.   Psychiatric/Behavioral: Negative for agitation and confusion.       Objective:      Physical Exam   Constitutional: She appears well-developed and well-nourished.   HENT:   Head: Normocephalic and atraumatic.   Neck: Normal range of motion.   Musculoskeletal:        Legs:      Assessment:       1. Pain and swelling of lower leg, left        Plan:         Pain and swelling of lower leg, left  -     US Lower Extremity Veins Left; Future; Expected date: 02/07/2020        Will order and schedule venous ultrasound.   Will inform of reports.

## 2020-02-07 NOTE — TELEPHONE ENCOUNTER
Hi, this pt was seen and had a ultrasound completed which showed a blood clot in her lower left leg and the provider placed her on an anti-coagulant called eliquis 5mg . She also would like to get her in to [facility] at least by next week but, the schedule only showed a opening starting on 02/18/20. If there is any way that you can get her in before then please contact her. Thanks //elise

## 2020-02-07 NOTE — TELEPHONE ENCOUNTER
There is a clot in her great saphenous vein which is a superficial vein mid way her calf per the radiology tech . Please advise. Thanks //kah

## 2020-02-11 ENCOUNTER — OFFICE VISIT (OUTPATIENT)
Dept: HEMATOLOGY/ONCOLOGY | Facility: CLINIC | Age: 75
End: 2020-02-11
Payer: MEDICARE

## 2020-02-11 ENCOUNTER — LAB VISIT (OUTPATIENT)
Dept: LAB | Facility: HOSPITAL | Age: 75
End: 2020-02-11
Attending: INTERNAL MEDICINE
Payer: MEDICARE

## 2020-02-11 VITALS
TEMPERATURE: 98 F | SYSTOLIC BLOOD PRESSURE: 153 MMHG | WEIGHT: 240.31 LBS | HEART RATE: 75 BPM | HEIGHT: 66 IN | BODY MASS INDEX: 38.62 KG/M2 | OXYGEN SATURATION: 95 % | DIASTOLIC BLOOD PRESSURE: 76 MMHG

## 2020-02-11 DIAGNOSIS — I73.9 PAD (PERIPHERAL ARTERY DISEASE): ICD-10-CM

## 2020-02-11 DIAGNOSIS — D49.1 NEOPLASM OF LUNG: ICD-10-CM

## 2020-02-11 DIAGNOSIS — R79.9 ABNORMAL FINDING OF BLOOD CHEMISTRY, UNSPECIFIED: ICD-10-CM

## 2020-02-11 DIAGNOSIS — Z12.9 SCREENING FOR CANCER: ICD-10-CM

## 2020-02-11 DIAGNOSIS — R79.89 OTHER SPECIFIED ABNORMAL FINDINGS OF BLOOD CHEMISTRY: ICD-10-CM

## 2020-02-11 DIAGNOSIS — I82.90 THROMBOSIS: ICD-10-CM

## 2020-02-11 DIAGNOSIS — H61.23 BILATERAL IMPACTED CERUMEN: ICD-10-CM

## 2020-02-11 DIAGNOSIS — R94.5 ABNORMAL RESULTS OF LIVER FUNCTION STUDIES: ICD-10-CM

## 2020-02-11 DIAGNOSIS — I82.90 THROMBOSIS: Primary | ICD-10-CM

## 2020-02-11 DIAGNOSIS — Z11.4 ENCOUNTER FOR SCREENING FOR HUMAN IMMUNODEFICIENCY VIRUS (HIV): ICD-10-CM

## 2020-02-11 DIAGNOSIS — J44.9 COPD, MODERATE: ICD-10-CM

## 2020-02-11 DIAGNOSIS — Z72.89 OTHER PROBLEMS RELATED TO LIFESTYLE: ICD-10-CM

## 2020-02-11 PROCEDURE — 84165 PROTEIN E-PHORESIS SERUM: CPT

## 2020-02-11 PROCEDURE — 1101F PR PT FALLS ASSESS DOC 0-1 FALLS W/OUT INJ PAST YR: ICD-10-PCS | Mod: CPTII,S$GLB,, | Performed by: INTERNAL MEDICINE

## 2020-02-11 PROCEDURE — 86334 IMMUNOFIX E-PHORESIS SERUM: CPT

## 2020-02-11 PROCEDURE — 85300 ANTITHROMBIN III ACTIVITY: CPT

## 2020-02-11 PROCEDURE — 84165 PATHOLOGIST INTERPRETATION SPE: ICD-10-PCS | Mod: 26,,, | Performed by: PATHOLOGY

## 2020-02-11 PROCEDURE — 1159F MED LIST DOCD IN RCRD: CPT | Mod: S$GLB,,, | Performed by: INTERNAL MEDICINE

## 2020-02-11 PROCEDURE — 99499 UNLISTED E&M SERVICE: CPT | Mod: S$GLB,,, | Performed by: INTERNAL MEDICINE

## 2020-02-11 PROCEDURE — 99205 OFFICE O/P NEW HI 60 MIN: CPT | Mod: S$GLB,,, | Performed by: INTERNAL MEDICINE

## 2020-02-11 PROCEDURE — 86334 IMMUNOFIX E-PHORESIS SERUM: CPT | Mod: 26,,, | Performed by: PATHOLOGY

## 2020-02-11 PROCEDURE — 86703 HIV-1/HIV-2 1 RESULT ANTBDY: CPT

## 2020-02-11 PROCEDURE — 83540 ASSAY OF IRON: CPT

## 2020-02-11 PROCEDURE — 85598 HEXAGNAL PHOSPH PLTLT NEUTRL: CPT

## 2020-02-11 PROCEDURE — 99499 RISK ADDL DX/OHS AUDIT: ICD-10-PCS | Mod: S$GLB,,, | Performed by: INTERNAL MEDICINE

## 2020-02-11 PROCEDURE — 83520 IMMUNOASSAY QUANT NOS NONAB: CPT | Mod: 59

## 2020-02-11 PROCEDURE — 1159F PR MEDICATION LIST DOCUMENTED IN MEDICAL RECORD: ICD-10-PCS | Mod: S$GLB,,, | Performed by: INTERNAL MEDICINE

## 2020-02-11 PROCEDURE — 86147 CARDIOLIPIN ANTIBODY EA IG: CPT

## 2020-02-11 PROCEDURE — 3078F DIAST BP <80 MM HG: CPT | Mod: CPTII,S$GLB,, | Performed by: INTERNAL MEDICINE

## 2020-02-11 PROCEDURE — 3077F PR MOST RECENT SYSTOLIC BLOOD PRESSURE >= 140 MM HG: ICD-10-PCS | Mod: CPTII,S$GLB,, | Performed by: INTERNAL MEDICINE

## 2020-02-11 PROCEDURE — 81241 F5 GENE: CPT

## 2020-02-11 PROCEDURE — 84165 PROTEIN E-PHORESIS SERUM: CPT | Mod: 26,,, | Performed by: PATHOLOGY

## 2020-02-11 PROCEDURE — 85613 RUSSELL VIPER VENOM DILUTED: CPT

## 2020-02-11 PROCEDURE — 81270 JAK2 GENE: CPT

## 2020-02-11 PROCEDURE — 86356 MONONUCLEAR CELL ANTIGEN: CPT | Mod: 91

## 2020-02-11 PROCEDURE — 99999 PR PBB SHADOW E&M-EST. PATIENT-LVL III: CPT | Mod: PBBFAC,,, | Performed by: INTERNAL MEDICINE

## 2020-02-11 PROCEDURE — 85303 CLOT INHIBIT PROT C ACTIVITY: CPT

## 2020-02-11 PROCEDURE — 80074 ACUTE HEPATITIS PANEL: CPT

## 2020-02-11 PROCEDURE — 85305 CLOT INHIBIT PROT S TOTAL: CPT

## 2020-02-11 PROCEDURE — 1101F PT FALLS ASSESS-DOCD LE1/YR: CPT | Mod: CPTII,S$GLB,, | Performed by: INTERNAL MEDICINE

## 2020-02-11 PROCEDURE — 99205 PR OFFICE/OUTPT VISIT, NEW, LEVL V, 60-74 MIN: ICD-10-PCS | Mod: S$GLB,,, | Performed by: INTERNAL MEDICINE

## 2020-02-11 PROCEDURE — 1125F PR PAIN SEVERITY QUANTIFIED, PAIN PRESENT: ICD-10-PCS | Mod: S$GLB,,, | Performed by: INTERNAL MEDICINE

## 2020-02-11 PROCEDURE — 3077F SYST BP >= 140 MM HG: CPT | Mod: CPTII,S$GLB,, | Performed by: INTERNAL MEDICINE

## 2020-02-11 PROCEDURE — 99999 PR PBB SHADOW E&M-EST. PATIENT-LVL III: ICD-10-PCS | Mod: PBBFAC,,, | Performed by: INTERNAL MEDICINE

## 2020-02-11 PROCEDURE — 81240 F2 GENE: CPT

## 2020-02-11 PROCEDURE — 3078F PR MOST RECENT DIASTOLIC BLOOD PRESSURE < 80 MM HG: ICD-10-PCS | Mod: CPTII,S$GLB,, | Performed by: INTERNAL MEDICINE

## 2020-02-11 PROCEDURE — 83010 ASSAY OF HAPTOGLOBIN QUANT: CPT

## 2020-02-11 PROCEDURE — 86147 CARDIOLIPIN ANTIBODY EA IG: CPT | Mod: 59

## 2020-02-11 PROCEDURE — 86334 PATHOLOGIST INTERPRETATION IFE: ICD-10-PCS | Mod: 26,,, | Performed by: PATHOLOGY

## 2020-02-11 PROCEDURE — 82728 ASSAY OF FERRITIN: CPT

## 2020-02-11 PROCEDURE — 1125F AMNT PAIN NOTED PAIN PRSNT: CPT | Mod: S$GLB,,, | Performed by: INTERNAL MEDICINE

## 2020-02-11 NOTE — LETTER
February 11, 2020      Mariana Davies NP  [address]  [place] [address]            [facility]  [address] [facility] [address]  Phone: [phone]  Fax: [phone]          Patient: Kay Rosas   MR Number: [id number]   YOB: 1945   Date of Visit: 2/11/2020       Dear Mariana Davies:    Thank you for referring Kay Rosas to me for evaluation. Attached you will find relevant portions of my assessment and plan of care.    If you have questions, please do not hesitate to call me. I look forward to following Kay Rosas along with you.    Sincerely,    Sincere Cartwright MD    Enclosure  CC:  No Recipients    If you would like to receive this communication electronically, please contact [email or web address] or [phone] to request more information on [employer] Link access.    For providers and/or their staff who would like to refer a patient to [facility], please contact us through our one-stop-shop provider referral line, [facility]quier[facility], at [phone].    If you feel you have received this communication in error or would no longer like to receive these types of communications, please e-mail [email or web address]

## 2020-02-11 NOTE — PROGRESS NOTES
Subjective:       Patient ID: Kay Rosas is a 74 y.o. female.    Chief Complaint: Results and Coagulation Disorder    HPI 74-year-old female with thromboembolic disease off left leg saphenous vein below the knee.  Patient reports previous DVT involving right leg a number of years ago she was treated with hospitalization and subcutaneous heparin.  Patient presents for review of treatment options both of these events were unprovoked    Past Medical History:   Diagnosis Date    Arthritis     Degenerative disc disease, cervical     Diabetes mellitus, type 2     Emphysema of lung     Hyperlipidemia     Hypertension     MVP (mitral valve prolapse)     Osteoporosis     feet    Thyroid condition      Family History   Problem Relation Age of Onset    Heart disease Mother     Kidney disease Mother     Arthritis Mother     Breast cancer Mother     Cancer Father     Heart disease Father     Cancer Sister     Ovarian cancer Sister     Alzheimer's disease Sister      Social History     Socioeconomic History    Marital status:      Spouse name: Not on file    Number of children: Not on file    Years of education: Not on file    Highest education level: Not on file   Occupational History    Not on file   Social Needs    Financial resource strain: Not on file    Food insecurity:     Worry: Not on file     Inability: Not on file    Transportation needs:     Medical: Not on file     Non-medical: Not on file   Tobacco Use    Smoking status: Former Smoker     Last attempt to quit: 2015     Years since quittin.6    Smokeless tobacco: Never Used   Substance and Sexual Activity    Alcohol use: No     Alcohol/week: 0.0 standard drinks    Drug use: No    Sexual activity: Not on file   Lifestyle    Physical activity:     Days per week: Not on file     Minutes per session: Not on file    Stress: Not on file   Relationships    Social connections:     Talks on phone: Not on file     Gets  together: Not on file     Attends Zoroastrian service: Not on file     Active member of club or organization: Not on file     Attends meetings of clubs or organizations: Not on file     Relationship status: Not on file   Other Topics Concern    Not on file   Social History Narrative    Not on file     Past Surgical History:   Procedure Laterality Date    BACK SURGERY  2012    cataract surgery  Bilateral 10/ 2012    elbow spur removed Left     HYSTERECTOMY Left 1978    INJECTION OF ANESTHETIC AGENT INTO SACROILIAC JOINT Bilateral 11/11/2019    Procedure: Bilateral GT bursa + SIJ injection;  Surgeon: Noe Pleitez MD;  Location: [facility];  Service: Pain Management;  Laterality: Bilateral;    INJECTION OF JOINT Bilateral 11/11/2019    Procedure: Bilateral GT bursa + SIJ injection;  Surgeon: Noe Pleitez MD;  Location: [facility];  Service: Pain Management;  Laterality: Bilateral;    OOPHORECTOMY      OVARIAN CYST REMOVAL      two from back, one from axilla    VASCULAR SURGERY Right     high ligation due to blood clot        Labs:  Lab Results   Component Value Date    WBC 6.30 10/07/2019    HGB 9.4 (L) 10/07/2019    HCT 33.4 (L) 10/07/2019    MCV 85 10/07/2019     10/07/2019     BMP  Lab Results   Component Value Date     10/07/2019    K 4.1 10/07/2019     10/07/2019    CO2 28 10/07/2019    BUN 51 (H) 10/07/2019    CREATININE 1.7 (H) 10/07/2019    CALCIUM 9.7 10/07/2019    ANIONGAP 9 10/07/2019    ESTGFRAFRICA 34.0 (A) 10/07/2019    EGFRNONAA 29.5 (A) 10/07/2019     Lab Results   Component Value Date    ALT 14 02/18/2019    AST 20 02/18/2019    ALKPHOS 80 02/18/2019    BILITOT 0.3 02/18/2019       No results found for: IRON, TIBC, FERRITIN, SATURATEDIRO  Lab Results   Component Value Date    UXPHXBEN19 874 03/27/2019     No results found for: FOLATE  Lab Results   Component Value Date    TSH 1.845 02/18/2019         Review of Systems   Constitutional: Positive for activity  change, appetite change and fatigue. Negative for chills, diaphoresis, fever and unexpected weight change.   HENT: Negative for congestion, dental problem, drooling, ear discharge, ear pain, facial swelling, hearing loss, mouth sores, nosebleeds, postnasal drip, rhinorrhea, sinus pressure, sneezing, sore throat, tinnitus, trouble swallowing and voice change.    Eyes: Negative for photophobia, pain, discharge, redness, itching and visual disturbance.   Respiratory: Negative for cough, choking, chest tightness, shortness of breath, wheezing and stridor.    Cardiovascular: Negative for chest pain, palpitations and leg swelling.   Gastrointestinal: Negative for abdominal distention, abdominal pain, anal bleeding, blood in stool, constipation, diarrhea, nausea, rectal pain and vomiting.   Endocrine: Negative for cold intolerance, heat intolerance, polydipsia, polyphagia and polyuria.   Genitourinary: Negative for decreased urine volume, difficulty urinating, dyspareunia, dysuria, enuresis, flank pain, frequency, genital sores, hematuria, menstrual problem, pelvic pain, urgency, vaginal bleeding, vaginal discharge and vaginal pain.   Musculoskeletal: Positive for arthralgias and myalgias. Negative for back pain, gait problem, joint swelling, neck pain and neck stiffness.   Skin: Negative for color change, pallor and rash.   Allergic/Immunologic: Negative for environmental allergies, food allergies and immunocompromised state.   Neurological: Positive for weakness. Negative for dizziness, tremors, seizures, syncope, facial asymmetry, speech difficulty, light-headedness, numbness and headaches.   Hematological: Negative for adenopathy. Does not bruise/bleed easily.   Psychiatric/Behavioral: Positive for dysphoric mood. Negative for agitation, behavioral problems, confusion, decreased concentration, hallucinations, self-injury, sleep disturbance and suicidal ideas. The patient is nervous/anxious. The patient is not  hyperactive.        Objective:      Physical Exam   Constitutional: She is oriented to person, place, and time. She appears well-developed and well-nourished. She appears distressed.   HENT:   Head: Normocephalic and atraumatic.   Right Ear: External ear normal.   Left Ear: External ear normal.   Ears:    Nose: Nose normal. Right sinus exhibits no maxillary sinus tenderness and no frontal sinus tenderness. Left sinus exhibits no maxillary sinus tenderness and no frontal sinus tenderness.   Mouth/Throat: Oropharynx is clear and moist. No oropharyngeal exudate.   Eyes: Pupils are equal, round, and reactive to light. Conjunctivae, EOM and lids are normal. Right eye exhibits no discharge. Left eye exhibits no discharge. Right conjunctiva is not injected. Right conjunctiva has no hemorrhage. Left conjunctiva is not injected. Left conjunctiva has no hemorrhage. No scleral icterus.   Neck: Normal range of motion. Neck supple. No JVD present. No tracheal deviation present. No thyromegaly present.   Cardiovascular: Normal rate, regular rhythm and normal heart sounds.   Pulmonary/Chest: Effort normal and breath sounds normal. No stridor. No respiratory distress. She exhibits no tenderness.       Abdominal: Soft. Bowel sounds are normal. She exhibits no distension and no mass. There is no splenomegaly or hepatomegaly. There is no tenderness. There is no rebound.   Musculoskeletal: Normal range of motion. She exhibits tenderness. She exhibits no edema.   Tenderness over the medial aspect of left leg no alopecia swelling no evidence of erythema   Lymphadenopathy:     She has no cervical adenopathy.     She has no axillary adenopathy.        Right: No supraclavicular adenopathy present.        Left: No supraclavicular adenopathy present.   Neurological: She is alert and oriented to person, place, and time. No cranial nerve deficit. Coordination normal.   Skin: Skin is dry. No rash noted. She is not diaphoretic. No erythema.    Psychiatric: She has a normal mood and affect. Her behavior is normal. Judgment and thought content normal.   Vitals reviewed.          Assessment:      1. Thrombosis    2. Abnormal finding of blood chemistry, unspecified     3. Encounter for screening for human immunodeficiency virus (HIV)     4. Other problems related to lifestyle     5. Abnormal results of liver function studies     6. Other specified abnormal findings of blood chemistry     7. Screening for cancer    8. Neoplasm of lung    9. COPD, moderate    10. PAD (peripheral artery disease)           Plan:     Recurrent thromboembolic disease.  At this particular point will proceed with laboratory evaluation for hypercoagulable state.  Patient has distant history of smoking would recommend CT chest without contrast because of renal insufficiency would recommend abdominal ultrasound.  Discussed implications with her answered questions will see back in 1 month communicate results through electronic patient portal with follow-up with me        Sincere Cartwright Jr, MD FACP

## 2020-02-12 LAB
ALBUMIN SERPL ELPH-MCNC: 4.08 G/DL (ref 3.35–5.55)
ALPHA1 GLOB SERPL ELPH-MCNC: 0.35 G/DL (ref 0.17–0.41)
ALPHA2 GLOB SERPL ELPH-MCNC: 0.79 G/DL (ref 0.43–0.99)
B-GLOBULIN SERPL ELPH-MCNC: 0.87 G/DL (ref 0.5–1.1)
CARDIOLIPIN IGG SER IA-ACNC: <9.4 GPL (ref 0–14.99)
CARDIOLIPIN IGM SER IA-ACNC: 125.2 MPL (ref 0–12.49)
FERRITIN SERPL-MCNC: 47 NG/ML (ref 20–300)
GAMMA GLOB SERPL ELPH-MCNC: 1.11 G/DL (ref 0.67–1.58)
HAV IGM SERPL QL IA: NEGATIVE
HBV CORE IGM SERPL QL IA: NEGATIVE
HBV SURFACE AG SERPL QL IA: NEGATIVE
HCV AB SERPL QL IA: NEGATIVE
HIV 1+2 AB+HIV1 P24 AG SERPL QL IA: NEGATIVE
IRON SERPL-MCNC: 48 UG/DL (ref 30–160)
KAPPA LC SER QL IA: 4.7 MG/DL (ref 0.33–1.94)
KAPPA LC/LAMBDA SER IA: 0.9 (ref 0.26–1.65)
LAMBDA LC SER QL IA: 5.23 MG/DL (ref 0.57–2.63)
PROT SERPL-MCNC: 7.2 G/DL (ref 6–8.4)
SATURATED IRON: 10 % (ref 20–50)
TOTAL IRON BINDING CAPACITY: 460 UG/DL (ref 250–450)
TRANSFERRIN SERPL-MCNC: 311 MG/DL (ref 200–375)

## 2020-02-12 RX ORDER — TIZANIDINE 4 MG/1
TABLET ORAL
Qty: 20 TABLET | Refills: 0 | OUTPATIENT
Start: 2020-02-12

## 2020-02-13 ENCOUNTER — OFFICE VISIT (OUTPATIENT)
Dept: INTERNAL MEDICINE | Facility: CLINIC | Age: 75
End: 2020-02-13
Payer: MEDICARE

## 2020-02-13 VITALS
WEIGHT: 246.06 LBS | BODY MASS INDEX: 39.54 KG/M2 | TEMPERATURE: 98 F | SYSTOLIC BLOOD PRESSURE: 139 MMHG | HEIGHT: 66 IN | HEART RATE: 79 BPM | DIASTOLIC BLOOD PRESSURE: 72 MMHG

## 2020-02-13 DIAGNOSIS — N18.4 TYPE 2 DIABETES MELLITUS WITH STAGE 4 CHRONIC KIDNEY DISEASE, WITHOUT LONG-TERM CURRENT USE OF INSULIN: ICD-10-CM

## 2020-02-13 DIAGNOSIS — E11.22 TYPE 2 DIABETES MELLITUS WITH STAGE 4 CHRONIC KIDNEY DISEASE, WITHOUT LONG-TERM CURRENT USE OF INSULIN: ICD-10-CM

## 2020-02-13 DIAGNOSIS — F51.01 PRIMARY INSOMNIA: ICD-10-CM

## 2020-02-13 DIAGNOSIS — N18.4 CHRONIC KIDNEY DISEASE, STAGE 4 (SEVERE): ICD-10-CM

## 2020-02-13 DIAGNOSIS — I82.499 DEEP VEIN THROMBOSIS (DVT) OF OTHER VEIN OF LOWER EXTREMITY, UNSPECIFIED CHRONICITY, UNSPECIFIED LATERALITY: ICD-10-CM

## 2020-02-13 DIAGNOSIS — E66.01 MORBID (SEVERE) OBESITY DUE TO EXCESS CALORIES: ICD-10-CM

## 2020-02-13 DIAGNOSIS — N25.81 SECONDARY HYPERPARATHYROIDISM OF RENAL ORIGIN: ICD-10-CM

## 2020-02-13 DIAGNOSIS — M46.1 SACROILIITIS: ICD-10-CM

## 2020-02-13 DIAGNOSIS — E11.9 CONTROLLED TYPE 2 DIABETES MELLITUS WITHOUT COMPLICATION, WITHOUT LONG-TERM CURRENT USE OF INSULIN: Primary | ICD-10-CM

## 2020-02-13 PROBLEM — I82.409 DEEP VEIN THROMBOSIS (DVT) OF LOWER EXTREMITY: Status: ACTIVE | Noted: 2020-02-13

## 2020-02-13 LAB
AT III ACT/NOR PPP CHRO: >131 % (ref 83–118)
HAPTOGLOB SERPL-MCNC: 176 MG/DL (ref 30–250)
INTERPRETATION SERPL IFE-IMP: NORMAL
PATHOLOGIST INTERPRETATION IFE: NORMAL
PATHOLOGIST INTERPRETATION SPE: NORMAL

## 2020-02-13 PROCEDURE — 99214 PR OFFICE/OUTPT VISIT, EST, LEVL IV, 30-39 MIN: ICD-10-PCS | Mod: S$GLB,,, | Performed by: FAMILY MEDICINE

## 2020-02-13 PROCEDURE — 1101F PR PT FALLS ASSESS DOC 0-1 FALLS W/OUT INJ PAST YR: ICD-10-PCS | Mod: CPTII,S$GLB,, | Performed by: FAMILY MEDICINE

## 2020-02-13 PROCEDURE — 99999 PR PBB SHADOW E&M-EST. PATIENT-LVL III: CPT | Mod: PBBFAC,,, | Performed by: FAMILY MEDICINE

## 2020-02-13 PROCEDURE — 3044F PR MOST RECENT HEMOGLOBIN A1C LEVEL <7.0%: ICD-10-PCS | Mod: CPTII,S$GLB,, | Performed by: FAMILY MEDICINE

## 2020-02-13 PROCEDURE — 1159F PR MEDICATION LIST DOCUMENTED IN MEDICAL RECORD: ICD-10-PCS | Mod: S$GLB,,, | Performed by: FAMILY MEDICINE

## 2020-02-13 PROCEDURE — 3078F PR MOST RECENT DIASTOLIC BLOOD PRESSURE < 80 MM HG: ICD-10-PCS | Mod: CPTII,S$GLB,, | Performed by: FAMILY MEDICINE

## 2020-02-13 PROCEDURE — 1159F MED LIST DOCD IN RCRD: CPT | Mod: S$GLB,,, | Performed by: FAMILY MEDICINE

## 2020-02-13 PROCEDURE — 3078F DIAST BP <80 MM HG: CPT | Mod: CPTII,S$GLB,, | Performed by: FAMILY MEDICINE

## 2020-02-13 PROCEDURE — 1126F PR PAIN SEVERITY QUANTIFIED, NO PAIN PRESENT: ICD-10-PCS | Mod: S$GLB,,, | Performed by: FAMILY MEDICINE

## 2020-02-13 PROCEDURE — 1101F PT FALLS ASSESS-DOCD LE1/YR: CPT | Mod: CPTII,S$GLB,, | Performed by: FAMILY MEDICINE

## 2020-02-13 PROCEDURE — 1126F AMNT PAIN NOTED NONE PRSNT: CPT | Mod: S$GLB,,, | Performed by: FAMILY MEDICINE

## 2020-02-13 PROCEDURE — 3075F SYST BP GE 130 - 139MM HG: CPT | Mod: CPTII,S$GLB,, | Performed by: FAMILY MEDICINE

## 2020-02-13 PROCEDURE — 3075F PR MOST RECENT SYSTOLIC BLOOD PRESS GE 130-139MM HG: ICD-10-PCS | Mod: CPTII,S$GLB,, | Performed by: FAMILY MEDICINE

## 2020-02-13 PROCEDURE — 99214 OFFICE O/P EST MOD 30 MIN: CPT | Mod: S$GLB,,, | Performed by: FAMILY MEDICINE

## 2020-02-13 PROCEDURE — 99999 PR PBB SHADOW E&M-EST. PATIENT-LVL III: ICD-10-PCS | Mod: PBBFAC,,, | Performed by: FAMILY MEDICINE

## 2020-02-13 PROCEDURE — 3044F HG A1C LEVEL LT 7.0%: CPT | Mod: CPTII,S$GLB,, | Performed by: FAMILY MEDICINE

## 2020-02-13 RX ORDER — TRAZODONE HYDROCHLORIDE 100 MG/1
TABLET ORAL
Qty: 90 TABLET | Refills: 1 | Status: SHIPPED | OUTPATIENT
Start: 2020-02-13 | End: 2021-02-18 | Stop reason: SDUPTHER

## 2020-02-13 NOTE — PROGRESS NOTES
Subjective:       Patient ID: Kay Rosas is a 74 y.o. female.    Chief Complaint: Follow-up and Medication Refill (trazodone)    Pt is a 74 year old here with 2nd clot in several years. Now on Eliquis and seeing Hematology. Pt does need hga1c in 4 months. Sugars are well controlled    Review of Systems   Constitutional: Negative.    Respiratory: Negative.    Cardiovascular: Negative.    Musculoskeletal: Negative.    Skin: Negative.    Neurological: Negative for syncope.   Hematological: Negative.    Psychiatric/Behavioral: Negative.        Objective:      Physical Exam   Constitutional: She is oriented to person, place, and time. She appears well-developed and well-nourished.   Cardiovascular: Normal rate and regular rhythm. Exam reveals no friction rub.   No murmur heard.  Pulmonary/Chest: Effort normal and breath sounds normal. No stridor. She has no wheezes.   Feet:   Right Foot:   Protective Sensation: 10 sites tested. 9 sites sensed.   Skin Integrity: Positive for callus and dry skin.   Left Foot:   Protective Sensation: 10 sites tested. 9 sites sensed.   Skin Integrity: Positive for callus and dry skin.   Neurological: She is alert and oriented to person, place, and time.       Assessment:       1. Controlled type 2 diabetes mellitus without complication, without long-term current use of insulin    2. Type 2 diabetes mellitus with stage 4 chronic kidney disease, without long-term current use of insulin    3. Morbid (severe) obesity due to excess calories    4. Secondary hyperparathyroidism of renal origin    5. Sacroiliitis    6. Chronic kidney disease, stage 4 (severe)    7. Deep vein thrombosis (DVT) of other vein of lower extremity, unspecified chronicity, unspecified laterality    8. Primary insomnia        Plan:       Controlled type 2 diabetes mellitus without complication, without long-term current use of insulin  Comments:  Stable    Type 2 diabetes mellitus with stage 4 chronic kidney disease,  without long-term current use of insulin  Comments:  Very well controlled    Morbid (severe) obesity due to excess calories  Comments:  Exericse and diet    Secondary hyperparathyroidism of renal origin  Comments:  Stable    Sacroiliitis  Comments:  stable    Chronic kidney disease, stage 4 (severe)  Comments:  stable    Deep vein thrombosis (DVT) of other vein of lower extremity, unspecified chronicity, unspecified laterality  Comments:  Pt remains on Eliquis    Primary insomnia  Comments:  Continue on the Trazodone    Other orders  -     traZODone (DESYREL) 100 MG tablet; TAKE 1 TABLET (100 MG TOTAL) BY MOUTH EVERY EVENING.  Dispense: 90 tablet; Refill: 1

## 2020-02-14 ENCOUNTER — HOSPITAL ENCOUNTER (OUTPATIENT)
Dept: RADIOLOGY | Facility: HOSPITAL | Age: 75
Discharge: HOME | End: 2020-02-14
Attending: INTERNAL MEDICINE
Payer: MEDICARE

## 2020-02-14 DIAGNOSIS — I82.90 THROMBOSIS: ICD-10-CM

## 2020-02-14 LAB
APTT PROTEIN C ACTIVATOR+FV DP/APTT PPP: 165 % (ref 70–140)
CARDIOLIPIN IGA SER IA-ACNC: <9 APL
F2 GENE MUT ANL BLD/T: NORMAL
F5 P.R506Q BLD/T QL: NORMAL
PROT S ACT/NOR PPP: >130 % (ref 70–140)
RBC CD59 DEFICIENT NFR: 0 % (ref 0–0)

## 2020-02-14 PROCEDURE — 76700 US EXAM ABDOM COMPLETE: CPT | Mod: TC

## 2020-02-14 PROCEDURE — 71250 CT THORAX DX C-: CPT | Mod: TC

## 2020-02-17 LAB — APTT HEX PL PPP: POSITIVE S

## 2020-02-18 ENCOUNTER — OFFICE VISIT (OUTPATIENT)
Dept: OTOLARYNGOLOGY | Facility: CLINIC | Age: 75
End: 2020-02-18
Payer: MEDICARE

## 2020-02-18 VITALS
BODY MASS INDEX: 39.29 KG/M2 | DIASTOLIC BLOOD PRESSURE: 77 MMHG | HEART RATE: 74 BPM | WEIGHT: 244.5 LBS | SYSTOLIC BLOOD PRESSURE: 149 MMHG | HEIGHT: 66 IN | TEMPERATURE: 98 F

## 2020-02-18 DIAGNOSIS — H61.22 CERUMEN DEBRIS ON TYMPANIC MEMBRANE OF LEFT EAR: ICD-10-CM

## 2020-02-18 LAB — LA PPP-IMP: POSITIVE

## 2020-02-18 PROCEDURE — 1101F PR PT FALLS ASSESS DOC 0-1 FALLS W/OUT INJ PAST YR: ICD-10-PCS | Mod: CPTII,S$GLB,, | Performed by: OTOLARYNGOLOGY

## 2020-02-18 PROCEDURE — 99999 PR PBB SHADOW E&M-EST. PATIENT-LVL IV: ICD-10-PCS | Mod: PBBFAC,,, | Performed by: OTOLARYNGOLOGY

## 2020-02-18 PROCEDURE — 3078F PR MOST RECENT DIASTOLIC BLOOD PRESSURE < 80 MM HG: ICD-10-PCS | Mod: CPTII,S$GLB,, | Performed by: OTOLARYNGOLOGY

## 2020-02-18 PROCEDURE — 99999 PR PBB SHADOW E&M-EST. PATIENT-LVL IV: CPT | Mod: PBBFAC,,, | Performed by: OTOLARYNGOLOGY

## 2020-02-18 PROCEDURE — 3077F PR MOST RECENT SYSTOLIC BLOOD PRESSURE >= 140 MM HG: ICD-10-PCS | Mod: CPTII,S$GLB,, | Performed by: OTOLARYNGOLOGY

## 2020-02-18 PROCEDURE — 3077F SYST BP >= 140 MM HG: CPT | Mod: CPTII,S$GLB,, | Performed by: OTOLARYNGOLOGY

## 2020-02-18 PROCEDURE — 1159F PR MEDICATION LIST DOCUMENTED IN MEDICAL RECORD: ICD-10-PCS | Mod: S$GLB,,, | Performed by: OTOLARYNGOLOGY

## 2020-02-18 PROCEDURE — 1126F PR PAIN SEVERITY QUANTIFIED, NO PAIN PRESENT: ICD-10-PCS | Mod: S$GLB,,, | Performed by: OTOLARYNGOLOGY

## 2020-02-18 PROCEDURE — 1126F AMNT PAIN NOTED NONE PRSNT: CPT | Mod: S$GLB,,, | Performed by: OTOLARYNGOLOGY

## 2020-02-18 PROCEDURE — 1159F MED LIST DOCD IN RCRD: CPT | Mod: S$GLB,,, | Performed by: OTOLARYNGOLOGY

## 2020-02-18 PROCEDURE — 3078F DIAST BP <80 MM HG: CPT | Mod: CPTII,S$GLB,, | Performed by: OTOLARYNGOLOGY

## 2020-02-18 PROCEDURE — 1101F PT FALLS ASSESS-DOCD LE1/YR: CPT | Mod: CPTII,S$GLB,, | Performed by: OTOLARYNGOLOGY

## 2020-02-18 PROCEDURE — 99213 PR OFFICE/OUTPT VISIT, EST, LEVL III, 20-29 MIN: ICD-10-PCS | Mod: S$GLB,,, | Performed by: OTOLARYNGOLOGY

## 2020-02-18 PROCEDURE — 99213 OFFICE O/P EST LOW 20 MIN: CPT | Mod: S$GLB,,, | Performed by: OTOLARYNGOLOGY

## 2020-02-18 NOTE — LETTER
February 18, 2020      Sincere Cartwright MD  [address] [address]  [place] [address]           [facility]  [address] [place] [address]  [place] [address]  Phone: [phone]  Fax: [phone]          Patient: Kay Rosas   MR Number: [id number]   YOB: 1945   Date of Visit: 2/18/2020       Dear Dr. Sicnere Cartwright:    Thank you for referring Kay Rosas to me for evaluation. Attached you will find relevant portions of my assessment and plan of care.    If you have questions, please do not hesitate to call me. I look forward to following Kay Rosas along with you.    Sincerely,    Capo Grant MD    Enclosure  CC:  No Recipients    If you would like to receive this communication electronically, please contact [email or web address] or [phone] to request more information on [employer] Link access.    For providers and/or their staff who would like to refer a patient to [facility], please contact us through our one-stop-shop provider referral line, [facility] , at [phone].    If you feel you have received this communication in error or would no longer like to receive these types of communications, please e-mail [email or web address]

## 2020-02-18 NOTE — PROGRESS NOTES
Subjective:   Patient: Kay Rosas [id number], :1945   Visit date:2020 9:54 AM    Chief Complaint:  Cerumen Impaction    HPI:  Kay is a 74 y.o. female who is here for ear exam.  No pain.  No bleeding or drainage.  No hearing loss.        Review of Systems:  -     Allergic/Immunologic: is allergic to nsaids (non-steroidal anti-inflammatory drug)..  -     Constitutional: Current temp: 98.3 °F (36.8 °C) (Tympanic)    Her meds, allergies, medical, surgical, social & family histories were reviewed & updated:  -     She has a current medication list which includes the following prescription(s): albuterol, apixaban, aripiprazole, gabapentin, levocetirizine, levothyroxine, losartan-hydrochlorothiazide 100-25 mg, oxybutynin, pantoprazole, sertraline, sertraline, tizanidine, tramadol, trazodone, and azelastine.  -     She  has a past medical history of Arthritis, Degenerative disc disease, cervical, Diabetes mellitus, type 2, Emphysema of lung, Hyperlipidemia, Hypertension, MVP (mitral valve prolapse), Osteoporosis, and Thyroid condition.   -     She does not have any pertinent problems on file.   -     She  has a past surgical history that includes cataract surgery  (Bilateral, 10/ 2012); elbow spur removed (Left); Hysterectomy (Left, ); Ovarian cyst removal; Vascular surgery (Right); Back surgery (); Oophorectomy; Injection of joint (Bilateral, 2019); and Injection of anesthetic agent into sacroiliac joint (Bilateral, 2019).  -     She  reports that she quit smoking about 4 years ago. She has never used smokeless tobacco. She reports that she does not drink alcohol or use drugs.  -     Her family history includes Alzheimer's disease in her sister; Arthritis in her mother; Breast cancer in her mother; Cancer in her father and sister; Heart disease in her father and mother; Kidney disease in her mother; Ovarian cancer in her sister.  -     She is allergic to nsaids (non-steroidal  "anti-inflammatory drug).    Objective:     Physical Exam:  Vitals:  BP (!) 149/77   Pulse 74   Temp 98.3 °F (36.8 °C) (Tympanic)   Ht 5' 6" (1.676 m)   Wt 110.9 kg (244 lb 7.8 oz)   BMI 39.46 kg/m²   Communication:  Able to communicate, no hoarseness.  Head & Face:  Normocephalic, atraumatic, no sinus tenderness.  Eyes:  Extraocular motions intact.  Ears:  Otoscopy of external auditory canals and tympanic membranes was normal, clinical speech reception thresholds grossly intact, no mass/lesion of auricle.  Nose:  No masses/lesions of external nose, nasal mucosa, septum, and turbinates were within normal limits.  Mouth:  No mass/lesion of lips, teeth, gums, hard/soft palate, tongue, tonsils, or oropharynx.  Neck & Lymphatics:  No cervical lymphadenopathy, no neck mass/crepitus/ asymmetry, trachea is midline, no thyroid enlargement/tenderness/mass.  Neuro/Psych: Alert with normal mood and affect.   Respiration/Chest:  Symmetric expansion during respiration, normal respiratory effort.  Skin:  Warm and intact.    Assessment & Plan:   Kay was seen today for cerumen impaction.    Diagnoses and all orders for this visit:    Cerumen debris on tympanic membrane of left ear  -     Ambulatory referral/consult to ENT      Ear cleaned.  PRN.   "

## 2020-02-19 LAB
MPNR  FINAL DIAGNOSIS: NORMAL
MPNR  SPECIMEN TYPE: NORMAL
MPNR RESULT: NORMAL

## 2020-02-19 RX ORDER — TIZANIDINE 4 MG/1
TABLET ORAL
Qty: 20 TABLET | Refills: 0 | OUTPATIENT
Start: 2020-02-19

## 2020-02-28 ENCOUNTER — PATIENT OUTREACH (OUTPATIENT)
Dept: ADMINISTRATIVE | Facility: OTHER | Age: 75
End: 2020-02-28

## 2020-02-28 DIAGNOSIS — I82.90 THROMBOSIS: ICD-10-CM

## 2020-02-28 DIAGNOSIS — E11.9 TYPE 2 DIABETES MELLITUS WITHOUT COMPLICATION: ICD-10-CM

## 2020-03-02 RX ORDER — TIZANIDINE 4 MG/1
TABLET ORAL
Qty: 30 TABLET | Refills: 1 | Status: SHIPPED | OUTPATIENT
Start: 2020-03-02 | End: 2020-03-19 | Stop reason: SDUPTHER

## 2020-03-04 ENCOUNTER — HOSPITAL ENCOUNTER (OUTPATIENT)
Dept: RADIOLOGY | Facility: HOSPITAL | Age: 75
Discharge: HOME | End: 2020-03-04
Attending: FAMILY MEDICINE
Payer: MEDICARE

## 2020-03-04 ENCOUNTER — OFFICE VISIT (OUTPATIENT)
Dept: ORTHOPEDICS | Facility: CLINIC | Age: 75
End: 2020-03-04
Payer: MEDICARE

## 2020-03-04 VITALS
WEIGHT: 244.5 LBS | HEIGHT: 66 IN | DIASTOLIC BLOOD PRESSURE: 62 MMHG | BODY MASS INDEX: 39.29 KG/M2 | SYSTOLIC BLOOD PRESSURE: 133 MMHG | HEART RATE: 65 BPM

## 2020-03-04 DIAGNOSIS — M25.561 PAIN IN BOTH KNEES, UNSPECIFIED CHRONICITY: Primary | ICD-10-CM

## 2020-03-04 DIAGNOSIS — M25.562 PAIN IN BOTH KNEES, UNSPECIFIED CHRONICITY: Primary | ICD-10-CM

## 2020-03-04 DIAGNOSIS — M25.561 RIGHT KNEE PAIN, UNSPECIFIED CHRONICITY: Primary | ICD-10-CM

## 2020-03-04 DIAGNOSIS — M25.561 PAIN IN BOTH KNEES, UNSPECIFIED CHRONICITY: ICD-10-CM

## 2020-03-04 DIAGNOSIS — M17.0 OSTEOARTHRITIS OF BOTH KNEES, UNSPECIFIED OSTEOARTHRITIS TYPE: Primary | ICD-10-CM

## 2020-03-04 DIAGNOSIS — M25.562 PAIN IN BOTH KNEES, UNSPECIFIED CHRONICITY: ICD-10-CM

## 2020-03-04 PROCEDURE — 3078F PR MOST RECENT DIASTOLIC BLOOD PRESSURE < 80 MM HG: ICD-10-PCS | Mod: CPTII,S$GLB,, | Performed by: FAMILY MEDICINE

## 2020-03-04 PROCEDURE — 73564 X-RAY EXAM KNEE 4 OR MORE: CPT | Mod: 26,50,, | Performed by: RADIOLOGY

## 2020-03-04 PROCEDURE — 99214 OFFICE O/P EST MOD 30 MIN: CPT | Mod: 25,S$GLB,, | Performed by: FAMILY MEDICINE

## 2020-03-04 PROCEDURE — 73564 X-RAY EXAM KNEE 4 OR MORE: CPT | Mod: TC,50

## 2020-03-04 PROCEDURE — 1101F PR PT FALLS ASSESS DOC 0-1 FALLS W/OUT INJ PAST YR: ICD-10-PCS | Mod: CPTII,S$GLB,, | Performed by: FAMILY MEDICINE

## 2020-03-04 PROCEDURE — 1159F PR MEDICATION LIST DOCUMENTED IN MEDICAL RECORD: ICD-10-PCS | Mod: S$GLB,,, | Performed by: FAMILY MEDICINE

## 2020-03-04 PROCEDURE — 1101F PT FALLS ASSESS-DOCD LE1/YR: CPT | Mod: CPTII,S$GLB,, | Performed by: FAMILY MEDICINE

## 2020-03-04 PROCEDURE — 99999 PR PBB SHADOW E&M-EST. PATIENT-LVL III: ICD-10-PCS | Mod: PBBFAC,,, | Performed by: FAMILY MEDICINE

## 2020-03-04 PROCEDURE — 99999 PR PBB SHADOW E&M-EST. PATIENT-LVL III: CPT | Mod: PBBFAC,,, | Performed by: FAMILY MEDICINE

## 2020-03-04 PROCEDURE — 20610 DRAIN/INJ JOINT/BURSA W/O US: CPT | Mod: RT,S$GLB,, | Performed by: FAMILY MEDICINE

## 2020-03-04 PROCEDURE — 20610 LARGE JOINT ASPIRATION/INJECTION: R KNEE: ICD-10-PCS | Mod: RT,S$GLB,, | Performed by: FAMILY MEDICINE

## 2020-03-04 PROCEDURE — 1125F AMNT PAIN NOTED PAIN PRSNT: CPT | Mod: S$GLB,,, | Performed by: FAMILY MEDICINE

## 2020-03-04 PROCEDURE — 99214 PR OFFICE/OUTPT VISIT, EST, LEVL IV, 30-39 MIN: ICD-10-PCS | Mod: 25,S$GLB,, | Performed by: FAMILY MEDICINE

## 2020-03-04 PROCEDURE — 1159F MED LIST DOCD IN RCRD: CPT | Mod: S$GLB,,, | Performed by: FAMILY MEDICINE

## 2020-03-04 PROCEDURE — 1125F PR PAIN SEVERITY QUANTIFIED, PAIN PRESENT: ICD-10-PCS | Mod: S$GLB,,, | Performed by: FAMILY MEDICINE

## 2020-03-04 PROCEDURE — 3075F PR MOST RECENT SYSTOLIC BLOOD PRESS GE 130-139MM HG: ICD-10-PCS | Mod: CPTII,S$GLB,, | Performed by: FAMILY MEDICINE

## 2020-03-04 PROCEDURE — 73564 XR KNEE ORTHO BILAT WITH FLEXION: ICD-10-PCS | Mod: 26,50,, | Performed by: RADIOLOGY

## 2020-03-04 PROCEDURE — 3078F DIAST BP <80 MM HG: CPT | Mod: CPTII,S$GLB,, | Performed by: FAMILY MEDICINE

## 2020-03-04 PROCEDURE — 3075F SYST BP GE 130 - 139MM HG: CPT | Mod: CPTII,S$GLB,, | Performed by: FAMILY MEDICINE

## 2020-03-04 RX ORDER — BETAMETHASONE SODIUM PHOSPHATE AND BETAMETHASONE ACETATE 3; 3 MG/ML; MG/ML
12 INJECTION, SUSPENSION INTRA-ARTICULAR; INTRALESIONAL; INTRAMUSCULAR; SOFT TISSUE
Status: DISCONTINUED | OUTPATIENT
Start: 2020-03-04 | End: 2020-03-04 | Stop reason: HOSPADM

## 2020-03-04 RX ADMIN — BETAMETHASONE SODIUM PHOSPHATE AND BETAMETHASONE ACETATE 12 MG: 3; 3 INJECTION, SUSPENSION INTRA-ARTICULAR; INTRALESIONAL; INTRAMUSCULAR; SOFT TISSUE at 08:03

## 2020-03-04 NOTE — PROCEDURES
Large Joint Aspiration/Injection: R knee  Performed by: Fabio Tiwari MD  Authorized by: Fabio Tiwari MD  Date/Time: 3/4/2020 8:30 AM    Indications:     Anesthesia    Anesthetic: bupivacaine 0.25% without epinephrine and lidocaine 1% without epinephrine    Details:  Approach: anterolateral  Location:  Knee  Site:  R knee    Medications: 12 mg betamethasone acetate-betamethasone sodium phosphate 6 mg/mL (Celestone 1.5 cc, 9 mg)  Patient tolerance:  patient tolerated the procedure well with no immediate complications

## 2020-03-04 NOTE — PATIENT INSTRUCTIONS
If you are experiencing pain/discomfort or have questions after 5pm and would like to be connected to the [facility] on call team, please call this number [phone] and specify which Orthopedics/Sports Medicine provider is treating you.   Ice to knee to 3 times today and elevate leg when possible today    Gradually resume normal activities after 24-48 hours    Start physical therapy next week    Recheck here in a few weeks to start Euflexxa 3 series for both knees

## 2020-03-04 NOTE — PROGRESS NOTES
Subjective:     Patient ID: Kay Rosas is a 74 y.o. female.    Chief Complaint: Pain and Swelling of the Right Knee      HPI:  Complains of continuing and worsening knee pain for several months and worse in the right knee.  She states that a cortisone shot we gave her a few months ago was helpful for month or so.    She tried physical therapy here at the [facility] but discontinued after a few weeks secondary to no appreciable benefit.    She feels weakness in the leg and has stiffness but it does lock in place on her.  No recent fever weight loss chills no specific injury to the knee.  The left knee at times feels weak and stiff.    She occasionally takes over-the-counter medication to reduce symptoms and she would like a cortisone injection to the right knee today.    Past Medical History:   Diagnosis Date    Arthritis     Degenerative disc disease, cervical     Diabetes mellitus, type 2     Emphysema of lung     Hyperlipidemia     Hypertension     MVP (mitral valve prolapse)     Osteoporosis     feet    Thyroid condition      Past Surgical History:   Procedure Laterality Date    BACK SURGERY  2012    cataract surgery  Bilateral 10/ 2012    elbow spur removed Left     HYSTERECTOMY Left 1978    INJECTION OF ANESTHETIC AGENT INTO SACROILIAC JOINT Bilateral 11/11/2019    Procedure: Bilateral GT bursa + SIJ injection;  Surgeon: Noe Pleitez MD;  Location: [facility] PAIN T;  Service: Pain Management;  Laterality: Bilateral;    INJECTION OF JOINT Bilateral 11/11/2019    Procedure: Bilateral GT bursa + SIJ injection;  Surgeon: Noe Pleitez MD;  Location: [facility] PAIN MGT;  Service: Pain Management;  Laterality: Bilateral;    OOPHORECTOMY      OVARIAN CYST REMOVAL      two from back, one from axilla    VASCULAR SURGERY Right     high ligation due to blood clot      Family History   Problem Relation Age of Onset    Heart disease Mother     Kidney disease Mother     Arthritis Mother     Breast  cancer Mother     Cancer Father     Heart disease Father     Cancer Sister     Ovarian cancer Sister     Alzheimer's disease Sister      Social History     Socioeconomic History    Marital status:      Spouse name: Not on file    Number of children: Not on file    Years of education: Not on file    Highest education level: Not on file   Occupational History    Not on file   Social Needs    Financial resource strain: Not on file    Food insecurity:     Worry: Not on file     Inability: Not on file    Transportation needs:     Medical: Not on file     Non-medical: Not on file   Tobacco Use    Smoking status: Former Smoker     Last attempt to quit: 2015     Years since quittin.7    Smokeless tobacco: Never Used   Substance and Sexual Activity    Alcohol use: No     Alcohol/week: 0.0 standard drinks    Drug use: No    Sexual activity: Not on file   Lifestyle    Physical activity:     Days per week: Not on file     Minutes per session: Not on file    Stress: Not on file   Relationships    Social connections:     Talks on phone: Not on file     Gets together: Not on file     Attends Gnosticist service: Not on file     Active member of club or organization: Not on file     Attends meetings of clubs or organizations: Not on file     Relationship status: Not on file   Other Topics Concern    Not on file   Social History Narrative    Not on file       Current Outpatient Medications:     albuterol (PROVENTIL/VENTOLIN HFA) 90 mcg/actuation inhaler, Inhale 2 puffs into the lungs every 6 (six) hours as needed., Disp: 18 g, Rfl: 4    apixaban (ELIQUIS) 5 mg Tab, Take 1 tablet (5 mg total) by mouth 2 (two) times daily., Disp: 60 tablet, Rfl: 1    ARIPiprazole (ABILIFY) 2 MG Tab, TAKE 1 TABLET (2 MG TOTAL) BY MOUTH ONCE DAILY., Disp: 90 tablet, Rfl: 2    gabapentin (NEURONTIN) 300 MG capsule, Take 2 capsules (600 mg total) by mouth 3 (three) times daily., Disp: 90 capsule, Rfl: 3     levocetirizine (XYZAL) 5 MG tablet, Take 1 tablet (5 mg total) by mouth every evening., Disp: 90 tablet, Rfl: 2    levothyroxine (SYNTHROID) 50 MCG tablet, TAKE 1 TABLET (50 MCG TOTAL) BY MOUTH ONCE DAILY., Disp: 90 tablet, Rfl: 3    losartan-hydrochlorothiazide 100-25 mg (HYZAAR) 100-25 mg per tablet, TAKE 1 TABLET BY MOUTH ONCE DAILY., Disp: 90 tablet, Rfl: 3    oxybutynin (DITROPAN-XL) 5 MG TR24, TAKE 1 TABLET BY MOUTH EVERY DAY, Disp: 90 tablet, Rfl: 1    pantoprazole (PROTONIX) 40 MG tablet, TAKE 1 TABLET BY MOUTH EVERY DAY, Disp: 90 tablet, Rfl: 1    sertraline (ZOLOFT) 100 MG tablet, TAKE 1 TABLET (100 MG TOTAL) BY MOUTH ONCE DAILY., Disp: 30 tablet, Rfl: 6    sertraline (ZOLOFT) 50 MG tablet, TAKE 1 TABLET BY MOUTH EVERY DAY, Disp: 90 tablet, Rfl: 1    tiZANidine (ZANAFLEX) 4 MG tablet, TAKE 1 TABLET BY MOUTH EVERY DAY AS NEEDED, Disp: 30 tablet, Rfl: 1    traMADol (ULTRAM) 50 mg tablet, Take 1 tablet (50 mg total) by mouth every 6 (six) hours as needed for Pain., Disp: 30 tablet, Rfl: 1    traZODone (DESYREL) 100 MG tablet, TAKE 1 TABLET (100 MG TOTAL) BY MOUTH EVERY EVENING., Disp: 90 tablet, Rfl: 1    azelastine (ASTELIN) 137 mcg (0.1 %) nasal spray, 2 sprays (274 mcg total) by Nasal route 2 (two) times daily., Disp: 30 mL, Rfl: 11  Review of patient's allergies indicates:   Allergen Reactions    Nsaids (non-steroidal anti-inflammatory drug)      CKD     Review of Systems   Constitutional: Negative for chills, fever and weight loss.   Respiratory: Negative for shortness of breath.    Cardiovascular: Negative for chest pain and palpitations.       Objective:   Body mass index is 39.46 kg/m².  Vitals:    03/04/20 0845   BP: 133/62   Pulse: 65           Ortho/SPM Exam obese pleasant ambulatory adult female in no acute distress    Right knee-chronic enlargement and bony changes noted    Range of motion 0-100 degrees    Negative Lachman's and 1+ valgus laxity no varus laxity noted    Neurovascular  intact    Mild diffuse discomfort to palpation of the medial lateral joint lines in the popliteal region.  No popliteal mass noted    Strength is 3/5 bilaterally for both knees    Discussion -25 min spent face-to-face with patient over 50% that time in discussion of various treatment planned regarding oral medication, injection therapy of cortisone and also of Visco supplementation as well as resuming physical therapy.    IMAGING: X-ray Knee Ortho Bilateral with Flexion  Narrative: EXAMINATION:  XR KNEE ORTHO BILAT WITH FLEXION    CLINICAL HISTORY:  Pain in right knee    TECHNIQUE:  AP standing of both knees, PA flexion standing views of both knees, and Merchant views of both knees were performed.  Lateral views of both knees were also performed.    COMPARISON:  08/28/2019    FINDINGS:  Right knee: There is no radiographic evidence of acute osseous, articular, or soft tissue abnormality. Joint spaces are well preserved    Left knee:  There is no radiographic evidence of acute osseous, articular, or soft tissue abnormality. Joint spaces are well preserved  Impression: No acute findings    Electronically signed by: Dhruv Rubio MD  Date:    03/04/2020  Time:    08:30       Radiographs / Imaging : Relevant imaging results reviewed by me and interpreted by me, discussed with the patient and / or family degenerative changes as noted and discussed with patient but no acute dislocation or fracture.  There are mild to moderate reduced joint spaces in the medial compartment bilaterally.      Assessment:     Encounter Diagnosis   Name Primary?    Right knee pain, unspecified chronicity Yes        Plan:   Right knee pain, unspecified chronicity  -     Large Joint Aspiration/Injection: R knee        The patient verbalized good understanding of the medical issues discussed today and expressed appreciation for the care provided.  Patient was given the opportunity to ask questions and be an active participant in their medical  care. Patient had no further questions or concerns at this time.     The patient was encouraged to participate in appropriate physical activity.      Fabio Tiwari M.D.  [facility]        This note was dictated using voice recognition software and may have sound a like errors.

## 2020-03-11 ENCOUNTER — OFFICE VISIT (OUTPATIENT)
Dept: HEMATOLOGY/ONCOLOGY | Facility: CLINIC | Age: 75
End: 2020-03-11
Payer: MEDICARE

## 2020-03-11 ENCOUNTER — LAB VISIT (OUTPATIENT)
Dept: LAB | Facility: HOSPITAL | Age: 75
End: 2020-03-11
Attending: INTERNAL MEDICINE
Payer: MEDICARE

## 2020-03-11 VITALS
TEMPERATURE: 99 F | SYSTOLIC BLOOD PRESSURE: 143 MMHG | HEART RATE: 71 BPM | BODY MASS INDEX: 39.62 KG/M2 | RESPIRATION RATE: 18 BRPM | HEIGHT: 66 IN | DIASTOLIC BLOOD PRESSURE: 67 MMHG | OXYGEN SATURATION: 96 % | WEIGHT: 246.5 LBS

## 2020-03-11 DIAGNOSIS — D50.0 IRON DEFICIENCY ANEMIA DUE TO CHRONIC BLOOD LOSS: ICD-10-CM

## 2020-03-11 DIAGNOSIS — I82.90 THROMBOSIS: Primary | ICD-10-CM

## 2020-03-11 DIAGNOSIS — N63.10 BREAST MASS, RIGHT: ICD-10-CM

## 2020-03-11 DIAGNOSIS — I82.90 THROMBOSIS: ICD-10-CM

## 2020-03-11 DIAGNOSIS — R92.8 OTHER ABNORMAL AND INCONCLUSIVE FINDINGS ON DIAGNOSTIC IMAGING OF BREAST: ICD-10-CM

## 2020-03-11 LAB
BASOPHILS # BLD AUTO: 0.04 K/UL (ref 0–0.2)
BASOPHILS NFR BLD: 0.6 % (ref 0–1.9)
DIFFERENTIAL METHOD: ABNORMAL
EOSINOPHIL # BLD AUTO: 0.2 K/UL (ref 0–0.5)
EOSINOPHIL NFR BLD: 3 % (ref 0–8)
ERYTHROCYTE [DISTWIDTH] IN BLOOD BY AUTOMATED COUNT: 16 % (ref 11.5–14.5)
HCT VFR BLD AUTO: 39.2 % (ref 37–48.5)
HGB BLD-MCNC: 11.7 G/DL (ref 12–16)
IMM GRANULOCYTES # BLD AUTO: 0.01 K/UL (ref 0–0.04)
IMM GRANULOCYTES NFR BLD AUTO: 0.2 % (ref 0–0.5)
LYMPHOCYTES # BLD AUTO: 1.7 K/UL (ref 1–4.8)
LYMPHOCYTES NFR BLD: 25.4 % (ref 18–48)
MCH RBC QN AUTO: 26.4 PG (ref 27–31)
MCHC RBC AUTO-ENTMCNC: 29.8 G/DL (ref 32–36)
MCV RBC AUTO: 88 FL (ref 82–98)
MONOCYTES # BLD AUTO: 0.3 K/UL (ref 0.3–1)
MONOCYTES NFR BLD: 4.7 % (ref 4–15)
NEUTROPHILS # BLD AUTO: 4.4 K/UL (ref 1.8–7.7)
NEUTROPHILS NFR BLD: 66.1 % (ref 38–73)
NRBC BLD-RTO: 0 /100 WBC
PLATELET # BLD AUTO: 221 K/UL (ref 150–350)
PMV BLD AUTO: 10.2 FL (ref 9.2–12.9)
RBC # BLD AUTO: 4.44 M/UL (ref 4–5.4)
WBC # BLD AUTO: 6.65 K/UL (ref 3.9–12.7)

## 2020-03-11 PROCEDURE — 1101F PT FALLS ASSESS-DOCD LE1/YR: CPT | Mod: CPTII,S$GLB,, | Performed by: INTERNAL MEDICINE

## 2020-03-11 PROCEDURE — 99214 PR OFFICE/OUTPT VISIT, EST, LEVL IV, 30-39 MIN: ICD-10-PCS | Mod: S$GLB,,, | Performed by: INTERNAL MEDICINE

## 2020-03-11 PROCEDURE — 3077F PR MOST RECENT SYSTOLIC BLOOD PRESSURE >= 140 MM HG: ICD-10-PCS | Mod: CPTII,S$GLB,, | Performed by: INTERNAL MEDICINE

## 2020-03-11 PROCEDURE — 99999 PR PBB SHADOW E&M-EST. PATIENT-LVL III: ICD-10-PCS | Mod: PBBFAC,,, | Performed by: INTERNAL MEDICINE

## 2020-03-11 PROCEDURE — 1125F PR PAIN SEVERITY QUANTIFIED, PAIN PRESENT: ICD-10-PCS | Mod: S$GLB,,, | Performed by: INTERNAL MEDICINE

## 2020-03-11 PROCEDURE — 3078F DIAST BP <80 MM HG: CPT | Mod: CPTII,S$GLB,, | Performed by: INTERNAL MEDICINE

## 2020-03-11 PROCEDURE — 1159F PR MEDICATION LIST DOCUMENTED IN MEDICAL RECORD: ICD-10-PCS | Mod: S$GLB,,, | Performed by: INTERNAL MEDICINE

## 2020-03-11 PROCEDURE — 36415 COLL VENOUS BLD VENIPUNCTURE: CPT

## 2020-03-11 PROCEDURE — 1101F PR PT FALLS ASSESS DOC 0-1 FALLS W/OUT INJ PAST YR: ICD-10-PCS | Mod: CPTII,S$GLB,, | Performed by: INTERNAL MEDICINE

## 2020-03-11 PROCEDURE — 1125F AMNT PAIN NOTED PAIN PRSNT: CPT | Mod: S$GLB,,, | Performed by: INTERNAL MEDICINE

## 2020-03-11 PROCEDURE — 99214 OFFICE O/P EST MOD 30 MIN: CPT | Mod: S$GLB,,, | Performed by: INTERNAL MEDICINE

## 2020-03-11 PROCEDURE — 99999 PR PBB SHADOW E&M-EST. PATIENT-LVL III: CPT | Mod: PBBFAC,,, | Performed by: INTERNAL MEDICINE

## 2020-03-11 PROCEDURE — 1159F MED LIST DOCD IN RCRD: CPT | Mod: S$GLB,,, | Performed by: INTERNAL MEDICINE

## 2020-03-11 PROCEDURE — 3078F PR MOST RECENT DIASTOLIC BLOOD PRESSURE < 80 MM HG: ICD-10-PCS | Mod: CPTII,S$GLB,, | Performed by: INTERNAL MEDICINE

## 2020-03-11 PROCEDURE — 3077F SYST BP >= 140 MM HG: CPT | Mod: CPTII,S$GLB,, | Performed by: INTERNAL MEDICINE

## 2020-03-11 RX ORDER — SODIUM CHLORIDE 0.9 % (FLUSH) 0.9 %
10 SYRINGE (ML) INJECTION
Status: CANCELLED | OUTPATIENT
Start: 2020-03-25

## 2020-03-11 RX ORDER — HEPARIN 100 UNIT/ML
500 SYRINGE INTRAVENOUS
Status: CANCELLED | OUTPATIENT
Start: 2020-03-11

## 2020-03-11 RX ORDER — HEPARIN 100 UNIT/ML
500 SYRINGE INTRAVENOUS
Status: CANCELLED | OUTPATIENT
Start: 2020-03-25

## 2020-03-11 RX ORDER — SODIUM CHLORIDE 0.9 % (FLUSH) 0.9 %
10 SYRINGE (ML) INJECTION
Status: CANCELLED | OUTPATIENT
Start: 2020-03-11

## 2020-03-11 NOTE — PROGRESS NOTES
Subjective:       Patient ID: Kay Rosas is a 74 y.o. female.    Chief Complaint: Results and Coagulation Disorder    HPI 74-year-old female returns CT chest demonstrates no evidence of intrathoracic lesion but there mass involving her right breast.  In addition patient is here for review of for hypercoagulable workup  Past Medical History:   Diagnosis Date    Arthritis     Degenerative disc disease, cervical     Diabetes mellitus, type 2     Emphysema of lung     Hyperlipidemia     Hypertension     MVP (mitral valve prolapse)     Osteoporosis     feet    Thyroid condition      Family History   Problem Relation Age of Onset    Heart disease Mother     Kidney disease Mother     Arthritis Mother     Breast cancer Mother     Cancer Father     Heart disease Father     Cancer Sister     Ovarian cancer Sister     Alzheimer's disease Sister      Social History     Socioeconomic History    Marital status:      Spouse name: Not on file    Number of children: Not on file    Years of education: Not on file    Highest education level: Not on file   Occupational History    Not on file   Social Needs    Financial resource strain: Not on file    Food insecurity:     Worry: Not on file     Inability: Not on file    Transportation needs:     Medical: Not on file     Non-medical: Not on file   Tobacco Use    Smoking status: Former Smoker     Last attempt to quit: 2015     Years since quittin.7    Smokeless tobacco: Never Used   Substance and Sexual Activity    Alcohol use: No     Alcohol/week: 0.0 standard drinks    Drug use: No    Sexual activity: Not on file   Lifestyle    Physical activity:     Days per week: Not on file     Minutes per session: Not on file    Stress: Not on file   Relationships    Social connections:     Talks on phone: Not on file     Gets together: Not on file     Attends [employer] service: Not on file     Active member of club or organization: Not on file      Attends meetings of clubs or organizations: Not on file     Relationship status: Not on file   Other Topics Concern    Not on file   Social History Narrative    Not on file     Past Surgical History:   Procedure Laterality Date    BACK SURGERY  2012    cataract surgery  Bilateral 10/ 2012    elbow spur removed Left     HYSTERECTOMY Left 1978    INJECTION OF ANESTHETIC AGENT INTO SACROILIAC JOINT Bilateral 11/11/2019    Procedure: Bilateral GT bursa + SIJ injection;  Surgeon: Noe Pleitez MD;  Location: [facility] PAIN MGT;  Service: Pain Management;  Laterality: Bilateral;    INJECTION OF JOINT Bilateral 11/11/2019    Procedure: Bilateral GT bursa + SIJ injection;  Surgeon: Noe Pleitez MD;  Location: [facility] PAIN MGT;  Service: Pain Management;  Laterality: Bilateral;    OOPHORECTOMY      OVARIAN CYST REMOVAL      two from back, one from axilla    VASCULAR SURGERY Right     high ligation due to blood clot        Labs:  Lab Results   Component Value Date    WBC 6.30 10/07/2019    HGB 9.4 (L) 10/07/2019    HCT 33.4 (L) 10/07/2019    MCV 85 10/07/2019     10/07/2019     BMP  Lab Results   Component Value Date     10/07/2019    K 4.1 10/07/2019     10/07/2019    CO2 28 10/07/2019    BUN 51 (H) 10/07/2019    CREATININE 1.7 (H) 10/07/2019    CALCIUM 9.7 10/07/2019    ANIONGAP 9 10/07/2019    ESTGFRAFRICA 34.0 (A) 10/07/2019    EGFRNONAA 29.5 (A) 10/07/2019     Lab Results   Component Value Date    ALT 14 02/18/2019    AST 20 02/18/2019    ALKPHOS 80 02/18/2019    BILITOT 0.3 02/18/2019       Lab Results   Component Value Date    IRON 48 02/11/2020    TIBC 460 (H) 02/11/2020    FERRITIN 47 02/11/2020     Lab Results   Component Value Date    EQLESHEW23 874 03/27/2019     No results found for: FOLATE  Lab Results   Component Value Date    TSH 1.845 02/18/2019         Review of Systems   Constitutional: Negative for activity change, appetite change, chills, diaphoresis, fatigue, fever and  unexpected weight change.   HENT: Negative for congestion, dental problem, drooling, ear discharge, ear pain, facial swelling, hearing loss, mouth sores, nosebleeds, postnasal drip, rhinorrhea, sinus pressure, sneezing, sore throat, tinnitus, trouble swallowing and voice change.    Eyes: Negative for photophobia, pain, discharge, redness, itching and visual disturbance.   Respiratory: Negative for cough, choking, chest tightness, shortness of breath, wheezing and stridor.    Cardiovascular: Negative for chest pain, palpitations and leg swelling.   Gastrointestinal: Negative for abdominal distention, abdominal pain, anal bleeding, blood in stool, constipation, diarrhea, nausea, rectal pain and vomiting.   Endocrine: Negative for cold intolerance, heat intolerance, polydipsia, polyphagia and polyuria.   Genitourinary: Negative for decreased urine volume, difficulty urinating, dyspareunia, dysuria, enuresis, flank pain, frequency, genital sores, hematuria, menstrual problem, pelvic pain, urgency, vaginal bleeding, vaginal discharge and vaginal pain.   Musculoskeletal: Negative for arthralgias, back pain, gait problem, joint swelling, myalgias, neck pain and neck stiffness.   Skin: Negative for color change, pallor and rash.   Allergic/Immunologic: Negative for environmental allergies, food allergies and immunocompromised state.   Neurological: Negative for dizziness, tremors, seizures, syncope, facial asymmetry, speech difficulty, weakness, light-headedness, numbness and headaches.   Hematological: Negative for adenopathy. Does not bruise/bleed easily.   Psychiatric/Behavioral: Positive for dysphoric mood. Negative for agitation, behavioral problems, confusion, decreased concentration, hallucinations, self-injury, sleep disturbance and suicidal ideas. The patient is nervous/anxious. The patient is not hyperactive.        Objective:      Physical Exam   Constitutional: She is oriented to person, place, and time. She  appears well-developed and well-nourished. She appears distressed.   HENT:   Head: Normocephalic and atraumatic.   Right Ear: External ear normal.   Left Ear: External ear normal.   Nose: Nose normal. Right sinus exhibits no maxillary sinus tenderness and no frontal sinus tenderness. Left sinus exhibits no maxillary sinus tenderness and no frontal sinus tenderness.   Mouth/Throat: Oropharynx is clear and moist. No oropharyngeal exudate.   Eyes: Pupils are equal, round, and reactive to light. Conjunctivae, EOM and lids are normal. Right eye exhibits no discharge. Left eye exhibits no discharge. Right conjunctiva is not injected. Right conjunctiva has no hemorrhage. Left conjunctiva is not injected. Left conjunctiva has no hemorrhage. No scleral icterus.   Neck: Normal range of motion. Neck supple. No JVD present. No tracheal deviation present. No thyromegaly present.   Cardiovascular: Normal rate and regular rhythm.   Pulmonary/Chest: Effort normal. No stridor. No respiratory distress. She exhibits no tenderness.       Abdominal: Soft. She exhibits no distension and no mass. There is no splenomegaly or hepatomegaly. There is no tenderness. There is no rebound.   Musculoskeletal: Normal range of motion. She exhibits no edema or tenderness.   Lymphadenopathy:     She has no cervical adenopathy.     She has no axillary adenopathy.        Right: No supraclavicular adenopathy present.        Left: No supraclavicular adenopathy present.   Neurological: She is alert and oriented to person, place, and time. No cranial nerve deficit. Coordination normal.   Skin: Skin is dry. No rash noted. She is not diaphoretic. No erythema.   Psychiatric: She has a normal mood and affect. Her behavior is normal. Judgment and thought content normal.   Vitals reviewed.          Assessment:      1. Thrombosis    2. Breast mass, right    3. Iron deficiency anemia due to chronic blood loss    4. Other abnormal and inconclusive findings on  diagnostic imaging of breast            Plan:     Hypercoagulable workup demonstrates positive findings for cardiolipin antibody.  Documented iron deficiency.  Will have CBC performed today patient is currently taking oral iron will be treated with intravenous iron on responsive patient will also have EGD and colon done will have to hold Eliquis 48 hr before and after for GI evaluation for iron blood loss.  In addition ultrasound and mammogram of right breast to be ordered I will see the patient back in 1 month for review she is having trouble with her Eliquis I have sent a prescription to the [facility] for help patient is aware 25 min face-to-face time coordination of care greater than 50% time face-to-face with patient        Sincere Cartwright Jr, MD FACP

## 2020-03-12 ENCOUNTER — TELEPHONE (OUTPATIENT)
Dept: RADIOLOGY | Facility: HOSPITAL | Age: 75
End: 2020-03-12

## 2020-03-12 ENCOUNTER — TELEPHONE (OUTPATIENT)
Dept: INTERNAL MEDICINE | Facility: CLINIC | Age: 75
End: 2020-03-12

## 2020-03-12 NOTE — TELEPHONE ENCOUNTER
----- Message from Michaelle Jolley sent at 3/12/2020 11:53 AM CDT -----  Contact: self [phone]  Pt states that her knee is not any better and hurts really bad. Wants to know what she needs to do. Please call back at [phone]//thank you acc

## 2020-03-13 ENCOUNTER — HOSPITAL ENCOUNTER (OUTPATIENT)
Dept: RADIOLOGY | Facility: HOSPITAL | Age: 75
Discharge: HOME | End: 2020-03-13
Attending: INTERNAL MEDICINE
Payer: MEDICARE

## 2020-03-13 DIAGNOSIS — I82.90 THROMBOSIS: ICD-10-CM

## 2020-03-13 DIAGNOSIS — R92.8 OTHER ABNORMAL AND INCONCLUSIVE FINDINGS ON DIAGNOSTIC IMAGING OF BREAST: ICD-10-CM

## 2020-03-13 DIAGNOSIS — D50.0 IRON DEFICIENCY ANEMIA DUE TO CHRONIC BLOOD LOSS: ICD-10-CM

## 2020-03-13 DIAGNOSIS — N63.10 BREAST MASS, RIGHT: ICD-10-CM

## 2020-03-13 DIAGNOSIS — M17.0 OSTEOARTHRITIS OF BOTH KNEES, UNSPECIFIED OSTEOARTHRITIS TYPE: Primary | ICD-10-CM

## 2020-03-13 PROCEDURE — 77061 BREAST TOMOSYNTHESIS UNI: CPT | Mod: TC

## 2020-03-13 PROCEDURE — 77061 MAMMO DIGITAL DIAGNOSTIC RIGHT WITH TOMOSYNTHESIS_CAD: ICD-10-PCS | Mod: 26,,, | Performed by: RADIOLOGY

## 2020-03-13 PROCEDURE — 77065 DX MAMMO INCL CAD UNI: CPT | Mod: 26,,, | Performed by: RADIOLOGY

## 2020-03-13 PROCEDURE — 77065 MAMMO DIGITAL DIAGNOSTIC RIGHT WITH TOMOSYNTHESIS_CAD: ICD-10-PCS | Mod: 26,,, | Performed by: RADIOLOGY

## 2020-03-13 PROCEDURE — 77061 BREAST TOMOSYNTHESIS UNI: CPT | Mod: 26,,, | Performed by: RADIOLOGY

## 2020-03-13 RX ORDER — DICLOFENAC SODIUM 10 MG/G
2 GEL TOPICAL 2 TIMES DAILY
Qty: 1 TUBE | Refills: 5 | Status: SHIPPED | OUTPATIENT
Start: 2020-03-13 | End: 2021-03-30

## 2020-03-19 RX ORDER — TIZANIDINE 4 MG/1
4 TABLET ORAL DAILY PRN
Qty: 90 TABLET | Refills: 1 | Status: SHIPPED | OUTPATIENT
Start: 2020-03-19 | End: 2021-03-30

## 2020-03-24 ENCOUNTER — INFUSION (OUTPATIENT)
Dept: INFUSION THERAPY | Facility: HOSPITAL | Age: 75
End: 2020-03-24
Attending: INTERNAL MEDICINE
Payer: MEDICARE

## 2020-03-24 VITALS
DIASTOLIC BLOOD PRESSURE: 68 MMHG | OXYGEN SATURATION: 95 % | RESPIRATION RATE: 16 BRPM | SYSTOLIC BLOOD PRESSURE: 160 MMHG | HEART RATE: 64 BPM | TEMPERATURE: 98 F

## 2020-03-24 DIAGNOSIS — D50.0 IRON DEFICIENCY ANEMIA DUE TO CHRONIC BLOOD LOSS: Primary | ICD-10-CM

## 2020-03-24 PROCEDURE — 96365 THER/PROPH/DIAG IV INF INIT: CPT

## 2020-03-24 PROCEDURE — 63600175 PHARM REV CODE 636 W HCPCS: Mod: JG | Performed by: INTERNAL MEDICINE

## 2020-03-24 RX ADMIN — FERRIC CARBOXYMALTOSE INJECTION 750 MG: 50 INJECTION, SOLUTION INTRAVENOUS at 12:03

## 2020-03-24 NOTE — PLAN OF CARE
Patient states she feels well today. No complaints. Vitals stable. Labs reviewed. Therapy plan in process.

## 2020-03-26 ENCOUNTER — TELEPHONE (OUTPATIENT)
Dept: ORTHOPEDICS | Facility: CLINIC | Age: 75
End: 2020-03-26

## 2020-03-31 ENCOUNTER — INFUSION (OUTPATIENT)
Dept: INFUSION THERAPY | Facility: HOSPITAL | Age: 75
End: 2020-03-31
Attending: INTERNAL MEDICINE
Payer: MEDICARE

## 2020-03-31 VITALS
TEMPERATURE: 97 F | HEART RATE: 66 BPM | DIASTOLIC BLOOD PRESSURE: 68 MMHG | RESPIRATION RATE: 17 BRPM | SYSTOLIC BLOOD PRESSURE: 144 MMHG | OXYGEN SATURATION: 95 %

## 2020-03-31 DIAGNOSIS — D50.0 IRON DEFICIENCY ANEMIA DUE TO CHRONIC BLOOD LOSS: Primary | ICD-10-CM

## 2020-03-31 PROCEDURE — 63600175 PHARM REV CODE 636 W HCPCS: Mod: JG | Performed by: INTERNAL MEDICINE

## 2020-03-31 PROCEDURE — 96365 THER/PROPH/DIAG IV INF INIT: CPT

## 2020-03-31 RX ORDER — SODIUM CHLORIDE 0.9 % (FLUSH) 0.9 %
10 SYRINGE (ML) INJECTION
Status: DISCONTINUED | OUTPATIENT
Start: 2020-03-31 | End: 2020-03-31 | Stop reason: HOSPADM

## 2020-03-31 RX ADMIN — FERRIC CARBOXYMALTOSE INJECTION 750 MG: 50 INJECTION, SOLUTION INTRAVENOUS at 12:03

## 2020-03-31 NOTE — DISCHARGE INSTRUCTIONS
[facility] [place]  [address] [address]  [address]  [phone] phone     [phone] fax  Hours of Operation: Monday- Friday 8:00am- 5:00pm  After hours phone  [phone]  Hematology / Oncology Physicians on call      FERNANDO Hassan Dr., Dr., Dr., Dr., NP Cheree Bodden, NP Sydney Prescott, NP      Please call with any concerns regarding your appointment today.

## 2020-04-02 ENCOUNTER — TELEPHONE (OUTPATIENT)
Dept: ENDOSCOPY | Facility: HOSPITAL | Age: 75
End: 2020-04-02

## 2020-04-07 ENCOUNTER — LAB VISIT (OUTPATIENT)
Dept: LAB | Facility: HOSPITAL | Age: 75
End: 2020-04-07
Attending: INTERNAL MEDICINE
Payer: MEDICARE

## 2020-04-07 DIAGNOSIS — D50.0 IRON DEFICIENCY ANEMIA DUE TO CHRONIC BLOOD LOSS: ICD-10-CM

## 2020-04-07 LAB
BASOPHILS # BLD AUTO: 0.04 K/UL (ref 0–0.2)
BASOPHILS NFR BLD: 0.8 % (ref 0–1.9)
DIFFERENTIAL METHOD: ABNORMAL
EOSINOPHIL # BLD AUTO: 0.1 K/UL (ref 0–0.5)
EOSINOPHIL NFR BLD: 2.2 % (ref 0–8)
ERYTHROCYTE [DISTWIDTH] IN BLOOD BY AUTOMATED COUNT: 16.3 % (ref 11.5–14.5)
FERRITIN SERPL-MCNC: 1437 NG/ML (ref 20–300)
HCT VFR BLD AUTO: 39.4 % (ref 37–48.5)
HGB BLD-MCNC: 12.2 G/DL (ref 12–16)
IMM GRANULOCYTES # BLD AUTO: 0.02 K/UL (ref 0–0.04)
IMM GRANULOCYTES NFR BLD AUTO: 0.4 % (ref 0–0.5)
IRON SERPL-MCNC: 99 UG/DL (ref 30–160)
LYMPHOCYTES # BLD AUTO: 1.3 K/UL (ref 1–4.8)
LYMPHOCYTES NFR BLD: 26.1 % (ref 18–48)
MCH RBC QN AUTO: 27.6 PG (ref 27–31)
MCHC RBC AUTO-ENTMCNC: 31 G/DL (ref 32–36)
MCV RBC AUTO: 89 FL (ref 82–98)
MONOCYTES # BLD AUTO: 0.4 K/UL (ref 0.3–1)
MONOCYTES NFR BLD: 8.5 % (ref 4–15)
NEUTROPHILS # BLD AUTO: 3.1 K/UL (ref 1.8–7.7)
NEUTROPHILS NFR BLD: 62 % (ref 38–73)
NRBC BLD-RTO: 0 /100 WBC
PLATELET # BLD AUTO: 177 K/UL (ref 150–350)
PMV BLD AUTO: 10.7 FL (ref 9.2–12.9)
RBC # BLD AUTO: 4.42 M/UL (ref 4–5.4)
SATURATED IRON: 27 % (ref 20–50)
TOTAL IRON BINDING CAPACITY: 367 UG/DL (ref 250–450)
TRANSFERRIN SERPL-MCNC: 248 MG/DL (ref 200–375)
WBC # BLD AUTO: 5.06 K/UL (ref 3.9–12.7)

## 2020-04-07 PROCEDURE — 36415 COLL VENOUS BLD VENIPUNCTURE: CPT

## 2020-04-07 PROCEDURE — 82728 ASSAY OF FERRITIN: CPT

## 2020-04-07 PROCEDURE — 85025 COMPLETE CBC W/AUTO DIFF WBC: CPT

## 2020-04-07 PROCEDURE — 83540 ASSAY OF IRON: CPT

## 2020-04-09 ENCOUNTER — OFFICE VISIT (OUTPATIENT)
Dept: HEMATOLOGY/ONCOLOGY | Facility: CLINIC | Age: 75
End: 2020-04-09
Payer: MEDICARE

## 2020-04-09 VITALS
WEIGHT: 243.38 LBS | DIASTOLIC BLOOD PRESSURE: 67 MMHG | TEMPERATURE: 98 F | BODY MASS INDEX: 39.12 KG/M2 | HEART RATE: 69 BPM | HEIGHT: 66 IN | SYSTOLIC BLOOD PRESSURE: 133 MMHG

## 2020-04-09 DIAGNOSIS — D50.0 IRON DEFICIENCY ANEMIA DUE TO CHRONIC BLOOD LOSS: Primary | ICD-10-CM

## 2020-04-09 DIAGNOSIS — I82.90 THROMBOSIS: ICD-10-CM

## 2020-04-09 PROCEDURE — 99999 PR PBB SHADOW E&M-EST. PATIENT-LVL III: ICD-10-PCS | Mod: PBBFAC,,, | Performed by: INTERNAL MEDICINE

## 2020-04-09 PROCEDURE — 3078F DIAST BP <80 MM HG: CPT | Mod: CPTII,S$GLB,, | Performed by: INTERNAL MEDICINE

## 2020-04-09 PROCEDURE — 99214 OFFICE O/P EST MOD 30 MIN: CPT | Mod: S$GLB,,, | Performed by: INTERNAL MEDICINE

## 2020-04-09 PROCEDURE — 99499 RISK ADDL DX/OHS AUDIT: ICD-10-PCS | Mod: S$GLB,,, | Performed by: INTERNAL MEDICINE

## 2020-04-09 PROCEDURE — 1101F PR PT FALLS ASSESS DOC 0-1 FALLS W/OUT INJ PAST YR: ICD-10-PCS | Mod: CPTII,S$GLB,, | Performed by: INTERNAL MEDICINE

## 2020-04-09 PROCEDURE — 1101F PT FALLS ASSESS-DOCD LE1/YR: CPT | Mod: CPTII,S$GLB,, | Performed by: INTERNAL MEDICINE

## 2020-04-09 PROCEDURE — 99214 PR OFFICE/OUTPT VISIT, EST, LEVL IV, 30-39 MIN: ICD-10-PCS | Mod: S$GLB,,, | Performed by: INTERNAL MEDICINE

## 2020-04-09 PROCEDURE — 1159F MED LIST DOCD IN RCRD: CPT | Mod: S$GLB,,, | Performed by: INTERNAL MEDICINE

## 2020-04-09 PROCEDURE — 1159F PR MEDICATION LIST DOCUMENTED IN MEDICAL RECORD: ICD-10-PCS | Mod: S$GLB,,, | Performed by: INTERNAL MEDICINE

## 2020-04-09 PROCEDURE — 3075F PR MOST RECENT SYSTOLIC BLOOD PRESS GE 130-139MM HG: ICD-10-PCS | Mod: CPTII,S$GLB,, | Performed by: INTERNAL MEDICINE

## 2020-04-09 PROCEDURE — 3075F SYST BP GE 130 - 139MM HG: CPT | Mod: CPTII,S$GLB,, | Performed by: INTERNAL MEDICINE

## 2020-04-09 PROCEDURE — 99499 UNLISTED E&M SERVICE: CPT | Mod: S$GLB,,, | Performed by: INTERNAL MEDICINE

## 2020-04-09 PROCEDURE — 1126F PR PAIN SEVERITY QUANTIFIED, NO PAIN PRESENT: ICD-10-PCS | Mod: S$GLB,,, | Performed by: INTERNAL MEDICINE

## 2020-04-09 PROCEDURE — 3078F PR MOST RECENT DIASTOLIC BLOOD PRESSURE < 80 MM HG: ICD-10-PCS | Mod: CPTII,S$GLB,, | Performed by: INTERNAL MEDICINE

## 2020-04-09 PROCEDURE — 1126F AMNT PAIN NOTED NONE PRSNT: CPT | Mod: S$GLB,,, | Performed by: INTERNAL MEDICINE

## 2020-04-09 PROCEDURE — 99999 PR PBB SHADOW E&M-EST. PATIENT-LVL III: CPT | Mod: PBBFAC,,, | Performed by: INTERNAL MEDICINE

## 2020-04-09 NOTE — PROGRESS NOTES
Subjective:       Patient ID: Kay Rosas is a 74 y.o. female.    Chief Complaint: Results; Coagulation Disorder; and Anemia    HPI 74-year-old female with recurrent thrombo embolic disease.  Continuing on Eliquis 5 mg p.o. b.i.d. patient returns after intravenous iron for response to therapy still reports fatigue and weakness seen during Coronavirus event    Past Medical History:   Diagnosis Date    Arthritis     Degenerative disc disease, cervical     Diabetes mellitus, type 2     Emphysema of lung     Hyperlipidemia     Hypertension     MVP (mitral valve prolapse)     Osteoporosis     feet    Thyroid condition      Family History   Problem Relation Age of Onset    Heart disease Mother     Kidney disease Mother     Arthritis Mother     Breast cancer Mother     Cancer Father     Heart disease Father     Cancer Sister     Ovarian cancer Sister     Alzheimer's disease Sister      Social History     Socioeconomic History    Marital status:      Spouse name: Not on file    Number of children: Not on file    Years of education: Not on file    Highest education level: Not on file   Occupational History    Not on file   Social Needs    Financial resource strain: Not on file    Food insecurity:     Worry: Not on file     Inability: Not on file    Transportation needs:     Medical: Not on file     Non-medical: Not on file   Tobacco Use    Smoking status: Former Smoker     Last attempt to quit: 2015     Years since quittin.8    Smokeless tobacco: Never Used   Substance and Sexual Activity    Alcohol use: No     Alcohol/week: 0.0 standard drinks    Drug use: No    Sexual activity: Not on file   Lifestyle    Physical activity:     Days per week: Not on file     Minutes per session: Not on file    Stress: Not on file   Relationships    Social connections:     Talks on phone: Not on file     Gets together: Not on file     Attends Congregational service: Not on file     Active  member of club or organization: Not on file     Attends meetings of clubs or organizations: Not on file     Relationship status: Not on file   Other Topics Concern    Not on file   Social History Narrative    Not on file     Past Surgical History:   Procedure Laterality Date    BACK SURGERY  2012    cataract surgery  Bilateral 10/ 2012    elbow spur removed Left     HYSTERECTOMY Left 1978    INJECTION OF ANESTHETIC AGENT INTO SACROILIAC JOINT Bilateral 11/11/2019    Procedure: Bilateral GT bursa + SIJ injection;  Surgeon: Noe Pleitez MD;  Location: [facility] PAIN [facility]GT;  Service: Pain Management;  Laterality: Bilateral;    INJECTION OF JOINT Bilateral 11/11/2019    Procedure: Bilateral GT bursa + SIJ injection;  Surgeon: Noe Pleitez MD;  Location: [facility]IN [facility];  Service: Pain Management;  Laterality: Bilateral;    OOPHORECTOMY      OVARIAN CYST REMOVAL      two from back, one from axilla    VASCULAR SURGERY Right     high ligation due to blood clot        Labs:  Lab Results   Component Value Date    WBC 5.06 04/07/2020    HGB 12.2 04/07/2020    HCT 39.4 04/07/2020    MCV 89 04/07/2020     04/07/2020     BMP  Lab Results   Component Value Date     10/07/2019    K 4.1 10/07/2019     10/07/2019    CO2 28 10/07/2019    BUN 51 (H) 10/07/2019    CREATININE 1.7 (H) 10/07/2019    CALCIUM 9.7 10/07/2019    ANIONGAP 9 10/07/2019    ESTGFRAFRICA 34.0 (A) 10/07/2019    EGFRNONAA 29.5 (A) 10/07/2019     Lab Results   Component Value Date    ALT 14 02/18/2019    AST 20 02/18/2019    ALKPHOS 80 02/18/2019    BILITOT 0.3 02/18/2019       Lab Results   Component Value Date    IRON 99 04/07/2020    TIBC 367 04/07/2020    FERRITIN 1,437 (H) 04/07/2020     Lab Results   Component Value Date    DUVXJOTO90 874 03/27/2019     No results found for: FOLATE  Lab Results   Component Value Date    TSH 1.845 02/18/2019         Review of Systems   Constitutional: Positive for fatigue. Negative for activity  change, appetite change, chills, diaphoresis, fever and unexpected weight change.   HENT: Negative for congestion, dental problem, drooling, ear discharge, ear pain, facial swelling, hearing loss, mouth sores, nosebleeds, postnasal drip, rhinorrhea, sinus pressure, sneezing, sore throat, tinnitus, trouble swallowing and voice change.    Eyes: Negative for photophobia, pain, discharge, redness, itching and visual disturbance.   Respiratory: Negative for cough, choking, chest tightness, shortness of breath, wheezing and stridor.    Cardiovascular: Negative for chest pain, palpitations and leg swelling.   Gastrointestinal: Negative for abdominal distention, abdominal pain, anal bleeding, blood in stool, constipation, diarrhea, nausea, rectal pain and vomiting.   Endocrine: Negative for cold intolerance, heat intolerance, polydipsia, polyphagia and polyuria.   Genitourinary: Negative for decreased urine volume, difficulty urinating, dyspareunia, dysuria, enuresis, flank pain, frequency, genital sores, hematuria, menstrual problem, pelvic pain, urgency, vaginal bleeding, vaginal discharge and vaginal pain.   Musculoskeletal: Negative for arthralgias, back pain, gait problem, joint swelling, myalgias, neck pain and neck stiffness.   Skin: Negative for color change, pallor and rash.   Allergic/Immunologic: Negative for environmental allergies, food allergies and immunocompromised state.   Neurological: Positive for weakness. Negative for dizziness, tremors, seizures, syncope, facial asymmetry, speech difficulty, light-headedness, numbness and headaches.   Hematological: Negative for adenopathy. Does not bruise/bleed easily.   Psychiatric/Behavioral: Positive for dysphoric mood. Negative for agitation, behavioral problems, confusion, decreased concentration, hallucinations, self-injury, sleep disturbance and suicidal ideas. The patient is nervous/anxious. The patient is not hyperactive.        Objective:      Physical Exam    Constitutional: She is oriented to person, place, and time. She has a sickly appearance. She appears distressed.   HENT:   Head: Normocephalic and atraumatic.   Right Ear: External ear normal.   Left Ear: External ear normal.   Nose: Nose normal. Right sinus exhibits no maxillary sinus tenderness and no frontal sinus tenderness. Left sinus exhibits no maxillary sinus tenderness and no frontal sinus tenderness.   Mouth/Throat: Oropharynx is clear and moist. No oropharyngeal exudate.   Eyes: Pupils are equal, round, and reactive to light. Conjunctivae, EOM and lids are normal. Right eye exhibits no discharge. Left eye exhibits no discharge. Right conjunctiva is not injected. Right conjunctiva has no hemorrhage. Left conjunctiva is not injected. Left conjunctiva has no hemorrhage. No scleral icterus.   Neck: Normal range of motion. Neck supple. No JVD present. No tracheal deviation present. No thyromegaly present.   Cardiovascular: Normal rate and regular rhythm.   Pulmonary/Chest: Effort normal. No stridor. No respiratory distress. She exhibits no tenderness.   Abdominal: Soft. She exhibits no distension and no mass. There is no splenomegaly or hepatomegaly. There is no tenderness. There is no rebound.   Musculoskeletal: Normal range of motion. She exhibits no edema or tenderness.   Lymphadenopathy:     She has no cervical adenopathy.     She has no axillary adenopathy.        Right: No supraclavicular adenopathy present.        Left: No supraclavicular adenopathy present.   Neurological: She is alert and oriented to person, place, and time. No cranial nerve deficit. Coordination normal.   Skin: Skin is dry. No rash noted. She is not diaphoretic. No erythema.   Psychiatric: She has a normal mood and affect. Her behavior is normal. Judgment and thought content normal.   Vitals reviewed.          Assessment:      1. Iron deficiency anemia due to chronic blood loss    2. Thrombosis           Plan:     Review of  laboratory studies excellent response intravenous iron.  Will follow-up in 4 months CBC iron status have recommended EGD and colon but on hold because of Coronavirus events.  In addition would recommend follow-up in 4 months CBC iron status.  Refill and continue on current dose of Eliquis because of recurrent thromboembolic disease        Sincere Cartwright Jr, MD FACP

## 2020-04-17 DIAGNOSIS — M54.16 RIGHT LUMBAR RADICULITIS: ICD-10-CM

## 2020-04-17 DIAGNOSIS — M47.816 LUMBAR SPONDYLOSIS: ICD-10-CM

## 2020-04-17 NOTE — TELEPHONE ENCOUNTER
----- Message from Sera Gerhard sent at 4/17/2020  3:11 PM CDT -----  Contact: pt  1. What is the name of the medication you are requesting? Gabapentine  2. What is the dose? 300mg  3. How do you take the medication? Orally, topically, etc? n/a  4. How often do you take this medication? Two three times daily  5. Do you need a 30 day or 90 day supply? n/a  6. How many refills are you requesting? n/a  7. What is your preferred pharmacy and location of the pharmacy? [facility]  8. Who can we contact with further questions? The pt at [phone]

## 2020-04-20 RX ORDER — GABAPENTIN 300 MG/1
600 CAPSULE ORAL 3 TIMES DAILY
Qty: 90 CAPSULE | Refills: 3 | Status: SHIPPED | OUTPATIENT
Start: 2020-04-20 | End: 2020-04-30

## 2020-04-29 DIAGNOSIS — M47.816 LUMBAR SPONDYLOSIS: ICD-10-CM

## 2020-04-29 DIAGNOSIS — M54.16 RIGHT LUMBAR RADICULITIS: ICD-10-CM

## 2020-04-30 RX ORDER — GABAPENTIN 300 MG/1
600 CAPSULE ORAL 3 TIMES DAILY
Qty: 180 CAPSULE | Refills: 3 | Status: SHIPPED | OUTPATIENT
Start: 2020-04-30 | End: 2021-02-25

## 2020-06-09 ENCOUNTER — PATIENT OUTREACH (OUTPATIENT)
Dept: ADMINISTRATIVE | Facility: OTHER | Age: 75
End: 2020-06-09

## 2020-06-10 ENCOUNTER — OFFICE VISIT (OUTPATIENT)
Dept: ORTHOPEDICS | Facility: CLINIC | Age: 75
End: 2020-06-10
Payer: MEDICARE

## 2020-06-10 VITALS
BODY MASS INDEX: 39.05 KG/M2 | WEIGHT: 243 LBS | SYSTOLIC BLOOD PRESSURE: 138 MMHG | DIASTOLIC BLOOD PRESSURE: 62 MMHG | HEART RATE: 59 BPM | HEIGHT: 66 IN

## 2020-06-10 DIAGNOSIS — M17.0 PRIMARY OSTEOARTHRITIS OF BOTH KNEES: Primary | ICD-10-CM

## 2020-06-10 PROCEDURE — 20610 LARGE JOINT ASPIRATION/INJECTION: BILATERAL KNEE: ICD-10-PCS | Mod: 50,S$GLB,, | Performed by: FAMILY MEDICINE

## 2020-06-10 PROCEDURE — 99499 NO LOS: ICD-10-PCS | Mod: S$GLB,,, | Performed by: FAMILY MEDICINE

## 2020-06-10 PROCEDURE — 99999 PR PBB SHADOW E&M-EST. PATIENT-LVL III: CPT | Mod: PBBFAC,,, | Performed by: FAMILY MEDICINE

## 2020-06-10 PROCEDURE — 99499 UNLISTED E&M SERVICE: CPT | Mod: S$GLB,,, | Performed by: FAMILY MEDICINE

## 2020-06-10 PROCEDURE — 99999 PR PBB SHADOW E&M-EST. PATIENT-LVL III: ICD-10-PCS | Mod: PBBFAC,,, | Performed by: FAMILY MEDICINE

## 2020-06-10 PROCEDURE — 20610 DRAIN/INJ JOINT/BURSA W/O US: CPT | Mod: 50,S$GLB,, | Performed by: FAMILY MEDICINE

## 2020-06-10 RX ORDER — LEVOTHYROXINE SODIUM 50 UG/1
50 TABLET ORAL DAILY
Qty: 90 TABLET | Refills: 3 | Status: SHIPPED | OUTPATIENT
Start: 2020-06-10 | End: 2021-08-13 | Stop reason: SDUPTHER

## 2020-06-10 NOTE — PATIENT INSTRUCTIONS
Apply ice to knee 3 times a day as needed for next few days    Reduce activities on lower extremities for next few days    Recheck in 1 week

## 2020-06-12 NOTE — PROGRESS NOTES
Subjective:     Patient ID: Kay Rosas is a 74 y.o. female.    Chief Complaint: Pain of the Right Knee and Pain of the Left Knee      HPI:  Patient has had chronic stiffness and pain in both knees and has radiographic evidence of bilateral osteoarthritis and is here for 1st of series of Euflexxa injections.  No recent fever weight loss chills shortness of breath chest pain.    Past Medical History:   Diagnosis Date    Arthritis     Degenerative disc disease, cervical     Diabetes mellitus, type 2     Emphysema of lung     Hyperlipidemia     Hypertension     MVP (mitral valve prolapse)     Osteoporosis     feet    Thyroid condition      Past Surgical History:   Procedure Laterality Date    BACK SURGERY  2012    cataract surgery  Bilateral 10/ 2012    elbow spur removed Left     HYSTERECTOMY Left 1978    INJECTION OF ANESTHETIC AGENT INTO SACROILIAC JOINT Bilateral 11/11/2019    Procedure: Bilateral GT bursa + SIJ injection;  Surgeon: Noe Pleitez MD;  Location: [facility] PAIN T;  Service: Pain Management;  Laterality: Bilateral;    INJECTION OF JOINT Bilateral 11/11/2019    Procedure: Bilateral GT bursa + SIJ injection;  Surgeon: Noe Pleitez MD;  Location: [facility]IN [facility];  Service: Pain Management;  Laterality: Bilateral;    OOPHORECTOMY      OVARIAN CYST REMOVAL      two from back, one from axilla    VASCULAR SURGERY Right     high ligation due to blood clot      Family History   Problem Relation Age of Onset    Heart disease Mother     Kidney disease Mother     Arthritis Mother     Breast cancer Mother     Cancer Father     Heart disease Father     Cancer Sister     Ovarian cancer Sister     Alzheimer's disease Sister      Social History     Socioeconomic History    Marital status:      Spouse name: Not on file    Number of children: Not on file    Years of education: Not on file    Highest education level: Not on file   Occupational History    Not on file    Social Needs    Financial resource strain: Not on file    Food insecurity:     Worry: Not on file     Inability: Not on file    Transportation needs:     Medical: Not on file     Non-medical: Not on file   Tobacco Use    Smoking status: Former Smoker     Last attempt to quit: 2015     Years since quittin.9    Smokeless tobacco: Never Used   Substance and Sexual Activity    Alcohol use: No     Alcohol/week: 0.0 standard drinks    Drug use: No    Sexual activity: Not on file   Lifestyle    Physical activity:     Days per week: Not on file     Minutes per session: Not on file    Stress: Not on file   Relationships    Social connections:     Talks on phone: Not on file     Gets together: Not on file     Attends [employer] service: Not on file     Active member of club or organization: Not on file     Attends meetings of clubs or organizations: Not on file     Relationship status: Not on file   Other Topics Concern    Not on file   Social History Narrative    Not on file       Current Outpatient Medications:     albuterol (PROVENTIL/VENTOLIN HFA) 90 mcg/actuation inhaler, Inhale 2 puffs into the lungs every 6 (six) hours as needed., Disp: 18 g, Rfl: 4    apixaban (ELIQUIS) 5 mg Tab, Take 1 tablet (5 mg total) by mouth 2 (two) times daily., Disp: 60 tablet, Rfl: 11    ARIPiprazole (ABILIFY) 2 MG Tab, TAKE 1 TABLET (2 MG TOTAL) BY MOUTH ONCE DAILY., Disp: 90 tablet, Rfl: 2    diclofenac sodium (VOLTAREN) 1 % Gel, Apply 2 g topically 2 (two) times daily., Disp: 1 Tube, Rfl: 5    gabapentin (NEURONTIN) 300 MG capsule, TAKE 2 CAPSULES (600 MG TOTAL) BY MOUTH 3 (THREE) TIMES DAILY., Disp: 180 capsule, Rfl: 3    levothyroxine (SYNTHROID) 50 MCG tablet, Take 1 tablet (50 mcg total) by mouth once daily., Disp: 90 tablet, Rfl: 3    losartan-hydrochlorothiazide 100-25 mg (HYZAAR) 100-25 mg per tablet, TAKE 1 TABLET BY MOUTH ONCE DAILY., Disp: 90 tablet, Rfl: 3    oxybutynin (DITROPAN-XL) 5 MG TR24,  TAKE 1 TABLET BY MOUTH EVERY DAY, Disp: 90 tablet, Rfl: 1    pantoprazole (PROTONIX) 40 MG tablet, TAKE 1 TABLET BY MOUTH EVERY DAY, Disp: 90 tablet, Rfl: 1    sertraline (ZOLOFT) 100 MG tablet, TAKE 1 TABLET (100 MG TOTAL) BY MOUTH ONCE DAILY., Disp: 30 tablet, Rfl: 6    sertraline (ZOLOFT) 50 MG tablet, TAKE 1 TABLET BY MOUTH EVERY DAY, Disp: 90 tablet, Rfl: 1    tiZANidine (ZANAFLEX) 4 MG tablet, Take 1 tablet (4 mg total) by mouth daily as needed., Disp: 90 tablet, Rfl: 1    traMADol (ULTRAM) 50 mg tablet, Take 1 tablet (50 mg total) by mouth every 6 (six) hours as needed for Pain., Disp: 30 tablet, Rfl: 1    traZODone (DESYREL) 100 MG tablet, TAKE 1 TABLET (100 MG TOTAL) BY MOUTH EVERY EVENING., Disp: 90 tablet, Rfl: 1    azelastine (ASTELIN) 137 mcg (0.1 %) nasal spray, 2 sprays (274 mcg total) by Nasal route 2 (two) times daily., Disp: 30 mL, Rfl: 11    levocetirizine (XYZAL) 5 MG tablet, Take 1 tablet (5 mg total) by mouth every evening., Disp: 90 tablet, Rfl: 2  Review of patient's allergies indicates:   Allergen Reactions    Nsaids (non-steroidal anti-inflammatory drug)      CKD     ROS    Objective:   Body mass index is 39.22 kg/m².  Vitals:    06/10/20 0939   BP: 138/62   Pulse: (!) 59           Ortho/SPM Exam-alert and oriented and in no acute distress    IMAGING: Mammo Digital Diagnostic Right w/ Curt  Narrative: Result:  Mammo Digital Diagnostic Right w/ Curt    History:  Patient is 74 y.o. and is seen for a diagnostic mammogram.    Films Compared:  Compared to: 08/01/2019 Mammo Digital Screening Bilat w/ Curt, 12/26/2017   Mammo Digital Screening Bilat with Tomosynthesis_CAD, and 11/29/2016 Mammo   Digital Screening Bilateral With CAD    Findings:  This procedure was performed using tomosynthesis. Computer-aided detection   was utilized in the interpretation of this examination.     The right breast has scattered areas of fibroglandular density. There is   no evidence of suspicious masses,  microcalcifications or architectural   distortion.  Impression: No mammographic evidence of malignancy.    BI-RADS Category 1: Negative    Recommendation:  Routine screening mammogram in 1 year is recommended.    Your estimated lifetime risk of breast cancer (to age 85) based on   Tyrer-Cuzick risk assessment model is Tyrer-Cuzick: 7.9 %. According to   the American Cancer Society, patients with a lifetime breast cancer risk   of 20% or higher might benefit from supplemental screening tests.       Radiographs / Imaging : Relevant imaging results reviewed by me and interpreted by me, discussed with the patient and / or family       Assessment:     Encounter Diagnosis   Name Primary?    Primary osteoarthritis of both knees Yes        Plan:   Primary osteoarthritis of both knees        The patient verbalized good understanding of the medical issues discussed today and expressed appreciation for the care provided.  Patient was given the opportunity to ask questions and be an active participant in their medical care. Patient had no further questions or concerns at this time.     The patient was encouraged to participate in appropriate physical activity.      Fabio Tiwari M.D.  [facility]        This note was dictated using voice recognition software and may have sound a like errors.

## 2020-06-12 NOTE — PROCEDURES
Large Joint Aspiration/Injection: bilateral knee  Date/Time: 6/10/2020 10:00 AM  Performed by: Fabio Tiwari MD  Authorized by: Fabio Tiwari MD     Indications:  Pain  Site marked: the procedure site was marked    Timeout: prior to procedure the correct patient, procedure, and site was verified    Prep: patient was prepped and draped in usual sterile fashion    Approach:  Anterolateral  Location:  Knee  Laterality:  Bilateral  Site:  Bilateral knee  Medications (Right):  20 mg sodium hyaluronate (EUFLEXXA) 10 mg/mL(mw 2.4 -3.6 million)  Medications (Left):  20 mg sodium hyaluronate (EUFLEXXA) 10 mg/mL(mw 2.4 -3.6 million)  Patient tolerance:  Patient tolerated the procedure well with no immediate complications

## 2020-06-15 ENCOUNTER — TELEPHONE (OUTPATIENT)
Dept: INTERNAL MEDICINE | Facility: CLINIC | Age: 75
End: 2020-06-15

## 2020-06-15 NOTE — TELEPHONE ENCOUNTER
I s/w pt in regards to rescheduling appointment that was scheduled for today. Pt rescheduled for July 1st at 3:40. //BJ

## 2020-06-16 ENCOUNTER — PATIENT OUTREACH (OUTPATIENT)
Dept: ADMINISTRATIVE | Facility: OTHER | Age: 75
End: 2020-06-16

## 2020-06-17 ENCOUNTER — OFFICE VISIT (OUTPATIENT)
Dept: ORTHOPEDICS | Facility: CLINIC | Age: 75
End: 2020-06-17
Payer: MEDICARE

## 2020-06-17 ENCOUNTER — PATIENT OUTREACH (OUTPATIENT)
Dept: ADMINISTRATIVE | Facility: HOSPITAL | Age: 75
End: 2020-06-17

## 2020-06-17 ENCOUNTER — TELEPHONE (OUTPATIENT)
Dept: ORTHOPEDICS | Facility: CLINIC | Age: 75
End: 2020-06-17

## 2020-06-17 VITALS
HEIGHT: 66 IN | HEART RATE: 60 BPM | WEIGHT: 243 LBS | DIASTOLIC BLOOD PRESSURE: 74 MMHG | SYSTOLIC BLOOD PRESSURE: 182 MMHG | BODY MASS INDEX: 39.05 KG/M2

## 2020-06-17 DIAGNOSIS — M17.0 BILATERAL PRIMARY OSTEOARTHRITIS OF KNEE: ICD-10-CM

## 2020-06-17 DIAGNOSIS — R03.0 ELEVATED BLOOD PRESSURE READING: Primary | ICD-10-CM

## 2020-06-17 PROCEDURE — 99999 PR PBB SHADOW E&M-EST. PATIENT-LVL III: CPT | Mod: PBBFAC,,, | Performed by: FAMILY MEDICINE

## 2020-06-17 PROCEDURE — 99999 PR PBB SHADOW E&M-EST. PATIENT-LVL III: ICD-10-PCS | Mod: PBBFAC,,, | Performed by: FAMILY MEDICINE

## 2020-06-17 PROCEDURE — 99499 UNLISTED E&M SERVICE: CPT | Mod: S$GLB,,, | Performed by: FAMILY MEDICINE

## 2020-06-17 PROCEDURE — 99499 NO LOS: ICD-10-PCS | Mod: S$GLB,,, | Performed by: FAMILY MEDICINE

## 2020-06-17 NOTE — PROGRESS NOTES
Pre Visit Chart Audit Complete:     Health Maintenance: Updated  Immunizations: Abstracted  Care Everywhere: Abstracted   [facility] Search: Pt not found.   Health Maintenance Due   Topic    TETANUS VACCINE     Low Dose Statin     DEXA SCAN     Pneumococcal Vaccine (65+ Low/Medium Risk) (1 of 2 - PCV13)    Eye Exam

## 2020-06-17 NOTE — PROGRESS NOTES
Subjective:     Patient ID: Kay Rosas is a 74 y.o. female.    Chief Complaint: Pain of the Right Knee and Pain of the Left Knee      HPI:  Here for 3rd Euflexxa injection today and has not noticed any change in her knee status so far.  However she did not take her blood pressure medication yet this morning and her blood pressure is over 170 and was repeated 3 times.  She denies any chest pain shortness of breath or other associated symptoms but will go home and take her medication and let us know when her pressure is below 150.    Past Medical History:   Diagnosis Date    Arthritis     Degenerative disc disease, cervical     Diabetes mellitus, type 2     Emphysema of lung     Hyperlipidemia     Hypertension     MVP (mitral valve prolapse)     Osteoporosis     feet    Thyroid condition      Past Surgical History:   Procedure Laterality Date    BACK SURGERY  2012    cataract surgery  Bilateral 10/ 2012    elbow spur removed Left     HYSTERECTOMY Left 1978    INJECTION OF ANESTHETIC AGENT INTO SACROILIAC JOINT Bilateral 11/11/2019    Procedure: Bilateral GT bursa + SIJ injection;  Surgeon: Noe Pleitez MD;  Location: [facility];  Service: Pain Management;  Laterality: Bilateral;    INJECTION OF JOINT Bilateral 11/11/2019    Procedure: Bilateral GT bursa + SIJ injection;  Surgeon: Noe Pleitez MD;  Location: [facility];  Service: Pain Management;  Laterality: Bilateral;    OOPHORECTOMY      OVARIAN CYST REMOVAL      two from back, one from axilla    VASCULAR SURGERY Right     high ligation due to blood clot      Family History   Problem Relation Age of Onset    Heart disease Mother     Kidney disease Mother     Arthritis Mother     Breast cancer Mother     Cancer Father     Heart disease Father     Cancer Sister     Ovarian cancer Sister     Alzheimer's disease Sister      Social History     Socioeconomic History    Marital status:      Spouse name: Not on file     Number of children: Not on file    Years of education: Not on file    Highest education level: Not on file   Occupational History    Not on file   Social Needs    Financial resource strain: Not on file    Food insecurity     Worry: Not on file     Inability: Not on file    Transportation needs     Medical: Not on file     Non-medical: Not on file   Tobacco Use    Smoking status: Former Smoker     Quit date: 2015     Years since quittin.9    Smokeless tobacco: Never Used   Substance and Sexual Activity    Alcohol use: No     Alcohol/week: 0.0 standard drinks    Drug use: No    Sexual activity: Not on file   Lifestyle    Physical activity     Days per week: Not on file     Minutes per session: Not on file    Stress: Not on file   Relationships    Social connections     Talks on phone: Not on file     Gets together: Not on file     Attends Evangelical service: Not on file     Active member of club or organization: Not on file     Attends meetings of clubs or organizations: Not on file     Relationship status: Not on file   Other Topics Concern    Not on file   Social History Narrative    Not on file       Current Outpatient Medications:     albuterol (PROVENTIL/VENTOLIN HFA) 90 mcg/actuation inhaler, Inhale 2 puffs into the lungs every 6 (six) hours as needed., Disp: 18 g, Rfl: 4    apixaban (ELIQUIS) 5 mg Tab, Take 1 tablet (5 mg total) by mouth 2 (two) times daily., Disp: 60 tablet, Rfl: 11    ARIPiprazole (ABILIFY) 2 MG Tab, TAKE 1 TABLET (2 MG TOTAL) BY MOUTH ONCE DAILY., Disp: 90 tablet, Rfl: 2    azelastine (ASTELIN) 137 mcg (0.1 %) nasal spray, 2 sprays (274 mcg total) by Nasal route 2 (two) times daily., Disp: 30 mL, Rfl: 11    diclofenac sodium (VOLTAREN) 1 % Gel, Apply 2 g topically 2 (two) times daily., Disp: 1 Tube, Rfl: 5    gabapentin (NEURONTIN) 300 MG capsule, TAKE 2 CAPSULES (600 MG TOTAL) BY MOUTH 3 (THREE) TIMES DAILY., Disp: 180 capsule, Rfl: 3    levocetirizine  (XYZAL) 5 MG tablet, Take 1 tablet (5 mg total) by mouth every evening., Disp: 90 tablet, Rfl: 2    levothyroxine (SYNTHROID) 50 MCG tablet, Take 1 tablet (50 mcg total) by mouth once daily., Disp: 90 tablet, Rfl: 3    losartan-hydrochlorothiazide 100-25 mg (HYZAAR) 100-25 mg per tablet, TAKE 1 TABLET BY MOUTH ONCE DAILY., Disp: 90 tablet, Rfl: 3    oxybutynin (DITROPAN-XL) 5 MG TR24, TAKE 1 TABLET BY MOUTH EVERY DAY, Disp: 90 tablet, Rfl: 1    pantoprazole (PROTONIX) 40 MG tablet, TAKE 1 TABLET BY MOUTH EVERY DAY, Disp: 90 tablet, Rfl: 1    sertraline (ZOLOFT) 100 MG tablet, TAKE 1 TABLET (100 MG TOTAL) BY MOUTH ONCE DAILY., Disp: 30 tablet, Rfl: 6    sertraline (ZOLOFT) 50 MG tablet, TAKE 1 TABLET BY MOUTH EVERY DAY, Disp: 90 tablet, Rfl: 1    tiZANidine (ZANAFLEX) 4 MG tablet, Take 1 tablet (4 mg total) by mouth daily as needed., Disp: 90 tablet, Rfl: 1    traMADol (ULTRAM) 50 mg tablet, Take 1 tablet (50 mg total) by mouth every 6 (six) hours as needed for Pain., Disp: 30 tablet, Rfl: 1    traZODone (DESYREL) 100 MG tablet, TAKE 1 TABLET (100 MG TOTAL) BY MOUTH EVERY EVENING., Disp: 90 tablet, Rfl: 1  Review of patient's allergies indicates:   Allergen Reactions    Nsaids (non-steroidal anti-inflammatory drug)      CKD     ROS    Objective:   Body mass index is 39.22 kg/m².  Vitals:    06/17/20 1011   BP: (!) 182/74   Pulse:            Ortho/SPM Exam    IMAGING: Mammo Digital Diagnostic Right w/ Curt  Narrative: Result:  Mammo Digital Diagnostic Right w/ Curt    History:  Patient is 74 y.o. and is seen for a diagnostic mammogram.    Films Compared:  Compared to: 08/01/2019 Mammo Digital Screening Bilat w/ Curt, 12/26/2017   Mammo Digital Screening Bilat with Tomosynthesis_CAD, and 11/29/2016 Mammo   Digital Screening Bilateral With CAD    Findings:  This procedure was performed using tomosynthesis. Computer-aided detection   was utilized in the interpretation of this examination.     The right breast  has scattered areas of fibroglandular density. There is   no evidence of suspicious masses, microcalcifications or architectural   distortion.  Impression: No mammographic evidence of malignancy.    BI-RADS Category 1: Negative    Recommendation:  Routine screening mammogram in 1 year is recommended.    Your estimated lifetime risk of breast cancer (to age 85) based on   Tyrer-Cuzick risk assessment model is Tyrer-Cuzick: 7.9 %. According to   the American Cancer Society, patients with a lifetime breast cancer risk   of 20% or higher might benefit from supplemental screening tests.       Radiographs / Imaging : Relevant imaging results reviewed by me and interpreted by me, discussed with the patient and / or family       Assessment:     Encounter Diagnoses   Name Primary?    Elevated blood pressure reading Yes    Bilateral primary osteoarthritis of knee         Plan:   Elevated blood pressure reading    Bilateral primary osteoarthritis of knee        The patient verbalized good understanding of the medical issues discussed today and expressed appreciation for the care provided.  Patient was given the opportunity to ask questions and be an active participant in their medical care. Patient had no further questions or concerns at this time.     The patient was encouraged to participate in appropriate physical activity.      Fabio Tiwari M.D.  [facility]        This note was dictated using voice recognition software and may have sound a like errors.

## 2020-06-17 NOTE — PATIENT INSTRUCTIONS
Take blood pressure medication as soon as possible    Contact primary care provider if blood pressure stays over 150 or if symptoms    Call Back to get appointment for this afternoon or Friday as long as blood pressure is below 150.

## 2020-06-17 NOTE — TELEPHONE ENCOUNTER
Called the pt to inform here that Dr. Fabio Tiwari would like to see her for her next Euflexxa Injection on 6/19/2020 due to her blood pressure being elevated. I informed her to come in between 9:30-10:00.Pt understood and was grateful for the call.           ----- Message from Marie Banegas LPN sent at 6/17/2020  2:36 PM CDT -----  Contact: Patient    ----- Message -----  From: María Dawson LPN  Sent: 6/17/2020   2:10 PM CDT  To: [facility] Clinical Staff      ----- Message -----  From: Dagmar Cheatham  Sent: 6/17/2020   1:13 PM CDT  To: [facility]    Please have Kalani to give patient a call back at Ph .[phone] (home)

## 2020-06-19 ENCOUNTER — OFFICE VISIT (OUTPATIENT)
Dept: ORTHOPEDICS | Facility: CLINIC | Age: 75
End: 2020-06-19
Payer: MEDICARE

## 2020-06-19 VITALS
HEART RATE: 58 BPM | DIASTOLIC BLOOD PRESSURE: 57 MMHG | WEIGHT: 243 LBS | BODY MASS INDEX: 39.05 KG/M2 | SYSTOLIC BLOOD PRESSURE: 125 MMHG | HEIGHT: 66 IN

## 2020-06-19 DIAGNOSIS — M17.0 PRIMARY OSTEOARTHRITIS OF BOTH KNEES: Primary | ICD-10-CM

## 2020-06-19 PROCEDURE — 99999 PR PBB SHADOW E&M-EST. PATIENT-LVL III: CPT | Mod: PBBFAC,,, | Performed by: FAMILY MEDICINE

## 2020-06-19 PROCEDURE — 99499 UNLISTED E&M SERVICE: CPT | Mod: S$GLB,,, | Performed by: FAMILY MEDICINE

## 2020-06-19 PROCEDURE — 99499 NO LOS: ICD-10-PCS | Mod: S$GLB,,, | Performed by: FAMILY MEDICINE

## 2020-06-19 PROCEDURE — 99999 PR PBB SHADOW E&M-EST. PATIENT-LVL III: ICD-10-PCS | Mod: PBBFAC,,, | Performed by: FAMILY MEDICINE

## 2020-06-19 NOTE — PROGRESS NOTES
Subjective:     Patient ID: Kay Rosas is a 74 y.o. female.    Chief Complaint: Pain of the Left Knee and Pain of the Right Knee      HPI:  Patient was rescheduled because her blood pressure was too high on the last visit but it is well within the normal range today and she states that eating cereal yesterday helped her.    No worsening of her knees and she is ready for her 2nd injection of Euflexxa.  No recent illness.    Past Medical History:   Diagnosis Date    Arthritis     Degenerative disc disease, cervical     Diabetes mellitus, type 2     Emphysema of lung     Hyperlipidemia     Hypertension     MVP (mitral valve prolapse)     Osteoporosis     feet    Thyroid condition      Past Surgical History:   Procedure Laterality Date    BACK SURGERY  2012    cataract surgery  Bilateral 10/ 2012    elbow spur removed Left     HYSTERECTOMY Left 1978    INJECTION OF ANESTHETIC AGENT INTO SACROILIAC JOINT Bilateral 11/11/2019    Procedure: Bilateral GT bursa + SIJ injection;  Surgeon: Noe Pleitez MD;  Location: [facility];  Service: Pain Management;  Laterality: Bilateral;    INJECTION OF JOINT Bilateral 11/11/2019    Procedure: Bilateral GT bursa + SIJ injection;  Surgeon: Noe Pleitez MD;  Location: [facility];  Service: Pain Management;  Laterality: Bilateral;    OOPHORECTOMY      OVARIAN CYST REMOVAL      two from back, one from axilla    VASCULAR SURGERY Right     high ligation due to blood clot      Family History   Problem Relation Age of Onset    Heart disease Mother     Kidney disease Mother     Arthritis Mother     Breast cancer Mother     Cancer Father     Heart disease Father     Cancer Sister     Ovarian cancer Sister     Alzheimer's disease Sister      Social History     Socioeconomic History    Marital status:      Spouse name: Not on file    Number of children: Not on file    Years of education: Not on file    Highest education level: Not on file    Occupational History    Not on file   Social Needs    Financial resource strain: Not on file    Food insecurity     Worry: Not on file     Inability: Not on file    Transportation needs     Medical: Not on file     Non-medical: Not on file   Tobacco Use    Smoking status: Former Smoker     Quit date: 2015     Years since quittin.0    Smokeless tobacco: Never Used   Substance and Sexual Activity    Alcohol use: No     Alcohol/week: 0.0 standard drinks    Drug use: No    Sexual activity: Not on file   Lifestyle    Physical activity     Days per week: Not on file     Minutes per session: Not on file    Stress: Not on file   Relationships    Social connections     Talks on phone: Not on file     Gets together: Not on file     Attends Jain service: Not on file     Active member of club or organization: Not on file     Attends meetings of clubs or organizations: Not on file     Relationship status: Not on file   Other Topics Concern    Not on file   Social History Narrative    Not on file       Current Outpatient Medications:     albuterol (PROVENTIL/VENTOLIN HFA) 90 mcg/actuation inhaler, Inhale 2 puffs into the lungs every 6 (six) hours as needed., Disp: 18 g, Rfl: 4    apixaban (ELIQUIS) 5 mg Tab, Take 1 tablet (5 mg total) by mouth 2 (two) times daily., Disp: 60 tablet, Rfl: 11    ARIPiprazole (ABILIFY) 2 MG Tab, TAKE 1 TABLET (2 MG TOTAL) BY MOUTH ONCE DAILY., Disp: 90 tablet, Rfl: 2    gabapentin (NEURONTIN) 300 MG capsule, TAKE 2 CAPSULES (600 MG TOTAL) BY MOUTH 3 (THREE) TIMES DAILY., Disp: 180 capsule, Rfl: 3    levothyroxine (SYNTHROID) 50 MCG tablet, Take 1 tablet (50 mcg total) by mouth once daily., Disp: 90 tablet, Rfl: 3    losartan-hydrochlorothiazide 100-25 mg (HYZAAR) 100-25 mg per tablet, TAKE 1 TABLET BY MOUTH ONCE DAILY., Disp: 90 tablet, Rfl: 3    oxybutynin (DITROPAN-XL) 5 MG TR24, TAKE 1 TABLET BY MOUTH EVERY DAY, Disp: 90 tablet, Rfl: 1    sertraline (ZOLOFT)  100 MG tablet, TAKE 1 TABLET (100 MG TOTAL) BY MOUTH ONCE DAILY., Disp: 30 tablet, Rfl: 6    sertraline (ZOLOFT) 50 MG tablet, TAKE 1 TABLET BY MOUTH EVERY DAY, Disp: 90 tablet, Rfl: 1    tiZANidine (ZANAFLEX) 4 MG tablet, Take 1 tablet (4 mg total) by mouth daily as needed., Disp: 90 tablet, Rfl: 1    traMADol (ULTRAM) 50 mg tablet, Take 1 tablet (50 mg total) by mouth every 6 (six) hours as needed for Pain., Disp: 30 tablet, Rfl: 1    traZODone (DESYREL) 100 MG tablet, TAKE 1 TABLET (100 MG TOTAL) BY MOUTH EVERY EVENING., Disp: 90 tablet, Rfl: 1    azelastine (ASTELIN) 137 mcg (0.1 %) nasal spray, 2 sprays (274 mcg total) by Nasal route 2 (two) times daily., Disp: 30 mL, Rfl: 11    diclofenac sodium (VOLTAREN) 1 % Gel, Apply 2 g topically 2 (two) times daily. (Patient not taking: Reported on 6/19/2020), Disp: 1 Tube, Rfl: 5    levocetirizine (XYZAL) 5 MG tablet, Take 1 tablet (5 mg total) by mouth every evening., Disp: 90 tablet, Rfl: 2    pantoprazole (PROTONIX) 40 MG tablet, TAKE 1 TABLET BY MOUTH EVERY DAY (Patient not taking: Reported on 6/19/2020), Disp: 90 tablet, Rfl: 1  Review of patient's allergies indicates:   Allergen Reactions    Nsaids (non-steroidal anti-inflammatory drug)      CKD     ROS    Objective:   Body mass index is 39.22 kg/m².  Vitals:    06/19/20 0912   BP: (!) 125/57   Pulse: (!) 58           Ortho/SPM Exam    IMAGING: Mammo Digital Diagnostic Right w/ Curt  Narrative: Result:  Mammo Digital Diagnostic Right w/ Curt    History:  Patient is 74 y.o. and is seen for a diagnostic mammogram.    Films Compared:  Compared to: 08/01/2019 Mammo Digital Screening Bilat w/ Curt, 12/26/2017   Mammo Digital Screening Bilat with Tomosynthesis_CAD, and 11/29/2016 Mammo   Digital Screening Bilateral With CAD    Findings:  This procedure was performed using tomosynthesis. Computer-aided detection   was utilized in the interpretation of this examination.     The right breast has scattered areas of  fibroglandular density. There is   no evidence of suspicious masses, microcalcifications or architectural   distortion.  Impression: No mammographic evidence of malignancy.    BI-RADS Category 1: Negative    Recommendation:  Routine screening mammogram in 1 year is recommended.    Your estimated lifetime risk of breast cancer (to age 85) based on   Tyrer-Cuzick risk assessment model is Tyrer-Cuzick: 7.9 %. According to   the American Cancer Society, patients with a lifetime breast cancer risk   of 20% or higher might benefit from supplemental screening tests.       Radiographs / Imaging : Relevant imaging results reviewed by me and interpreted by me, discussed with the patient and / or family       Assessment:     Encounter Diagnosis   Name Primary?    Primary osteoarthritis of both knees Yes        Plan:   Primary osteoarthritis of both knees        The patient verbalized good understanding of the medical issues discussed today and expressed appreciation for the care provided.  Patient was given the opportunity to ask questions and be an active participant in their medical care. Patient had no further questions or concerns at this time.     The patient was encouraged to participate in appropriate physical activity.      Fabio Tiwari M.D.  [facility]        This note was dictated using voice recognition software and may have sound a like errors.

## 2020-06-19 NOTE — PATIENT INSTRUCTIONS
Ice to knees 3 times a day for next 48 hr and decreased activity    Recheck in 1 week for next injection

## 2020-06-24 ENCOUNTER — OFFICE VISIT (OUTPATIENT)
Dept: ORTHOPEDICS | Facility: CLINIC | Age: 75
End: 2020-06-24
Payer: MEDICARE

## 2020-06-24 VITALS
HEIGHT: 66 IN | SYSTOLIC BLOOD PRESSURE: 142 MMHG | DIASTOLIC BLOOD PRESSURE: 70 MMHG | WEIGHT: 243 LBS | BODY MASS INDEX: 39.05 KG/M2 | HEART RATE: 62 BPM

## 2020-06-24 DIAGNOSIS — M17.0 PRIMARY OSTEOARTHRITIS OF BOTH KNEES: Primary | ICD-10-CM

## 2020-06-24 PROCEDURE — 99499 NO LOS: ICD-10-PCS | Mod: S$GLB,,, | Performed by: FAMILY MEDICINE

## 2020-06-24 PROCEDURE — 20610 DRAIN/INJ JOINT/BURSA W/O US: CPT | Mod: 50,S$GLB,, | Performed by: FAMILY MEDICINE

## 2020-06-24 PROCEDURE — 20610 LARGE JOINT ASPIRATION/INJECTION: BILATERAL KNEE: ICD-10-PCS | Mod: 50,S$GLB,, | Performed by: FAMILY MEDICINE

## 2020-06-24 PROCEDURE — 99499 UNLISTED E&M SERVICE: CPT | Mod: S$GLB,,, | Performed by: FAMILY MEDICINE

## 2020-06-24 PROCEDURE — 99999 PR PBB SHADOW E&M-EST. PATIENT-LVL III: ICD-10-PCS | Mod: PBBFAC,,, | Performed by: FAMILY MEDICINE

## 2020-06-24 PROCEDURE — 99999 PR PBB SHADOW E&M-EST. PATIENT-LVL III: CPT | Mod: PBBFAC,,, | Performed by: FAMILY MEDICINE

## 2020-06-24 NOTE — PROCEDURES
Large Joint Aspiration/Injection: bilateral knee    Date/Time: 6/24/2020 10:00 AM  Performed by: Fabio Tiwari MD  Authorized by: Fabio Tiwari MD     Indications:  Pain  Site marked: the procedure site was marked    Timeout: prior to procedure the correct patient, procedure, and site was verified    Prep: patient was prepped and draped in usual sterile fashion    Approach:  Anterolateral  Location:  Knee  Laterality:  Bilateral  Site:  Bilateral knee  Medications (Right):  20 mg sodium hyaluronate (EUFLEXXA) 10 mg/mL(mw 2.4 -3.6 million)  Medications (Left):  20 mg sodium hyaluronate (EUFLEXXA) 10 mg/mL(mw 2.4 -3.6 million)  Patient tolerance:  Patient tolerated the procedure well with no immediate complications

## 2020-07-02 ENCOUNTER — OFFICE VISIT (OUTPATIENT)
Dept: INTERNAL MEDICINE | Facility: CLINIC | Age: 75
End: 2020-07-02
Payer: MEDICARE

## 2020-07-02 ENCOUNTER — LAB VISIT (OUTPATIENT)
Dept: LAB | Facility: HOSPITAL | Age: 75
End: 2020-07-02
Attending: FAMILY MEDICINE
Payer: MEDICARE

## 2020-07-02 VITALS
OXYGEN SATURATION: 97 % | DIASTOLIC BLOOD PRESSURE: 62 MMHG | WEIGHT: 244.5 LBS | BODY MASS INDEX: 39.29 KG/M2 | HEIGHT: 66 IN | RESPIRATION RATE: 16 BRPM | TEMPERATURE: 98 F | SYSTOLIC BLOOD PRESSURE: 124 MMHG | HEART RATE: 88 BPM

## 2020-07-02 DIAGNOSIS — I10 ESSENTIAL HYPERTENSION: Primary | ICD-10-CM

## 2020-07-02 DIAGNOSIS — Z23 IMMUNIZATION DUE: ICD-10-CM

## 2020-07-02 DIAGNOSIS — J44.9 COPD, MODERATE: ICD-10-CM

## 2020-07-02 DIAGNOSIS — I10 ESSENTIAL HYPERTENSION: ICD-10-CM

## 2020-07-02 DIAGNOSIS — E78.00 PURE HYPERCHOLESTEROLEMIA: ICD-10-CM

## 2020-07-02 LAB
ALBUMIN SERPL BCP-MCNC: 4.1 G/DL (ref 3.5–5.2)
ALP SERPL-CCNC: 80 U/L (ref 55–135)
ALT SERPL W/O P-5'-P-CCNC: 22 U/L (ref 10–44)
ANION GAP SERPL CALC-SCNC: 12 MMOL/L (ref 8–16)
AST SERPL-CCNC: 20 U/L (ref 10–40)
BILIRUB SERPL-MCNC: 0.3 MG/DL (ref 0.1–1)
BUN SERPL-MCNC: 38 MG/DL (ref 8–23)
CALCIUM SERPL-MCNC: 10.2 MG/DL (ref 8.7–10.5)
CHLORIDE SERPL-SCNC: 105 MMOL/L (ref 95–110)
CHOLEST SERPL-MCNC: 257 MG/DL (ref 120–199)
CHOLEST/HDLC SERPL: 4.7 {RATIO} (ref 2–5)
CO2 SERPL-SCNC: 26 MMOL/L (ref 23–29)
CREAT SERPL-MCNC: 1.7 MG/DL (ref 0.5–1.4)
EST. GFR  (AFRICAN AMERICAN): 34 ML/MIN/1.73 M^2
EST. GFR  (NON AFRICAN AMERICAN): 29 ML/MIN/1.73 M^2
GLUCOSE SERPL-MCNC: 99 MG/DL (ref 70–110)
HDLC SERPL-MCNC: 55 MG/DL (ref 40–75)
HDLC SERPL: 21.4 % (ref 20–50)
LDLC SERPL CALC-MCNC: 153.6 MG/DL (ref 63–159)
NONHDLC SERPL-MCNC: 202 MG/DL
POTASSIUM SERPL-SCNC: 4.7 MMOL/L (ref 3.5–5.1)
PROT SERPL-MCNC: 8 G/DL (ref 6–8.4)
SODIUM SERPL-SCNC: 143 MMOL/L (ref 136–145)
TRIGL SERPL-MCNC: 242 MG/DL (ref 30–150)

## 2020-07-02 PROCEDURE — 99214 PR OFFICE/OUTPT VISIT, EST, LEVL IV, 30-39 MIN: ICD-10-PCS | Mod: 25,S$GLB,, | Performed by: FAMILY MEDICINE

## 2020-07-02 PROCEDURE — 1159F PR MEDICATION LIST DOCUMENTED IN MEDICAL RECORD: ICD-10-PCS | Mod: S$GLB,,, | Performed by: FAMILY MEDICINE

## 2020-07-02 PROCEDURE — 90670 PNEUMOCOCCAL CONJUGATE VACCINE 13-VALENT LESS THAN 5YO & GREATER THAN: ICD-10-PCS | Mod: S$GLB,,, | Performed by: FAMILY MEDICINE

## 2020-07-02 PROCEDURE — 3078F DIAST BP <80 MM HG: CPT | Mod: CPTII,S$GLB,, | Performed by: FAMILY MEDICINE

## 2020-07-02 PROCEDURE — 3078F PR MOST RECENT DIASTOLIC BLOOD PRESSURE < 80 MM HG: ICD-10-PCS | Mod: CPTII,S$GLB,, | Performed by: FAMILY MEDICINE

## 2020-07-02 PROCEDURE — 1101F PT FALLS ASSESS-DOCD LE1/YR: CPT | Mod: CPTII,S$GLB,, | Performed by: FAMILY MEDICINE

## 2020-07-02 PROCEDURE — 99999 PR PBB SHADOW E&M-EST. PATIENT-LVL V: CPT | Mod: PBBFAC,,, | Performed by: FAMILY MEDICINE

## 2020-07-02 PROCEDURE — 3074F PR MOST RECENT SYSTOLIC BLOOD PRESSURE < 130 MM HG: ICD-10-PCS | Mod: CPTII,S$GLB,, | Performed by: FAMILY MEDICINE

## 2020-07-02 PROCEDURE — 99214 OFFICE O/P EST MOD 30 MIN: CPT | Mod: 25,S$GLB,, | Performed by: FAMILY MEDICINE

## 2020-07-02 PROCEDURE — 1159F MED LIST DOCD IN RCRD: CPT | Mod: S$GLB,,, | Performed by: FAMILY MEDICINE

## 2020-07-02 PROCEDURE — G0009 ADMIN PNEUMOCOCCAL VACCINE: HCPCS | Mod: S$GLB,,, | Performed by: FAMILY MEDICINE

## 2020-07-02 PROCEDURE — 80061 LIPID PANEL: CPT

## 2020-07-02 PROCEDURE — 99999 PR PBB SHADOW E&M-EST. PATIENT-LVL V: ICD-10-PCS | Mod: PBBFAC,,, | Performed by: FAMILY MEDICINE

## 2020-07-02 PROCEDURE — 80053 COMPREHEN METABOLIC PANEL: CPT

## 2020-07-02 PROCEDURE — 99499 UNLISTED E&M SERVICE: CPT | Mod: S$GLB,,, | Performed by: FAMILY MEDICINE

## 2020-07-02 PROCEDURE — 3008F PR BODY MASS INDEX (BMI) DOCUMENTED: ICD-10-PCS | Mod: CPTII,S$GLB,, | Performed by: FAMILY MEDICINE

## 2020-07-02 PROCEDURE — 90670 PCV13 VACCINE IM: CPT | Mod: S$GLB,,, | Performed by: FAMILY MEDICINE

## 2020-07-02 PROCEDURE — 99499 RISK ADDL DX/OHS AUDIT: ICD-10-PCS | Mod: S$GLB,,, | Performed by: FAMILY MEDICINE

## 2020-07-02 PROCEDURE — G0009 PNEUMOCOCCAL CONJUGATE VACCINE 13-VALENT LESS THAN 5YO & GREATER THAN: ICD-10-PCS | Mod: S$GLB,,, | Performed by: FAMILY MEDICINE

## 2020-07-02 PROCEDURE — 36415 COLL VENOUS BLD VENIPUNCTURE: CPT

## 2020-07-02 PROCEDURE — 3008F BODY MASS INDEX DOCD: CPT | Mod: CPTII,S$GLB,, | Performed by: FAMILY MEDICINE

## 2020-07-02 PROCEDURE — 3074F SYST BP LT 130 MM HG: CPT | Mod: CPTII,S$GLB,, | Performed by: FAMILY MEDICINE

## 2020-07-02 PROCEDURE — 1101F PR PT FALLS ASSESS DOC 0-1 FALLS W/OUT INJ PAST YR: ICD-10-PCS | Mod: CPTII,S$GLB,, | Performed by: FAMILY MEDICINE

## 2020-07-02 RX ORDER — APIXABAN 5 MG/1
TABLET, FILM COATED ORAL
COMMUNITY
Start: 2020-06-19 | End: 2021-02-18 | Stop reason: SDUPTHER

## 2020-07-02 RX ORDER — OMEPRAZOLE 40 MG/1
40 CAPSULE, DELAYED RELEASE ORAL DAILY
Qty: 90 CAPSULE | Refills: 2 | Status: SHIPPED | OUTPATIENT
Start: 2020-07-02 | End: 2021-03-30

## 2020-07-02 NOTE — PROGRESS NOTES
Subjective:       Patient ID: Kay Rosas is a 74 y.o. female.    Chief Complaint: Follow-up    Pt is a 74 year old here for follow-up. Pt was given diagnosis of DM several years ago. Her a1c has never been over 6 since seen pt. Discussed this with pt. Reviewed pt HM and she is due for DEXA scan. Pt HTN and and thyroid is well controlled    Review of Systems   Constitutional: Negative.    Respiratory: Negative.    Cardiovascular: Negative.    Psychiatric/Behavioral: Negative.          Objective:      Physical Exam  Constitutional:       Appearance: Normal appearance.   Cardiovascular:      Rate and Rhythm: Normal rate and regular rhythm.      Pulses: Normal pulses.      Heart sounds: Normal heart sounds.   Neurological:      General: No focal deficit present.      Mental Status: She is alert and oriented to person, place, and time.   Psychiatric:         Mood and Affect: Mood normal.         Behavior: Behavior normal.         Assessment:       1. Essential hypertension    2. Pure hypercholesterolemia    3. COPD, moderate    4. Immunization due        Plan:       Essential hypertension  Comments:  Pt BP is well controlled  Orders:  -     Comprehensive metabolic panel; Future; Expected date: 07/02/2020    Pure hypercholesterolemia  -     Lipid Panel; Future; Expected date: 07/02/2020    COPD, moderate    Immunization due  -     (In Office Administered) Pneumococcal Conjugate Vaccine (13 Valent) (IM)    Other orders  -     omeprazole (PRILOSEC) 40 MG capsule; Take 1 capsule (40 mg total) by mouth once daily.  Dispense: 90 capsule; Refill: 2

## 2020-07-23 ENCOUNTER — LAB VISIT (OUTPATIENT)
Dept: LAB | Facility: HOSPITAL | Age: 75
End: 2020-07-23
Attending: INTERNAL MEDICINE
Payer: MEDICARE

## 2020-07-23 DIAGNOSIS — D50.0 IRON DEFICIENCY ANEMIA DUE TO CHRONIC BLOOD LOSS: ICD-10-CM

## 2020-07-23 LAB
BASOPHILS # BLD AUTO: 0.02 K/UL (ref 0–0.2)
BASOPHILS NFR BLD: 0.4 % (ref 0–1.9)
DIFFERENTIAL METHOD: ABNORMAL
EOSINOPHIL # BLD AUTO: 0.1 K/UL (ref 0–0.5)
EOSINOPHIL NFR BLD: 2.4 % (ref 0–8)
ERYTHROCYTE [DISTWIDTH] IN BLOOD BY AUTOMATED COUNT: 13.4 % (ref 11.5–14.5)
HCT VFR BLD AUTO: 41.5 % (ref 37–48.5)
HGB BLD-MCNC: 13 G/DL (ref 12–16)
IMM GRANULOCYTES # BLD AUTO: 0.02 K/UL (ref 0–0.04)
IMM GRANULOCYTES NFR BLD AUTO: 0.4 % (ref 0–0.5)
LYMPHOCYTES # BLD AUTO: 1.6 K/UL (ref 1–4.8)
LYMPHOCYTES NFR BLD: 32.6 % (ref 18–48)
MCH RBC QN AUTO: 29.3 PG (ref 27–31)
MCHC RBC AUTO-ENTMCNC: 31.3 G/DL (ref 32–36)
MCV RBC AUTO: 94 FL (ref 82–98)
MONOCYTES # BLD AUTO: 0.3 K/UL (ref 0.3–1)
MONOCYTES NFR BLD: 6.9 % (ref 4–15)
NEUTROPHILS # BLD AUTO: 2.8 K/UL (ref 1.8–7.7)
NEUTROPHILS NFR BLD: 57.3 % (ref 38–73)
NRBC BLD-RTO: 0 /100 WBC
PLATELET # BLD AUTO: 198 K/UL (ref 150–350)
PMV BLD AUTO: 9.9 FL (ref 9.2–12.9)
RBC # BLD AUTO: 4.44 M/UL (ref 4–5.4)
WBC # BLD AUTO: 4.94 K/UL (ref 3.9–12.7)

## 2020-07-23 PROCEDURE — 36415 COLL VENOUS BLD VENIPUNCTURE: CPT

## 2020-07-23 PROCEDURE — 82728 ASSAY OF FERRITIN: CPT

## 2020-07-23 PROCEDURE — 83540 ASSAY OF IRON: CPT

## 2020-07-23 PROCEDURE — 85025 COMPLETE CBC W/AUTO DIFF WBC: CPT

## 2020-07-24 ENCOUNTER — OFFICE VISIT (OUTPATIENT)
Dept: ORTHOPEDICS | Facility: CLINIC | Age: 75
End: 2020-07-24
Payer: MEDICARE

## 2020-07-24 VITALS
DIASTOLIC BLOOD PRESSURE: 71 MMHG | WEIGHT: 244.5 LBS | SYSTOLIC BLOOD PRESSURE: 144 MMHG | HEART RATE: 86 BPM | BODY MASS INDEX: 39.29 KG/M2 | HEIGHT: 66 IN

## 2020-07-24 DIAGNOSIS — M25.561 RIGHT KNEE PAIN, UNSPECIFIED CHRONICITY: Primary | ICD-10-CM

## 2020-07-24 DIAGNOSIS — M17.0 PRIMARY OSTEOARTHRITIS OF BOTH KNEES: ICD-10-CM

## 2020-07-24 LAB
FERRITIN SERPL-MCNC: 329 NG/ML (ref 20–300)
IRON SERPL-MCNC: 70 UG/DL (ref 30–160)
SATURATED IRON: 20 % (ref 20–50)
TOTAL IRON BINDING CAPACITY: 342 UG/DL (ref 250–450)
TRANSFERRIN SERPL-MCNC: 231 MG/DL (ref 200–375)

## 2020-07-24 PROCEDURE — 1159F PR MEDICATION LIST DOCUMENTED IN MEDICAL RECORD: ICD-10-PCS | Mod: S$GLB,,, | Performed by: FAMILY MEDICINE

## 2020-07-24 PROCEDURE — 99999 PR PBB SHADOW E&M-EST. PATIENT-LVL IV: CPT | Mod: PBBFAC,,, | Performed by: FAMILY MEDICINE

## 2020-07-24 PROCEDURE — 3078F DIAST BP <80 MM HG: CPT | Mod: CPTII,S$GLB,, | Performed by: FAMILY MEDICINE

## 2020-07-24 PROCEDURE — 3077F PR MOST RECENT SYSTOLIC BLOOD PRESSURE >= 140 MM HG: ICD-10-PCS | Mod: CPTII,S$GLB,, | Performed by: FAMILY MEDICINE

## 2020-07-24 PROCEDURE — 3008F BODY MASS INDEX DOCD: CPT | Mod: CPTII,S$GLB,, | Performed by: FAMILY MEDICINE

## 2020-07-24 PROCEDURE — 99999 PR PBB SHADOW E&M-EST. PATIENT-LVL IV: ICD-10-PCS | Mod: PBBFAC,,, | Performed by: FAMILY MEDICINE

## 2020-07-24 PROCEDURE — 20610 LARGE JOINT ASPIRATION/INJECTION: R KNEE: ICD-10-PCS | Mod: RT,S$GLB,, | Performed by: FAMILY MEDICINE

## 2020-07-24 PROCEDURE — 3008F PR BODY MASS INDEX (BMI) DOCUMENTED: ICD-10-PCS | Mod: CPTII,S$GLB,, | Performed by: FAMILY MEDICINE

## 2020-07-24 PROCEDURE — 1125F PR PAIN SEVERITY QUANTIFIED, PAIN PRESENT: ICD-10-PCS | Mod: S$GLB,,, | Performed by: FAMILY MEDICINE

## 2020-07-24 PROCEDURE — 3077F SYST BP >= 140 MM HG: CPT | Mod: CPTII,S$GLB,, | Performed by: FAMILY MEDICINE

## 2020-07-24 PROCEDURE — 99214 PR OFFICE/OUTPT VISIT, EST, LEVL IV, 30-39 MIN: ICD-10-PCS | Mod: 25,S$GLB,, | Performed by: FAMILY MEDICINE

## 2020-07-24 PROCEDURE — 1101F PR PT FALLS ASSESS DOC 0-1 FALLS W/OUT INJ PAST YR: ICD-10-PCS | Mod: CPTII,S$GLB,, | Performed by: FAMILY MEDICINE

## 2020-07-24 PROCEDURE — 1125F AMNT PAIN NOTED PAIN PRSNT: CPT | Mod: S$GLB,,, | Performed by: FAMILY MEDICINE

## 2020-07-24 PROCEDURE — 20610 DRAIN/INJ JOINT/BURSA W/O US: CPT | Mod: RT,S$GLB,, | Performed by: FAMILY MEDICINE

## 2020-07-24 PROCEDURE — 99214 OFFICE O/P EST MOD 30 MIN: CPT | Mod: 25,S$GLB,, | Performed by: FAMILY MEDICINE

## 2020-07-24 PROCEDURE — 3078F PR MOST RECENT DIASTOLIC BLOOD PRESSURE < 80 MM HG: ICD-10-PCS | Mod: CPTII,S$GLB,, | Performed by: FAMILY MEDICINE

## 2020-07-24 PROCEDURE — 1159F MED LIST DOCD IN RCRD: CPT | Mod: S$GLB,,, | Performed by: FAMILY MEDICINE

## 2020-07-24 PROCEDURE — 1101F PT FALLS ASSESS-DOCD LE1/YR: CPT | Mod: CPTII,S$GLB,, | Performed by: FAMILY MEDICINE

## 2020-07-24 RX ORDER — BETAMETHASONE SODIUM PHOSPHATE AND BETAMETHASONE ACETATE 3; 3 MG/ML; MG/ML
6 INJECTION, SUSPENSION INTRA-ARTICULAR; INTRALESIONAL; INTRAMUSCULAR; SOFT TISSUE
Status: DISCONTINUED | OUTPATIENT
Start: 2020-07-24 | End: 2020-07-24 | Stop reason: HOSPADM

## 2020-07-24 RX ADMIN — BETAMETHASONE SODIUM PHOSPHATE AND BETAMETHASONE ACETATE 6 MG: 3; 3 INJECTION, SUSPENSION INTRA-ARTICULAR; INTRALESIONAL; INTRAMUSCULAR; SOFT TISSUE at 02:07

## 2020-07-24 NOTE — PROCEDURES
Large Joint Aspiration/Injection: R knee    Date/Time: 7/24/2020 2:00 PM  Performed by: Fabio Tiwari MD  Authorized by: Fabio Tiwari MD     Indications:  Pain  Site marked: the procedure site was marked    Timeout: prior to procedure the correct patient, procedure, and site was verified    Prep: patient was prepped and draped in usual sterile fashion    Approach:  Anterolateral  Location:  Knee  Site:  R knee  Medications:  6 mg betamethasone acetate-betamethasone sodium phosphate 6 mg/mL  Patient tolerance:  Patient tolerated the procedure well with no immediate complications

## 2020-07-24 NOTE — PROGRESS NOTES
Subjective:     Patient ID: Kay Rosas is a 74 y.o. female.    Chief Complaint: Pain of the Right Knee      HPI:  Patient with long-term osteoarthritis of the knees and pain and stiffness in the right knee.  The patient finished Euflexxa S series about 4 weeks ago and states that her knee is not improved so far.    She states she has fairly severe pain at times when sitting and has to keep moving his stretching her knee and leg out and that she also has pain when standing from a sitting position.  No swelling but she does notice weakness and no actual locking.    Has not been to physical therapy in over 6 months and is willing to start.  Takes Tylenol but is not very helpful.  Has not been using Voltaren or ice on a regular basis.    No recent acute illness and has had 2-COVID test.  She would like a cortisone injection today to hopefully decrease pain and improve function and her hemoglobin A1c has been normal.    Past Medical History:   Diagnosis Date    Arthritis     Degenerative disc disease, cervical     Diabetes mellitus, type 2     Emphysema of lung     Hyperlipidemia     Hypertension     MVP (mitral valve prolapse)     Osteoporosis     feet    Thyroid condition      Past Surgical History:   Procedure Laterality Date    BACK SURGERY  2012    cataract surgery  Bilateral 10/ 2012    elbow spur removed Left     HYSTERECTOMY Left 1978    INJECTION OF ANESTHETIC AGENT INTO SACROILIAC JOINT Bilateral 11/11/2019    Procedure: Bilateral GT bursa + SIJ injection;  Surgeon: Noe Pleitez MD;  Location: [facility];  Service: Pain Management;  Laterality: Bilateral;    INJECTION OF JOINT Bilateral 11/11/2019    Procedure: Bilateral GT bursa + SIJ injection;  Surgeon: Noe Pleitez MD;  Location: [facility];  Service: Pain Management;  Laterality: Bilateral;    OOPHORECTOMY      OVARIAN CYST REMOVAL      two from back, one from axilla    VASCULAR SURGERY Right     high ligation due to  blood clot      Family History   Problem Relation Age of Onset    Heart disease Mother     Kidney disease Mother     Arthritis Mother     Breast cancer Mother     Cancer Father     Heart disease Father     Cancer Sister     Ovarian cancer Sister     Alzheimer's disease Sister      Social History     Socioeconomic History    Marital status:      Spouse name: Not on file    Number of children: Not on file    Years of education: Not on file    Highest education level: Not on file   Occupational History    Not on file   Social Needs    Financial resource strain: Not on file    Food insecurity     Worry: Not on file     Inability: Not on file    Transportation needs     Medical: Not on file     Non-medical: Not on file   Tobacco Use    Smoking status: Former Smoker     Quit date: 2015     Years since quittin.0    Smokeless tobacco: Never Used   Substance and Sexual Activity    Alcohol use: No     Alcohol/week: 0.0 standard drinks    Drug use: No    Sexual activity: Not on file   Lifestyle    Physical activity     Days per week: Not on file     Minutes per session: Not on file    Stress: Not on file   Relationships    Social connections     Talks on phone: Not on file     Gets together: Not on file     Attends Bahai service: Not on file     Active member of club or organization: Not on file     Attends meetings of clubs or organizations: Not on file     Relationship status: Not on file   Other Topics Concern    Not on file   Social History Narrative    Not on file       Current Outpatient Medications:     albuterol (PROVENTIL/VENTOLIN HFA) 90 mcg/actuation inhaler, Inhale 2 puffs into the lungs every 6 (six) hours as needed., Disp: 18 g, Rfl: 4    apixaban (ELIQUIS) 5 mg Tab, Take 1 tablet (5 mg total) by mouth 2 (two) times daily., Disp: 60 tablet, Rfl: 11    ARIPiprazole (ABILIFY) 2 MG Tab, TAKE 1 TABLET (2 MG TOTAL) BY MOUTH ONCE DAILY., Disp: 90 tablet, Rfl: 2     diclofenac sodium (VOLTAREN) 1 % Gel, Apply 2 g topically 2 (two) times daily., Disp: 1 Tube, Rfl: 5    gabapentin (NEURONTIN) 300 MG capsule, TAKE 2 CAPSULES (600 MG TOTAL) BY MOUTH 3 (THREE) TIMES DAILY., Disp: 180 capsule, Rfl: 3    levothyroxine (SYNTHROID) 50 MCG tablet, Take 1 tablet (50 mcg total) by mouth once daily., Disp: 90 tablet, Rfl: 3    losartan-hydrochlorothiazide 100-25 mg (HYZAAR) 100-25 mg per tablet, TAKE 1 TABLET BY MOUTH ONCE DAILY., Disp: 90 tablet, Rfl: 3    omeprazole (PRILOSEC) 40 MG capsule, Take 1 capsule (40 mg total) by mouth once daily., Disp: 90 capsule, Rfl: 2    oxybutynin (DITROPAN-XL) 5 MG TR24, TAKE 1 TABLET BY MOUTH EVERY DAY, Disp: 90 tablet, Rfl: 1    sertraline (ZOLOFT) 100 MG tablet, TAKE 1 TABLET (100 MG TOTAL) BY MOUTH ONCE DAILY., Disp: 30 tablet, Rfl: 6    sertraline (ZOLOFT) 50 MG tablet, TAKE 1 TABLET BY MOUTH EVERY DAY, Disp: 90 tablet, Rfl: 1    tiZANidine (ZANAFLEX) 4 MG tablet, Take 1 tablet (4 mg total) by mouth daily as needed., Disp: 90 tablet, Rfl: 1    traMADol (ULTRAM) 50 mg tablet, Take 1 tablet (50 mg total) by mouth every 6 (six) hours as needed for Pain., Disp: 30 tablet, Rfl: 1    traZODone (DESYREL) 100 MG tablet, TAKE 1 TABLET (100 MG TOTAL) BY MOUTH EVERY EVENING., Disp: 90 tablet, Rfl: 1    azelastine (ASTELIN) 137 mcg (0.1 %) nasal spray, 2 sprays (274 mcg total) by Nasal route 2 (two) times daily., Disp: 30 mL, Rfl: 11    ELIQUIS 5 mg Tab, , Disp: , Rfl:     levocetirizine (XYZAL) 5 MG tablet, Take 1 tablet (5 mg total) by mouth every evening., Disp: 90 tablet, Rfl: 2  Review of patient's allergies indicates:   Allergen Reactions    Nsaids (non-steroidal anti-inflammatory drug)      CKD     Review of Systems   Constitutional: Negative for chills, fever and weight loss.   Respiratory: Negative for shortness of breath.    Cardiovascular: Negative for chest pain and palpitations.       Objective:   Body mass index is 39.46  kg/m².  Vitals:    07/24/20 1359   BP: (!) 144/71   Pulse: 86           Ortho/SPM Exam alert and oriented obese ambulatory pleasant adult female no acute distress    Respiratory effort is normal    Right knee chronic bony changes to the joint but no acute deformity    Range of motion 0-100 degrees with crepitus    No apparent swelling    Neurovascular intact    Strength is 3/5    Psychiatric good affect and cognition    IMAGING: Mammo Digital Diagnostic Right w/ Curt  Narrative: Result:  Mammo Digital Diagnostic Right w/ Curt    History:  Patient is 74 y.o. and is seen for a diagnostic mammogram.    Films Compared:  Compared to: 08/01/2019 Mammo Digital Screening Bilat w/ Curt, 12/26/2017   Mammo Digital Screening Bilat with Tomosynthesis_CAD, and 11/29/2016 Mammo   Digital Screening Bilateral With CAD    Findings:  This procedure was performed using tomosynthesis. Computer-aided detection   was utilized in the interpretation of this examination.     The right breast has scattered areas of fibroglandular density. There is   no evidence of suspicious masses, microcalcifications or architectural   distortion.  Impression: No mammographic evidence of malignancy.    BI-RADS Category 1: Negative    Recommendation:  Routine screening mammogram in 1 year is recommended.    Your estimated lifetime risk of breast cancer (to age 85) based on   Tyrer-Cuzick risk assessment model is Tyrer-Cuzick: 7.9 %. According to   the American Cancer Society, patients with a lifetime breast cancer risk   of 20% or higher might benefit from supplemental screening tests.       Radiographs / Imaging : Relevant imaging results reviewed by me and interpreted by me, discussed with the patient and / or family -x-rays reviewed with patient concerning her moderate degenerative changes but no acute fracture or dislocation.      Assessment:     Encounter Diagnoses   Name Primary?    Right knee pain, unspecified chronicity Yes    Primary  osteoarthritis of both knees         Plan:   Right knee pain, unspecified chronicity  -     Ambulatory referral/consult to Physical/Occupational Therapy; Future; Expected date: 07/31/2020  -     Large Joint Aspiration/Injection: R knee    Primary osteoarthritis of both knees        The patient verbalized good understanding of the medical issues discussed today and expressed appreciation for the care provided.  Patient was given the opportunity to ask questions and be an active participant in their medical care. Patient had no further questions or concerns at this time.     The patient was encouraged to participate in appropriate physical activity.      Fabio Tiwari M.D.  [facility]        This note was dictated using voice recognition software and may have sound a like errors.

## 2020-09-04 DIAGNOSIS — E11.9 TYPE 2 DIABETES MELLITUS WITHOUT COMPLICATION: ICD-10-CM

## 2020-09-30 ENCOUNTER — PATIENT OUTREACH (OUTPATIENT)
Dept: ADMINISTRATIVE | Facility: HOSPITAL | Age: 75
End: 2020-09-30

## 2020-09-30 NOTE — Clinical Note
Pt on DM Registry. She has had normal A1c's since 2019. Last results A1c 07/2019. She is not on any diabetic medications. Should we submit to have pt removed from registry?     Hiwot

## 2020-09-30 NOTE — PROGRESS NOTES
Working A1c Report:     Last resulted A1c 07/2019. A1c's have been below 6 since 2018. Pt on diabetic registry and not on any diabetic medications.     Message sent to PCP to see if pt should be removed from registry.

## 2020-10-02 DIAGNOSIS — E11.9 TYPE 2 DIABETES MELLITUS WITHOUT COMPLICATION: ICD-10-CM

## 2020-12-10 ENCOUNTER — PATIENT OUTREACH (OUTPATIENT)
Dept: ADMINISTRATIVE | Facility: HOSPITAL | Age: 75
End: 2020-12-10

## 2020-12-10 NOTE — PROGRESS NOTES
HA1c HM GAP: Attempted to reach out to Pt to schedule appt for HA1c. Pt did not answer, left voicemail with callback number. Portal msg sent.

## 2020-12-18 ENCOUNTER — PATIENT OUTREACH (OUTPATIENT)
Dept: ADMINISTRATIVE | Facility: HOSPITAL | Age: 75
End: 2020-12-18

## 2020-12-18 NOTE — PROGRESS NOTES
DIABETES REPORT: Attempting to contact pt to schedule overdue HM: LABS. Unable to reach patient at this time. Left voicemail.

## 2021-01-29 ENCOUNTER — PATIENT MESSAGE (OUTPATIENT)
Dept: ADMINISTRATIVE | Facility: HOSPITAL | Age: 76
End: 2021-01-29

## 2021-02-01 ENCOUNTER — OFFICE VISIT (OUTPATIENT)
Dept: FAMILY MEDICINE | Facility: CLINIC | Age: 76
End: 2021-02-01
Payer: MEDICARE

## 2021-02-01 ENCOUNTER — LAB VISIT (OUTPATIENT)
Dept: LAB | Facility: HOSPITAL | Age: 76
End: 2021-02-01
Attending: FAMILY MEDICINE
Payer: MEDICARE

## 2021-02-01 VITALS
BODY MASS INDEX: 40.6 KG/M2 | SYSTOLIC BLOOD PRESSURE: 138 MMHG | HEIGHT: 66 IN | TEMPERATURE: 99 F | OXYGEN SATURATION: 97 % | WEIGHT: 252.63 LBS | HEART RATE: 100 BPM | DIASTOLIC BLOOD PRESSURE: 69 MMHG

## 2021-02-01 DIAGNOSIS — E66.01 MORBID (SEVERE) OBESITY DUE TO EXCESS CALORIES: ICD-10-CM

## 2021-02-01 DIAGNOSIS — F32.A DEPRESSION, UNSPECIFIED DEPRESSION TYPE: ICD-10-CM

## 2021-02-01 DIAGNOSIS — E78.00 PURE HYPERCHOLESTEROLEMIA: ICD-10-CM

## 2021-02-01 DIAGNOSIS — E03.9 ACQUIRED HYPOTHYROIDISM: ICD-10-CM

## 2021-02-01 DIAGNOSIS — I74.9 EMBOLISM AND THROMBOSIS OF UNSPECIFIED ARTERY: ICD-10-CM

## 2021-02-01 DIAGNOSIS — N25.81 SECONDARY HYPERPARATHYROIDISM OF RENAL ORIGIN: ICD-10-CM

## 2021-02-01 DIAGNOSIS — N18.30 STAGE 3 CHRONIC KIDNEY DISEASE, UNSPECIFIED WHETHER STAGE 3A OR 3B CKD: ICD-10-CM

## 2021-02-01 DIAGNOSIS — Z86.39 HISTORY OF DIABETES MELLITUS: ICD-10-CM

## 2021-02-01 DIAGNOSIS — J44.9 COPD, MODERATE: ICD-10-CM

## 2021-02-01 DIAGNOSIS — F51.01 PRIMARY INSOMNIA: ICD-10-CM

## 2021-02-01 DIAGNOSIS — M46.1 SACROILIITIS: ICD-10-CM

## 2021-02-01 DIAGNOSIS — I10 ESSENTIAL HYPERTENSION: Primary | ICD-10-CM

## 2021-02-01 PROBLEM — R26.9 ABNORMALITY OF GAIT: Status: RESOLVED | Noted: 2019-10-08 | Resolved: 2021-02-01

## 2021-02-01 PROBLEM — R39.15 URINARY URGENCY: Status: RESOLVED | Noted: 2019-06-17 | Resolved: 2021-02-01

## 2021-02-01 PROBLEM — R25.2 MUSCLE CRAMPS AT NIGHT: Status: RESOLVED | Noted: 2017-10-25 | Resolved: 2021-02-01

## 2021-02-01 PROBLEM — N30.00 ACUTE CYSTITIS WITHOUT HEMATURIA: Status: RESOLVED | Noted: 2019-02-18 | Resolved: 2021-02-01

## 2021-02-01 PROBLEM — I82.90 THROMBOSIS: Status: RESOLVED | Noted: 2020-02-11 | Resolved: 2021-02-01

## 2021-02-01 PROCEDURE — 3075F SYST BP GE 130 - 139MM HG: CPT | Mod: CPTII,S$GLB,, | Performed by: FAMILY MEDICINE

## 2021-02-01 PROCEDURE — 99499 UNLISTED E&M SERVICE: CPT | Mod: S$GLB,,, | Performed by: FAMILY MEDICINE

## 2021-02-01 PROCEDURE — 3078F DIAST BP <80 MM HG: CPT | Mod: CPTII,S$GLB,, | Performed by: FAMILY MEDICINE

## 2021-02-01 PROCEDURE — 84443 ASSAY THYROID STIM HORMONE: CPT

## 2021-02-01 PROCEDURE — 1126F PR PAIN SEVERITY QUANTIFIED, NO PAIN PRESENT: ICD-10-PCS | Mod: S$GLB,,, | Performed by: FAMILY MEDICINE

## 2021-02-01 PROCEDURE — 36415 COLL VENOUS BLD VENIPUNCTURE: CPT | Mod: PO

## 2021-02-01 PROCEDURE — 3078F PR MOST RECENT DIASTOLIC BLOOD PRESSURE < 80 MM HG: ICD-10-PCS | Mod: CPTII,S$GLB,, | Performed by: FAMILY MEDICINE

## 2021-02-01 PROCEDURE — 1101F PR PT FALLS ASSESS DOC 0-1 FALLS W/OUT INJ PAST YR: ICD-10-PCS | Mod: CPTII,S$GLB,, | Performed by: FAMILY MEDICINE

## 2021-02-01 PROCEDURE — 1126F AMNT PAIN NOTED NONE PRSNT: CPT | Mod: S$GLB,,, | Performed by: FAMILY MEDICINE

## 2021-02-01 PROCEDURE — 3075F PR MOST RECENT SYSTOLIC BLOOD PRESS GE 130-139MM HG: ICD-10-PCS | Mod: CPTII,S$GLB,, | Performed by: FAMILY MEDICINE

## 2021-02-01 PROCEDURE — 99214 OFFICE O/P EST MOD 30 MIN: CPT | Mod: S$GLB,,, | Performed by: FAMILY MEDICINE

## 2021-02-01 PROCEDURE — 1159F PR MEDICATION LIST DOCUMENTED IN MEDICAL RECORD: ICD-10-PCS | Mod: S$GLB,,, | Performed by: FAMILY MEDICINE

## 2021-02-01 PROCEDURE — 83036 HEMOGLOBIN GLYCOSYLATED A1C: CPT

## 2021-02-01 PROCEDURE — 1101F PT FALLS ASSESS-DOCD LE1/YR: CPT | Mod: CPTII,S$GLB,, | Performed by: FAMILY MEDICINE

## 2021-02-01 PROCEDURE — 99999 PR PBB SHADOW E&M-EST. PATIENT-LVL III: CPT | Mod: PBBFAC,,, | Performed by: FAMILY MEDICINE

## 2021-02-01 PROCEDURE — 99999 PR PBB SHADOW E&M-EST. PATIENT-LVL III: ICD-10-PCS | Mod: PBBFAC,,, | Performed by: FAMILY MEDICINE

## 2021-02-01 PROCEDURE — 1159F MED LIST DOCD IN RCRD: CPT | Mod: S$GLB,,, | Performed by: FAMILY MEDICINE

## 2021-02-01 PROCEDURE — 3008F PR BODY MASS INDEX (BMI) DOCUMENTED: ICD-10-PCS | Mod: CPTII,S$GLB,, | Performed by: FAMILY MEDICINE

## 2021-02-01 PROCEDURE — 3288F PR FALLS RISK ASSESSMENT DOCUMENTED: ICD-10-PCS | Mod: CPTII,S$GLB,, | Performed by: FAMILY MEDICINE

## 2021-02-01 PROCEDURE — 3008F BODY MASS INDEX DOCD: CPT | Mod: CPTII,S$GLB,, | Performed by: FAMILY MEDICINE

## 2021-02-01 PROCEDURE — 99499 RISK ADDL DX/OHS AUDIT: ICD-10-PCS | Mod: S$GLB,,, | Performed by: FAMILY MEDICINE

## 2021-02-01 PROCEDURE — 3288F FALL RISK ASSESSMENT DOCD: CPT | Mod: CPTII,S$GLB,, | Performed by: FAMILY MEDICINE

## 2021-02-01 PROCEDURE — 99214 PR OFFICE/OUTPT VISIT, EST, LEVL IV, 30-39 MIN: ICD-10-PCS | Mod: S$GLB,,, | Performed by: FAMILY MEDICINE

## 2021-02-01 RX ORDER — LOSARTAN POTASSIUM AND HYDROCHLOROTHIAZIDE 25; 100 MG/1; MG/1
1 TABLET ORAL DAILY
Qty: 90 TABLET | Refills: 3 | Status: SHIPPED | OUTPATIENT
Start: 2021-02-01 | End: 2022-01-25

## 2021-02-01 RX ORDER — SERTRALINE HYDROCHLORIDE 100 MG/1
100 TABLET, FILM COATED ORAL DAILY
Qty: 90 TABLET | Refills: 3 | Status: SHIPPED | OUTPATIENT
Start: 2021-02-01 | End: 2022-01-25

## 2021-02-01 RX ORDER — AZELASTINE 1 MG/ML
2 SPRAY, METERED NASAL 2 TIMES DAILY
Qty: 30 ML | Refills: 11 | Status: SHIPPED | OUTPATIENT
Start: 2021-02-01 | End: 2021-02-18 | Stop reason: SDUPTHER

## 2021-02-01 RX ORDER — SERTRALINE HYDROCHLORIDE 50 MG/1
50 TABLET, FILM COATED ORAL DAILY
Qty: 90 TABLET | Refills: 3 | Status: SHIPPED | OUTPATIENT
Start: 2021-02-01 | End: 2022-01-25

## 2021-02-02 LAB
ESTIMATED AVG GLUCOSE: 114 MG/DL (ref 68–131)
HBA1C MFR BLD: 5.6 % (ref 4–5.6)
TSH SERPL DL<=0.005 MIU/L-ACNC: 2.02 UIU/ML (ref 0.4–4)

## 2021-02-03 ENCOUNTER — PATIENT OUTREACH (OUTPATIENT)
Dept: INTERNAL MEDICINE | Facility: CLINIC | Age: 76
End: 2021-02-03

## 2021-02-04 ENCOUNTER — PATIENT OUTREACH (OUTPATIENT)
Dept: ADMINISTRATIVE | Facility: HOSPITAL | Age: 76
End: 2021-02-04

## 2021-02-17 DIAGNOSIS — I82.90 THROMBOSIS: ICD-10-CM

## 2021-02-18 ENCOUNTER — OFFICE VISIT (OUTPATIENT)
Dept: FAMILY MEDICINE | Facility: CLINIC | Age: 76
End: 2021-02-18
Payer: MEDICARE

## 2021-02-18 ENCOUNTER — HOSPITAL ENCOUNTER (OUTPATIENT)
Dept: RADIOLOGY | Facility: HOSPITAL | Age: 76
Discharge: HOME | End: 2021-02-18
Attending: FAMILY MEDICINE
Payer: MEDICARE

## 2021-02-18 VITALS
OXYGEN SATURATION: 98 % | SYSTOLIC BLOOD PRESSURE: 116 MMHG | TEMPERATURE: 97 F | BODY MASS INDEX: 39.32 KG/M2 | HEART RATE: 78 BPM | RESPIRATION RATE: 16 BRPM | HEIGHT: 66 IN | WEIGHT: 244.69 LBS | DIASTOLIC BLOOD PRESSURE: 82 MMHG

## 2021-02-18 DIAGNOSIS — E78.00 PURE HYPERCHOLESTEROLEMIA: ICD-10-CM

## 2021-02-18 DIAGNOSIS — M17.0 PRIMARY OSTEOARTHRITIS OF BOTH KNEES: ICD-10-CM

## 2021-02-18 DIAGNOSIS — I82.499 DEEP VEIN THROMBOSIS (DVT) OF OTHER VEIN OF LOWER EXTREMITY, UNSPECIFIED CHRONICITY, UNSPECIFIED LATERALITY: ICD-10-CM

## 2021-02-18 DIAGNOSIS — Z01.818 PRE-OPERATIVE CLEARANCE: ICD-10-CM

## 2021-02-18 DIAGNOSIS — I10 ESSENTIAL HYPERTENSION: ICD-10-CM

## 2021-02-18 DIAGNOSIS — F33.0 MAJOR DEPRESSIVE DISORDER, RECURRENT, MILD: ICD-10-CM

## 2021-02-18 DIAGNOSIS — N18.4 CHRONIC KIDNEY DISEASE, STAGE 4 (SEVERE): ICD-10-CM

## 2021-02-18 DIAGNOSIS — E03.9 ACQUIRED HYPOTHYROIDISM: ICD-10-CM

## 2021-02-18 DIAGNOSIS — F51.01 PRIMARY INSOMNIA: ICD-10-CM

## 2021-02-18 DIAGNOSIS — Z01.818 PRE-OPERATIVE CLEARANCE: Primary | ICD-10-CM

## 2021-02-18 DIAGNOSIS — J44.9 COPD, MODERATE: ICD-10-CM

## 2021-02-18 PROBLEM — D69.2 OTHER NONTHROMBOCYTOPENIC PURPURA: Status: ACTIVE | Noted: 2021-02-18

## 2021-02-18 PROCEDURE — 3008F BODY MASS INDEX DOCD: CPT | Mod: CPTII,S$GLB,, | Performed by: FAMILY MEDICINE

## 2021-02-18 PROCEDURE — 3074F SYST BP LT 130 MM HG: CPT | Mod: CPTII,S$GLB,, | Performed by: FAMILY MEDICINE

## 2021-02-18 PROCEDURE — 3008F PR BODY MASS INDEX (BMI) DOCUMENTED: ICD-10-PCS | Mod: CPTII,S$GLB,, | Performed by: FAMILY MEDICINE

## 2021-02-18 PROCEDURE — 71046 X-RAY EXAM CHEST 2 VIEWS: CPT | Mod: TC,FY,PO

## 2021-02-18 PROCEDURE — 93010 ELECTROCARDIOGRAM REPORT: CPT | Mod: S$GLB,,, | Performed by: INTERNAL MEDICINE

## 2021-02-18 PROCEDURE — 99214 PR OFFICE/OUTPT VISIT, EST, LEVL IV, 30-39 MIN: ICD-10-PCS | Mod: S$GLB,,, | Performed by: FAMILY MEDICINE

## 2021-02-18 PROCEDURE — 3079F PR MOST RECENT DIASTOLIC BLOOD PRESSURE 80-89 MM HG: ICD-10-PCS | Mod: CPTII,S$GLB,, | Performed by: FAMILY MEDICINE

## 2021-02-18 PROCEDURE — 93005 EKG 12-LEAD: ICD-10-PCS | Mod: S$GLB,,, | Performed by: FAMILY MEDICINE

## 2021-02-18 PROCEDURE — 1159F MED LIST DOCD IN RCRD: CPT | Mod: S$GLB,,, | Performed by: FAMILY MEDICINE

## 2021-02-18 PROCEDURE — 1126F AMNT PAIN NOTED NONE PRSNT: CPT | Mod: S$GLB,,, | Performed by: FAMILY MEDICINE

## 2021-02-18 PROCEDURE — 71046 XR CHEST PA AND LATERAL: ICD-10-PCS | Mod: 26,,, | Performed by: RADIOLOGY

## 2021-02-18 PROCEDURE — 99499 RISK ADDL DX/OHS AUDIT: ICD-10-PCS | Mod: S$GLB,,, | Performed by: FAMILY MEDICINE

## 2021-02-18 PROCEDURE — 3079F DIAST BP 80-89 MM HG: CPT | Mod: CPTII,S$GLB,, | Performed by: FAMILY MEDICINE

## 2021-02-18 PROCEDURE — 99999 PR PBB SHADOW E&M-EST. PATIENT-LVL IV: ICD-10-PCS | Mod: PBBFAC,,, | Performed by: FAMILY MEDICINE

## 2021-02-18 PROCEDURE — 1159F PR MEDICATION LIST DOCUMENTED IN MEDICAL RECORD: ICD-10-PCS | Mod: S$GLB,,, | Performed by: FAMILY MEDICINE

## 2021-02-18 PROCEDURE — 93010 EKG 12-LEAD: ICD-10-PCS | Mod: S$GLB,,, | Performed by: INTERNAL MEDICINE

## 2021-02-18 PROCEDURE — 1101F PR PT FALLS ASSESS DOC 0-1 FALLS W/OUT INJ PAST YR: ICD-10-PCS | Mod: CPTII,S$GLB,, | Performed by: FAMILY MEDICINE

## 2021-02-18 PROCEDURE — 1101F PT FALLS ASSESS-DOCD LE1/YR: CPT | Mod: CPTII,S$GLB,, | Performed by: FAMILY MEDICINE

## 2021-02-18 PROCEDURE — 87081 CULTURE SCREEN ONLY: CPT

## 2021-02-18 PROCEDURE — 99999 PR PBB SHADOW E&M-EST. PATIENT-LVL IV: CPT | Mod: PBBFAC,,, | Performed by: FAMILY MEDICINE

## 2021-02-18 PROCEDURE — 3288F PR FALLS RISK ASSESSMENT DOCUMENTED: ICD-10-PCS | Mod: CPTII,S$GLB,, | Performed by: FAMILY MEDICINE

## 2021-02-18 PROCEDURE — 1126F PR PAIN SEVERITY QUANTIFIED, NO PAIN PRESENT: ICD-10-PCS | Mod: S$GLB,,, | Performed by: FAMILY MEDICINE

## 2021-02-18 PROCEDURE — 3074F PR MOST RECENT SYSTOLIC BLOOD PRESSURE < 130 MM HG: ICD-10-PCS | Mod: CPTII,S$GLB,, | Performed by: FAMILY MEDICINE

## 2021-02-18 PROCEDURE — 99499 UNLISTED E&M SERVICE: CPT | Mod: S$GLB,,, | Performed by: FAMILY MEDICINE

## 2021-02-18 PROCEDURE — 3288F FALL RISK ASSESSMENT DOCD: CPT | Mod: CPTII,S$GLB,, | Performed by: FAMILY MEDICINE

## 2021-02-18 PROCEDURE — 71046 X-RAY EXAM CHEST 2 VIEWS: CPT | Mod: 26,,, | Performed by: RADIOLOGY

## 2021-02-18 PROCEDURE — 93005 ELECTROCARDIOGRAM TRACING: CPT | Mod: S$GLB,,, | Performed by: FAMILY MEDICINE

## 2021-02-18 PROCEDURE — 99214 OFFICE O/P EST MOD 30 MIN: CPT | Mod: S$GLB,,, | Performed by: FAMILY MEDICINE

## 2021-02-18 RX ORDER — TRAZODONE HYDROCHLORIDE 100 MG/1
TABLET ORAL
Qty: 90 TABLET | Refills: 1 | Status: SHIPPED | OUTPATIENT
Start: 2021-02-18 | End: 2021-08-12

## 2021-02-18 RX ORDER — AZELASTINE 1 MG/ML
2 SPRAY, METERED NASAL 2 TIMES DAILY
Qty: 30 ML | Refills: 11 | Status: SHIPPED | OUTPATIENT
Start: 2021-02-18 | End: 2021-03-30

## 2021-02-19 ENCOUNTER — PATIENT MESSAGE (OUTPATIENT)
Dept: FAMILY MEDICINE | Facility: CLINIC | Age: 76
End: 2021-02-19

## 2021-02-19 RX ORDER — NITROFURANTOIN 25; 75 MG/1; MG/1
100 CAPSULE ORAL 2 TIMES DAILY
Qty: 14 CAPSULE | Refills: 0 | Status: SHIPPED | OUTPATIENT
Start: 2021-02-19 | End: 2021-02-26

## 2021-02-20 LAB — MRSA SPEC QL CULT: NORMAL

## 2021-02-23 ENCOUNTER — TELEPHONE (OUTPATIENT)
Dept: FAMILY MEDICINE | Facility: CLINIC | Age: 76
End: 2021-02-23

## 2021-02-25 ENCOUNTER — OFFICE VISIT (OUTPATIENT)
Dept: HEMATOLOGY/ONCOLOGY | Facility: CLINIC | Age: 76
End: 2021-02-25
Payer: MEDICARE

## 2021-02-25 VITALS
SYSTOLIC BLOOD PRESSURE: 147 MMHG | DIASTOLIC BLOOD PRESSURE: 74 MMHG | BODY MASS INDEX: 39.32 KG/M2 | TEMPERATURE: 98 F | WEIGHT: 244.69 LBS | HEART RATE: 72 BPM | OXYGEN SATURATION: 98 % | HEIGHT: 66 IN

## 2021-02-25 DIAGNOSIS — I74.9 EMBOLISM AND THROMBOSIS OF UNSPECIFIED ARTERY: ICD-10-CM

## 2021-02-25 DIAGNOSIS — D50.0 IRON DEFICIENCY ANEMIA DUE TO CHRONIC BLOOD LOSS: ICD-10-CM

## 2021-02-25 DIAGNOSIS — I82.499 DEEP VEIN THROMBOSIS (DVT) OF OTHER VEIN OF LOWER EXTREMITY, UNSPECIFIED CHRONICITY, UNSPECIFIED LATERALITY: Primary | ICD-10-CM

## 2021-02-25 DIAGNOSIS — M17.0 PRIMARY OSTEOARTHRITIS OF BOTH KNEES: ICD-10-CM

## 2021-02-25 DIAGNOSIS — E66.01 MORBID (SEVERE) OBESITY DUE TO EXCESS CALORIES: ICD-10-CM

## 2021-02-25 PROCEDURE — 1159F PR MEDICATION LIST DOCUMENTED IN MEDICAL RECORD: ICD-10-PCS | Mod: S$GLB,,, | Performed by: INTERNAL MEDICINE

## 2021-02-25 PROCEDURE — 3288F PR FALLS RISK ASSESSMENT DOCUMENTED: ICD-10-PCS | Mod: CPTII,S$GLB,, | Performed by: INTERNAL MEDICINE

## 2021-02-25 PROCEDURE — 3008F BODY MASS INDEX DOCD: CPT | Mod: CPTII,S$GLB,, | Performed by: INTERNAL MEDICINE

## 2021-02-25 PROCEDURE — 99999 PR PBB SHADOW E&M-EST. PATIENT-LVL IV: ICD-10-PCS | Mod: PBBFAC,,, | Performed by: INTERNAL MEDICINE

## 2021-02-25 PROCEDURE — 3077F SYST BP >= 140 MM HG: CPT | Mod: CPTII,S$GLB,, | Performed by: INTERNAL MEDICINE

## 2021-02-25 PROCEDURE — 1126F AMNT PAIN NOTED NONE PRSNT: CPT | Mod: S$GLB,,, | Performed by: INTERNAL MEDICINE

## 2021-02-25 PROCEDURE — 1101F PT FALLS ASSESS-DOCD LE1/YR: CPT | Mod: CPTII,S$GLB,, | Performed by: INTERNAL MEDICINE

## 2021-02-25 PROCEDURE — 3078F PR MOST RECENT DIASTOLIC BLOOD PRESSURE < 80 MM HG: ICD-10-PCS | Mod: CPTII,S$GLB,, | Performed by: INTERNAL MEDICINE

## 2021-02-25 PROCEDURE — 1126F PR PAIN SEVERITY QUANTIFIED, NO PAIN PRESENT: ICD-10-PCS | Mod: S$GLB,,, | Performed by: INTERNAL MEDICINE

## 2021-02-25 PROCEDURE — 1159F MED LIST DOCD IN RCRD: CPT | Mod: S$GLB,,, | Performed by: INTERNAL MEDICINE

## 2021-02-25 PROCEDURE — 3008F PR BODY MASS INDEX (BMI) DOCUMENTED: ICD-10-PCS | Mod: CPTII,S$GLB,, | Performed by: INTERNAL MEDICINE

## 2021-02-25 PROCEDURE — 3288F FALL RISK ASSESSMENT DOCD: CPT | Mod: CPTII,S$GLB,, | Performed by: INTERNAL MEDICINE

## 2021-02-25 PROCEDURE — 99214 PR OFFICE/OUTPT VISIT, EST, LEVL IV, 30-39 MIN: ICD-10-PCS | Mod: S$GLB,,, | Performed by: INTERNAL MEDICINE

## 2021-02-25 PROCEDURE — 99499 RISK ADDL DX/OHS AUDIT: ICD-10-PCS | Mod: S$GLB,,, | Performed by: INTERNAL MEDICINE

## 2021-02-25 PROCEDURE — 99499 UNLISTED E&M SERVICE: CPT | Mod: S$GLB,,, | Performed by: INTERNAL MEDICINE

## 2021-02-25 PROCEDURE — 3077F PR MOST RECENT SYSTOLIC BLOOD PRESSURE >= 140 MM HG: ICD-10-PCS | Mod: CPTII,S$GLB,, | Performed by: INTERNAL MEDICINE

## 2021-02-25 PROCEDURE — 99999 PR PBB SHADOW E&M-EST. PATIENT-LVL IV: CPT | Mod: PBBFAC,,, | Performed by: INTERNAL MEDICINE

## 2021-02-25 PROCEDURE — 1101F PR PT FALLS ASSESS DOC 0-1 FALLS W/OUT INJ PAST YR: ICD-10-PCS | Mod: CPTII,S$GLB,, | Performed by: INTERNAL MEDICINE

## 2021-02-25 PROCEDURE — 99214 OFFICE O/P EST MOD 30 MIN: CPT | Mod: S$GLB,,, | Performed by: INTERNAL MEDICINE

## 2021-02-25 PROCEDURE — 3078F DIAST BP <80 MM HG: CPT | Mod: CPTII,S$GLB,, | Performed by: INTERNAL MEDICINE

## 2021-03-30 ENCOUNTER — OFFICE VISIT (OUTPATIENT)
Dept: FAMILY MEDICINE | Facility: CLINIC | Age: 76
End: 2021-03-30
Payer: MEDICARE

## 2021-03-30 VITALS
BODY MASS INDEX: 37.91 KG/M2 | RESPIRATION RATE: 18 BRPM | HEIGHT: 66 IN | DIASTOLIC BLOOD PRESSURE: 79 MMHG | HEART RATE: 92 BPM | TEMPERATURE: 98 F | WEIGHT: 235.88 LBS | SYSTOLIC BLOOD PRESSURE: 120 MMHG

## 2021-03-30 DIAGNOSIS — R39.9 URINARY SYMPTOM OR SIGN: ICD-10-CM

## 2021-03-30 DIAGNOSIS — N39.0 RECURRENT URINARY TRACT INFECTION: Primary | ICD-10-CM

## 2021-03-30 PROBLEM — H61.23 BILATERAL IMPACTED CERUMEN: Status: RESOLVED | Noted: 2018-01-29 | Resolved: 2021-03-30

## 2021-03-30 PROBLEM — M25.561 RIGHT KNEE PAIN: Status: RESOLVED | Noted: 2017-03-13 | Resolved: 2021-03-30

## 2021-03-30 PROBLEM — R19.7 DIARRHEA: Status: RESOLVED | Noted: 2017-06-14 | Resolved: 2021-03-30

## 2021-03-30 PROBLEM — M79.644 PAIN OF RIGHT THUMB: Status: RESOLVED | Noted: 2017-03-13 | Resolved: 2021-03-30

## 2021-03-30 PROBLEM — L72.9 CYST OF SKIN: Status: RESOLVED | Noted: 2019-04-25 | Resolved: 2021-03-30

## 2021-03-30 LAB
BILIRUB SERPL-MCNC: ABNORMAL MG/DL
BLOOD URINE, POC: ABNORMAL
CLARITY, POC UA: CLEAR
COLOR, POC UA: YELLOW
GLUCOSE UR QL STRIP: NEGATIVE
KETONES UR QL STRIP: NEGATIVE
LEUKOCYTE ESTERASE URINE, POC: ABNORMAL
NITRITE, POC UA: NEGATIVE
PH, POC UA: 6
PROTEIN, POC: NEGATIVE
SPECIFIC GRAVITY, POC UA: 1.02
UROBILINOGEN, POC UA: 1

## 2021-03-30 PROCEDURE — 1159F MED LIST DOCD IN RCRD: CPT | Mod: S$GLB,,, | Performed by: REGISTERED NURSE

## 2021-03-30 PROCEDURE — 81002 URINALYSIS NONAUTO W/O SCOPE: CPT | Mod: S$GLB,,, | Performed by: REGISTERED NURSE

## 2021-03-30 PROCEDURE — 81002 POCT URINE DIPSTICK WITHOUT MICROSCOPE: ICD-10-PCS | Mod: S$GLB,,, | Performed by: REGISTERED NURSE

## 2021-03-30 PROCEDURE — 3008F BODY MASS INDEX DOCD: CPT | Mod: CPTII,S$GLB,, | Performed by: REGISTERED NURSE

## 2021-03-30 PROCEDURE — 3288F FALL RISK ASSESSMENT DOCD: CPT | Mod: CPTII,S$GLB,, | Performed by: REGISTERED NURSE

## 2021-03-30 PROCEDURE — 1101F PR PT FALLS ASSESS DOC 0-1 FALLS W/OUT INJ PAST YR: ICD-10-PCS | Mod: CPTII,S$GLB,, | Performed by: REGISTERED NURSE

## 2021-03-30 PROCEDURE — 3008F PR BODY MASS INDEX (BMI) DOCUMENTED: ICD-10-PCS | Mod: CPTII,S$GLB,, | Performed by: REGISTERED NURSE

## 2021-03-30 PROCEDURE — 1159F PR MEDICATION LIST DOCUMENTED IN MEDICAL RECORD: ICD-10-PCS | Mod: S$GLB,,, | Performed by: REGISTERED NURSE

## 2021-03-30 PROCEDURE — 1101F PT FALLS ASSESS-DOCD LE1/YR: CPT | Mod: CPTII,S$GLB,, | Performed by: REGISTERED NURSE

## 2021-03-30 PROCEDURE — 99999 PR PBB SHADOW E&M-EST. PATIENT-LVL IV: ICD-10-PCS | Mod: PBBFAC,,, | Performed by: REGISTERED NURSE

## 2021-03-30 PROCEDURE — 99213 PR OFFICE/OUTPT VISIT, EST, LEVL III, 20-29 MIN: ICD-10-PCS | Mod: 25,S$GLB,, | Performed by: REGISTERED NURSE

## 2021-03-30 PROCEDURE — 99999 PR PBB SHADOW E&M-EST. PATIENT-LVL IV: CPT | Mod: PBBFAC,,, | Performed by: REGISTERED NURSE

## 2021-03-30 PROCEDURE — 3288F PR FALLS RISK ASSESSMENT DOCUMENTED: ICD-10-PCS | Mod: CPTII,S$GLB,, | Performed by: REGISTERED NURSE

## 2021-03-30 PROCEDURE — 99213 OFFICE O/P EST LOW 20 MIN: CPT | Mod: 25,S$GLB,, | Performed by: REGISTERED NURSE

## 2021-03-30 RX ORDER — SULFAMETHOXAZOLE AND TRIMETHOPRIM 800; 160 MG/1; MG/1
1 TABLET ORAL 2 TIMES DAILY
Qty: 20 TABLET | Refills: 0 | Status: SHIPPED | OUTPATIENT
Start: 2021-03-30 | End: 2021-04-09

## 2021-05-07 ENCOUNTER — OFFICE VISIT (OUTPATIENT)
Dept: FAMILY MEDICINE | Facility: CLINIC | Age: 76
End: 2021-05-07
Payer: MEDICARE

## 2021-05-07 VITALS
OXYGEN SATURATION: 97 % | HEART RATE: 77 BPM | TEMPERATURE: 98 F | DIASTOLIC BLOOD PRESSURE: 71 MMHG | RESPIRATION RATE: 18 BRPM | HEIGHT: 66 IN | SYSTOLIC BLOOD PRESSURE: 110 MMHG | BODY MASS INDEX: 37.41 KG/M2 | WEIGHT: 232.81 LBS

## 2021-05-07 DIAGNOSIS — Z87.440 RECENT URINARY TRACT INFECTION: Primary | ICD-10-CM

## 2021-05-07 DIAGNOSIS — N39.0 RECURRENT URINARY TRACT INFECTION: ICD-10-CM

## 2021-05-07 DIAGNOSIS — R39.9 URINARY SYMPTOM OR SIGN: ICD-10-CM

## 2021-05-07 LAB
BILIRUB SERPL-MCNC: ABNORMAL MG/DL
BLOOD URINE, POC: ABNORMAL
CLARITY, POC UA: CLEAR
COLOR, POC UA: YELLOW
GLUCOSE UR QL STRIP: ABNORMAL
KETONES UR QL STRIP: ABNORMAL
LEUKOCYTE ESTERASE URINE, POC: ABNORMAL
NITRITE, POC UA: ABNORMAL
PH, POC UA: 7
PROTEIN, POC: ABNORMAL
SPECIFIC GRAVITY, POC UA: 1
UROBILINOGEN, POC UA: NORMAL

## 2021-05-07 PROCEDURE — 1101F PT FALLS ASSESS-DOCD LE1/YR: CPT | Mod: CPTII,S$GLB,, | Performed by: REGISTERED NURSE

## 2021-05-07 PROCEDURE — 3008F PR BODY MASS INDEX (BMI) DOCUMENTED: ICD-10-PCS | Mod: CPTII,S$GLB,, | Performed by: REGISTERED NURSE

## 2021-05-07 PROCEDURE — 1101F PR PT FALLS ASSESS DOC 0-1 FALLS W/OUT INJ PAST YR: ICD-10-PCS | Mod: CPTII,S$GLB,, | Performed by: REGISTERED NURSE

## 2021-05-07 PROCEDURE — 87086 URINE CULTURE/COLONY COUNT: CPT | Performed by: REGISTERED NURSE

## 2021-05-07 PROCEDURE — 81002 URINALYSIS NONAUTO W/O SCOPE: CPT | Mod: S$GLB,,, | Performed by: REGISTERED NURSE

## 2021-05-07 PROCEDURE — 99999 PR PBB SHADOW E&M-EST. PATIENT-LVL III: ICD-10-PCS | Mod: PBBFAC,,, | Performed by: REGISTERED NURSE

## 2021-05-07 PROCEDURE — 99213 OFFICE O/P EST LOW 20 MIN: CPT | Mod: 25,S$GLB,, | Performed by: REGISTERED NURSE

## 2021-05-07 PROCEDURE — 3288F FALL RISK ASSESSMENT DOCD: CPT | Mod: CPTII,S$GLB,, | Performed by: REGISTERED NURSE

## 2021-05-07 PROCEDURE — 1126F PR PAIN SEVERITY QUANTIFIED, NO PAIN PRESENT: ICD-10-PCS | Mod: S$GLB,,, | Performed by: REGISTERED NURSE

## 2021-05-07 PROCEDURE — 1126F AMNT PAIN NOTED NONE PRSNT: CPT | Mod: S$GLB,,, | Performed by: REGISTERED NURSE

## 2021-05-07 PROCEDURE — 1159F MED LIST DOCD IN RCRD: CPT | Mod: S$GLB,,, | Performed by: REGISTERED NURSE

## 2021-05-07 PROCEDURE — 1159F PR MEDICATION LIST DOCUMENTED IN MEDICAL RECORD: ICD-10-PCS | Mod: S$GLB,,, | Performed by: REGISTERED NURSE

## 2021-05-07 PROCEDURE — 99999 PR PBB SHADOW E&M-EST. PATIENT-LVL III: CPT | Mod: PBBFAC,,, | Performed by: REGISTERED NURSE

## 2021-05-07 PROCEDURE — 3008F BODY MASS INDEX DOCD: CPT | Mod: CPTII,S$GLB,, | Performed by: REGISTERED NURSE

## 2021-05-07 PROCEDURE — 99213 PR OFFICE/OUTPT VISIT, EST, LEVL III, 20-29 MIN: ICD-10-PCS | Mod: 25,S$GLB,, | Performed by: REGISTERED NURSE

## 2021-05-07 PROCEDURE — 3288F PR FALLS RISK ASSESSMENT DOCUMENTED: ICD-10-PCS | Mod: CPTII,S$GLB,, | Performed by: REGISTERED NURSE

## 2021-05-07 PROCEDURE — 81002 POCT URINE DIPSTICK WITHOUT MICROSCOPE: ICD-10-PCS | Mod: S$GLB,,, | Performed by: REGISTERED NURSE

## 2021-05-07 RX ORDER — CEPHALEXIN 500 MG/1
500 CAPSULE ORAL EVERY 12 HOURS
Qty: 20 CAPSULE | Refills: 0 | Status: SHIPPED | OUTPATIENT
Start: 2021-05-07 | End: 2021-05-17

## 2021-05-08 LAB — BACTERIA UR CULT: NORMAL

## 2021-05-18 ENCOUNTER — TELEPHONE (OUTPATIENT)
Dept: ADMINISTRATIVE | Facility: HOSPITAL | Age: 76
End: 2021-05-18

## 2021-06-03 ENCOUNTER — TELEPHONE (OUTPATIENT)
Dept: ADMINISTRATIVE | Facility: HOSPITAL | Age: 76
End: 2021-06-03

## 2021-06-08 ENCOUNTER — TELEPHONE (OUTPATIENT)
Dept: ADMINISTRATIVE | Facility: HOSPITAL | Age: 76
End: 2021-06-08

## 2021-06-17 ENCOUNTER — PATIENT OUTREACH (OUTPATIENT)
Dept: ADMINISTRATIVE | Facility: HOSPITAL | Age: 76
End: 2021-06-17

## 2021-06-28 ENCOUNTER — OFFICE VISIT (OUTPATIENT)
Dept: FAMILY MEDICINE | Facility: CLINIC | Age: 76
End: 2021-06-28
Payer: MEDICARE

## 2021-06-28 VITALS
WEIGHT: 232.5 LBS | OXYGEN SATURATION: 95 % | DIASTOLIC BLOOD PRESSURE: 60 MMHG | TEMPERATURE: 98 F | RESPIRATION RATE: 18 BRPM | HEART RATE: 87 BPM | BODY MASS INDEX: 37.37 KG/M2 | SYSTOLIC BLOOD PRESSURE: 118 MMHG | HEIGHT: 66 IN

## 2021-06-28 DIAGNOSIS — R39.9 URINARY SYMPTOM OR SIGN: Primary | ICD-10-CM

## 2021-06-28 DIAGNOSIS — R30.0 DYSURIA: ICD-10-CM

## 2021-06-28 LAB
BILIRUB SERPL-MCNC: NORMAL MG/DL
BLOOD URINE, POC: NORMAL
CLARITY, POC UA: CLEAR
COLOR, POC UA: YELLOW
GLUCOSE UR QL STRIP: NORMAL
KETONES UR QL STRIP: NORMAL
LEUKOCYTE ESTERASE URINE, POC: NORMAL
NITRITE, POC UA: NORMAL
PH, POC UA: 5
PROTEIN, POC: NORMAL
SPECIFIC GRAVITY, POC UA: 1.02
UROBILINOGEN, POC UA: NORMAL

## 2021-06-28 PROCEDURE — 3074F SYST BP LT 130 MM HG: CPT | Mod: CPTII,S$GLB,, | Performed by: REGISTERED NURSE

## 2021-06-28 PROCEDURE — 1159F MED LIST DOCD IN RCRD: CPT | Mod: CPTII,S$GLB,, | Performed by: REGISTERED NURSE

## 2021-06-28 PROCEDURE — 3008F BODY MASS INDEX DOCD: CPT | Mod: CPTII,S$GLB,, | Performed by: REGISTERED NURSE

## 2021-06-28 PROCEDURE — 87086 URINE CULTURE/COLONY COUNT: CPT | Performed by: REGISTERED NURSE

## 2021-06-28 PROCEDURE — 1101F PT FALLS ASSESS-DOCD LE1/YR: CPT | Mod: CPTII,S$GLB,, | Performed by: REGISTERED NURSE

## 2021-06-28 PROCEDURE — 81002 POCT URINE DIPSTICK WITHOUT MICROSCOPE: ICD-10-PCS | Mod: S$GLB,,, | Performed by: REGISTERED NURSE

## 2021-06-28 PROCEDURE — 99999 PR PBB SHADOW E&M-EST. PATIENT-LVL III: ICD-10-PCS | Mod: PBBFAC,,, | Performed by: REGISTERED NURSE

## 2021-06-28 PROCEDURE — 3074F PR MOST RECENT SYSTOLIC BLOOD PRESSURE < 130 MM HG: ICD-10-PCS | Mod: CPTII,S$GLB,, | Performed by: REGISTERED NURSE

## 2021-06-28 PROCEDURE — 81002 URINALYSIS NONAUTO W/O SCOPE: CPT | Mod: S$GLB,,, | Performed by: REGISTERED NURSE

## 2021-06-28 PROCEDURE — 1126F AMNT PAIN NOTED NONE PRSNT: CPT | Mod: CPTII,S$GLB,, | Performed by: REGISTERED NURSE

## 2021-06-28 PROCEDURE — 3008F PR BODY MASS INDEX (BMI) DOCUMENTED: ICD-10-PCS | Mod: CPTII,S$GLB,, | Performed by: REGISTERED NURSE

## 2021-06-28 PROCEDURE — 99213 PR OFFICE/OUTPT VISIT, EST, LEVL III, 20-29 MIN: ICD-10-PCS | Mod: 25,S$GLB,, | Performed by: REGISTERED NURSE

## 2021-06-28 PROCEDURE — 1101F PR PT FALLS ASSESS DOC 0-1 FALLS W/OUT INJ PAST YR: ICD-10-PCS | Mod: CPTII,S$GLB,, | Performed by: REGISTERED NURSE

## 2021-06-28 PROCEDURE — 3288F FALL RISK ASSESSMENT DOCD: CPT | Mod: CPTII,S$GLB,, | Performed by: REGISTERED NURSE

## 2021-06-28 PROCEDURE — 3044F PR MOST RECENT HEMOGLOBIN A1C LEVEL <7.0%: ICD-10-PCS | Mod: CPTII,S$GLB,, | Performed by: REGISTERED NURSE

## 2021-06-28 PROCEDURE — 87088 URINE BACTERIA CULTURE: CPT | Performed by: REGISTERED NURSE

## 2021-06-28 PROCEDURE — 3078F PR MOST RECENT DIASTOLIC BLOOD PRESSURE < 80 MM HG: ICD-10-PCS | Mod: CPTII,S$GLB,, | Performed by: REGISTERED NURSE

## 2021-06-28 PROCEDURE — 3078F DIAST BP <80 MM HG: CPT | Mod: CPTII,S$GLB,, | Performed by: REGISTERED NURSE

## 2021-06-28 PROCEDURE — 99213 OFFICE O/P EST LOW 20 MIN: CPT | Mod: 25,S$GLB,, | Performed by: REGISTERED NURSE

## 2021-06-28 PROCEDURE — 99999 PR PBB SHADOW E&M-EST. PATIENT-LVL III: CPT | Mod: PBBFAC,,, | Performed by: REGISTERED NURSE

## 2021-06-28 PROCEDURE — 1126F PR PAIN SEVERITY QUANTIFIED, NO PAIN PRESENT: ICD-10-PCS | Mod: CPTII,S$GLB,, | Performed by: REGISTERED NURSE

## 2021-06-28 PROCEDURE — 1159F PR MEDICATION LIST DOCUMENTED IN MEDICAL RECORD: ICD-10-PCS | Mod: CPTII,S$GLB,, | Performed by: REGISTERED NURSE

## 2021-06-28 PROCEDURE — 3044F HG A1C LEVEL LT 7.0%: CPT | Mod: CPTII,S$GLB,, | Performed by: REGISTERED NURSE

## 2021-06-28 PROCEDURE — 3288F PR FALLS RISK ASSESSMENT DOCUMENTED: ICD-10-PCS | Mod: CPTII,S$GLB,, | Performed by: REGISTERED NURSE

## 2021-06-28 RX ORDER — PHENAZOPYRIDINE HYDROCHLORIDE 200 MG/1
200 TABLET, FILM COATED ORAL
Qty: 6 TABLET | Refills: 0 | Status: SHIPPED | OUTPATIENT
Start: 2021-06-28 | End: 2021-06-30

## 2021-06-30 LAB — BACTERIA UR CULT: ABNORMAL

## 2021-07-14 DIAGNOSIS — Z12.31 OTHER SCREENING MAMMOGRAM: ICD-10-CM

## 2021-08-13 RX ORDER — LEVOTHYROXINE SODIUM 50 UG/1
50 TABLET ORAL DAILY
Qty: 90 TABLET | Refills: 0 | Status: SHIPPED | OUTPATIENT
Start: 2021-08-13 | End: 2021-11-08

## 2021-09-27 RX ORDER — TRAZODONE HYDROCHLORIDE 100 MG/1
100 TABLET ORAL NIGHTLY PRN
Qty: 90 TABLET | Refills: 1 | Status: SHIPPED | OUTPATIENT
Start: 2021-09-27 | End: 2022-03-29

## 2021-09-30 ENCOUNTER — LAB VISIT (OUTPATIENT)
Dept: LAB | Facility: HOSPITAL | Age: 76
End: 2021-09-30
Attending: REGISTERED NURSE
Payer: MEDICARE

## 2021-09-30 ENCOUNTER — OFFICE VISIT (OUTPATIENT)
Dept: FAMILY MEDICINE | Facility: CLINIC | Age: 76
End: 2021-09-30
Payer: MEDICARE

## 2021-09-30 VITALS
HEIGHT: 66 IN | DIASTOLIC BLOOD PRESSURE: 70 MMHG | OXYGEN SATURATION: 96 % | BODY MASS INDEX: 37.79 KG/M2 | HEART RATE: 82 BPM | RESPIRATION RATE: 18 BRPM | WEIGHT: 235.13 LBS | SYSTOLIC BLOOD PRESSURE: 120 MMHG | TEMPERATURE: 98 F

## 2021-09-30 DIAGNOSIS — R39.9 URINARY SYMPTOM OR SIGN: ICD-10-CM

## 2021-09-30 DIAGNOSIS — Z87.898 HISTORY OF PREDIABETES: ICD-10-CM

## 2021-09-30 DIAGNOSIS — R35.0 URINARY FREQUENCY: ICD-10-CM

## 2021-09-30 DIAGNOSIS — R35.1 NOCTURIA: ICD-10-CM

## 2021-09-30 DIAGNOSIS — R35.0 URINARY FREQUENCY: Primary | ICD-10-CM

## 2021-09-30 LAB
BILIRUB SERPL-MCNC: NORMAL MG/DL
BLOOD URINE, POC: NORMAL
CLARITY, POC UA: CLEAR
COLOR, POC UA: YELLOW
ESTIMATED AVG GLUCOSE: 108 MG/DL (ref 68–131)
GLUCOSE UR QL STRIP: NORMAL
HBA1C MFR BLD: 5.4 % (ref 4–5.6)
KETONES UR QL STRIP: NORMAL
LEUKOCYTE ESTERASE URINE, POC: NORMAL
NITRITE, POC UA: NORMAL
PH, POC UA: 7
PROTEIN, POC: NORMAL
SPECIFIC GRAVITY, POC UA: 1
UROBILINOGEN, POC UA: NORMAL

## 2021-09-30 PROCEDURE — 1159F MED LIST DOCD IN RCRD: CPT | Mod: CPTII,S$GLB,, | Performed by: REGISTERED NURSE

## 2021-09-30 PROCEDURE — 3078F DIAST BP <80 MM HG: CPT | Mod: CPTII,S$GLB,, | Performed by: REGISTERED NURSE

## 2021-09-30 PROCEDURE — 87086 URINE CULTURE/COLONY COUNT: CPT | Performed by: REGISTERED NURSE

## 2021-09-30 PROCEDURE — 3288F PR FALLS RISK ASSESSMENT DOCUMENTED: ICD-10-PCS | Mod: CPTII,S$GLB,, | Performed by: REGISTERED NURSE

## 2021-09-30 PROCEDURE — 99214 OFFICE O/P EST MOD 30 MIN: CPT | Mod: 25,S$GLB,, | Performed by: REGISTERED NURSE

## 2021-09-30 PROCEDURE — 3074F PR MOST RECENT SYSTOLIC BLOOD PRESSURE < 130 MM HG: ICD-10-PCS | Mod: CPTII,S$GLB,, | Performed by: REGISTERED NURSE

## 2021-09-30 PROCEDURE — 3044F HG A1C LEVEL LT 7.0%: CPT | Mod: CPTII,S$GLB,, | Performed by: REGISTERED NURSE

## 2021-09-30 PROCEDURE — 1101F PR PT FALLS ASSESS DOC 0-1 FALLS W/OUT INJ PAST YR: ICD-10-PCS | Mod: CPTII,S$GLB,, | Performed by: REGISTERED NURSE

## 2021-09-30 PROCEDURE — 1159F PR MEDICATION LIST DOCUMENTED IN MEDICAL RECORD: ICD-10-PCS | Mod: CPTII,S$GLB,, | Performed by: REGISTERED NURSE

## 2021-09-30 PROCEDURE — 3074F SYST BP LT 130 MM HG: CPT | Mod: CPTII,S$GLB,, | Performed by: REGISTERED NURSE

## 2021-09-30 PROCEDURE — 99999 PR PBB SHADOW E&M-EST. PATIENT-LVL III: ICD-10-PCS | Mod: PBBFAC,,, | Performed by: REGISTERED NURSE

## 2021-09-30 PROCEDURE — 3288F FALL RISK ASSESSMENT DOCD: CPT | Mod: CPTII,S$GLB,, | Performed by: REGISTERED NURSE

## 2021-09-30 PROCEDURE — 99214 PR OFFICE/OUTPT VISIT, EST, LEVL IV, 30-39 MIN: ICD-10-PCS | Mod: 25,S$GLB,, | Performed by: REGISTERED NURSE

## 2021-09-30 PROCEDURE — 36415 COLL VENOUS BLD VENIPUNCTURE: CPT | Mod: PO | Performed by: REGISTERED NURSE

## 2021-09-30 PROCEDURE — 81002 URINALYSIS NONAUTO W/O SCOPE: CPT | Mod: S$GLB,,, | Performed by: REGISTERED NURSE

## 2021-09-30 PROCEDURE — 99999 PR PBB SHADOW E&M-EST. PATIENT-LVL III: CPT | Mod: PBBFAC,,, | Performed by: REGISTERED NURSE

## 2021-09-30 PROCEDURE — 3044F PR MOST RECENT HEMOGLOBIN A1C LEVEL <7.0%: ICD-10-PCS | Mod: CPTII,S$GLB,, | Performed by: REGISTERED NURSE

## 2021-09-30 PROCEDURE — 1101F PT FALLS ASSESS-DOCD LE1/YR: CPT | Mod: CPTII,S$GLB,, | Performed by: REGISTERED NURSE

## 2021-09-30 PROCEDURE — 1125F PR PAIN SEVERITY QUANTIFIED, PAIN PRESENT: ICD-10-PCS | Mod: CPTII,S$GLB,, | Performed by: REGISTERED NURSE

## 2021-09-30 PROCEDURE — 1125F AMNT PAIN NOTED PAIN PRSNT: CPT | Mod: CPTII,S$GLB,, | Performed by: REGISTERED NURSE

## 2021-09-30 PROCEDURE — 83036 HEMOGLOBIN GLYCOSYLATED A1C: CPT | Performed by: REGISTERED NURSE

## 2021-09-30 PROCEDURE — 81002 POCT URINE DIPSTICK WITHOUT MICROSCOPE: ICD-10-PCS | Mod: S$GLB,,, | Performed by: REGISTERED NURSE

## 2021-09-30 PROCEDURE — 3078F PR MOST RECENT DIASTOLIC BLOOD PRESSURE < 80 MM HG: ICD-10-PCS | Mod: CPTII,S$GLB,, | Performed by: REGISTERED NURSE

## 2021-09-30 RX ORDER — FLAVOXATE HYDROCHLORIDE 100 MG/1
100-200 TABLET ORAL 3 TIMES DAILY PRN
Qty: 15 TABLET | Refills: 0 | Status: SHIPPED | OUTPATIENT
Start: 2021-09-30 | End: 2022-10-26

## 2021-09-30 RX ORDER — TRAZODONE HYDROCHLORIDE 100 MG/1
100 TABLET ORAL NIGHTLY PRN
Qty: 90 TABLET | Refills: 1 | OUTPATIENT
Start: 2021-09-30

## 2021-10-02 LAB — BACTERIA UR CULT: NO GROWTH

## 2022-03-09 DIAGNOSIS — I10 ESSENTIAL HYPERTENSION: ICD-10-CM

## 2022-03-30 ENCOUNTER — OFFICE VISIT (OUTPATIENT)
Dept: FAMILY MEDICINE | Facility: CLINIC | Age: 77
End: 2022-03-30
Payer: MEDICARE

## 2022-03-30 VITALS
DIASTOLIC BLOOD PRESSURE: 66 MMHG | BODY MASS INDEX: 39.65 KG/M2 | HEART RATE: 85 BPM | SYSTOLIC BLOOD PRESSURE: 120 MMHG | HEIGHT: 66 IN | TEMPERATURE: 98 F | OXYGEN SATURATION: 96 % | WEIGHT: 246.69 LBS

## 2022-03-30 DIAGNOSIS — N39.0 ACUTE UTI (URINARY TRACT INFECTION): Primary | ICD-10-CM

## 2022-03-30 DIAGNOSIS — R39.9 URINARY SYMPTOM OR SIGN: ICD-10-CM

## 2022-03-30 PROCEDURE — 1125F PR PAIN SEVERITY QUANTIFIED, PAIN PRESENT: ICD-10-PCS | Mod: CPTII,S$GLB,, | Performed by: REGISTERED NURSE

## 2022-03-30 PROCEDURE — 99213 OFFICE O/P EST LOW 20 MIN: CPT | Mod: 25,S$GLB,, | Performed by: REGISTERED NURSE

## 2022-03-30 PROCEDURE — 3078F PR MOST RECENT DIASTOLIC BLOOD PRESSURE < 80 MM HG: ICD-10-PCS | Mod: CPTII,S$GLB,, | Performed by: REGISTERED NURSE

## 2022-03-30 PROCEDURE — 1159F MED LIST DOCD IN RCRD: CPT | Mod: CPTII,S$GLB,, | Performed by: REGISTERED NURSE

## 2022-03-30 PROCEDURE — 1159F PR MEDICATION LIST DOCUMENTED IN MEDICAL RECORD: ICD-10-PCS | Mod: CPTII,S$GLB,, | Performed by: REGISTERED NURSE

## 2022-03-30 PROCEDURE — 1101F PR PT FALLS ASSESS DOC 0-1 FALLS W/OUT INJ PAST YR: ICD-10-PCS | Mod: CPTII,S$GLB,, | Performed by: REGISTERED NURSE

## 2022-03-30 PROCEDURE — 81002 URINALYSIS NONAUTO W/O SCOPE: CPT | Mod: S$GLB,,, | Performed by: REGISTERED NURSE

## 2022-03-30 PROCEDURE — 99213 PR OFFICE/OUTPT VISIT, EST, LEVL III, 20-29 MIN: ICD-10-PCS | Mod: 25,S$GLB,, | Performed by: REGISTERED NURSE

## 2022-03-30 PROCEDURE — 3288F PR FALLS RISK ASSESSMENT DOCUMENTED: ICD-10-PCS | Mod: CPTII,S$GLB,, | Performed by: REGISTERED NURSE

## 2022-03-30 PROCEDURE — 99999 PR PBB SHADOW E&M-EST. PATIENT-LVL III: ICD-10-PCS | Mod: PBBFAC,,, | Performed by: REGISTERED NURSE

## 2022-03-30 PROCEDURE — 3288F FALL RISK ASSESSMENT DOCD: CPT | Mod: CPTII,S$GLB,, | Performed by: REGISTERED NURSE

## 2022-03-30 PROCEDURE — 81002 POCT URINE DIPSTICK WITHOUT MICROSCOPE: ICD-10-PCS | Mod: S$GLB,,, | Performed by: REGISTERED NURSE

## 2022-03-30 PROCEDURE — 3074F PR MOST RECENT SYSTOLIC BLOOD PRESSURE < 130 MM HG: ICD-10-PCS | Mod: CPTII,S$GLB,, | Performed by: REGISTERED NURSE

## 2022-03-30 PROCEDURE — 3074F SYST BP LT 130 MM HG: CPT | Mod: CPTII,S$GLB,, | Performed by: REGISTERED NURSE

## 2022-03-30 PROCEDURE — 1125F AMNT PAIN NOTED PAIN PRSNT: CPT | Mod: CPTII,S$GLB,, | Performed by: REGISTERED NURSE

## 2022-03-30 PROCEDURE — 99999 PR PBB SHADOW E&M-EST. PATIENT-LVL III: CPT | Mod: PBBFAC,,, | Performed by: REGISTERED NURSE

## 2022-03-30 PROCEDURE — 1101F PT FALLS ASSESS-DOCD LE1/YR: CPT | Mod: CPTII,S$GLB,, | Performed by: REGISTERED NURSE

## 2022-03-30 PROCEDURE — 3078F DIAST BP <80 MM HG: CPT | Mod: CPTII,S$GLB,, | Performed by: REGISTERED NURSE

## 2022-03-30 RX ORDER — CEPHALEXIN 500 MG/1
500 CAPSULE ORAL EVERY 12 HOURS
Qty: 20 CAPSULE | Refills: 0 | Status: SHIPPED | OUTPATIENT
Start: 2022-03-30 | End: 2022-04-09

## 2022-03-30 NOTE — PROGRESS NOTES
Subjective:       Patient ID: Kay Rosas is a 76 y.o. female.      Chief Complaint   Patient presents with    Cystitis       HPI    Urinary Tract Infection   This is a new problem. The current episode started in the past 7 days. The problem has been gradually worsening. The quality of the pain is described as burning. There has been no fever. There is no history of pyelonephritis. Associated symptoms include frequency, hesitancy, urgency and withholding. Pertinent negatives include no behavior changes, chills, discharge, flank pain, hematuria, nausea, sweats, vomiting, weight loss or bubble bath use. She has tried increased fluids for the symptoms. The treatment provided mild relief.          Review of Systems   Constitutional: Negative for chills, fever and weight loss.   Respiratory: Negative.    Cardiovascular: Negative.    Gastrointestinal: Negative for nausea and vomiting.   Genitourinary: Positive for dysuria, frequency, hesitancy and urgency. Negative for flank pain, hematuria and pelvic pain.   Neurological: Negative.          Review of patient's allergies indicates:   Allergen Reactions    Nsaids (non-steroidal anti-inflammatory drug)      CKD         Patient Active Problem List   Diagnosis    Essential hypertension    Acquired hypothyroidism    Hyperlipidemia    Arthritis    B12 deficiency    Depression    Lumbar spondylosis    COPD, moderate    PAD (peripheral artery disease)    Osteoarthritis of both knees    Chronic bilateral low back pain with right-sided sciatica    Lumbar radiculopathy    Sacroiliitis    Greater trochanteric bursitis of both hips    Morbid (severe) obesity due to excess calories    Secondary hyperparathyroidism of renal origin    Chronic kidney disease, stage 4 (severe)    Deep vein thrombosis (DVT) of lower extremity    Primary insomnia    Iron deficiency anemia due to chronic blood loss    Embolism and thrombosis of unspecified artery    Other  "nonthrombocytopenic purpura    Major depressive disorder, recurrent, mild           Current Outpatient Medications:     apixaban (ELIQUIS) 5 mg Tab, Take 1 tablet (5 mg total) by mouth 2 (two) times daily., Disp: 60 tablet, Rfl: 11    flavoxATE (URISPAS) 100 mg Tab, Take 1-2 tablets (100-200 mg total) by mouth 3 (three) times daily as needed. For bladder spasms, Disp: 15 tablet, Rfl: 0    levothyroxine (SYNTHROID) 50 MCG tablet, TAKE 1 TABLET BY MOUTH EVERY DAY, Disp: 90 tablet, Rfl: 1    losartan-hydrochlorothiazide 100-25 mg (HYZAAR) 100-25 mg per tablet, TAKE 1 TABLET BY MOUTH EVERY DAY, Disp: 90 tablet, Rfl: 0    sertraline (ZOLOFT) 100 MG tablet, TAKE 1 TABLET BY MOUTH EVERY DAY, Disp: 90 tablet, Rfl: 0    sertraline (ZOLOFT) 50 MG tablet, TAKE 1 TABLET BY MOUTH EVERY DAY, Disp: 90 tablet, Rfl: 0    traZODone (DESYREL) 100 MG tablet, TAKE 1 TABLET BY MOUTH EVERY NIGHT AT BEDTIME AS NEEDED FOR INSOMNIA, Disp: 30 tablet, Rfl: 0        Past medical, surgical, family and social histories have been reviewed today.      Objective:     Vitals:    03/30/22 1519   BP: 120/66   Pulse: 85   Temp: 97.6 °F (36.4 °C)   SpO2: 96%   Weight: 111.9 kg (246 lb 11.1 oz)   Height: 5' 6" (1.676 m)   PainSc:   4   PainLoc: Back         Physical Exam  Constitutional:       General: She is not in acute distress.  HENT:      Head: Normocephalic and atraumatic.   Abdominal:      Tenderness: There is no abdominal tenderness. There is no right CVA tenderness, left CVA tenderness or guarding.   Neurological:      Mental Status: She is alert and oriented to person, place, and time.         Results for orders placed or performed in visit on 03/30/22   POCT urine dipstick without microscope   Result Value Ref Range    Color, UA Yellow     pH, UA 5     WBC, UA Mod (++)     Nitrite, UA Neg     Protein, POC Trace     Glucose, UA Neg     Ketones, UA Neg     Urobilinogen, UA Normal     Bilirubin, POC Neg     Blood, UA Trace     Clarity, UA " Clear     Spec Grav UA 1.020        Assessment     1. Acute UTI (urinary tract infection) --- infection triggers & prevention, abx as ordered.  Not enough urine in specimen cup to send for urine culture.  - cephALEXin (KEFLEX) 500 MG capsule; Take 1 capsule (500 mg total) by mouth every 12 (twelve) hours. for 10 days  Dispense: 20 capsule; Refill: 0    2. Urinary symptom or sign  - POCT urine dipstick without microscope         Follow-up     Contact office back in 2 to 3 days if worse or no better.  Return to clinic as needed.      BRANDAN Lin  [facility]

## 2022-04-11 NOTE — TELEPHONE ENCOUNTER
Care Due:                  Date            Visit Type   Department     Provider  --------------------------------------------------------------------------------                                EP -                              PRIMARY      [facility]  Last Visit: 02-      CARE ([facility])   MEDICINE       Kim Lakhani                              EP -                              PRIMARY      [facility]  Next Visit: 04-      CARE ([facility])   MEDICINE       Kim Lakhani                                                            Last  Test          Frequency    Reason                     Performed    Due Date  --------------------------------------------------------------------------------    CMP.........  12 months..  losartan-hydrochlorothiaz  02- 02-                             caron......................    Powered by [employer]. Reference number: [id number].   4/11/2022 11:59:00 AM CDT

## 2022-04-12 NOTE — TELEPHONE ENCOUNTER
Refill Routing Note   Medication(s) are not appropriate for processing by [facility] for the following reason(s):      - Indication is outside of scope for [facility]    [facility] action(s):  Defer Medication-related problems identified: Requires labs        Medication reconciliation completed: No     Appointments  past 12m or future 3m with PCP    Date Provider   Last Visit   2/18/2021 Kim Lakhani MD   Next Visit   4/26/2022 Kim Lakhani MD   ED visits in past 90 days: 0        Note composed:12:21 PM 04/12/2022

## 2022-04-13 RX ORDER — TRAZODONE HYDROCHLORIDE 100 MG/1
TABLET ORAL
Qty: 30 TABLET | Refills: 0 | Status: SHIPPED | OUTPATIENT
Start: 2022-04-13 | End: 2022-04-26

## 2022-04-22 NOTE — TELEPHONE ENCOUNTER
----- Message from Iliana Diaz sent at 12/4/2019  2:46 PM CST -----  .Type:  Needs Medical Advice    Who Called: self  Symptoms (please be specific): sinus infection  How long has patient had these symptoms:  4 days  Pharmacy name and phone #:  .  [id number] - [place] - [address]  [address]  [place] [address]  Phone: [phone] Fax: [phone]    Would the patient rather a call back or a response via [facility]? call  Best Call Back Number: .[phone] (home)   Additional Information:     Dupixent Pregnancy And Lactation Text: This medication likely crosses the placenta but the risk for the fetus is uncertain. This medication is excreted in breast milk.

## 2022-04-26 ENCOUNTER — LAB VISIT (OUTPATIENT)
Dept: LAB | Facility: HOSPITAL | Age: 77
End: 2022-04-26
Attending: FAMILY MEDICINE
Payer: MEDICARE

## 2022-04-26 ENCOUNTER — OFFICE VISIT (OUTPATIENT)
Dept: FAMILY MEDICINE | Facility: CLINIC | Age: 77
End: 2022-04-26
Payer: MEDICARE

## 2022-04-26 VITALS
HEIGHT: 66 IN | BODY MASS INDEX: 39.58 KG/M2 | HEART RATE: 76 BPM | DIASTOLIC BLOOD PRESSURE: 82 MMHG | WEIGHT: 246.25 LBS | OXYGEN SATURATION: 96 % | TEMPERATURE: 98 F | SYSTOLIC BLOOD PRESSURE: 130 MMHG

## 2022-04-26 DIAGNOSIS — N25.81 SECONDARY HYPERPARATHYROIDISM OF RENAL ORIGIN: ICD-10-CM

## 2022-04-26 DIAGNOSIS — J44.9 COPD, MODERATE: ICD-10-CM

## 2022-04-26 DIAGNOSIS — E66.01 MORBID (SEVERE) OBESITY DUE TO EXCESS CALORIES: ICD-10-CM

## 2022-04-26 DIAGNOSIS — I10 ESSENTIAL HYPERTENSION: ICD-10-CM

## 2022-04-26 DIAGNOSIS — I73.9 PAD (PERIPHERAL ARTERY DISEASE): ICD-10-CM

## 2022-04-26 DIAGNOSIS — Z86.718 HISTORY OF DVT (DEEP VEIN THROMBOSIS): ICD-10-CM

## 2022-04-26 DIAGNOSIS — N18.30 STAGE 3 CHRONIC KIDNEY DISEASE, UNSPECIFIED WHETHER STAGE 3A OR 3B CKD: ICD-10-CM

## 2022-04-26 DIAGNOSIS — R10.12 LEFT UPPER QUADRANT ABDOMINAL PAIN: ICD-10-CM

## 2022-04-26 DIAGNOSIS — E53.8 B12 DEFICIENCY: ICD-10-CM

## 2022-04-26 DIAGNOSIS — Z79.899 LONG TERM USE OF DRUG: ICD-10-CM

## 2022-04-26 DIAGNOSIS — Z00.00 ENCOUNTER FOR WELLNESS EXAMINATION: Primary | ICD-10-CM

## 2022-04-26 DIAGNOSIS — Z13.820 SCREENING FOR OSTEOPOROSIS: ICD-10-CM

## 2022-04-26 DIAGNOSIS — F33.0 MAJOR DEPRESSIVE DISORDER, RECURRENT, MILD: ICD-10-CM

## 2022-04-26 DIAGNOSIS — E03.9 ACQUIRED HYPOTHYROIDISM: ICD-10-CM

## 2022-04-26 DIAGNOSIS — E78.00 PURE HYPERCHOLESTEROLEMIA: ICD-10-CM

## 2022-04-26 DIAGNOSIS — Z12.11 SCREENING FOR COLON CANCER: ICD-10-CM

## 2022-04-26 DIAGNOSIS — Z00.00 ENCOUNTER FOR WELLNESS EXAMINATION: ICD-10-CM

## 2022-04-26 DIAGNOSIS — Z78.0 POST-MENOPAUSAL: ICD-10-CM

## 2022-04-26 DIAGNOSIS — F51.01 PRIMARY INSOMNIA: ICD-10-CM

## 2022-04-26 DIAGNOSIS — Z23 NEED FOR PNEUMOCOCCAL VACCINATION: ICD-10-CM

## 2022-04-26 PROBLEM — D69.2 OTHER NONTHROMBOCYTOPENIC PURPURA: Status: RESOLVED | Noted: 2021-02-18 | Resolved: 2022-04-26

## 2022-04-26 PROBLEM — I74.9 EMBOLISM AND THROMBOSIS OF UNSPECIFIED ARTERY: Status: RESOLVED | Noted: 2021-02-01 | Resolved: 2022-04-26

## 2022-04-26 PROBLEM — M46.1 SACROILIITIS: Status: RESOLVED | Noted: 2019-11-11 | Resolved: 2022-04-26

## 2022-04-26 PROBLEM — N18.4 CHRONIC KIDNEY DISEASE, STAGE 4 (SEVERE): Status: RESOLVED | Noted: 2020-02-13 | Resolved: 2022-04-26

## 2022-04-26 LAB
ALBUMIN SERPL BCP-MCNC: 4.1 G/DL (ref 3.5–5.2)
ALP SERPL-CCNC: 64 U/L (ref 55–135)
ALT SERPL W/O P-5'-P-CCNC: 21 U/L (ref 10–44)
ANION GAP SERPL CALC-SCNC: 11 MMOL/L (ref 8–16)
AST SERPL-CCNC: 19 U/L (ref 10–40)
BILIRUB SERPL-MCNC: 0.5 MG/DL (ref 0.1–1)
BUN SERPL-MCNC: 25 MG/DL (ref 8–23)
CALCIUM SERPL-MCNC: 10.5 MG/DL (ref 8.7–10.5)
CHLORIDE SERPL-SCNC: 102 MMOL/L (ref 95–110)
CHOLEST SERPL-MCNC: 241 MG/DL (ref 120–199)
CHOLEST/HDLC SERPL: 4 {RATIO} (ref 2–5)
CO2 SERPL-SCNC: 28 MMOL/L (ref 23–29)
CREAT SERPL-MCNC: 1.8 MG/DL (ref 0.5–1.4)
ERYTHROCYTE [DISTWIDTH] IN BLOOD BY AUTOMATED COUNT: 14.2 % (ref 11.5–14.5)
EST. GFR  (AFRICAN AMERICAN): 31.3 ML/MIN/1.73 M^2
EST. GFR  (NON AFRICAN AMERICAN): 27.1 ML/MIN/1.73 M^2
ESTIMATED AVG GLUCOSE: 120 MG/DL (ref 68–131)
GLUCOSE SERPL-MCNC: 90 MG/DL (ref 70–110)
HBA1C MFR BLD: 5.8 % (ref 4–5.6)
HCT VFR BLD AUTO: 41.4 % (ref 37–48.5)
HDLC SERPL-MCNC: 61 MG/DL (ref 40–75)
HDLC SERPL: 25.3 % (ref 20–50)
HGB BLD-MCNC: 12.3 G/DL (ref 12–16)
LDLC SERPL CALC-MCNC: 152 MG/DL (ref 63–159)
MCH RBC QN AUTO: 28.2 PG (ref 27–31)
MCHC RBC AUTO-ENTMCNC: 29.7 G/DL (ref 32–36)
MCV RBC AUTO: 95 FL (ref 82–98)
NONHDLC SERPL-MCNC: 180 MG/DL
PLATELET # BLD AUTO: 217 K/UL (ref 150–450)
PMV BLD AUTO: 11.7 FL (ref 9.2–12.9)
POTASSIUM SERPL-SCNC: 5 MMOL/L (ref 3.5–5.1)
PROT SERPL-MCNC: 8 G/DL (ref 6–8.4)
RBC # BLD AUTO: 4.36 M/UL (ref 4–5.4)
SODIUM SERPL-SCNC: 141 MMOL/L (ref 136–145)
T4 FREE SERPL-MCNC: 0.96 NG/DL (ref 0.71–1.51)
TRIGL SERPL-MCNC: 140 MG/DL (ref 30–150)
TSH SERPL DL<=0.005 MIU/L-ACNC: 2.05 UIU/ML (ref 0.4–4)
VIT B12 SERPL-MCNC: 272 PG/ML (ref 210–950)
WBC # BLD AUTO: 4.73 K/UL (ref 3.9–12.7)

## 2022-04-26 PROCEDURE — 99499 UNLISTED E&M SERVICE: CPT | Mod: S$GLB,,, | Performed by: FAMILY MEDICINE

## 2022-04-26 PROCEDURE — 80053 COMPREHEN METABOLIC PANEL: CPT | Performed by: FAMILY MEDICINE

## 2022-04-26 PROCEDURE — 85027 COMPLETE CBC AUTOMATED: CPT | Performed by: FAMILY MEDICINE

## 2022-04-26 PROCEDURE — 99499 RISK ADDL DX/OHS AUDIT: ICD-10-PCS | Mod: S$GLB,,, | Performed by: FAMILY MEDICINE

## 2022-04-26 PROCEDURE — 3075F SYST BP GE 130 - 139MM HG: CPT | Mod: CPTII,S$GLB,, | Performed by: FAMILY MEDICINE

## 2022-04-26 PROCEDURE — 1101F PR PT FALLS ASSESS DOC 0-1 FALLS W/OUT INJ PAST YR: ICD-10-PCS | Mod: CPTII,S$GLB,, | Performed by: FAMILY MEDICINE

## 2022-04-26 PROCEDURE — 1159F PR MEDICATION LIST DOCUMENTED IN MEDICAL RECORD: ICD-10-PCS | Mod: CPTII,S$GLB,, | Performed by: FAMILY MEDICINE

## 2022-04-26 PROCEDURE — G0009 ADMIN PNEUMOCOCCAL VACCINE: HCPCS | Mod: S$GLB,,, | Performed by: FAMILY MEDICINE

## 2022-04-26 PROCEDURE — 84443 ASSAY THYROID STIM HORMONE: CPT | Performed by: FAMILY MEDICINE

## 2022-04-26 PROCEDURE — 99999 PR PBB SHADOW E&M-EST. PATIENT-LVL IV: ICD-10-PCS | Mod: PBBFAC,,, | Performed by: FAMILY MEDICINE

## 2022-04-26 PROCEDURE — 1125F PR PAIN SEVERITY QUANTIFIED, PAIN PRESENT: ICD-10-PCS | Mod: CPTII,S$GLB,, | Performed by: FAMILY MEDICINE

## 2022-04-26 PROCEDURE — 1125F AMNT PAIN NOTED PAIN PRSNT: CPT | Mod: CPTII,S$GLB,, | Performed by: FAMILY MEDICINE

## 2022-04-26 PROCEDURE — 99999 PR PBB SHADOW E&M-EST. PATIENT-LVL IV: CPT | Mod: PBBFAC,,, | Performed by: FAMILY MEDICINE

## 2022-04-26 PROCEDURE — 80061 LIPID PANEL: CPT | Performed by: FAMILY MEDICINE

## 2022-04-26 PROCEDURE — 3288F FALL RISK ASSESSMENT DOCD: CPT | Mod: CPTII,S$GLB,, | Performed by: FAMILY MEDICINE

## 2022-04-26 PROCEDURE — 1159F MED LIST DOCD IN RCRD: CPT | Mod: CPTII,S$GLB,, | Performed by: FAMILY MEDICINE

## 2022-04-26 PROCEDURE — 99214 OFFICE O/P EST MOD 30 MIN: CPT | Mod: 25,S$GLB,, | Performed by: FAMILY MEDICINE

## 2022-04-26 PROCEDURE — 90732 PNEUMOCOCCAL POLYSACCHARIDE VACCINE 23-VALENT =>2YO SQ IM: ICD-10-PCS | Mod: S$GLB,,, | Performed by: FAMILY MEDICINE

## 2022-04-26 PROCEDURE — G0009 PNEUMOCOCCAL POLYSACCHARIDE VACCINE 23-VALENT =>2YO SQ IM: ICD-10-PCS | Mod: S$GLB,,, | Performed by: FAMILY MEDICINE

## 2022-04-26 PROCEDURE — 83036 HEMOGLOBIN GLYCOSYLATED A1C: CPT | Performed by: FAMILY MEDICINE

## 2022-04-26 PROCEDURE — 99214 PR OFFICE/OUTPT VISIT, EST, LEVL IV, 30-39 MIN: ICD-10-PCS | Mod: 25,S$GLB,, | Performed by: FAMILY MEDICINE

## 2022-04-26 PROCEDURE — 3079F DIAST BP 80-89 MM HG: CPT | Mod: CPTII,S$GLB,, | Performed by: FAMILY MEDICINE

## 2022-04-26 PROCEDURE — 36415 COLL VENOUS BLD VENIPUNCTURE: CPT | Mod: PO | Performed by: FAMILY MEDICINE

## 2022-04-26 PROCEDURE — 3288F PR FALLS RISK ASSESSMENT DOCUMENTED: ICD-10-PCS | Mod: CPTII,S$GLB,, | Performed by: FAMILY MEDICINE

## 2022-04-26 PROCEDURE — 84439 ASSAY OF FREE THYROXINE: CPT | Performed by: FAMILY MEDICINE

## 2022-04-26 PROCEDURE — 90732 PPSV23 VACC 2 YRS+ SUBQ/IM: CPT | Mod: S$GLB,,, | Performed by: FAMILY MEDICINE

## 2022-04-26 PROCEDURE — 3075F PR MOST RECENT SYSTOLIC BLOOD PRESS GE 130-139MM HG: ICD-10-PCS | Mod: CPTII,S$GLB,, | Performed by: FAMILY MEDICINE

## 2022-04-26 PROCEDURE — 82607 VITAMIN B-12: CPT | Performed by: FAMILY MEDICINE

## 2022-04-26 PROCEDURE — 1101F PT FALLS ASSESS-DOCD LE1/YR: CPT | Mod: CPTII,S$GLB,, | Performed by: FAMILY MEDICINE

## 2022-04-26 PROCEDURE — 3079F PR MOST RECENT DIASTOLIC BLOOD PRESSURE 80-89 MM HG: ICD-10-PCS | Mod: CPTII,S$GLB,, | Performed by: FAMILY MEDICINE

## 2022-04-26 RX ORDER — SERTRALINE HYDROCHLORIDE 50 MG/1
50 TABLET, FILM COATED ORAL DAILY
Qty: 90 TABLET | Refills: 3 | Status: SHIPPED | OUTPATIENT
Start: 2022-04-26 | End: 2023-05-03

## 2022-04-26 RX ORDER — LEVOTHYROXINE SODIUM 50 UG/1
50 TABLET ORAL DAILY
Qty: 90 TABLET | Refills: 3 | Status: SHIPPED | OUTPATIENT
Start: 2022-04-26 | End: 2023-06-14

## 2022-04-26 RX ORDER — SERTRALINE HYDROCHLORIDE 100 MG/1
100 TABLET, FILM COATED ORAL DAILY
Qty: 90 TABLET | Refills: 3 | Status: SHIPPED | OUTPATIENT
Start: 2022-04-26 | End: 2022-06-02 | Stop reason: SDUPTHER

## 2022-04-26 RX ORDER — LOSARTAN POTASSIUM AND HYDROCHLOROTHIAZIDE 25; 100 MG/1; MG/1
1 TABLET ORAL DAILY
Qty: 90 TABLET | Refills: 3 | Status: SHIPPED | OUTPATIENT
Start: 2022-04-26 | End: 2023-06-15

## 2022-04-26 RX ORDER — TRAZODONE HYDROCHLORIDE 150 MG/1
150 TABLET ORAL NIGHTLY
Qty: 30 TABLET | Refills: 11 | Status: SHIPPED | OUTPATIENT
Start: 2022-04-26 | End: 2022-06-07 | Stop reason: SDUPTHER

## 2022-04-26 NOTE — PROGRESS NOTES
Subjective:      Patient ID: Kay Rosas is a 76 y.o. female.    Chief Complaint: GI Problem    HPI    Patient with pmhx of HTN, DDD, HLD, depression (150 mg of zoloft), COPD, hypothyroidism, GERD, DVT x 2, CKD stage 3, insomnia here today for wellness visit     Diagnosed a long time ago with diabetes but then rechecked and was told she wasn't a diabetic. Not taking medication for this.     Unable to afford blood thinner ($45/month too expensive). She understands that she needs to be taking it but doesn't like taking it as well due to bruising and side effects. Doesn't want to do warfarin either.     Past Medical History:   Diagnosis Date    Arthritis     Degenerative disc disease, cervical     Disorder of kidney and ureter     CKD stage 3    Emphysema of lung     Hyperlipidemia     Hypertension     MVP (mitral valve prolapse)     Osteoporosis     feet    Thyroid condition        Past Surgical History:   Procedure Laterality Date    BACK SURGERY  2012    cataract surgery  Bilateral 10/ 2012    elbow spur removed Left     HYSTERECTOMY Left 1978    INJECTION OF ANESTHETIC AGENT INTO SACROILIAC JOINT Bilateral 11/11/2019    Procedure: Bilateral GT bursa + SIJ injection;  Surgeon: Noe Pleitez MD;  Location: [facility];  Service: Pain Management;  Laterality: Bilateral;    INJECTION OF JOINT Bilateral 11/11/2019    Procedure: Bilateral GT bursa + SIJ injection;  Surgeon: Noe Pleitez MD;  Location: [facility];  Service: Pain Management;  Laterality: Bilateral;    OOPHORECTOMY      OVARIAN CYST REMOVAL      two from back, one from axilla    VASCULAR SURGERY Right     high ligation due to blood clot        Family History   Problem Relation Age of Onset    Heart disease Mother     Kidney disease Mother     Arthritis Mother     Breast cancer Mother     Cancer Father     Heart disease Father     Cancer Sister     Ovarian cancer Sister     Alzheimer's disease Sister         Social History     Socioeconomic History    Marital status:    Tobacco Use    Smoking status: Former Smoker     Quit date: 2015     Years since quittin.8    Smokeless tobacco: Never Used   Substance and Sexual Activity    Alcohol use: No     Alcohol/week: 0.0 standard drinks    Drug use: No       Health Maintenance Topics with due status: Not Due       Topic Last Completion Date    Lipid Panel 2020       Medication List with Changes/Refills   New Medications    TRAZODONE (DESYREL) 150 MG TABLET    Take 1 tablet (150 mg total) by mouth every evening.   Current Medications    FLAVOXATE (URISPAS) 100 MG TAB    Take 1-2 tablets (100-200 mg total) by mouth 3 (three) times daily as needed. For bladder spasms   Changed and/or Refilled Medications    Modified Medication Previous Medication    LEVOTHYROXINE (SYNTHROID) 50 MCG TABLET levothyroxine (SYNTHROID) 50 MCG tablet       Take 1 tablet (50 mcg total) by mouth once daily.    TAKE 1 TABLET BY MOUTH EVERY DAY    LOSARTAN-HYDROCHLOROTHIAZIDE 100-25 MG (HYZAAR) 100-25 MG PER TABLET losartan-hydrochlorothiazide 100-25 mg (HYZAAR) 100-25 mg per tablet       Take 1 tablet by mouth once daily.    TAKE 1 TABLET BY MOUTH EVERY DAY    SERTRALINE (ZOLOFT) 100 MG TABLET sertraline (ZOLOFT) 100 MG tablet       Take 1 tablet (100 mg total) by mouth once daily.    TAKE 1 TABLET BY MOUTH EVERY DAY    SERTRALINE (ZOLOFT) 50 MG TABLET sertraline (ZOLOFT) 50 MG tablet       Take 1 tablet (50 mg total) by mouth once daily.    TAKE 1 TABLET BY MOUTH EVERY DAY   Discontinued Medications    APIXABAN (ELIQUIS) 5 MG TAB    Take 1 tablet (5 mg total) by mouth 2 (two) times daily.    TRAZODONE (DESYREL) 100 MG TABLET    TAKE 1 TABLET BY MOUTH EVERY NIGHT AT BEDTIME AS NEEDED FOR INSOMNIA       Review of patient's allergies indicates:   Allergen Reactions    Nsaids (non-steroidal anti-inflammatory drug)      CKD       Review of Systems   Constitutional: Negative  for fever.   HENT: Negative for congestion.    Eyes: Negative for blurred vision.   Respiratory: Negative for shortness of breath.    Cardiovascular: Negative for chest pain and leg swelling.   Gastrointestinal: Negative for abdominal pain, constipation and diarrhea.   Genitourinary: Negative for dysuria.   Skin: Negative for rash.   Neurological: Negative for headaches.       Objective:     Vitals:    04/26/22 0751   BP: 130/82   Pulse: 76   Temp: 97.6 °F (36.4 °C)     Body mass index is 39.75 kg/m².    Physical Exam  Vitals and nursing note reviewed.   Constitutional:       General: She is not in acute distress.     Appearance: She is well-developed.   HENT:      Head: Normocephalic and atraumatic.      Right Ear: External ear normal.      Left Ear: External ear normal.      Nose: Nose normal.   Eyes:      Conjunctiva/sclera: Conjunctivae normal.      Pupils: Pupils are equal, round, and reactive to light.   Neck:      Thyroid: No thyromegaly.   Cardiovascular:      Rate and Rhythm: Normal rate and regular rhythm.      Heart sounds: Normal heart sounds. No murmur heard.  Pulmonary:      Effort: Pulmonary effort is normal. No respiratory distress.      Breath sounds: Normal breath sounds. No wheezing or rales.   Chest:      Chest wall: No tenderness.   Abdominal:      General: Bowel sounds are normal. There is no distension.      Palpations: Abdomen is soft.      Tenderness: There is no abdominal tenderness.          Comments: TTP on exam - notes that this has been a chronic issue for months. Changes in bowel habits due to this as well. No blood in stool   Lymphadenopathy:      Cervical: No cervical adenopathy.   Skin:     General: Skin is warm and dry.   Neurological:      Mental Status: She is alert and oriented to person, place, and time.   Psychiatric:         Mood and Affect: Mood normal.         Assessment and Plan:     Encounter for wellness examination  -     Ambulatory referral/consult to Endo Procedure  ; Future; Expected date: 04/27/2022  -     DXA Bone Density Spine And Hip; Future; Expected date: 04/26/2022  -     (In Office Administered) Pneumococcal Polysaccharide Vaccine (23 Valent) (SQ/IM)  -     CBC Without Differential; Future; Expected date: 04/26/2022  -     Comprehensive Metabolic Panel; Future; Expected date: 04/26/2022  -     Hemoglobin A1C; Future; Expected date: 04/26/2022  -     Lipid Panel; Future; Expected date: 04/26/2022  -     TSH; Future; Expected date: 04/26/2022  -     T4, Free; Future; Expected date: 04/26/2022  Age appropriate counseling    Need for pneumococcal vaccination  -     (In Office Administered) Pneumococcal Polysaccharide Vaccine (23 Valent) (SQ/IM)    Post-menopausal  -     DXA Bone Density Spine And Hip; Future; Expected date: 04/26/2022    Screening for osteoporosis  -     DXA Bone Density Spine And Hip; Future; Expected date: 04/26/2022    Screening for colon cancer  -     Ambulatory referral/consult to Endo Procedure ; Future; Expected date: 04/27/2022    Essential hypertension  -     losartan-hydrochlorothiazide 100-25 mg (HYZAAR) 100-25 mg per tablet; Take 1 tablet by mouth once daily.  Dispense: 90 tablet; Refill: 3    COPD, moderate  Stable    Pure hypercholesterolemia  Will check lipid panel    Acquired hypothyroidism  -     TSH; Future; Expected date: 04/26/2022  -     T4, Free; Future; Expected date: 04/26/2022  -     levothyroxine (SYNTHROID) 50 MCG tablet; Take 1 tablet (50 mcg total) by mouth once daily.  Dispense: 90 tablet; Refill: 3    Primary insomnia  -     traZODone (DESYREL) 150 MG tablet; Take 1 tablet (150 mg total) by mouth every evening.  Dispense: 30 tablet; Refill: 11    Major depressive disorder, recurrent, mild  -     sertraline (ZOLOFT) 50 MG tablet; Take 1 tablet (50 mg total) by mouth once daily.  Dispense: 90 tablet; Refill: 3  -     sertraline (ZOLOFT) 100 MG tablet; Take 1 tablet (100 mg total) by mouth once daily.   Dispense: 90 tablet; Refill: 3    PAD (peripheral artery disease)  -     Comprehensive Metabolic Panel; Future; Expected date: 04/26/2022    B12 deficiency  Will get b12 today     Stage 3 chronic kidney disease, unspecified whether stage 3a or 3b CKD  -     Comprehensive Metabolic Panel; Future; Expected date: 04/26/2022    Morbid (severe) obesity due to excess calories  Weight loss and exercise     Secondary hyperparathyroidism of renal origin  CKD/repeat CMP    History of DVT (deep vein thrombosis)  Unable to afford blood thinner ($45/month too expensive). She understands that she needs to be taking it but doesn't like taking it as well due to bruising and side effects. Doesn't want to do warfarin either.     Long term use of drug  -     CBC Without Differential; Future; Expected date: 04/26/2022  -     Comprehensive Metabolic Panel; Future; Expected date: 04/26/2022  -     Hemoglobin A1C; Future; Expected date: 04/26/2022  -     Lipid Panel; Future; Expected date: 04/26/2022  -     TSH; Future; Expected date: 04/26/2022  -     T4, Free; Future; Expected date: 04/26/2022    Abdominal pain - CT scan/colonoscopy       Follow up in about 6 months (around 10/26/2022).

## 2022-04-28 ENCOUNTER — PATIENT MESSAGE (OUTPATIENT)
Dept: FAMILY MEDICINE | Facility: CLINIC | Age: 77
End: 2022-04-28
Payer: MEDICARE

## 2022-04-28 DIAGNOSIS — N18.30 STAGE 3 CHRONIC KIDNEY DISEASE, UNSPECIFIED WHETHER STAGE 3A OR 3B CKD: ICD-10-CM

## 2022-04-28 DIAGNOSIS — N39.0 URINARY TRACT INFECTION WITHOUT HEMATURIA, SITE UNSPECIFIED: ICD-10-CM

## 2022-04-28 DIAGNOSIS — R73.03 PREDIABETES: Primary | ICD-10-CM

## 2022-04-28 RX ORDER — METFORMIN HYDROCHLORIDE 500 MG/1
500 TABLET, EXTENDED RELEASE ORAL
Qty: 90 TABLET | Refills: 3 | Status: SHIPPED | OUTPATIENT
Start: 2022-04-28 | End: 2022-04-28

## 2022-04-28 NOTE — TELEPHONE ENCOUNTER
----- Message from Latoya Stevens sent at 12/26/2017 10:20 AM CST -----  Contact: self [phone]  Would like to have orders for mammogram faxed over.  Please call back at [phone].  Md Carmen  
Mammogram ordered and scheduled   
Detail Level: Detailed
Patient Specific Counseling (Will Not Stick From Patient To Patient): Patient notes the lesion site will produce puss that is yellow and blood in color. Patient notes no irritation on the groin or armpits. EG notes we can start an oral antibiotic. EG recommends applying gentle warm compresses and notes there is some scar tissue pressure. EG notes we could inject the lesion with ILK but she recommends waiting until inflammation decreases.

## 2022-04-28 NOTE — ADDENDUM NOTE
Addended by: WILDA BUTCHER on: 1/29/2018 01:57 PM     Modules accepted: Orders     Cephalexin Counseling: I counseled the patient regarding use of cephalexin as an antibiotic for prophylactic and/or therapeutic purposes. Cephalexin (commonly prescribed under brand name Keflex) is a cephalosporin antibiotic which is active against numerous classes of bacteria, including most skin bacteria. Side effects may include nausea, diarrhea, gastrointestinal upset, rash, hives, yeast infections, and in rare cases, hepatitis, kidney disease, seizures, fever, confusion, neurologic symptoms, and others. Patients with severe allergies to penicillin medications are cautioned that there is about a 10% incidence of cross-reactivity with cephalosporins. When possible, patients with penicillin allergies should use alternatives to cephalosporins for antibiotic therapy.

## 2022-05-11 ENCOUNTER — HOSPITAL ENCOUNTER (OUTPATIENT)
Dept: PREADMISSION TESTING | Facility: HOSPITAL | Age: 77
Discharge: HOME | End: 2022-05-11
Attending: FAMILY MEDICINE
Payer: MEDICARE

## 2022-05-11 ENCOUNTER — TELEPHONE (OUTPATIENT)
Dept: PREADMISSION TESTING | Facility: HOSPITAL | Age: 77
End: 2022-05-11
Payer: MEDICARE

## 2022-05-11 ENCOUNTER — TELEPHONE (OUTPATIENT)
Dept: FAMILY MEDICINE | Facility: CLINIC | Age: 77
End: 2022-05-11

## 2022-05-11 DIAGNOSIS — Z12.11 SCREENING FOR COLON CANCER: ICD-10-CM

## 2022-05-11 DIAGNOSIS — Z00.00 ENCOUNTER FOR WELLNESS EXAMINATION: ICD-10-CM

## 2022-05-11 NOTE — TELEPHONE ENCOUNTER
Pt wanted to know UA results from 4/28, however lab results say pending collection and lab appt says complete. No active order so new order is needed. Pt states she is coming in tomorrow morning 5/12. Please advise.

## 2022-05-12 ENCOUNTER — LAB VISIT (OUTPATIENT)
Dept: LAB | Facility: HOSPITAL | Age: 77
End: 2022-05-12
Attending: FAMILY MEDICINE
Payer: MEDICARE

## 2022-05-12 DIAGNOSIS — Z00.00 ENCOUNTER FOR WELLNESS EXAMINATION: ICD-10-CM

## 2022-05-12 DIAGNOSIS — E11.22 TYPE 2 DIABETES MELLITUS WITH STAGE 3 CHRONIC KIDNEY DISEASE, UNSPECIFIED WHETHER LONG TERM INSULIN USE, UNSPECIFIED WHETHER STAGE 3A OR 3B CKD: ICD-10-CM

## 2022-05-12 DIAGNOSIS — Z00.00 ENCOUNTER FOR WELLNESS EXAMINATION: Primary | ICD-10-CM

## 2022-05-12 DIAGNOSIS — N18.30 TYPE 2 DIABETES MELLITUS WITH STAGE 3 CHRONIC KIDNEY DISEASE, UNSPECIFIED WHETHER LONG TERM INSULIN USE, UNSPECIFIED WHETHER STAGE 3A OR 3B CKD: ICD-10-CM

## 2022-05-12 LAB
BACTERIA #/AREA URNS AUTO: ABNORMAL /HPF
BILIRUB UR QL STRIP: NEGATIVE
CLARITY UR REFRACT.AUTO: ABNORMAL
COLOR UR AUTO: YELLOW
GLUCOSE UR QL STRIP: NEGATIVE
HGB UR QL STRIP: NEGATIVE
KETONES UR QL STRIP: NEGATIVE
LEUKOCYTE ESTERASE UR QL STRIP: ABNORMAL
MICROSCOPIC COMMENT: ABNORMAL
NITRITE UR QL STRIP: NEGATIVE
PH UR STRIP: 7 [PH] (ref 5–8)
PROT UR QL STRIP: NEGATIVE
RBC #/AREA URNS AUTO: 1 /HPF (ref 0–4)
SP GR UR STRIP: 1.01 (ref 1–1.03)
SQUAMOUS #/AREA URNS AUTO: 3 /HPF
URN SPEC COLLECT METH UR: ABNORMAL
WBC #/AREA URNS AUTO: 6 /HPF (ref 0–5)

## 2022-05-12 PROCEDURE — 81001 URINALYSIS AUTO W/SCOPE: CPT | Performed by: FAMILY MEDICINE

## 2022-05-13 RX ORDER — NITROFURANTOIN 25; 75 MG/1; MG/1
100 CAPSULE ORAL 2 TIMES DAILY
Qty: 14 CAPSULE | Refills: 0 | Status: SHIPPED | OUTPATIENT
Start: 2022-05-13 | End: 2022-05-20

## 2022-05-13 RX ORDER — TRAZODONE HYDROCHLORIDE 100 MG/1
TABLET ORAL
Qty: 30 TABLET | Refills: 0 | OUTPATIENT
Start: 2022-05-13

## 2022-05-13 NOTE — TELEPHONE ENCOUNTER
Refill Authorization Note   Kay Rosas  is requesting a refill authorization.  Brief Assessment and Rationale for Refill:  Quick Discontinue     Medication Therapy Plan:       Medication Reconciliation Completed: No   Comments:     No Care Gaps recommended.     Note composed:12:00 PM 05/13/2022

## 2022-05-13 NOTE — TELEPHONE ENCOUNTER
No new care gaps identified.  [facility] Embedded Care Gaps. Reference number: [id number]. 5/13/2022   10:33:40 AM GENAROT

## 2022-05-18 ENCOUNTER — APPOINTMENT (OUTPATIENT)
Dept: RADIOLOGY | Facility: HOSPITAL | Age: 77
End: 2022-05-18
Attending: FAMILY MEDICINE
Payer: MEDICARE

## 2022-05-18 DIAGNOSIS — Z13.820 SCREENING FOR OSTEOPOROSIS: ICD-10-CM

## 2022-05-18 DIAGNOSIS — Z00.00 ENCOUNTER FOR WELLNESS EXAMINATION: ICD-10-CM

## 2022-05-18 DIAGNOSIS — Z78.0 POST-MENOPAUSAL: ICD-10-CM

## 2022-05-18 PROCEDURE — 77080 DXA BONE DENSITY AXIAL: CPT | Mod: 26,,, | Performed by: RADIOLOGY

## 2022-05-18 PROCEDURE — 77080 DXA BONE DENSITY AXIAL: CPT | Mod: TC

## 2022-05-18 PROCEDURE — 77080 DEXA BONE DENSITY SPINE HIP: ICD-10-PCS | Mod: 26,,, | Performed by: RADIOLOGY

## 2022-06-06 ENCOUNTER — OFFICE VISIT (OUTPATIENT)
Dept: FAMILY MEDICINE | Facility: CLINIC | Age: 77
End: 2022-06-06
Payer: MEDICARE

## 2022-06-06 VITALS
DIASTOLIC BLOOD PRESSURE: 70 MMHG | TEMPERATURE: 97 F | SYSTOLIC BLOOD PRESSURE: 118 MMHG | BODY MASS INDEX: 39.17 KG/M2 | HEART RATE: 83 BPM | OXYGEN SATURATION: 95 % | HEIGHT: 66 IN | WEIGHT: 243.75 LBS

## 2022-06-06 DIAGNOSIS — J44.9 COPD, MODERATE: ICD-10-CM

## 2022-06-06 DIAGNOSIS — E11.22 TYPE 2 DIABETES MELLITUS WITH STAGE 3 CHRONIC KIDNEY DISEASE, UNSPECIFIED WHETHER LONG TERM INSULIN USE, UNSPECIFIED WHETHER STAGE 3A OR 3B CKD: ICD-10-CM

## 2022-06-06 DIAGNOSIS — J01.00 ACUTE NON-RECURRENT MAXILLARY SINUSITIS: ICD-10-CM

## 2022-06-06 DIAGNOSIS — I10 ESSENTIAL HYPERTENSION: ICD-10-CM

## 2022-06-06 DIAGNOSIS — R05.9 COUGH: Primary | ICD-10-CM

## 2022-06-06 DIAGNOSIS — N18.30 TYPE 2 DIABETES MELLITUS WITH STAGE 3 CHRONIC KIDNEY DISEASE, UNSPECIFIED WHETHER LONG TERM INSULIN USE, UNSPECIFIED WHETHER STAGE 3A OR 3B CKD: ICD-10-CM

## 2022-06-06 PROCEDURE — 96372 PR INJECTION,THERAP/PROPH/DIAG2ST, IM OR SUBCUT: ICD-10-PCS | Mod: S$GLB,,, | Performed by: INTERNAL MEDICINE

## 2022-06-06 PROCEDURE — 3074F PR MOST RECENT SYSTOLIC BLOOD PRESSURE < 130 MM HG: ICD-10-PCS | Mod: CPTII,S$GLB,, | Performed by: INTERNAL MEDICINE

## 2022-06-06 PROCEDURE — 1101F PR PT FALLS ASSESS DOC 0-1 FALLS W/OUT INJ PAST YR: ICD-10-PCS | Mod: CPTII,S$GLB,, | Performed by: INTERNAL MEDICINE

## 2022-06-06 PROCEDURE — 3044F PR MOST RECENT HEMOGLOBIN A1C LEVEL <7.0%: ICD-10-PCS | Mod: CPTII,S$GLB,, | Performed by: INTERNAL MEDICINE

## 2022-06-06 PROCEDURE — 3074F SYST BP LT 130 MM HG: CPT | Mod: CPTII,S$GLB,, | Performed by: INTERNAL MEDICINE

## 2022-06-06 PROCEDURE — 3044F HG A1C LEVEL LT 7.0%: CPT | Mod: CPTII,S$GLB,, | Performed by: INTERNAL MEDICINE

## 2022-06-06 PROCEDURE — 3078F DIAST BP <80 MM HG: CPT | Mod: CPTII,S$GLB,, | Performed by: INTERNAL MEDICINE

## 2022-06-06 PROCEDURE — 99999 PR PBB SHADOW E&M-EST. PATIENT-LVL III: ICD-10-PCS | Mod: PBBFAC,,, | Performed by: INTERNAL MEDICINE

## 2022-06-06 PROCEDURE — 1101F PT FALLS ASSESS-DOCD LE1/YR: CPT | Mod: CPTII,S$GLB,, | Performed by: INTERNAL MEDICINE

## 2022-06-06 PROCEDURE — 3288F PR FALLS RISK ASSESSMENT DOCUMENTED: ICD-10-PCS | Mod: CPTII,S$GLB,, | Performed by: INTERNAL MEDICINE

## 2022-06-06 PROCEDURE — 99999 PR PBB SHADOW E&M-EST. PATIENT-LVL III: CPT | Mod: PBBFAC,,, | Performed by: INTERNAL MEDICINE

## 2022-06-06 PROCEDURE — 99214 OFFICE O/P EST MOD 30 MIN: CPT | Mod: 25,S$GLB,, | Performed by: INTERNAL MEDICINE

## 2022-06-06 PROCEDURE — 1159F MED LIST DOCD IN RCRD: CPT | Mod: CPTII,S$GLB,, | Performed by: INTERNAL MEDICINE

## 2022-06-06 PROCEDURE — 3078F PR MOST RECENT DIASTOLIC BLOOD PRESSURE < 80 MM HG: ICD-10-PCS | Mod: CPTII,S$GLB,, | Performed by: INTERNAL MEDICINE

## 2022-06-06 PROCEDURE — 99214 PR OFFICE/OUTPT VISIT, EST, LEVL IV, 30-39 MIN: ICD-10-PCS | Mod: 25,S$GLB,, | Performed by: INTERNAL MEDICINE

## 2022-06-06 PROCEDURE — 3288F FALL RISK ASSESSMENT DOCD: CPT | Mod: CPTII,S$GLB,, | Performed by: INTERNAL MEDICINE

## 2022-06-06 PROCEDURE — 1126F PR PAIN SEVERITY QUANTIFIED, NO PAIN PRESENT: ICD-10-PCS | Mod: CPTII,S$GLB,, | Performed by: INTERNAL MEDICINE

## 2022-06-06 PROCEDURE — 1159F PR MEDICATION LIST DOCUMENTED IN MEDICAL RECORD: ICD-10-PCS | Mod: CPTII,S$GLB,, | Performed by: INTERNAL MEDICINE

## 2022-06-06 PROCEDURE — 99499 RISK ADDL DX/OHS AUDIT: ICD-10-PCS | Mod: S$GLB,,, | Performed by: INTERNAL MEDICINE

## 2022-06-06 PROCEDURE — 96372 THER/PROPH/DIAG INJ SC/IM: CPT | Mod: S$GLB,,, | Performed by: INTERNAL MEDICINE

## 2022-06-06 PROCEDURE — 99499 UNLISTED E&M SERVICE: CPT | Mod: S$GLB,,, | Performed by: INTERNAL MEDICINE

## 2022-06-06 PROCEDURE — 1126F AMNT PAIN NOTED NONE PRSNT: CPT | Mod: CPTII,S$GLB,, | Performed by: INTERNAL MEDICINE

## 2022-06-06 RX ORDER — AZITHROMYCIN 500 MG/1
500 TABLET, FILM COATED ORAL DAILY
Qty: 5 TABLET | Refills: 0 | Status: SHIPPED | OUTPATIENT
Start: 2022-06-06 | End: 2022-06-11

## 2022-06-06 RX ORDER — METHYLPREDNISOLONE ACETATE 80 MG/ML
60 INJECTION, SUSPENSION INTRA-ARTICULAR; INTRALESIONAL; INTRAMUSCULAR; SOFT TISSUE
Status: COMPLETED | OUTPATIENT
Start: 2022-06-06 | End: 2022-06-06

## 2022-06-06 RX ADMIN — METHYLPREDNISOLONE ACETATE 60 MG: 80 INJECTION, SUSPENSION INTRA-ARTICULAR; INTRALESIONAL; INTRAMUSCULAR; SOFT TISSUE at 03:06

## 2022-06-06 NOTE — PROGRESS NOTES
"Subjective:      Patient ID: Kay Rosas is a 76 y.o. female.    Chief Complaint: Headache, Nasal Congestion, and Cough      HPI  Pt of Dr. Lakhani, here for head pressure/HA/coughing up white x 1week.      Review of Systems   Constitutional: Negative for chills, diaphoresis and fever.   Respiratory: Negative for cough and shortness of breath.    Cardiovascular: Negative for chest pain, palpitations and leg swelling.   Gastrointestinal: Negative for blood in stool, constipation, diarrhea, nausea and vomiting.   Genitourinary: Negative for dysuria, frequency and hematuria.   Psychiatric/Behavioral: The patient is not nervous/anxious.          Objective:   /70   Pulse 83   Temp 97 °F (36.1 °C)   Ht 5' 6" (1.676 m)   Wt 110.6 kg (243 lb 11.5 oz)   SpO2 95%   BMI 39.34 kg/m²     Physical Exam  Constitutional:       Appearance: She is well-developed.   Neck:      Thyroid: No thyroid mass.      Vascular: No carotid bruit.   Cardiovascular:      Rate and Rhythm: Normal rate and regular rhythm.      Heart sounds: No murmur heard.    No friction rub. No gallop.   Pulmonary:      Effort: Pulmonary effort is normal.      Breath sounds: Normal breath sounds. No wheezing or rales.   Abdominal:      General: Bowel sounds are normal.      Palpations: Abdomen is soft. There is no mass.      Tenderness: There is no abdominal tenderness.   Musculoskeletal:      Cervical back: Neck supple.   Lymphadenopathy:      Cervical: No cervical adenopathy.   Neurological:      Mental Status: She is alert and oriented to person, place, and time.             Assessment:       1. Cough    2. Acute non-recurrent maxillary sinusitis    3. COPD, moderate    4. Essential hypertension    5. Type 2 diabetes mellitus with stage 3 chronic kidney disease, unspecified whether long term insulin use, unspecified whether stage 3a or 3b CKD          Plan:     Cough, r/o covid- positive, sx over 5 days- treat COPD exacerbation now.  -     POCT " COVID-19 Rapid Screening    Acute non-recurrent maxillary sinusitis    COPD, moderate    Essential hypertension- stable, cont rx.    Type 2 diabetes mellitus with stage 3 chronic kidney disease, unspecified whether long term insulin use, unspecified whether stage 3a or 3b CKD- no rx.    Acute on chronic lung disease, and sinusitis-  -     methylPREDNISolone acetate injection 60 mg  -     azithromycin (ZITHROMAX) 500 MG tablet; Take 1 tablet (500 mg total) by mouth once daily. for 5 days  Dispense: 5 tablet; Refill: 0

## 2022-06-07 ENCOUNTER — PATIENT MESSAGE (OUTPATIENT)
Dept: FAMILY MEDICINE | Facility: CLINIC | Age: 77
End: 2022-06-07
Payer: MEDICARE

## 2022-07-05 ENCOUNTER — TELEPHONE (OUTPATIENT)
Dept: FAMILY MEDICINE | Facility: CLINIC | Age: 77
End: 2022-07-05
Payer: MEDICARE

## 2022-07-05 DIAGNOSIS — N18.30 TYPE 2 DIABETES MELLITUS WITH STAGE 3 CHRONIC KIDNEY DISEASE, UNSPECIFIED WHETHER LONG TERM INSULIN USE, UNSPECIFIED WHETHER STAGE 3A OR 3B CKD: Primary | ICD-10-CM

## 2022-07-05 DIAGNOSIS — E11.22 TYPE 2 DIABETES MELLITUS WITH STAGE 3 CHRONIC KIDNEY DISEASE, UNSPECIFIED WHETHER LONG TERM INSULIN USE, UNSPECIFIED WHETHER STAGE 3A OR 3B CKD: Primary | ICD-10-CM

## 2022-07-05 NOTE — TELEPHONE ENCOUNTER
----- Message from Linda Cheek, RT sent at 7/5/2022  3:43 PM CDT -----  Regarding: Stat Labs needed  Hello,   Ms Rosas has a CT with contrast scheduled for Friday, July 8. We need her to have STAT labs done for kidney function an hour prior to her exam. Please order a lab66 (creatine and serum) and schedule them prior to her scan here at [facility].       Thank You,   Linda Cheek  [facility], [facility]  [phone]

## 2022-07-06 NOTE — TELEPHONE ENCOUNTER
Called pt. Notified her that ct wanted her to get blood work drawn prior to procedure. PT. Verbalized understanding to get there at least an hour early prior to procedure.

## 2022-07-06 NOTE — TELEPHONE ENCOUNTER
OK orders placed. Staff please call patient and let her know she needs to go early and get labs done before her CT scan.

## 2022-07-08 ENCOUNTER — HOSPITAL ENCOUNTER (OUTPATIENT)
Dept: RADIOLOGY | Facility: HOSPITAL | Age: 77
Discharge: HOME | End: 2022-07-08
Attending: FAMILY MEDICINE
Payer: MEDICARE

## 2022-07-08 DIAGNOSIS — R10.12 LEFT UPPER QUADRANT ABDOMINAL PAIN: ICD-10-CM

## 2022-07-08 PROCEDURE — A9698 NON-RAD CONTRAST MATERIALNOC: HCPCS | Performed by: FAMILY MEDICINE

## 2022-07-08 PROCEDURE — 74176 CT ABD & PELVIS W/O CONTRAST: CPT | Mod: 26,,, | Performed by: RADIOLOGY

## 2022-07-08 PROCEDURE — 74176 CT ABD & PELVIS W/O CONTRAST: CPT | Mod: TC

## 2022-07-08 PROCEDURE — 74176 CT ABDOMEN PELVIS WITHOUT CONTRAST: ICD-10-PCS | Mod: 26,,, | Performed by: RADIOLOGY

## 2022-07-08 PROCEDURE — 25500020 PHARM REV CODE 255: Performed by: FAMILY MEDICINE

## 2022-07-08 RX ADMIN — IOHEXOL 1000 ML: 12 SOLUTION ORAL at 11:07

## 2022-07-08 NOTE — PROGRESS NOTES
Subjective:       Patient ID: Kay Rosas is a 76 y.o. White female here today for:  Cystitis      PCP:  Kim Lakhani MD --- date of last visit 4/26/2022  The patient's last visit with me was on 3/30/2022.    HPI    Urinary Tract Infection   This is a recurrent problem. The current episode started in the past 7 days. The quality of the pain is described as burning (increased pelvic pain/pressure). There has been no fever. Associated symptoms include behavior changes, frequency, nausea, urgency and constipation (LBM this AM but stools infreq with c/o incomplete emptying, bloating and pressure). Pertinent negatives include no chills, discharge, flank pain, hematuria, hesitancy or vomiting. She has tried increased fluids for the symptoms. Her past medical history is significant for diabetes mellitus.        Review of Systems   Constitutional: Negative for chills and fever.   HENT: Negative.    Respiratory: Negative.    Cardiovascular: Negative.    Gastrointestinal: Positive for abdominal pain, constipation (LBM this AM but stools infreq with c/o incomplete emptying, bloating and pressure) and nausea. Negative for abdominal distention, anal bleeding, blood in stool, diarrhea, rectal pain and vomiting.   Endocrine: Positive for polyuria. Negative for polydipsia and polyphagia.        No home bp checks.   Genitourinary: Positive for frequency, pelvic pain and urgency. Negative for flank pain, hematuria and hesitancy.   Musculoskeletal: Negative.    Skin: Negative.    Neurological: Negative.        Review of patient's allergies indicates:   Allergen Reactions    Nsaids (non-steroidal anti-inflammatory drug)      CKD       Patient Active Problem List   Diagnosis    Essential hypertension    Acquired hypothyroidism    Hyperlipidemia    Type 2 diabetes mellitus with stage 3 chronic kidney disease, unspecified whether long term insulin use, unspecified whether stage 3a or 3b CKD    Arthritis    B12 deficiency     Depression    Lumbar spondylosis    COPD, moderate    PAD (peripheral artery disease)    Osteoarthritis of both knees    Chronic bilateral low back pain with right-sided sciatica    Lumbar radiculopathy    Greater trochanteric bursitis of both hips    Morbid (severe) obesity due to excess calories    Secondary hyperparathyroidism of renal origin    Deep vein thrombosis (DVT) of lower extremity    Primary insomnia    Iron deficiency anemia due to chronic blood loss    Major depressive disorder, recurrent, mild       Current Outpatient Medications:     flavoxATE (URISPAS) 100 mg Tab, Take 1-2 tablets (100-200 mg total) by mouth 3 (three) times daily as needed. For bladder spasms, Disp: 15 tablet, Rfl: 0    levothyroxine (SYNTHROID) 50 MCG tablet, Take 1 tablet (50 mcg total) by mouth once daily., Disp: 90 tablet, Rfl: 3    losartan-hydrochlorothiazide 100-25 mg (HYZAAR) 100-25 mg per tablet, Take 1 tablet by mouth once daily., Disp: 90 tablet, Rfl: 3    sertraline (ZOLOFT) 100 MG tablet, Take 1 tablet (100 mg total) by mouth once daily., Disp: 90 tablet, Rfl: 3    sertraline (ZOLOFT) 50 MG tablet, Take 1 tablet (50 mg total) by mouth once daily., Disp: 90 tablet, Rfl: 3    traZODone (DESYREL) 150 MG tablet, Take 1 tablet (150 mg total) by mouth every evening., Disp: 30 tablet, Rfl: 11    Past Medical History:   Diagnosis Date    Arthritis     Degenerative disc disease, cervical     Disorder of kidney and ureter     CKD stage 3    Emphysema of lung     Hyperlipidemia     Hypertension     MVP (mitral valve prolapse)     Osteoporosis     feet    Thyroid condition        Past Surgical History:   Procedure Laterality Date    BACK SURGERY  2012    cataract surgery  Bilateral 10/ 2012    elbow spur removed Left     HYSTERECTOMY Left 1978    INJECTION OF ANESTHETIC AGENT INTO SACROILIAC JOINT Bilateral 11/11/2019    Procedure: Bilateral GT bursa + SIJ injection;  Surgeon: Noe Pleitez,  "MD;  Location: [facility];  Service: Pain Management;  Laterality: Bilateral;    INJECTION OF JOINT Bilateral 2019    Procedure: Bilateral GT bursa + SIJ injection;  Surgeon: Noe Pleitez MD;  Location: [facility];  Service: Pain Management;  Laterality: Bilateral;    OOPHORECTOMY      OVARIAN CYST REMOVAL      two from back, one from axilla    VASCULAR SURGERY Right     high ligation due to blood clot        Family History   Problem Relation Age of Onset    Heart disease Mother     Kidney disease Mother     Arthritis Mother     Breast cancer Mother     Cancer Father     Heart disease Father     Cancer Sister     Ovarian cancer Sister     Alzheimer's disease Sister        Social History     Socioeconomic History    Marital status:    Tobacco Use    Smoking status: Former Smoker     Quit date: 2015     Years since quittin.0    Smokeless tobacco: Never Used   Substance and Sexual Activity    Alcohol use: No     Alcohol/week: 0.0 standard drinks    Drug use: No       Objective:     Vitals:    22 0838   BP: 110/68   Pulse: 81   Temp: 98.1 °F (36.7 °C)   SpO2: 95%   Weight: 107 kg (235 lb 14.3 oz)   Height: 5' 6" (1.676 m)   PainSc: 0-No pain       Physical Exam  Constitutional:       General: She is not in acute distress.     Appearance: She is not ill-appearing or diaphoretic.   Cardiovascular:      Rate and Rhythm: Normal rate and regular rhythm.   Pulmonary:      Effort: Pulmonary effort is normal.      Breath sounds: Normal breath sounds.   Abdominal:      General: There is no distension.      Palpations: There is no mass.      Tenderness: There is no abdominal tenderness.   Neurological:      Mental Status: She is alert and oriented to person, place, and time.         Results for orders placed or performed in visit on 22   POCT urine dipstick without microscope   Result Value Ref Range    Color, UA Yellow     pH, UA 7     WBC, UA MOD++     Nitrite, UA " Neg     Protein, POC Trace     Glucose, UA Neg     Ketones, UA Neg     Urobilinogen, UA Normal     Bilirubin, POC Neg     Blood, UA Neg     Clarity, UA Clear     Spec Grav UA 1.005        Diagnosis/Assessment:     1. Acute UTI (urinary tract infection)  - Urine culture  - levoFLOXacin (LEVAQUIN) 250 MG tablet; Take 1 tablet (250 mg total) by mouth once daily. for 3 days  Dispense: 3 tablet; Refill: 0    2. Urinary symptom or sign  - POCT urine dipstick without microscope  - Urine culture    3. Constipation, unspecified constipation type  - X-Ray Abdomen Flat And Erect; Future    4. Diverticulosis  - Cologuard Screening (Multitarget Stool DNA); Future    5. Colon cancer screening  - Cologuard Screening (Multitarget Stool DNA); Future       Plan:     Urinary infection triggers and prevention discussed.  Urine culture pending.  May need to see Urology if needed.  HCM addressed --- colon testing ordered, refuses scope as she does not have anyone to transport her there & back home.  Xray ordered for stool issues.    Follow-up:     See PCP as scheduled in October.  RTC as directed or on prn basis.        BRANDAN Lin  [facility]       30 minutes of total time spent on the encounter, which includes face to face time and non-face to face time preparing to see the patient (eg, review of tests), obtaining and/or reviewing separately obtained history, and documenting clinical information in the electronic or other health record.  Also includes independent interpretation of results (not separately reported) and communicating results to the patient/family/caregiver, with care coordination (not separately reported).

## 2022-07-12 ENCOUNTER — OFFICE VISIT (OUTPATIENT)
Dept: FAMILY MEDICINE | Facility: CLINIC | Age: 77
End: 2022-07-12
Payer: MEDICARE

## 2022-07-12 ENCOUNTER — HOSPITAL ENCOUNTER (OUTPATIENT)
Dept: RADIOLOGY | Facility: HOSPITAL | Age: 77
Discharge: HOME | End: 2022-07-12
Attending: REGISTERED NURSE
Payer: MEDICARE

## 2022-07-12 VITALS
TEMPERATURE: 98 F | BODY MASS INDEX: 37.91 KG/M2 | HEIGHT: 66 IN | SYSTOLIC BLOOD PRESSURE: 110 MMHG | WEIGHT: 235.88 LBS | HEART RATE: 81 BPM | DIASTOLIC BLOOD PRESSURE: 68 MMHG | OXYGEN SATURATION: 95 %

## 2022-07-12 DIAGNOSIS — Z12.11 COLON CANCER SCREENING: ICD-10-CM

## 2022-07-12 DIAGNOSIS — N39.0 ACUTE UTI (URINARY TRACT INFECTION): Primary | ICD-10-CM

## 2022-07-12 DIAGNOSIS — K59.00 CONSTIPATION, UNSPECIFIED CONSTIPATION TYPE: ICD-10-CM

## 2022-07-12 DIAGNOSIS — K57.90 DIVERTICULOSIS: ICD-10-CM

## 2022-07-12 DIAGNOSIS — R39.9 URINARY SYMPTOM OR SIGN: ICD-10-CM

## 2022-07-12 LAB
BILIRUB SERPL-MCNC: NORMAL MG/DL
BLOOD URINE, POC: NORMAL
CLARITY, POC UA: CLEAR
COLOR, POC UA: YELLOW
GLUCOSE UR QL STRIP: NORMAL
KETONES UR QL STRIP: NORMAL
LEUKOCYTE ESTERASE URINE, POC: NORMAL
NITRITE, POC UA: NORMAL
PH, POC UA: 7
PROTEIN, POC: NORMAL
SPECIFIC GRAVITY, POC UA: 1
UROBILINOGEN, POC UA: NORMAL

## 2022-07-12 PROCEDURE — 74019 XR ABDOMEN FLAT AND ERECT: ICD-10-PCS | Mod: 26,,, | Performed by: RADIOLOGY

## 2022-07-12 PROCEDURE — 3044F HG A1C LEVEL LT 7.0%: CPT | Mod: CPTII,S$GLB,, | Performed by: REGISTERED NURSE

## 2022-07-12 PROCEDURE — 3078F PR MOST RECENT DIASTOLIC BLOOD PRESSURE < 80 MM HG: ICD-10-PCS | Mod: CPTII,S$GLB,, | Performed by: REGISTERED NURSE

## 2022-07-12 PROCEDURE — 1101F PR PT FALLS ASSESS DOC 0-1 FALLS W/OUT INJ PAST YR: ICD-10-PCS | Mod: CPTII,S$GLB,, | Performed by: REGISTERED NURSE

## 2022-07-12 PROCEDURE — 81002 POCT URINE DIPSTICK WITHOUT MICROSCOPE: ICD-10-PCS | Mod: S$GLB,,, | Performed by: REGISTERED NURSE

## 2022-07-12 PROCEDURE — 74019 RADEX ABDOMEN 2 VIEWS: CPT | Mod: TC,FY,PO

## 2022-07-12 PROCEDURE — 99999 PR PBB SHADOW E&M-EST. PATIENT-LVL IV: CPT | Mod: PBBFAC,,, | Performed by: REGISTERED NURSE

## 2022-07-12 PROCEDURE — 81002 URINALYSIS NONAUTO W/O SCOPE: CPT | Mod: S$GLB,,, | Performed by: REGISTERED NURSE

## 2022-07-12 PROCEDURE — 1126F PR PAIN SEVERITY QUANTIFIED, NO PAIN PRESENT: ICD-10-PCS | Mod: CPTII,S$GLB,, | Performed by: REGISTERED NURSE

## 2022-07-12 PROCEDURE — 1126F AMNT PAIN NOTED NONE PRSNT: CPT | Mod: CPTII,S$GLB,, | Performed by: REGISTERED NURSE

## 2022-07-12 PROCEDURE — 1159F MED LIST DOCD IN RCRD: CPT | Mod: CPTII,S$GLB,, | Performed by: REGISTERED NURSE

## 2022-07-12 PROCEDURE — 3288F FALL RISK ASSESSMENT DOCD: CPT | Mod: CPTII,S$GLB,, | Performed by: REGISTERED NURSE

## 2022-07-12 PROCEDURE — 3044F PR MOST RECENT HEMOGLOBIN A1C LEVEL <7.0%: ICD-10-PCS | Mod: CPTII,S$GLB,, | Performed by: REGISTERED NURSE

## 2022-07-12 PROCEDURE — 3074F PR MOST RECENT SYSTOLIC BLOOD PRESSURE < 130 MM HG: ICD-10-PCS | Mod: CPTII,S$GLB,, | Performed by: REGISTERED NURSE

## 2022-07-12 PROCEDURE — 99214 PR OFFICE/OUTPT VISIT, EST, LEVL IV, 30-39 MIN: ICD-10-PCS | Mod: 25,S$GLB,, | Performed by: REGISTERED NURSE

## 2022-07-12 PROCEDURE — 99214 OFFICE O/P EST MOD 30 MIN: CPT | Mod: 25,S$GLB,, | Performed by: REGISTERED NURSE

## 2022-07-12 PROCEDURE — 99999 PR PBB SHADOW E&M-EST. PATIENT-LVL IV: ICD-10-PCS | Mod: PBBFAC,,, | Performed by: REGISTERED NURSE

## 2022-07-12 PROCEDURE — 1101F PT FALLS ASSESS-DOCD LE1/YR: CPT | Mod: CPTII,S$GLB,, | Performed by: REGISTERED NURSE

## 2022-07-12 PROCEDURE — 3074F SYST BP LT 130 MM HG: CPT | Mod: CPTII,S$GLB,, | Performed by: REGISTERED NURSE

## 2022-07-12 PROCEDURE — 3078F DIAST BP <80 MM HG: CPT | Mod: CPTII,S$GLB,, | Performed by: REGISTERED NURSE

## 2022-07-12 PROCEDURE — 74019 RADEX ABDOMEN 2 VIEWS: CPT | Mod: 26,,, | Performed by: RADIOLOGY

## 2022-07-12 PROCEDURE — 3288F PR FALLS RISK ASSESSMENT DOCUMENTED: ICD-10-PCS | Mod: CPTII,S$GLB,, | Performed by: REGISTERED NURSE

## 2022-07-12 PROCEDURE — 87086 URINE CULTURE/COLONY COUNT: CPT | Performed by: REGISTERED NURSE

## 2022-07-12 PROCEDURE — 1159F PR MEDICATION LIST DOCUMENTED IN MEDICAL RECORD: ICD-10-PCS | Mod: CPTII,S$GLB,, | Performed by: REGISTERED NURSE

## 2022-07-12 RX ORDER — LEVOFLOXACIN 250 MG/1
250 TABLET ORAL DAILY
Qty: 3 TABLET | Refills: 0 | Status: SHIPPED | OUTPATIENT
Start: 2022-07-12 | End: 2022-07-15

## 2022-07-13 DIAGNOSIS — N18.30 TYPE 2 DIABETES MELLITUS WITH STAGE 3 CHRONIC KIDNEY DISEASE, UNSPECIFIED WHETHER LONG TERM INSULIN USE, UNSPECIFIED WHETHER STAGE 3A OR 3B CKD: Primary | ICD-10-CM

## 2022-07-13 DIAGNOSIS — E11.22 TYPE 2 DIABETES MELLITUS WITH STAGE 3 CHRONIC KIDNEY DISEASE, UNSPECIFIED WHETHER LONG TERM INSULIN USE, UNSPECIFIED WHETHER STAGE 3A OR 3B CKD: Primary | ICD-10-CM

## 2022-07-13 LAB
BACTERIA UR CULT: NORMAL
BACTERIA UR CULT: NORMAL

## 2022-07-20 DIAGNOSIS — N83.8 ENLARGED OVARY: Primary | ICD-10-CM

## 2022-07-20 DIAGNOSIS — R19.00 PELVIC MASS: ICD-10-CM

## 2022-07-25 ENCOUNTER — HOSPITAL ENCOUNTER (OUTPATIENT)
Dept: RADIOLOGY | Facility: HOSPITAL | Age: 77
Discharge: HOME | End: 2022-07-25
Attending: FAMILY MEDICINE
Payer: MEDICARE

## 2022-07-25 DIAGNOSIS — R19.00 PELVIC MASS: ICD-10-CM

## 2022-07-25 DIAGNOSIS — N83.8 ENLARGED OVARY: ICD-10-CM

## 2022-07-25 PROCEDURE — 76856 US EXAM PELVIC COMPLETE: CPT | Mod: 26,,, | Performed by: RADIOLOGY

## 2022-07-25 PROCEDURE — 76830 US PELVIS COMP WITH TRANSVAG NON-OB (XPD): ICD-10-PCS | Mod: 26,,, | Performed by: RADIOLOGY

## 2022-07-25 PROCEDURE — 76830 TRANSVAGINAL US NON-OB: CPT | Mod: TC

## 2022-07-25 PROCEDURE — 76830 TRANSVAGINAL US NON-OB: CPT | Mod: 26,,, | Performed by: RADIOLOGY

## 2022-07-25 PROCEDURE — 76856 US PELVIS COMP WITH TRANSVAG NON-OB (XPD): ICD-10-PCS | Mod: 26,,, | Performed by: RADIOLOGY

## 2022-08-10 DIAGNOSIS — R10.9 ABDOMINAL PAIN, UNSPECIFIED ABDOMINAL LOCATION: ICD-10-CM

## 2022-08-10 DIAGNOSIS — R19.00 PELVIC MASS: Primary | ICD-10-CM

## 2022-08-11 ENCOUNTER — PATIENT MESSAGE (OUTPATIENT)
Dept: HEMATOLOGY/ONCOLOGY | Facility: CLINIC | Age: 77
End: 2022-08-11
Payer: MEDICARE

## 2022-08-11 ENCOUNTER — TELEPHONE (OUTPATIENT)
Dept: HEMATOLOGY/ONCOLOGY | Facility: CLINIC | Age: 77
End: 2022-08-11
Payer: MEDICARE

## 2022-08-11 NOTE — NURSING
1432pm: Outgoing call to pt regarding WQ referral request per Dr. Lakhani for pelvic mass. Pt didn't answer. LVM. My [facility] portal msg sent. I contacted you, no answer, but I left a voicemail. Pt scheduled for new patient appt on Mon 8/22 at 1120 am at the  w/ Dr. Quintana.   Oncology Navigation   Intake  Cancer Type: Gynecologic  Internal / External Referral: Internal  Referral Source: Dr. Kim Lakhani  Date of Referral: 8/10/2022  Initial Nurse Navigator Contact: 8/11/2022  Referral to Initial Contact Timeline (days): 1  Date Worked: 8/11/2022  Appointment Date: 8/22/2022  Schedule to Appointment Timeline (days): 11  Multiple appointments: No  Reason if booked > 7 days after scheduling: Specific provider / access     Treatment        Medical Oncologist: Dr. Katie Quintana  Consult Date: 8/22/2022                       Acuity      Follow Up  No follow-ups on file.

## 2022-08-22 ENCOUNTER — TELEPHONE (OUTPATIENT)
Dept: GYNECOLOGIC ONCOLOGY | Facility: CLINIC | Age: 77
End: 2022-08-22
Payer: MEDICARE

## 2022-08-22 ENCOUNTER — OFFICE VISIT (OUTPATIENT)
Dept: HEMATOLOGY/ONCOLOGY | Facility: CLINIC | Age: 77
End: 2022-08-22
Payer: MEDICARE

## 2022-08-22 ENCOUNTER — LAB VISIT (OUTPATIENT)
Dept: LAB | Facility: HOSPITAL | Age: 77
End: 2022-08-22
Attending: INTERNAL MEDICINE
Payer: MEDICARE

## 2022-08-22 VITALS
SYSTOLIC BLOOD PRESSURE: 163 MMHG | BODY MASS INDEX: 38.51 KG/M2 | HEIGHT: 66 IN | TEMPERATURE: 98 F | WEIGHT: 239.63 LBS | DIASTOLIC BLOOD PRESSURE: 73 MMHG | OXYGEN SATURATION: 96 % | HEART RATE: 71 BPM

## 2022-08-22 DIAGNOSIS — R19.00 PELVIC MASS: ICD-10-CM

## 2022-08-22 DIAGNOSIS — E66.01 MORBID (SEVERE) OBESITY DUE TO EXCESS CALORIES: ICD-10-CM

## 2022-08-22 DIAGNOSIS — R10.9 ABDOMINAL PAIN, UNSPECIFIED ABDOMINAL LOCATION: ICD-10-CM

## 2022-08-22 DIAGNOSIS — I74.9 EMBOLISM AND THROMBOSIS OF UNSPECIFIED ARTERY: ICD-10-CM

## 2022-08-22 DIAGNOSIS — N83.8 OVARIAN MASS, LEFT: Primary | ICD-10-CM

## 2022-08-22 DIAGNOSIS — R91.1 PULMONARY NODULE: ICD-10-CM

## 2022-08-22 DIAGNOSIS — M17.0 PRIMARY OSTEOARTHRITIS OF BOTH KNEES: ICD-10-CM

## 2022-08-22 DIAGNOSIS — C56.2 MALIGNANT NEOPLASM OF LEFT OVARY: ICD-10-CM

## 2022-08-22 LAB
ALBUMIN SERPL BCP-MCNC: 3.7 G/DL (ref 3.5–5.2)
ALP SERPL-CCNC: 56 U/L (ref 55–135)
ALT SERPL W/O P-5'-P-CCNC: 15 U/L (ref 10–44)
ANION GAP SERPL CALC-SCNC: 10 MMOL/L (ref 8–16)
AST SERPL-CCNC: 17 U/L (ref 10–40)
BASOPHILS # BLD AUTO: 0.03 K/UL (ref 0–0.2)
BASOPHILS NFR BLD: 0.7 % (ref 0–1.9)
BILIRUB SERPL-MCNC: 0.4 MG/DL (ref 0.1–1)
BUN SERPL-MCNC: 21 MG/DL (ref 8–23)
CALCIUM SERPL-MCNC: 9.5 MG/DL (ref 8.7–10.5)
CANCER AG125 SERPL-ACNC: 26 U/ML (ref 0–30)
CHLORIDE SERPL-SCNC: 108 MMOL/L (ref 95–110)
CO2 SERPL-SCNC: 25 MMOL/L (ref 23–29)
CREAT SERPL-MCNC: 1.5 MG/DL (ref 0.5–1.4)
DIFFERENTIAL METHOD: ABNORMAL
EOSINOPHIL # BLD AUTO: 0.1 K/UL (ref 0–0.5)
EOSINOPHIL NFR BLD: 2.9 % (ref 0–8)
ERYTHROCYTE [DISTWIDTH] IN BLOOD BY AUTOMATED COUNT: 15.6 % (ref 11.5–14.5)
EST. GFR  (NO RACE VARIABLE): 36 ML/MIN/1.73 M^2
GLUCOSE SERPL-MCNC: 90 MG/DL (ref 70–110)
HCT VFR BLD AUTO: 36.2 % (ref 37–48.5)
HGB BLD-MCNC: 11.4 G/DL (ref 12–16)
IMM GRANULOCYTES # BLD AUTO: 0.02 K/UL (ref 0–0.04)
IMM GRANULOCYTES NFR BLD AUTO: 0.4 % (ref 0–0.5)
LYMPHOCYTES # BLD AUTO: 1.3 K/UL (ref 1–4.8)
LYMPHOCYTES NFR BLD: 28.2 % (ref 18–48)
MCH RBC QN AUTO: 28 PG (ref 27–31)
MCHC RBC AUTO-ENTMCNC: 31.5 G/DL (ref 32–36)
MCV RBC AUTO: 89 FL (ref 82–98)
MONOCYTES # BLD AUTO: 0.3 K/UL (ref 0.3–1)
MONOCYTES NFR BLD: 7.6 % (ref 4–15)
NEUTROPHILS # BLD AUTO: 2.7 K/UL (ref 1.8–7.7)
NEUTROPHILS NFR BLD: 60.2 % (ref 38–73)
NRBC BLD-RTO: 0 /100 WBC
PLATELET # BLD AUTO: 177 K/UL (ref 150–450)
PMV BLD AUTO: 9.9 FL (ref 9.2–12.9)
POTASSIUM SERPL-SCNC: 5 MMOL/L (ref 3.5–5.1)
PROT SERPL-MCNC: 7.1 G/DL (ref 6–8.4)
RBC # BLD AUTO: 4.07 M/UL (ref 4–5.4)
SODIUM SERPL-SCNC: 143 MMOL/L (ref 136–145)
WBC # BLD AUTO: 4.47 K/UL (ref 3.9–12.7)

## 2022-08-22 PROCEDURE — 3288F FALL RISK ASSESSMENT DOCD: CPT | Mod: CPTII,S$GLB,, | Performed by: INTERNAL MEDICINE

## 2022-08-22 PROCEDURE — 1125F AMNT PAIN NOTED PAIN PRSNT: CPT | Mod: CPTII,S$GLB,, | Performed by: INTERNAL MEDICINE

## 2022-08-22 PROCEDURE — 36415 COLL VENOUS BLD VENIPUNCTURE: CPT | Performed by: INTERNAL MEDICINE

## 2022-08-22 PROCEDURE — 3077F SYST BP >= 140 MM HG: CPT | Mod: CPTII,S$GLB,, | Performed by: INTERNAL MEDICINE

## 2022-08-22 PROCEDURE — 3288F PR FALLS RISK ASSESSMENT DOCUMENTED: ICD-10-PCS | Mod: CPTII,S$GLB,, | Performed by: INTERNAL MEDICINE

## 2022-08-22 PROCEDURE — 99999 PR PBB SHADOW E&M-EST. PATIENT-LVL V: CPT | Mod: PBBFAC,,, | Performed by: INTERNAL MEDICINE

## 2022-08-22 PROCEDURE — 3044F PR MOST RECENT HEMOGLOBIN A1C LEVEL <7.0%: ICD-10-PCS | Mod: CPTII,S$GLB,, | Performed by: INTERNAL MEDICINE

## 2022-08-22 PROCEDURE — 99999 PR PBB SHADOW E&M-EST. PATIENT-LVL V: ICD-10-PCS | Mod: PBBFAC,,, | Performed by: INTERNAL MEDICINE

## 2022-08-22 PROCEDURE — 1101F PT FALLS ASSESS-DOCD LE1/YR: CPT | Mod: CPTII,S$GLB,, | Performed by: INTERNAL MEDICINE

## 2022-08-22 PROCEDURE — 3077F PR MOST RECENT SYSTOLIC BLOOD PRESSURE >= 140 MM HG: ICD-10-PCS | Mod: CPTII,S$GLB,, | Performed by: INTERNAL MEDICINE

## 2022-08-22 PROCEDURE — 1160F RVW MEDS BY RX/DR IN RCRD: CPT | Mod: CPTII,S$GLB,, | Performed by: INTERNAL MEDICINE

## 2022-08-22 PROCEDURE — 99215 OFFICE O/P EST HI 40 MIN: CPT | Mod: S$GLB,,, | Performed by: INTERNAL MEDICINE

## 2022-08-22 PROCEDURE — 85025 COMPLETE CBC W/AUTO DIFF WBC: CPT | Performed by: INTERNAL MEDICINE

## 2022-08-22 PROCEDURE — 1125F PR PAIN SEVERITY QUANTIFIED, PAIN PRESENT: ICD-10-PCS | Mod: CPTII,S$GLB,, | Performed by: INTERNAL MEDICINE

## 2022-08-22 PROCEDURE — 80053 COMPREHEN METABOLIC PANEL: CPT | Performed by: INTERNAL MEDICINE

## 2022-08-22 PROCEDURE — 1159F PR MEDICATION LIST DOCUMENTED IN MEDICAL RECORD: ICD-10-PCS | Mod: CPTII,S$GLB,, | Performed by: INTERNAL MEDICINE

## 2022-08-22 PROCEDURE — 3044F HG A1C LEVEL LT 7.0%: CPT | Mod: CPTII,S$GLB,, | Performed by: INTERNAL MEDICINE

## 2022-08-22 PROCEDURE — 1159F MED LIST DOCD IN RCRD: CPT | Mod: CPTII,S$GLB,, | Performed by: INTERNAL MEDICINE

## 2022-08-22 PROCEDURE — 99499 RISK ADDL DX/OHS AUDIT: ICD-10-PCS | Mod: S$GLB,,, | Performed by: INTERNAL MEDICINE

## 2022-08-22 PROCEDURE — 86304 IMMUNOASSAY TUMOR CA 125: CPT | Performed by: INTERNAL MEDICINE

## 2022-08-22 PROCEDURE — 99499 UNLISTED E&M SERVICE: CPT | Mod: S$GLB,,, | Performed by: INTERNAL MEDICINE

## 2022-08-22 PROCEDURE — 99215 PR OFFICE/OUTPT VISIT, EST, LEVL V, 40-54 MIN: ICD-10-PCS | Mod: S$GLB,,, | Performed by: INTERNAL MEDICINE

## 2022-08-22 PROCEDURE — 3078F DIAST BP <80 MM HG: CPT | Mod: CPTII,S$GLB,, | Performed by: INTERNAL MEDICINE

## 2022-08-22 PROCEDURE — 1101F PR PT FALLS ASSESS DOC 0-1 FALLS W/OUT INJ PAST YR: ICD-10-PCS | Mod: CPTII,S$GLB,, | Performed by: INTERNAL MEDICINE

## 2022-08-22 PROCEDURE — 3078F PR MOST RECENT DIASTOLIC BLOOD PRESSURE < 80 MM HG: ICD-10-PCS | Mod: CPTII,S$GLB,, | Performed by: INTERNAL MEDICINE

## 2022-08-22 PROCEDURE — 1160F PR REVIEW ALL MEDS BY PRESCRIBER/CLIN PHARMACIST DOCUMENTED: ICD-10-PCS | Mod: CPTII,S$GLB,, | Performed by: INTERNAL MEDICINE

## 2022-08-22 RX ORDER — METFORMIN HYDROCHLORIDE 500 MG/1
500 TABLET, EXTENDED RELEASE ORAL EVERY MORNING
COMMUNITY
Start: 2022-07-24 | End: 2022-11-25 | Stop reason: CLARIF

## 2022-08-22 NOTE — TELEPHONE ENCOUNTER
----- Message from Den Barragan MD sent at 8/22/2022  2:34 PM CDT -----  Sure happy to see. Thank you.    [place], it looks like she is getting a PET/CT on 9/2.  Can you schedule her for when I am in [place] in September?  Thank you.  ----- Message -----  From: Katie Quintana MD  Sent: 8/22/2022  12:44 PM CDT  To: Den Barragan MD, [facility] Staff    Fyi on this pt is you can see her please

## 2022-08-22 NOTE — TELEPHONE ENCOUNTER
New pt referral received from Dr Quintana for pelvic mass to be seen with Dr Barragan in . [facility] with for a call back to arrange new pt appointment. Direct navigator phone number provided to pt [phone]

## 2022-08-22 NOTE — TELEPHONE ENCOUNTER
New referral received from Dr Quintana for recent diagnosis of pelvic mass. Pt called and name and  verified. Explained my role as nurse navigator and direct number provided. Pt scheduled to see Dr Barragan in the next available BR appointment. Patient encouraged to call for any questions or concerns about her care or appointments. Pt verbalized understanding. Date time and location of appointment reviewed with pt.

## 2022-08-22 NOTE — PROGRESS NOTES
Subjective:      DATE OF VISIT: 8/22/22     ?  Patient ID:?Kay Rosas is a 76 y.o. female.?? MR#: [id number]   ?   REFERRING PROVIDER: Kim Lakhani MD  [address] [id number]     ? Primary Care Providers:  Kim Lakhani MD, MD (General)     CHIEF COMPLAINT:  Left adnexal mass  ?   ONCOLOGIC DIAGNOSIS:  TBD  ?   CURRENT TREATMENT:  TBD    PAST TREATMENT:  N/A  ?   ONCOLOGIC HISTORY:       HPI    Ms. Rosas is a 76-year-old woman with pre-diabetes, hypertension, hyperlipidemia, osteoarthritis, s/p TIM and R oophorectomy in 1978 (patient unclear on reason noted to have cyst (, no known history of malignancy.  She was followed in hematology clinic by Dr. Cartwright in past due to thromboembolic disease initial episode right lower extremity DVT circa 2006 with recurrence in 2020 left lower extremity DVT at which point indefinite anticoagulation was recommended however she discontinued after 1 year due to easy bruisability.  She is not followed up in Hematology Clinic over last couple years.  She notes chronic arthritic pain with some exacerbation in bilateral knees recently.  She has had chronic GERD associated upper abdominal discomfort as well as chronic lower pelvic pain.  She had CT abdomen with primary care due to abdominal/pelvic pain which revealed left adnexal abnormality and follow-up pelvic ultrasound.      7/8/22 CT a/p notable for:  The patient appears to be status post hysterectomy.  Soft tissue density within the left hemipelvis favored to represent a retained left ovary although if this does represent the left ovary this is enlarged and measures up to 4.9 by 3.8 cm.  Underlying solid ovarian lesion not excluded.  Other possibilities include a peritoneal inclusion cyst that is somewhat high density.  Ultrasound is recommended for further evaluation.    Scarring versus atelectatic changes seen within the bilateral lung bases.  Incompletely visualized noncalcified nodule  seen within the right lower lobe series 2 image 1 measuring under 5 mm in size.    7/25/22 pelvic US:  Exophytic/immediately adjacent to the left ovary is a heterogeneous more solid appearing lesion corresponding to CT.  This measures 3.4 x 3.1 x 3.1 cm     She endorses chronic abdominal bloating. No vaginal or other bleeding. She denies notable weight change.    Review of Systems    ? + chronic OA pain particularly bilateral knees, GERN reflux discomfort,   A comprehensive 14-point review of systems was reviewed with patient and was negative other than as specified above.   ?   PAST MEDICAL HISTORY:   Past Medical History:   Diagnosis Date    Arthritis     Degenerative disc disease, cervical     Disorder of kidney and ureter     CKD stage 3    Emphysema of lung     Hyperlipidemia     Hypertension     MVP (mitral valve prolapse)     Osteoporosis     feet    Thyroid condition     ?     PAST SURGICAL HISTORY:   Past Surgical History:   Procedure Laterality Date    BACK SURGERY  2012    cataract surgery  Bilateral 10/ 2012    elbow spur removed Left     HYSTERECTOMY Left 1978    INJECTION OF ANESTHETIC AGENT INTO SACROILIAC JOINT Bilateral 11/11/2019    Procedure: Bilateral GT bursa + SIJ injection;  Surgeon: Noe Pleitez MD;  Location: [facility];  Service: Pain Management;  Laterality: Bilateral;    INJECTION OF JOINT Bilateral 11/11/2019    Procedure: Bilateral GT bursa + SIJ injection;  Surgeon: Noe Pleitez MD;  Location: [facility];  Service: Pain Management;  Laterality: Bilateral;    OOPHORECTOMY      OVARIAN CYST REMOVAL      two from back, one from axilla    VASCULAR SURGERY Right     high ligation due to blood clot       ?   ALLERGIES:   Allergies as of 08/22/2022 - Reviewed 08/22/2022   Allergen Reaction Noted    Nsaids (non-steroidal anti-inflammatory drug)  10/11/2019      ?   MEDICATIONS:?   Outpatient Medications Marked as Taking for the 8/22/22 encounter (Office  Visit) with Katie Quintana MD   Medication Sig Dispense Refill    levothyroxine (SYNTHROID) 50 MCG tablet Take 1 tablet (50 mcg total) by mouth once daily. 90 tablet 3    losartan-hydrochlorothiazide 100-25 mg (HYZAAR) 100-25 mg per tablet Take 1 tablet by mouth once daily. 90 tablet 3    metFORMIN (GLUCOPHAGE-XR) 500 MG ER 24hr tablet Take 500 mg by mouth every morning.      sertraline (ZOLOFT) 100 MG tablet Take 1 tablet (100 mg total) by mouth once daily. 90 tablet 3    sertraline (ZOLOFT) 50 MG tablet Take 1 tablet (50 mg total) by mouth once daily. 90 tablet 3    traZODone (DESYREL) 150 MG tablet Take 1 tablet (150 mg total) by mouth every evening. 30 tablet 11      ?   SOCIAL HISTORY:?   Social History     Tobacco Use    Smoking status: Former Smoker     Quit date: 2015     Years since quittin.1    Smokeless tobacco: Never Used   Substance Use Topics    Alcohol use: No     Alcohol/week: 0.0 standard drinks      ?      ?   FAMILY HISTORY:   family history includes Alzheimer's disease in her sister; Arthritis in her mother; Breast cancer in her mother; Cancer in her father and sister; Heart disease in her father and mother; Kidney disease in her mother; Ovarian cancer in her sister.   ?        Objective:      Physical Exam      ?   Vitals:    22 1104   BP: (!) 163/73   Pulse: 71   Temp: 97.6 °F (36.4 °C)      ?   ECOG:?1   General appearance: Generally well appearing, in no acute distress.   Head, eyes, ears, nose, and throat: moist mucous membranes.   Respiratory:  Normal work of breathing  Abdomen: nontender, nondistended.   Extremities: Warm, without edema.   Neurologic: Alert and oriented. Grossly normal strength, coordination, and gait.   Skin: No rashes, ecchymoses or petechial lesion.   Psychiatric:  Normal mood and affect.    ?   Laboratory:  ?   Lab Visit on 2022   Component Date Value Ref Range Status    WBC 2022 4.47  3.90 - 12.70 K/uL Final    RBC 2022  4.07  4.00 - 5.40 M/uL Final    Hemoglobin 08/22/2022 11.4 (A) 12.0 - 16.0 g/dL Final    Hematocrit 08/22/2022 36.2 (A) 37.0 - 48.5 % Final    MCV 08/22/2022 89  82 - 98 fL Final    MCH 08/22/2022 28.0  27.0 - 31.0 pg Final    MCHC 08/22/2022 31.5 (A) 32.0 - 36.0 g/dL Final    RDW 08/22/2022 15.6 (A) 11.5 - 14.5 % Final    Platelets 08/22/2022 177  150 - 450 K/uL Final    MPV 08/22/2022 9.9  9.2 - 12.9 fL Final    Immature Granulocytes 08/22/2022 0.4  0.0 - 0.5 % Final    Gran # (ANC) 08/22/2022 2.7  1.8 - 7.7 K/uL Final    Immature Grans (Abs) 08/22/2022 0.02  0.00 - 0.04 K/uL Final    Lymph # 08/22/2022 1.3  1.0 - 4.8 K/uL Final    Mono # 08/22/2022 0.3  0.3 - 1.0 K/uL Final    Eos # 08/22/2022 0.1  0.0 - 0.5 K/uL Final    Baso # 08/22/2022 0.03  0.00 - 0.20 K/uL Final    nRBC 08/22/2022 0  0 /100 WBC Final    Gran % 08/22/2022 60.2  38.0 - 73.0 % Final    Lymph % 08/22/2022 28.2  18.0 - 48.0 % Final    Mono % 08/22/2022 7.6  4.0 - 15.0 % Final    Eosinophil % 08/22/2022 2.9  0.0 - 8.0 % Final    Basophil % 08/22/2022 0.7  0.0 - 1.9 % Final    Differential Method 08/22/2022 Automated   Final      ?   Tumor markers   ?  pending  ?   Imaging:  ?    Results for orders placed or performed during the hospital encounter of 07/08/22 (from the past 2160 hour(s))   CT Abdomen Pelvis  Without Contrast    Impression    1. Left adnexal mass measuring 4.9 x 3.8 cm with possibilities given above.  Further evaluation with ultrasound is recommended.  2. Mild diverticulosis without evidence for diverticulitis.      Electronically signed by: Mariano Rojas DO  Date:    07/08/2022  Time:    13:29     No results found for this or any previous visit (from the past 2160 hour(s)).  No results found for this or any previous visit (from the past 2160 hour(s)).      Pathology:    none    ?   Assessment/Plan:   Ovarian mass, left    Pelvic mass  -     Ambulatory referral/consult to Hematology / Oncology  -      Ambulatory referral/consult to Gynecologic Oncology; Future; Expected date: 08/29/2022  -     CBC W/ AUTO DIFFERENTIAL; Future; Expected date: 08/22/2022  -     Comprehensive Metabolic Panel; Future; Expected date: 11/22/2022  -     CANCER ANTIGEN 125; Future; Expected date: 08/22/2022  -     NM PET CT Routine FDG; Future; Expected date: 08/22/2022    Abdominal pain, unspecified abdominal location  -     Ambulatory referral/consult to Hematology / Oncology    Malignant neoplasm of left ovary   -     CANCER ANTIGEN 125; Future; Expected date: 08/22/2022    Pulmonary nodule    Embolism and thrombosis of unspecified artery    Primary osteoarthritis of both knees    Morbid (severe) obesity due to excess calories       1. Ovarian mass, left    2. Pelvic mass    3. Abdominal pain, unspecified abdominal location    4. Malignant neoplasm of left ovary     5. Pulmonary nodule    6. Embolism and thrombosis of unspecified artery    7. Primary osteoarthritis of both knees    8. Morbid (severe) obesity due to excess calories          Plan:     Problem List Items Addressed This Visit        Endocrine    Morbid (severe) obesity due to excess calories       Orthopedic    Osteoarthritis of both knees      Other Visit Diagnoses     Ovarian mass, left    -  Primary    Pelvic mass        Abdominal pain, unspecified abdominal location        Malignant neoplasm of left ovary         Pulmonary nodule        Embolism and thrombosis of unspecified artery              Left ovarian mass: h/o TIM/R BSO for benign cause in 1978. Chronic pelvic pain and bloating patient denies recent change. CT a/p and pelvic US notable for 7/8/22 CT a/p notable for exophytic/immediately adjacent to the left ovary is a heterogeneous more solid appearing lesion corresponding to CT measuring 3.4 x 3.1 x 3.1 cm. RLL 5mm pulmonary nodule. Will need further workup; will get PET for complete staging, baseline labs with  referral to gyn onc.    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 Discussed importance of anticoagulation and amenable to restarting. She is aware of need for holding if biopsy/procedure is planned.    Follow-Up:   Patient Instructions   Labs today  PET  Referral to gyn onc asap  RV after above

## 2022-08-23 ENCOUNTER — TELEPHONE (OUTPATIENT)
Dept: HEMATOLOGY/ONCOLOGY | Facility: CLINIC | Age: 77
End: 2022-08-23
Payer: MEDICARE

## 2022-08-23 NOTE — TELEPHONE ENCOUNTER
Nurse called pt to let her know that kovtun appt is scheduled. Nurse went over time/date/location. Pt verbalized understanding

## 2022-08-26 ENCOUNTER — PATIENT OUTREACH (OUTPATIENT)
Dept: ADMINISTRATIVE | Facility: HOSPITAL | Age: 77
End: 2022-08-26
Payer: MEDICARE

## 2022-08-26 NOTE — PROGRESS NOTES
PHN Statin DM Report: Per chart review, patient is not taking a statin, patient has a diagnosis of pre diabetes.

## 2022-09-02 ENCOUNTER — HOSPITAL ENCOUNTER (OUTPATIENT)
Dept: RADIOLOGY | Facility: HOSPITAL | Age: 77
Discharge: HOME | End: 2022-09-02
Attending: INTERNAL MEDICINE
Payer: MEDICARE

## 2022-09-02 DIAGNOSIS — R19.00 PELVIC MASS: ICD-10-CM

## 2022-09-02 PROCEDURE — 78815 PET IMAGE W/CT SKULL-THIGH: CPT | Mod: 26,PI,, | Performed by: RADIOLOGY

## 2022-09-02 PROCEDURE — 78815 PET IMAGE W/CT SKULL-THIGH: CPT | Mod: TC

## 2022-09-02 PROCEDURE — 78815 NM PET CT ROUTINE: ICD-10-PCS | Mod: 26,PI,, | Performed by: RADIOLOGY

## 2022-09-21 ENCOUNTER — TELEPHONE (OUTPATIENT)
Dept: HEMATOLOGY/ONCOLOGY | Facility: CLINIC | Age: 77
End: 2022-09-21
Payer: MEDICARE

## 2022-09-21 ENCOUNTER — OFFICE VISIT (OUTPATIENT)
Dept: GYNECOLOGIC ONCOLOGY | Facility: CLINIC | Age: 77
End: 2022-09-21
Payer: MEDICARE

## 2022-09-21 VITALS
WEIGHT: 237.63 LBS | DIASTOLIC BLOOD PRESSURE: 86 MMHG | HEIGHT: 66 IN | BODY MASS INDEX: 38.19 KG/M2 | SYSTOLIC BLOOD PRESSURE: 128 MMHG

## 2022-09-21 DIAGNOSIS — N94.89 ADNEXAL MASS: Primary | ICD-10-CM

## 2022-09-21 DIAGNOSIS — E11.22 TYPE 2 DIABETES MELLITUS WITH STAGE 3 CHRONIC KIDNEY DISEASE, UNSPECIFIED WHETHER LONG TERM INSULIN USE, UNSPECIFIED WHETHER STAGE 3A OR 3B CKD: ICD-10-CM

## 2022-09-21 DIAGNOSIS — I73.9 PAD (PERIPHERAL ARTERY DISEASE): ICD-10-CM

## 2022-09-21 DIAGNOSIS — J44.9 COPD, MODERATE: ICD-10-CM

## 2022-09-21 DIAGNOSIS — M17.0 PRIMARY OSTEOARTHRITIS OF BOTH KNEES: ICD-10-CM

## 2022-09-21 DIAGNOSIS — I10 ESSENTIAL HYPERTENSION: ICD-10-CM

## 2022-09-21 DIAGNOSIS — E03.9 ACQUIRED HYPOTHYROIDISM: ICD-10-CM

## 2022-09-21 DIAGNOSIS — R19.00 PELVIC MASS: ICD-10-CM

## 2022-09-21 DIAGNOSIS — N18.30 TYPE 2 DIABETES MELLITUS WITH STAGE 3 CHRONIC KIDNEY DISEASE, UNSPECIFIED WHETHER LONG TERM INSULIN USE, UNSPECIFIED WHETHER STAGE 3A OR 3B CKD: ICD-10-CM

## 2022-09-21 PROCEDURE — 1101F PR PT FALLS ASSESS DOC 0-1 FALLS W/OUT INJ PAST YR: ICD-10-PCS | Mod: CPTII,S$GLB,, | Performed by: OBSTETRICS & GYNECOLOGY

## 2022-09-21 PROCEDURE — 99499 UNLISTED E&M SERVICE: CPT | Mod: S$GLB,,, | Performed by: OBSTETRICS & GYNECOLOGY

## 2022-09-21 PROCEDURE — 99999 PR PBB SHADOW E&M-EST. PATIENT-LVL III: ICD-10-PCS | Mod: PBBFAC,,, | Performed by: OBSTETRICS & GYNECOLOGY

## 2022-09-21 PROCEDURE — 3074F SYST BP LT 130 MM HG: CPT | Mod: CPTII,S$GLB,, | Performed by: OBSTETRICS & GYNECOLOGY

## 2022-09-21 PROCEDURE — 3074F PR MOST RECENT SYSTOLIC BLOOD PRESSURE < 130 MM HG: ICD-10-PCS | Mod: CPTII,S$GLB,, | Performed by: OBSTETRICS & GYNECOLOGY

## 2022-09-21 PROCEDURE — 3288F PR FALLS RISK ASSESSMENT DOCUMENTED: ICD-10-PCS | Mod: CPTII,S$GLB,, | Performed by: OBSTETRICS & GYNECOLOGY

## 2022-09-21 PROCEDURE — 1160F RVW MEDS BY RX/DR IN RCRD: CPT | Mod: CPTII,S$GLB,, | Performed by: OBSTETRICS & GYNECOLOGY

## 2022-09-21 PROCEDURE — 99205 PR OFFICE/OUTPT VISIT, NEW, LEVL V, 60-74 MIN: ICD-10-PCS | Mod: S$GLB,,, | Performed by: OBSTETRICS & GYNECOLOGY

## 2022-09-21 PROCEDURE — 1126F PR PAIN SEVERITY QUANTIFIED, NO PAIN PRESENT: ICD-10-PCS | Mod: CPTII,S$GLB,, | Performed by: OBSTETRICS & GYNECOLOGY

## 2022-09-21 PROCEDURE — 3079F DIAST BP 80-89 MM HG: CPT | Mod: CPTII,S$GLB,, | Performed by: OBSTETRICS & GYNECOLOGY

## 2022-09-21 PROCEDURE — 1101F PT FALLS ASSESS-DOCD LE1/YR: CPT | Mod: CPTII,S$GLB,, | Performed by: OBSTETRICS & GYNECOLOGY

## 2022-09-21 PROCEDURE — 1159F PR MEDICATION LIST DOCUMENTED IN MEDICAL RECORD: ICD-10-PCS | Mod: CPTII,S$GLB,, | Performed by: OBSTETRICS & GYNECOLOGY

## 2022-09-21 PROCEDURE — 3288F FALL RISK ASSESSMENT DOCD: CPT | Mod: CPTII,S$GLB,, | Performed by: OBSTETRICS & GYNECOLOGY

## 2022-09-21 PROCEDURE — 1159F MED LIST DOCD IN RCRD: CPT | Mod: CPTII,S$GLB,, | Performed by: OBSTETRICS & GYNECOLOGY

## 2022-09-21 PROCEDURE — 3079F PR MOST RECENT DIASTOLIC BLOOD PRESSURE 80-89 MM HG: ICD-10-PCS | Mod: CPTII,S$GLB,, | Performed by: OBSTETRICS & GYNECOLOGY

## 2022-09-21 PROCEDURE — 99499 RISK ADDL DX/OHS AUDIT: ICD-10-PCS | Mod: S$GLB,,, | Performed by: OBSTETRICS & GYNECOLOGY

## 2022-09-21 PROCEDURE — 99999 PR PBB SHADOW E&M-EST. PATIENT-LVL III: CPT | Mod: PBBFAC,,, | Performed by: OBSTETRICS & GYNECOLOGY

## 2022-09-21 PROCEDURE — 1160F PR REVIEW ALL MEDS BY PRESCRIBER/CLIN PHARMACIST DOCUMENTED: ICD-10-PCS | Mod: CPTII,S$GLB,, | Performed by: OBSTETRICS & GYNECOLOGY

## 2022-09-21 PROCEDURE — 1126F AMNT PAIN NOTED NONE PRSNT: CPT | Mod: CPTII,S$GLB,, | Performed by: OBSTETRICS & GYNECOLOGY

## 2022-09-21 PROCEDURE — 99205 OFFICE O/P NEW HI 60 MIN: CPT | Mod: S$GLB,,, | Performed by: OBSTETRICS & GYNECOLOGY

## 2022-09-21 NOTE — PROGRESS NOTES
REFERRING PROVIDER  Katie Quintana MD     HISTORY OF PRESENT CONDITION  Chief complaint: left ovarian mass  Kay Rosas is a 77 y.o. who presents in consultation for an opinion regarding a left ovarian mass with a CA-125 of 26.     LLQ pain x 2 months.  Unable to characterize.  Aggravated with position changes.  No alleviating factors.  Not associated with eating habits. -Dysuria or hematuria. -Melena, hematochezia, or mucus.  -Unintentional weight loss. -Bloating. Mammogram (3/13/20): BI-RADS1.  Not UTD on colonoscopies.      REVIEW OF SYSTEMS  All systems reviewed and negative except as noted in HPI.    OBJECTIVE   Vitals:    09/21/22 1401   BP: 128/86      Body mass index is 38.36 kg/m².      1. General: Well appearing, no apparent distress, alert and oriented.  2. Lymph: Neck symmetric without cervical or supraclavicular adenopathy or mass.  3. Lungs: Normal respiratory rate, no accessory muscle use.  4. Cardiac: Normal rate  5. Psych: Normal affect.  6. Abdomen:  non-distended, soft, non-tender, are no masses, no ascites, no hepatosplenomegaly.  7. Skin: Warm, dry, no rashes or lesions.   8. Extremities: Bilateral lower extremities without edema or tenderness.  9. Genitourinary               Pelvic Examination including:                a. External genitalia are normal in appearance. No lesions noted.               b. Urethral meatus is normal size, location, and appearance.               c. Urethra is negative.               d. Bladder is nontender. No masses noted.               e. Vagina has normal mucosa with physiologic discharge. No lesions noted.              f. Uterus absent              g. Adnexa normal   h. Rectum deferred    ECOG status: 1    LABORATORY DATA  Lab data reviewed.    RADIOLOGICAL DATA  Radiology data reviewed.    PATHOLOGY DATA  Pathology data reviewed.    ASSESSMENT / PLAN     1. Adnexal mass    2. Type 2 diabetes mellitus with stage 3 chronic kidney disease, unspecified  whether long term insulin use, unspecified whether stage 3a or 3b CKD    3. Essential hypertension    4. Acquired hypothyroidism    5. COPD, moderate    6. PAD (peripheral artery disease)    7. Primary osteoarthritis of both knees    8. Pelvic mass         PSH:  TIM/RSO via Pfannestiel     7/8/22: CT A/P w/o: L complex adnexal mass, 5 cm.  No omental caking, lymphadenopathy, or asictes.  7/25/22: Pelvic US: L complex adnexal mass, 3 cm with the appearance of a fibroid.  9/2/22: PET/CT: L adnexal mass without FDG avidity.     We reviewed the results of her scans.  Although there is concern that this is a separate process from her left ovary, to me this is most consistent with a fibroma and is unlikely to represent an ovarian cancer.  However, due to symptoms which could be related to her mass, my recommendation is to proceed with surgery if it can be performed in a minimally invasive approach.  She will discuss further with her daughter and we will revisit virtually in 2 weeks to finalize a plan.  She does have a h/o CPOD and is unmedicated, and therefore I recommended F/U PCP for optimization of COPD.        PATIENT EDUCATION  Ready to learn, no apparent learning barriers were identified; learning preferences include listening.  Explained diagnosis and treatment plan; patient expressed understanding of the content.    INFORMED CONSENT  Discussed the risks, benefits, and alternatives of the procedure and of possible blood transfusion.  Discussed the necessity of other members of the healthcare team participating in the procedure.  All questions answered and consent given.      Den Barragan

## 2022-10-07 ENCOUNTER — OFFICE VISIT (OUTPATIENT)
Dept: GYNECOLOGIC ONCOLOGY | Facility: CLINIC | Age: 77
End: 2022-10-07
Payer: MEDICARE

## 2022-10-07 DIAGNOSIS — E11.22 TYPE 2 DIABETES MELLITUS WITH STAGE 3 CHRONIC KIDNEY DISEASE, UNSPECIFIED WHETHER LONG TERM INSULIN USE, UNSPECIFIED WHETHER STAGE 3A OR 3B CKD: ICD-10-CM

## 2022-10-07 DIAGNOSIS — N18.30 TYPE 2 DIABETES MELLITUS WITH STAGE 3 CHRONIC KIDNEY DISEASE, UNSPECIFIED WHETHER LONG TERM INSULIN USE, UNSPECIFIED WHETHER STAGE 3A OR 3B CKD: ICD-10-CM

## 2022-10-07 DIAGNOSIS — I73.9 PAD (PERIPHERAL ARTERY DISEASE): ICD-10-CM

## 2022-10-07 DIAGNOSIS — E03.9 ACQUIRED HYPOTHYROIDISM: ICD-10-CM

## 2022-10-07 DIAGNOSIS — J44.9 COPD, MODERATE: ICD-10-CM

## 2022-10-07 DIAGNOSIS — N94.89 ADNEXAL MASS: Primary | ICD-10-CM

## 2022-10-07 DIAGNOSIS — M17.0 PRIMARY OSTEOARTHRITIS OF BOTH KNEES: ICD-10-CM

## 2022-10-07 DIAGNOSIS — I10 ESSENTIAL HYPERTENSION: ICD-10-CM

## 2022-10-07 PROCEDURE — 1159F MED LIST DOCD IN RCRD: CPT | Mod: CPTII,95,, | Performed by: OBSTETRICS & GYNECOLOGY

## 2022-10-07 PROCEDURE — 99442 PR PHYSICIAN TELEPHONE EVALUATION 11-20 MIN: ICD-10-PCS | Mod: 95,,, | Performed by: OBSTETRICS & GYNECOLOGY

## 2022-10-07 PROCEDURE — 99442 PR PHYSICIAN TELEPHONE EVALUATION 11-20 MIN: CPT | Mod: 95,,, | Performed by: OBSTETRICS & GYNECOLOGY

## 2022-10-07 PROCEDURE — 1159F PR MEDICATION LIST DOCUMENTED IN MEDICAL RECORD: ICD-10-PCS | Mod: CPTII,95,, | Performed by: OBSTETRICS & GYNECOLOGY

## 2022-10-07 NOTE — PROGRESS NOTES
Established Patient - Audio Only Telehealth Visit     The patient location is: home  The chief complaint leading to consultation is: adnexal mass  Visit type: Virtual visit with audio only (telephone)  Total time spent with patient: 15       The reason for the audio only service rather than synchronous audio and video virtual visit was related to technical difficulties or patient preference/necessity.     Each patient to whom I provide medical services by telemedicine is:  (1) informed of the relationship between the physician and patient and the respective role of any other health care provider with respect to management of the patient; and (2) notified that they may decline to receive medical services by telemedicine and may withdraw from such care at any time. Patient verbally consented to receive this service via voice-only telephone call.    This service was not originating from a related E/M service provided within the previous 7 days nor will  to an E/M service or procedure within the next 24 hours or my soonest available appointment.  Prevailing standard of care was able to be met in this audio-only visit.      REFERRING PROVIDER  Katie Quintana MD     HISTORY OF PRESENT CONDITION  Chief complaint: left ovarian mass  Kay Rosas is a 77 y.o. who presents in consultation for an opinion regarding a left ovarian mass with a CA-125 of 26.     LLQ pain x 2 months.  Unable to characterize.  Aggravated with position changes.  No alleviating factors.  Not associated with eating habits. -Dysuria or hematuria. -Melena, hematochezia, or mucus.  -Unintentional weight loss. -Bloating. Mammogram (3/13/20): BI-RADS1.  Not UTD on colonoscopies.      REVIEW OF SYSTEMS  All systems reviewed and negative except as noted in HPI.    OBJECTIVE   There were no vitals filed for this visit.     There is no height or weight on file to calculate BMI.     ECOG status: 1    LABORATORY DATA  Lab data  reviewed.    RADIOLOGICAL DATA  Radiology data reviewed.    PATHOLOGY DATA  Pathology data reviewed.    ASSESSMENT / PLAN     1. Adnexal mass    2. COPD, moderate    3. Type 2 diabetes mellitus with stage 3 chronic kidney disease, unspecified whether long term insulin use, unspecified whether stage 3a or 3b CKD    4. Essential hypertension    5. Acquired hypothyroidism    6. PAD (peripheral artery disease)    7. Primary osteoarthritis of both knees        F/U Pulmonology clearance  F/U AV     PSH:  TIM/RSO via Pfannestiel     7/8/22: CT A/P w/o: L complex adnexal mass, 5 cm.  No omental caking, lymphadenopathy, or asictes.  7/25/22: Pelvic US: L complex adnexal mass, 3 cm with the appearance of a fibroid.  9/2/22: PET/CT: L adnexal mass without FDG avidity.     We reviewed the results of her scans.  Although there is concern that this is a separate process from her left ovary, to me this is most consistent with a fibroma and is unlikely to represent an ovarian cancer.  However, due to persistent symptoms which could be related to her mass, she wishes to proceed with surgery which I am in agreement. Differential diagnoses includes a benign, borderline, or malignant mass.  I will plan to perform this in a robotic fashion.  The procedure and its risks and benefits were discussed in detail.  We will not utilize frozen pathology.    PATIENT EDUCATION  Ready to learn, no apparent learning barriers were identified; learning preferences include listening.  Explained diagnosis and treatment plan; patient expressed understanding of the content.    INFORMED CONSENT  Discussed the risks, benefits, and alternatives of the procedure and of possible blood transfusion.  Discussed the necessity of other members of the healthcare team participating in the procedure.  All questions answered and consent given.      Den Barragan      .[email or web address]

## 2022-10-14 ENCOUNTER — TELEPHONE (OUTPATIENT)
Dept: HEMATOLOGY/ONCOLOGY | Facility: CLINIC | Age: 77
End: 2022-10-14
Payer: MEDICARE

## 2022-10-14 NOTE — TELEPHONE ENCOUNTER
Nurse called pt to set up appt with benita. Pt and nurse made appt over phone. Pt verbalized understanding

## 2022-10-19 ENCOUNTER — TELEPHONE (OUTPATIENT)
Dept: HEMATOLOGY/ONCOLOGY | Facility: CLINIC | Age: 77
End: 2022-10-19
Payer: MEDICARE

## 2022-10-19 NOTE — TELEPHONE ENCOUNTER
As instructed, spoke with patient and let her know that Dr. Quintana tried to contact her yesterday to let her know that her appointment today wasn't really needed.  I asked if she was due to have surgery soon to which she acknowledged.  I then Informed her that per Dr. Quintana, she is to f/u with Gyn and they will let her know if an appointment with her is needed. She acknowledged with no questions. Call ended well.

## 2022-10-20 ENCOUNTER — LAB VISIT (OUTPATIENT)
Dept: LAB | Facility: HOSPITAL | Age: 77
End: 2022-10-20
Attending: INTERNAL MEDICINE
Payer: MEDICARE

## 2022-10-20 DIAGNOSIS — N18.30 TYPE 2 DIABETES MELLITUS WITH STAGE 3 CHRONIC KIDNEY DISEASE, UNSPECIFIED WHETHER LONG TERM INSULIN USE, UNSPECIFIED WHETHER STAGE 3A OR 3B CKD: ICD-10-CM

## 2022-10-20 DIAGNOSIS — E11.22 TYPE 2 DIABETES MELLITUS WITH STAGE 3 CHRONIC KIDNEY DISEASE, UNSPECIFIED WHETHER LONG TERM INSULIN USE, UNSPECIFIED WHETHER STAGE 3A OR 3B CKD: ICD-10-CM

## 2022-10-20 LAB
ALBUMIN SERPL BCP-MCNC: 4 G/DL (ref 3.5–5.2)
ALP SERPL-CCNC: 62 U/L (ref 55–135)
ALT SERPL W/O P-5'-P-CCNC: 10 U/L (ref 10–44)
ANION GAP SERPL CALC-SCNC: 8 MMOL/L (ref 8–16)
AST SERPL-CCNC: 13 U/L (ref 10–40)
BILIRUB SERPL-MCNC: 0.5 MG/DL (ref 0.1–1)
BUN SERPL-MCNC: 20 MG/DL (ref 8–23)
CALCIUM SERPL-MCNC: 9.3 MG/DL (ref 8.7–10.5)
CHLORIDE SERPL-SCNC: 104 MMOL/L (ref 95–110)
CO2 SERPL-SCNC: 30 MMOL/L (ref 23–29)
CREAT SERPL-MCNC: 1.4 MG/DL (ref 0.5–1.4)
EST. GFR  (NO RACE VARIABLE): 39 ML/MIN/1.73 M^2
GLUCOSE SERPL-MCNC: 94 MG/DL (ref 70–110)
POTASSIUM SERPL-SCNC: 4.4 MMOL/L (ref 3.5–5.1)
PROT SERPL-MCNC: 7.3 G/DL (ref 6–8.4)
SODIUM SERPL-SCNC: 142 MMOL/L (ref 136–145)

## 2022-10-20 PROCEDURE — 36415 COLL VENOUS BLD VENIPUNCTURE: CPT | Performed by: INTERNAL MEDICINE

## 2022-10-20 PROCEDURE — 80053 COMPREHEN METABOLIC PANEL: CPT | Performed by: INTERNAL MEDICINE

## 2022-10-26 ENCOUNTER — OFFICE VISIT (OUTPATIENT)
Dept: NEPHROLOGY | Facility: CLINIC | Age: 77
End: 2022-10-26
Payer: MEDICARE

## 2022-10-26 VITALS
SYSTOLIC BLOOD PRESSURE: 118 MMHG | BODY MASS INDEX: 38.19 KG/M2 | DIASTOLIC BLOOD PRESSURE: 64 MMHG | HEART RATE: 67 BPM | HEIGHT: 66 IN | WEIGHT: 237.63 LBS

## 2022-10-26 DIAGNOSIS — N18.32 STAGE 3B CHRONIC KIDNEY DISEASE: Primary | ICD-10-CM

## 2022-10-26 DIAGNOSIS — I10 ESSENTIAL HYPERTENSION: ICD-10-CM

## 2022-10-26 PROCEDURE — 3078F PR MOST RECENT DIASTOLIC BLOOD PRESSURE < 80 MM HG: ICD-10-PCS | Mod: CPTII,S$GLB,, | Performed by: INTERNAL MEDICINE

## 2022-10-26 PROCEDURE — 1159F MED LIST DOCD IN RCRD: CPT | Mod: CPTII,S$GLB,, | Performed by: INTERNAL MEDICINE

## 2022-10-26 PROCEDURE — 1159F PR MEDICATION LIST DOCUMENTED IN MEDICAL RECORD: ICD-10-PCS | Mod: CPTII,S$GLB,, | Performed by: INTERNAL MEDICINE

## 2022-10-26 PROCEDURE — 99204 OFFICE O/P NEW MOD 45 MIN: CPT | Mod: S$GLB,,, | Performed by: INTERNAL MEDICINE

## 2022-10-26 PROCEDURE — 1125F PR PAIN SEVERITY QUANTIFIED, PAIN PRESENT: ICD-10-PCS | Mod: CPTII,S$GLB,, | Performed by: INTERNAL MEDICINE

## 2022-10-26 PROCEDURE — 1101F PR PT FALLS ASSESS DOC 0-1 FALLS W/OUT INJ PAST YR: ICD-10-PCS | Mod: CPTII,S$GLB,, | Performed by: INTERNAL MEDICINE

## 2022-10-26 PROCEDURE — 99999 PR PBB SHADOW E&M-EST. PATIENT-LVL III: ICD-10-PCS | Mod: PBBFAC,,, | Performed by: INTERNAL MEDICINE

## 2022-10-26 PROCEDURE — 1160F PR REVIEW ALL MEDS BY PRESCRIBER/CLIN PHARMACIST DOCUMENTED: ICD-10-PCS | Mod: CPTII,S$GLB,, | Performed by: INTERNAL MEDICINE

## 2022-10-26 PROCEDURE — 3288F PR FALLS RISK ASSESSMENT DOCUMENTED: ICD-10-PCS | Mod: CPTII,S$GLB,, | Performed by: INTERNAL MEDICINE

## 2022-10-26 PROCEDURE — 3078F DIAST BP <80 MM HG: CPT | Mod: CPTII,S$GLB,, | Performed by: INTERNAL MEDICINE

## 2022-10-26 PROCEDURE — 1160F RVW MEDS BY RX/DR IN RCRD: CPT | Mod: CPTII,S$GLB,, | Performed by: INTERNAL MEDICINE

## 2022-10-26 PROCEDURE — 99999 PR PBB SHADOW E&M-EST. PATIENT-LVL III: CPT | Mod: PBBFAC,,, | Performed by: INTERNAL MEDICINE

## 2022-10-26 PROCEDURE — 3288F FALL RISK ASSESSMENT DOCD: CPT | Mod: CPTII,S$GLB,, | Performed by: INTERNAL MEDICINE

## 2022-10-26 PROCEDURE — 99204 PR OFFICE/OUTPT VISIT, NEW, LEVL IV, 45-59 MIN: ICD-10-PCS | Mod: S$GLB,,, | Performed by: INTERNAL MEDICINE

## 2022-10-26 PROCEDURE — 3074F PR MOST RECENT SYSTOLIC BLOOD PRESSURE < 130 MM HG: ICD-10-PCS | Mod: CPTII,S$GLB,, | Performed by: INTERNAL MEDICINE

## 2022-10-26 PROCEDURE — 1125F AMNT PAIN NOTED PAIN PRSNT: CPT | Mod: CPTII,S$GLB,, | Performed by: INTERNAL MEDICINE

## 2022-10-26 PROCEDURE — 99499 UNLISTED E&M SERVICE: CPT | Mod: S$GLB,,, | Performed by: INTERNAL MEDICINE

## 2022-10-26 PROCEDURE — 3074F SYST BP LT 130 MM HG: CPT | Mod: CPTII,S$GLB,, | Performed by: INTERNAL MEDICINE

## 2022-10-26 PROCEDURE — 1101F PT FALLS ASSESS-DOCD LE1/YR: CPT | Mod: CPTII,S$GLB,, | Performed by: INTERNAL MEDICINE

## 2022-10-26 NOTE — PROGRESS NOTES
Kay Rosas is a 77 y.o. female      HPI:    She is not been seen in Nephrology Clinic in over 3 years.  She presents to clinic today to reestablish care.  She has history of mild chronic kidney disease as well as hypertension and diabetes mellitus.  In the clinic today she is doing well and has no specific complaints.  She relates that she is scheduled to have surgery next month for a mass on her ovary.  Her laboratory studies medications were reviewed.  All Nephrology related questions were answered to her satisfaction.    PAST MEDICAL HISTORY:  She  has a past medical history of Arthritis, Degenerative disc disease, cervical, Disorder of kidney and ureter, Emphysema of lung, Hyperlipidemia, Hypertension, MVP (mitral valve prolapse), Osteoporosis, and Thyroid condition.    PAST SURGICAL HISTORY:  She  has a past surgical history that includes cataract surgery  (Bilateral, 10/ 2012); elbow spur removed (Left); Hysterectomy (Left, 1978); Ovarian cyst removal; Vascular surgery (Right); Back surgery (2012); Oophorectomy; Injection of joint (Bilateral, 11/11/2019); and Injection of anesthetic agent into sacroiliac joint (Bilateral, 11/11/2019).    SOCIAL HISTORY:  She  reports that she quit smoking about 7 years ago. She has never used smokeless tobacco. She reports that she does not drink alcohol and does not use drugs.      FAMILY MEDICAL HISTORY:  Her family history includes Alzheimer's disease in her sister; Arthritis in her mother; Breast cancer in her mother; Cancer in her father and sister; Heart disease in her father and mother; Kidney disease in her mother; Ovarian cancer in her sister.    Review of patient's allergies indicates:   Allergen Reactions    Nsaids (non-steroidal anti-inflammatory drug)      CKD           Prior to Admission medications    Medication Sig Start Date End Date Taking? Authorizing Provider   ELIQUIS 5 mg Tab TAKE 1 TABLET BY MOUTH TWICE A DAY 9/16/22  Yes Sincere Cartwright MD    levothyroxine (SYNTHROID) 50 MCG tablet Take 1 tablet (50 mcg total) by mouth once daily. 4/26/22  Yes Kim Lakhani MD   losartan-hydrochlorothiazide 100-25 mg (HYZAAR) 100-25 mg per tablet Take 1 tablet by mouth once daily. 4/26/22  Yes Kim Lakhani MD   metFORMIN (GLUCOPHAGE-XR) 500 MG ER 24hr tablet Take 500 mg by mouth every morning. 7/24/22  Yes Historical Provider   sertraline (ZOLOFT) 100 MG tablet Take 1 tablet (100 mg total) by mouth once daily. 6/3/22  Yes Kim Lakhani MD   sertraline (ZOLOFT) 50 MG tablet Take 1 tablet (50 mg total) by mouth once daily. 4/26/22  Yes Kim Lakhani MD   traZODone (DESYREL) 150 MG tablet Take 1 tablet (150 mg total) by mouth every evening. 6/8/22 6/8/23 Yes Kim Lakhani MD   flavoxATE (URISPAS) 100 mg Tab Take 1-2 tablets (100-200 mg total) by mouth 3 (three) times daily as needed. For bladder spasms  Patient not taking: No sig reported 9/30/21 10/26/22  William Fernández NP      Sodium   Date Value Ref Range Status   10/20/2022 142 136 - 145 mmol/L Final   08/22/2022 143 136 - 145 mmol/L Final   04/26/2022 141 136 - 145 mmol/L Final     Potassium   Date Value Ref Range Status   10/20/2022 4.4 3.5 - 5.1 mmol/L Final   08/22/2022 5.0 3.5 - 5.1 mmol/L Final   04/26/2022 5.0 3.5 - 5.1 mmol/L Final     Chloride   Date Value Ref Range Status   10/20/2022 104 95 - 110 mmol/L Final   08/22/2022 108 95 - 110 mmol/L Final   04/26/2022 102 95 - 110 mmol/L Final     CO2   Date Value Ref Range Status   10/20/2022 30 (H) 23 - 29 mmol/L Final   08/22/2022 25 23 - 29 mmol/L Final   04/26/2022 28 23 - 29 mmol/L Final     Glucose   Date Value Ref Range Status   10/20/2022 94 70 - 110 mg/dL Final   08/22/2022 90 70 - 110 mg/dL Final   04/26/2022 90 70 - 110 mg/dL Final     BUN   Date Value Ref Range Status   10/20/2022 20 8 - 23 mg/dL Final   08/22/2022 21 8 - 23 mg/dL Final   04/26/2022 25 (H) 8 - 23 mg/dL Final     Creatinine   Date Value Ref Range  Status   10/20/2022 1.4 0.5 - 1.4 mg/dL Final   08/22/2022 1.5 (H) 0.5 - 1.4 mg/dL Final   07/08/2022 1.7 (H) 0.5 - 1.4 mg/dL Final     Calcium   Date Value Ref Range Status   10/20/2022 9.3 8.7 - 10.5 mg/dL Final   08/22/2022 9.5 8.7 - 10.5 mg/dL Final   04/26/2022 10.5 8.7 - 10.5 mg/dL Final     Total Protein   Date Value Ref Range Status   10/20/2022 7.3 6.0 - 8.4 g/dL Final   08/22/2022 7.1 6.0 - 8.4 g/dL Final   04/26/2022 8.0 6.0 - 8.4 g/dL Final     Albumin   Date Value Ref Range Status   10/20/2022 4.0 3.5 - 5.2 g/dL Final   08/22/2022 3.7 3.5 - 5.2 g/dL Final   04/26/2022 4.1 3.5 - 5.2 g/dL Final     Total Bilirubin   Date Value Ref Range Status   10/20/2022 0.5 0.1 - 1.0 mg/dL Final     Comment:     For infants and newborns, interpretation of results should be based  on gestational age, weight and in agreement with clinical  observations.    Premature Infant recommended reference ranges:  Up to 24 hours.............<8.0 mg/dL  Up to 48 hours............<12.0 mg/dL  3-5 days..................<15.0 mg/dL  6-29 days.................<15.0 mg/dL     08/22/2022 0.4 0.1 - 1.0 mg/dL Final     Comment:     For infants and newborns, interpretation of results should be based  on gestational age, weight and in agreement with clinical  observations.    Premature Infant recommended reference ranges:  Up to 24 hours.............<8.0 mg/dL  Up to 48 hours............<12.0 mg/dL  3-5 days..................<15.0 mg/dL  6-29 days.................<15.0 mg/dL     04/26/2022 0.5 0.1 - 1.0 mg/dL Final     Comment:     For infants and newborns, interpretation of results should be based  on gestational age, weight and in agreement with clinical  observations.    Premature Infant recommended reference ranges:  Up to 24 hours.............<8.0 mg/dL  Up to 48 hours............<12.0 mg/dL  3-5 days..................<15.0 mg/dL  6-29 days.................<15.0 mg/dL       Alkaline Phosphatase   Date Value Ref Range Status   10/20/2022  62 55 - 135 U/L Final   08/22/2022 56 55 - 135 U/L Final   04/26/2022 64 55 - 135 U/L Final     AST   Date Value Ref Range Status   10/20/2022 13 10 - 40 U/L Final   08/22/2022 17 10 - 40 U/L Final   04/26/2022 19 10 - 40 U/L Final     ALT   Date Value Ref Range Status   10/20/2022 10 10 - 44 U/L Final   08/22/2022 15 10 - 44 U/L Final   04/26/2022 21 10 - 44 U/L Final     Anion Gap   Date Value Ref Range Status   10/20/2022 8 8 - 16 mmol/L Final   08/22/2022 10 8 - 16 mmol/L Final   04/26/2022 11 8 - 16 mmol/L Final     eGFR if    Date Value Ref Range Status   07/08/2022 34 (A) >60 mL/min/1.73 m^2 Final   04/26/2022 31.3 (A) >60 mL/min/1.73 m^2 Final   02/18/2021 36.3 (A) >60 mL/min/1.73 m^2 Final     eGFR if non    Date Value Ref Range Status   07/08/2022 29 (A) >60 mL/min/1.73 m^2 Final     Comment:     Calculation used to obtain the estimated glomerular filtration  rate (eGFR) is the CKD-EPI equation.      04/26/2022 27.1 (A) >60 mL/min/1.73 m^2 Final     Comment:     Calculation used to obtain the estimated glomerular filtration  rate (eGFR) is the CKD-EPI equation.      02/18/2021 31.5 (A) >60 mL/min/1.73 m^2 Final     Comment:     Calculation used to obtain the estimated glomerular filtration  rate (eGFR) is the CKD-EPI equation.        RBC, UA   Date Value Ref Range Status   05/12/2022 1 0 - 4 /hpf Final   02/18/2021 1 0 - 4 /hpf Final   03/28/2019 0 0 - 4 /hpf Final     WBC, UA   Date Value Ref Range Status   05/12/2022 6 (H) 0 - 5 /hpf Final   02/18/2021 60 (H) 0 - 5 /hpf Final   04/25/2019 6 (H) 0 - 5 /hpf Final     Bacteria   Date Value Ref Range Status   05/12/2022 Rare None-Occ /hpf Final   02/18/2021 Few (A) None-Occ /hpf Final   03/28/2019 None None-Occ /hpf Final     Hyaline Casts, UA   Date Value Ref Range Status   03/28/2019 0 0-1/lpf /lpf Final     Microscopic Comment   Date Value Ref Range Status   05/12/2022 SEE COMMENT  Final     Comment:     Other formed  "elements not mentioned in the report are not   present in the microscopic examination.      02/18/2021 SEE COMMENT  Final     Comment:     Other formed elements not mentioned in the report are not   present in the microscopic examination.      04/25/2019 SEE COMMENT  Final     Comment:     Other formed elements not mentioned in the report are not   present in the microscopic examination.        Protein, Urine Random   Date Value Ref Range Status   10/20/2022 13 0 - 15 mg/dL Final     Creatinine, Urine   Date Value Ref Range Status   10/20/2022 131.0 15.0 - 325.0 mg/dL Final   02/18/2019 52.0 15.0 - 325.0 mg/dL Final     Comment:     The random urine reference ranges provided were established   for 24 hour urine collections.  No reference ranges exist for  random urine specimens.  Correlate clinically.     11/29/2016 73.0 15.0 - 325.0 mg/dL Final     Comment:     The random urine reference ranges provided were established   for 24 hour urine collections.  No reference ranges exist for  random urine specimens.  Correlate clinically.       Prot/Creat Ratio, Urine   Date Value Ref Range Status   10/20/2022 0.10 0.00 - 0.20 Final         REVIEW OF SYSTEMS:  Patient has no fever, fatigue, visual changes, chest pain, edema, cough, dyspnea, nausea, vomiting, constipation, diarrhea, arthralgias, pruritis, dizziness, weakness, depression, confusion.        PHYSICAL EXAM:   height is 5' 6" (1.676 m) and weight is 107.8 kg (237 lb 10.5 oz). Her blood pressure is 118/64 and her pulse is 67.   Gen: WDWN female in no apparent distress  Psych: Normal mood and affect  Skin: No rashes or ulcers  Eyes: Normal conjunctiva and lids, PERRLA  ENT: Normal hearing with no oropharyngeal lesions  Neck: No JVD  Chest: Clear with no rales, rhonchi, wheezing with normal effort  CV: Regular with no murmurs, gallops or rubs  Abd: Soft, nontender, no distension, positive bowel sounds  Ext: No cyanosis, clubbing or edema          IMPRESSION AND " RECOMMENDATIONS:    1. CKD 3:  Creatinine has remained relatively stable ranging between 1.4 and 1.8 for the past 2 years.  Most recent was 1.4.  This fluctuation is likely hydrational in nature.  Her urinalysis is bland without evidence of proteinuria.  She is on a RAAS inhibitor.      She is clear from a renal standpoint for her surgery next month.    2. Hypertension:  Blood pressure is well controlled on current regimen.  As per above she is on a RAAS inhibitor.

## 2022-10-27 ENCOUNTER — OFFICE VISIT (OUTPATIENT)
Dept: FAMILY MEDICINE | Facility: CLINIC | Age: 77
End: 2022-10-27
Payer: MEDICARE

## 2022-10-27 ENCOUNTER — LAB VISIT (OUTPATIENT)
Dept: LAB | Facility: HOSPITAL | Age: 77
End: 2022-10-27
Attending: FAMILY MEDICINE
Payer: MEDICARE

## 2022-10-27 VITALS
TEMPERATURE: 98 F | OXYGEN SATURATION: 96 % | HEART RATE: 70 BPM | HEIGHT: 66 IN | DIASTOLIC BLOOD PRESSURE: 74 MMHG | BODY MASS INDEX: 37.98 KG/M2 | WEIGHT: 236.31 LBS | SYSTOLIC BLOOD PRESSURE: 137 MMHG

## 2022-10-27 DIAGNOSIS — E03.9 ACQUIRED HYPOTHYROIDISM: ICD-10-CM

## 2022-10-27 DIAGNOSIS — F33.0 MAJOR DEPRESSIVE DISORDER, RECURRENT, MILD: ICD-10-CM

## 2022-10-27 DIAGNOSIS — E27.8 ADRENAL MASS: ICD-10-CM

## 2022-10-27 DIAGNOSIS — R73.03 PREDIABETES: Primary | ICD-10-CM

## 2022-10-27 DIAGNOSIS — J44.9 COPD, MODERATE: ICD-10-CM

## 2022-10-27 DIAGNOSIS — N18.30 STAGE 3 CHRONIC KIDNEY DISEASE, UNSPECIFIED WHETHER STAGE 3A OR 3B CKD: ICD-10-CM

## 2022-10-27 DIAGNOSIS — F51.01 PRIMARY INSOMNIA: ICD-10-CM

## 2022-10-27 DIAGNOSIS — R73.03 PREDIABETES: ICD-10-CM

## 2022-10-27 DIAGNOSIS — I10 ESSENTIAL HYPERTENSION: ICD-10-CM

## 2022-10-27 LAB
ESTIMATED AVG GLUCOSE: 108 MG/DL (ref 68–131)
HBA1C MFR BLD: 5.4 % (ref 4–5.6)

## 2022-10-27 PROCEDURE — 99499 UNLISTED E&M SERVICE: CPT | Mod: S$GLB,,, | Performed by: FAMILY MEDICINE

## 2022-10-27 PROCEDURE — 99999 PR PBB SHADOW E&M-EST. PATIENT-LVL III: ICD-10-PCS | Mod: PBBFAC,,, | Performed by: FAMILY MEDICINE

## 2022-10-27 PROCEDURE — 1125F AMNT PAIN NOTED PAIN PRSNT: CPT | Mod: CPTII,S$GLB,, | Performed by: FAMILY MEDICINE

## 2022-10-27 PROCEDURE — 1101F PR PT FALLS ASSESS DOC 0-1 FALLS W/OUT INJ PAST YR: ICD-10-PCS | Mod: CPTII,S$GLB,, | Performed by: FAMILY MEDICINE

## 2022-10-27 PROCEDURE — 36415 COLL VENOUS BLD VENIPUNCTURE: CPT | Mod: PO | Performed by: FAMILY MEDICINE

## 2022-10-27 PROCEDURE — 3288F PR FALLS RISK ASSESSMENT DOCUMENTED: ICD-10-PCS | Mod: CPTII,S$GLB,, | Performed by: FAMILY MEDICINE

## 2022-10-27 PROCEDURE — 3078F PR MOST RECENT DIASTOLIC BLOOD PRESSURE < 80 MM HG: ICD-10-PCS | Mod: CPTII,S$GLB,, | Performed by: FAMILY MEDICINE

## 2022-10-27 PROCEDURE — 83036 HEMOGLOBIN GLYCOSYLATED A1C: CPT | Performed by: FAMILY MEDICINE

## 2022-10-27 PROCEDURE — 1159F MED LIST DOCD IN RCRD: CPT | Mod: CPTII,S$GLB,, | Performed by: FAMILY MEDICINE

## 2022-10-27 PROCEDURE — 1125F PR PAIN SEVERITY QUANTIFIED, PAIN PRESENT: ICD-10-PCS | Mod: CPTII,S$GLB,, | Performed by: FAMILY MEDICINE

## 2022-10-27 PROCEDURE — 3078F DIAST BP <80 MM HG: CPT | Mod: CPTII,S$GLB,, | Performed by: FAMILY MEDICINE

## 2022-10-27 PROCEDURE — 1159F PR MEDICATION LIST DOCUMENTED IN MEDICAL RECORD: ICD-10-PCS | Mod: CPTII,S$GLB,, | Performed by: FAMILY MEDICINE

## 2022-10-27 PROCEDURE — 3288F FALL RISK ASSESSMENT DOCD: CPT | Mod: CPTII,S$GLB,, | Performed by: FAMILY MEDICINE

## 2022-10-27 PROCEDURE — 99214 OFFICE O/P EST MOD 30 MIN: CPT | Mod: S$GLB,,, | Performed by: FAMILY MEDICINE

## 2022-10-27 PROCEDURE — 3075F PR MOST RECENT SYSTOLIC BLOOD PRESS GE 130-139MM HG: ICD-10-PCS | Mod: CPTII,S$GLB,, | Performed by: FAMILY MEDICINE

## 2022-10-27 PROCEDURE — 99214 PR OFFICE/OUTPT VISIT, EST, LEVL IV, 30-39 MIN: ICD-10-PCS | Mod: S$GLB,,, | Performed by: FAMILY MEDICINE

## 2022-10-27 PROCEDURE — 99499 RISK ADDL DX/OHS AUDIT: ICD-10-PCS | Mod: S$GLB,,, | Performed by: FAMILY MEDICINE

## 2022-10-27 PROCEDURE — 3075F SYST BP GE 130 - 139MM HG: CPT | Mod: CPTII,S$GLB,, | Performed by: FAMILY MEDICINE

## 2022-10-27 PROCEDURE — 99999 PR PBB SHADOW E&M-EST. PATIENT-LVL III: CPT | Mod: PBBFAC,,, | Performed by: FAMILY MEDICINE

## 2022-10-27 PROCEDURE — 1101F PT FALLS ASSESS-DOCD LE1/YR: CPT | Mod: CPTII,S$GLB,, | Performed by: FAMILY MEDICINE

## 2022-10-27 NOTE — PROGRESS NOTES
Subjective:      Patient ID: Kay Rosas is a 77 y.o. female.    Chief Complaint: Follow-up    HPI    Patient with pmhx of HTN, DDD, HLD, depression (150 mg of zoloft), COPD, hypothyroidism, GERD, DVT x 2, CKD stage 3, prediabetes, insomnia here today for follow up of chronic conditions. Left adrenal mass - following Dr. Barragan and will have surgery scheduled after pulmonary clearance.     Past Medical History:   Diagnosis Date    Arthritis     Degenerative disc disease, cervical     Disorder of kidney and ureter     CKD stage 3    Emphysema of lung     Hyperlipidemia     Hypertension     MVP (mitral valve prolapse)     Osteoporosis     feet    Thyroid condition        Past Surgical History:   Procedure Laterality Date    BACK SURGERY      cataract surgery  Bilateral 10/ 2012    elbow spur removed Left     HYSTERECTOMY Left     INJECTION OF ANESTHETIC AGENT INTO SACROILIAC JOINT Bilateral 2019    Procedure: Bilateral GT bursa + SIJ injection;  Surgeon: Noe Pleitez MD;  Location: [facility];  Service: Pain Management;  Laterality: Bilateral;    INJECTION OF JOINT Bilateral 2019    Procedure: Bilateral GT bursa + SIJ injection;  Surgeon: Noe Pleitez MD;  Location: [facility];  Service: Pain Management;  Laterality: Bilateral;    OOPHORECTOMY      OVARIAN CYST REMOVAL      two from back, one from axilla    VASCULAR SURGERY Right     high ligation due to blood clot        Family History   Problem Relation Age of Onset    Heart disease Mother     Kidney disease Mother     Arthritis Mother     Breast cancer Mother     Cancer Father     Heart disease Father     Cancer Sister     Ovarian cancer Sister     Alzheimer's disease Sister        Social History     Socioeconomic History    Marital status:    Tobacco Use    Smoking status: Former     Types: Cigarettes     Quit date: 2015     Years since quittin.3    Smokeless tobacco: Never   Substance and Sexual Activity     Alcohol use: No     Alcohol/week: 0.0 standard drinks    Drug use: No       Health Maintenance Topics with due status: Not Due       Topic Last Completion Date    Lipid Panel 04/26/2022    DEXA Scan 05/18/2022       Medication List with Changes/Refills   Current Medications    ELIQUIS 5 MG TAB    TAKE 1 TABLET BY MOUTH TWICE A DAY    LEVOTHYROXINE (SYNTHROID) 50 MCG TABLET    Take 1 tablet (50 mcg total) by mouth once daily.    LOSARTAN-HYDROCHLOROTHIAZIDE 100-25 MG (HYZAAR) 100-25 MG PER TABLET    Take 1 tablet by mouth once daily.    METFORMIN (GLUCOPHAGE-XR) 500 MG ER 24HR TABLET    Take 500 mg by mouth every morning.    SERTRALINE (ZOLOFT) 100 MG TABLET    Take 1 tablet (100 mg total) by mouth once daily.    SERTRALINE (ZOLOFT) 50 MG TABLET    Take 1 tablet (50 mg total) by mouth once daily.    TRAZODONE (DESYREL) 150 MG TABLET    Take 1 tablet (150 mg total) by mouth every evening.       Review of patient's allergies indicates:   Allergen Reactions    Nsaids (non-steroidal anti-inflammatory drug)      CKD       Review of Systems   Constitutional:  Negative for fever.   HENT:  Negative for congestion.    Eyes:  Negative for blurred vision.   Respiratory:  Negative for shortness of breath.    Cardiovascular:  Negative for chest pain and leg swelling.   Gastrointestinal:  Negative for abdominal pain, constipation and diarrhea.   Genitourinary:  Negative for dysuria.   Skin:  Negative for rash.   Neurological:  Negative for headaches.     Objective:     Vitals:    10/27/22 0810   BP: 137/74   Pulse: 70   Temp: 97.8 °F (36.6 °C)     Body mass index is 38.15 kg/m².    Physical Exam  Vitals and nursing note reviewed.   Constitutional:       General: She is not in acute distress.     Appearance: She is well-developed.   HENT:      Head: Normocephalic.   Cardiovascular:      Rate and Rhythm: Normal rate and regular rhythm.      Heart sounds: Normal heart sounds. No murmur heard.  Pulmonary:      Effort: Pulmonary  effort is normal. No respiratory distress.      Breath sounds: Normal breath sounds. No wheezing.   Abdominal:      General: Bowel sounds are normal.      Palpations: Abdomen is soft.      Tenderness: There is no abdominal tenderness.   Skin:     General: Skin is warm and dry.   Neurological:      Mental Status: She is alert and oriented to person, place, and time.       Assessment and Plan:     Prediabetes  -     Hemoglobin A1C; Future; Expected date: 10/27/2022    Essential hypertension  Blood pressure controlled at today's visit   Continue with current medications   Ambulatory logs of blood pressure readings recommended   DASH diet, weight loss, exercise     Stage 3 chronic kidney disease, unspecified whether stage 3a or 3b CKD  Following nephrology     Acquired hypothyroidism  Stable    Primary insomnia  Stable    Major depressive disorder, recurrent, mild  Stable    COPD, moderate  Following pulmonology     Adrenal mass  Following gyn/onc    No follow-ups on file.

## 2022-11-07 ENCOUNTER — TELEPHONE (OUTPATIENT)
Dept: GYNECOLOGIC ONCOLOGY | Facility: CLINIC | Age: 77
End: 2022-11-07
Payer: MEDICARE

## 2022-11-07 NOTE — PROGRESS NOTES
Established Patient - Audio Only Telehealth Visit     The patient location is: home  The chief complaint leading to consultation is: adnexal mass  Visit type: Virtual visit with audio only (telephone)  Total time spent with patient: 15       The reason for the audio only service rather than synchronous audio and video virtual visit was related to technical difficulties or patient preference/necessity.     Each patient to whom I provide medical services by telemedicine is:  (1) informed of the relationship between the physician and patient and the respective role of any other health care provider with respect to management of the patient; and (2) notified that they may decline to receive medical services by telemedicine and may withdraw from such care at any time. Patient verbally consented to receive this service via voice-only telephone call.    This service was not originating from a related E/M service provided within the previous 7 days nor will  to an E/M service or procedure within the next 24 hours or my soonest available appointment.  Prevailing standard of care was able to be met in this audio-only visit.      REFERRING PROVIDER  Katie Quintana MD     HISTORY OF PRESENT CONDITION  Chief complaint: left ovarian mass  Kay Rosas is a 77 y.o. who presents in consultation for an opinion regarding a left ovarian mass with a CA-125 of 26.     LLQ pain x 2 months.  Unable to characterize.  Aggravated with position changes.  No alleviating factors.  Not associated with eating habits. -Dysuria or hematuria. -Melena, hematochezia, or mucus.  -Unintentional weight loss. -Bloating. Mammogram (3/13/20): BI-RADS1.  Not UTD on colonoscopies.      REVIEW OF SYSTEMS  All systems reviewed and negative except as noted in HPI.    OBJECTIVE   There were no vitals filed for this visit.     There is no height or weight on file to calculate BMI.     ECOG status: 1    LABORATORY DATA  Lab data  reviewed.    RADIOLOGICAL DATA  Radiology data reviewed.    PATHOLOGY DATA  Pathology data reviewed.    ASSESSMENT / PLAN     1. Adnexal mass    2. COPD, moderate    3. Type 2 diabetes mellitus with stage 3 chronic kidney disease, unspecified whether long term insulin use, unspecified whether stage 3a or 3b CKD    4. Essential hypertension    5. Acquired hypothyroidism    6. PAD (peripheral artery disease)    7. Nocturnal oxygen desaturation        PSH:  TIM/RSO via [facility]ann[place]iel     4/26/22: TSH = 2.05, T4 = 0.96  7/8/22: CT A/P w/o: L complex adnexal mass, 5 cm.  No omental caking, lymphadenopathy, or asictes.  7/25/22: Pelvic US: L complex adnexal mass, 3 cm with the appearance of a fibroid.  8/22/22: CA-125 = 26  9/2/22: PET/CT: L adnexal mass without FDG avidity.   11/11/22: Pulmonology clearance    We reviewed the results of her scans.  Although there is concern that this is a separate process from her left ovary, to me this is most consistent with a fibroma and is unlikely to represent an ovarian cancer.  However, due to persistent symptoms which could be related to her mass, she wishes to proceed with surgery which I am in agreement. Differential diagnoses includes a benign, borderline, or malignant mass.  I will plan to perform this in a robotic fashion.  The procedure and its risks and benefits were discussed in detail.  We will not utilize frozen pathology.  Lastly, we discussed holding her Eliquis for 3 days prior to surgery.    CONSENTS THE DAY OF SURGERY  23H OBSERVATION    PATIENT EDUCATION  Ready to learn, no apparent learning barriers were identified; learning preferences include listening.  Explained diagnosis and treatment plan; patient expressed understanding of the content.    INFORMED CONSENT  Discussed the risks, benefits, and alternatives of the procedure and of possible blood transfusion.  Discussed the necessity of other members of the healthcare team participating in the procedure.  All  questions answered and consent given.      Den Barragan

## 2022-11-07 NOTE — PROGRESS NOTES
Patient, Kay Rosas (MRN #[id number]), presented with a recent Estimated Glumerular Filtration Rate (EGFR) between 15 and 29 consistent with the definition of chronic kidney disease stage 4 (ICD10 - N18.4).    eGFR if non    Date Value Ref Range Status   07/08/2022 29 (A) >60 mL/min/1.73 m^2 Final     Comment:     Calculation used to obtain the estimated glomerular filtration  rate (eGFR) is the CKD-EPI equation.          The patient's chronic kidney disease stage 4 was monitored, evaluated, addressed and/or treated. This addendum to the medical record is made on 11/07/2022.

## 2022-11-07 NOTE — TELEPHONE ENCOUNTER
----- Message from Den Barragan MD sent at 11/7/2022 11:43 AM CST -----  Please schedule AV next week.  Thanks.

## 2022-11-11 ENCOUNTER — TELEPHONE (OUTPATIENT)
Dept: PULMONOLOGY | Facility: CLINIC | Age: 77
End: 2022-11-11
Payer: MEDICARE

## 2022-11-11 ENCOUNTER — CLINICAL SUPPORT (OUTPATIENT)
Dept: PULMONOLOGY | Facility: CLINIC | Age: 77
End: 2022-11-11
Payer: MEDICARE

## 2022-11-11 ENCOUNTER — OFFICE VISIT (OUTPATIENT)
Dept: PULMONOLOGY | Facility: CLINIC | Age: 77
End: 2022-11-11
Payer: MEDICARE

## 2022-11-11 ENCOUNTER — PATIENT MESSAGE (OUTPATIENT)
Dept: PULMONOLOGY | Facility: HOSPITAL | Age: 77
End: 2022-11-11
Payer: MEDICARE

## 2022-11-11 ENCOUNTER — HOSPITAL ENCOUNTER (OUTPATIENT)
Dept: RADIOLOGY | Facility: HOSPITAL | Age: 77
Discharge: HOME | End: 2022-11-11
Attending: INTERNAL MEDICINE
Payer: MEDICARE

## 2022-11-11 VITALS
DIASTOLIC BLOOD PRESSURE: 73 MMHG | SYSTOLIC BLOOD PRESSURE: 155 MMHG | WEIGHT: 236.31 LBS | BODY MASS INDEX: 37.98 KG/M2 | HEART RATE: 75 BPM | OXYGEN SATURATION: 93 % | RESPIRATION RATE: 16 BRPM | HEIGHT: 66 IN

## 2022-11-11 VITALS — HEIGHT: 66 IN | BODY MASS INDEX: 37.98 KG/M2 | WEIGHT: 236.31 LBS

## 2022-11-11 DIAGNOSIS — J44.9 COPD, MODERATE: ICD-10-CM

## 2022-11-11 DIAGNOSIS — J44.9 COPD, MODERATE: Primary | ICD-10-CM

## 2022-11-11 DIAGNOSIS — I82.499 DEEP VEIN THROMBOSIS (DVT) OF OTHER VEIN OF LOWER EXTREMITY, UNSPECIFIED CHRONICITY, UNSPECIFIED LATERALITY: ICD-10-CM

## 2022-11-11 DIAGNOSIS — R09.02 EXERCISE HYPOXEMIA: ICD-10-CM

## 2022-11-11 DIAGNOSIS — E66.9 OBESITY (BMI 35.0-39.9 WITHOUT COMORBIDITY): ICD-10-CM

## 2022-11-11 DIAGNOSIS — I10 ESSENTIAL HYPERTENSION: ICD-10-CM

## 2022-11-11 DIAGNOSIS — Z01.811 PRE-OPERATIVE RESPIRATORY EXAMINATION: Primary | ICD-10-CM

## 2022-11-11 DIAGNOSIS — N94.89 ADNEXAL MASS: ICD-10-CM

## 2022-11-11 LAB
ALLENS TEST: ABNORMAL
BRPFT: NORMAL
DELSYS: ABNORMAL
FEF 25 75 CHG: 21.6 %
FEF 25 75 LLN: 0.76
FEF 25 75 POST REF: 36.7 %
FEF 25 75 PRE REF: 30.2 %
FEF 25 75 REF: 1.75
FET100 CHG: -8.3 %
FEV1 CHG: 7.1 %
FEV1 FVC CHG: 4.5 %
FEV1 FVC LLN: 63
FEV1 FVC POST REF: 84.6 %
FEV1 FVC PRE REF: 81 %
FEV1 FVC REF: 77
FEV1 LLN: 1.55
FEV1 POST REF: 66.1 %
FEV1 PRE REF: 61.7 %
FEV1 REF: 2.18
FIO2: 21
FVC CHG: 2.5 %
FVC LLN: 2.04
FVC POST REF: 77.2 %
FVC PRE REF: 75.3 %
FVC REF: 2.86
HCO3 UR-SCNC: 26.5 MMOL/L (ref 24–28)
MODE: ABNORMAL
PCO2 BLDA: 43.4 MMHG (ref 35–45)
PEF CHG: 11.5 %
PEF LLN: 3.67
PEF POST REF: 93.1 %
PEF PRE REF: 83.5 %
PEF REF: 5.49
PH SMN: 7.39 [PH] (ref 7.35–7.45)
PO2 BLDA: 73 MMHG (ref 80–100)
POC BE: 2 MMOL/L
POC SATURATED O2: 94 % (ref 95–100)
POST FEF 25 75: 0.64 L/S
POST FET 100: 10.67 SEC
POST FEV1 FVC: 65.28 %
POST FEV1: 1.44 L
POST FVC: 2.21 L
POST PEF: 5.12 L/S
PRE FEF 25 75: 0.53 L/S
PRE FET 100: 11.63 SEC
PRE FEV1 FVC: 62.48 %
PRE FEV1: 1.35 L
PRE FVC: 2.16 L
PRE PEF: 4.59 L/S
SAMPLE: ABNORMAL
SITE: ABNORMAL

## 2022-11-11 PROCEDURE — 36600 WITHDRAWAL OF ARTERIAL BLOOD: CPT | Mod: S$GLB,,, | Performed by: INTERNAL MEDICINE

## 2022-11-11 PROCEDURE — 1101F PR PT FALLS ASSESS DOC 0-1 FALLS W/OUT INJ PAST YR: ICD-10-PCS | Mod: CPTII,S$GLB,, | Performed by: INTERNAL MEDICINE

## 2022-11-11 PROCEDURE — 1160F RVW MEDS BY RX/DR IN RCRD: CPT | Mod: CPTII,S$GLB,, | Performed by: INTERNAL MEDICINE

## 2022-11-11 PROCEDURE — 3288F FALL RISK ASSESSMENT DOCD: CPT | Mod: CPTII,S$GLB,, | Performed by: INTERNAL MEDICINE

## 2022-11-11 PROCEDURE — 1159F PR MEDICATION LIST DOCUMENTED IN MEDICAL RECORD: ICD-10-PCS | Mod: CPTII,S$GLB,, | Performed by: INTERNAL MEDICINE

## 2022-11-11 PROCEDURE — 99999 PR PBB SHADOW E&M-EST. PATIENT-LVL I: ICD-10-PCS | Mod: PBBFAC,,,

## 2022-11-11 PROCEDURE — 99205 PR OFFICE/OUTPT VISIT, NEW, LEVL V, 60-74 MIN: ICD-10-PCS | Mod: 25,S$GLB,, | Performed by: INTERNAL MEDICINE

## 2022-11-11 PROCEDURE — 1159F MED LIST DOCD IN RCRD: CPT | Mod: CPTII,S$GLB,, | Performed by: INTERNAL MEDICINE

## 2022-11-11 PROCEDURE — 94618 PULMONARY STRESS TESTING: CPT | Mod: S$GLB,,, | Performed by: INTERNAL MEDICINE

## 2022-11-11 PROCEDURE — 3078F DIAST BP <80 MM HG: CPT | Mod: CPTII,S$GLB,, | Performed by: INTERNAL MEDICINE

## 2022-11-11 PROCEDURE — 3078F PR MOST RECENT DIASTOLIC BLOOD PRESSURE < 80 MM HG: ICD-10-PCS | Mod: CPTII,S$GLB,, | Performed by: INTERNAL MEDICINE

## 2022-11-11 PROCEDURE — 94060 EVALUATION OF WHEEZING: CPT | Mod: S$GLB,,, | Performed by: INTERNAL MEDICINE

## 2022-11-11 PROCEDURE — 82803 BLOOD GASES ANY COMBINATION: CPT | Mod: S$GLB,,, | Performed by: INTERNAL MEDICINE

## 2022-11-11 PROCEDURE — 99999 PR PBB SHADOW E&M-EST. PATIENT-LVL I: CPT | Mod: PBBFAC,,,

## 2022-11-11 PROCEDURE — 36600 PR WITHDRAWAL OF ARTERIAL BLOOD: ICD-10-PCS | Mod: S$GLB,,, | Performed by: INTERNAL MEDICINE

## 2022-11-11 PROCEDURE — 99999 PR PBB SHADOW E&M-EST. PATIENT-LVL IV: CPT | Mod: PBBFAC,,, | Performed by: INTERNAL MEDICINE

## 2022-11-11 PROCEDURE — 3077F SYST BP >= 140 MM HG: CPT | Mod: CPTII,S$GLB,, | Performed by: INTERNAL MEDICINE

## 2022-11-11 PROCEDURE — 1101F PT FALLS ASSESS-DOCD LE1/YR: CPT | Mod: CPTII,S$GLB,, | Performed by: INTERNAL MEDICINE

## 2022-11-11 PROCEDURE — 3288F PR FALLS RISK ASSESSMENT DOCUMENTED: ICD-10-PCS | Mod: CPTII,S$GLB,, | Performed by: INTERNAL MEDICINE

## 2022-11-11 PROCEDURE — 94060 PR EVAL OF BRONCHOSPASM: ICD-10-PCS | Mod: S$GLB,,, | Performed by: INTERNAL MEDICINE

## 2022-11-11 PROCEDURE — 82803 PR  BLOOD GASES: PH, PO2 & PCO2: ICD-10-PCS | Mod: S$GLB,,, | Performed by: INTERNAL MEDICINE

## 2022-11-11 PROCEDURE — 1160F PR REVIEW ALL MEDS BY PRESCRIBER/CLIN PHARMACIST DOCUMENTED: ICD-10-PCS | Mod: CPTII,S$GLB,, | Performed by: INTERNAL MEDICINE

## 2022-11-11 PROCEDURE — 71046 XR CHEST PA AND LATERAL: ICD-10-PCS | Mod: 26,,, | Performed by: STUDENT IN AN ORGANIZED HEALTH CARE EDUCATION/TRAINING PROGRAM

## 2022-11-11 PROCEDURE — 99205 OFFICE O/P NEW HI 60 MIN: CPT | Mod: 25,S$GLB,, | Performed by: INTERNAL MEDICINE

## 2022-11-11 PROCEDURE — 3077F PR MOST RECENT SYSTOLIC BLOOD PRESSURE >= 140 MM HG: ICD-10-PCS | Mod: CPTII,S$GLB,, | Performed by: INTERNAL MEDICINE

## 2022-11-11 PROCEDURE — 99999 PR PBB SHADOW E&M-EST. PATIENT-LVL IV: ICD-10-PCS | Mod: PBBFAC,,, | Performed by: INTERNAL MEDICINE

## 2022-11-11 PROCEDURE — 71046 X-RAY EXAM CHEST 2 VIEWS: CPT | Mod: TC

## 2022-11-11 PROCEDURE — 99499 RISK ADDL DX/OHS AUDIT: ICD-10-PCS | Mod: S$GLB,,, | Performed by: INTERNAL MEDICINE

## 2022-11-11 PROCEDURE — 71046 X-RAY EXAM CHEST 2 VIEWS: CPT | Mod: 26,,, | Performed by: STUDENT IN AN ORGANIZED HEALTH CARE EDUCATION/TRAINING PROGRAM

## 2022-11-11 PROCEDURE — 94618 PULMONARY STRESS TESTING: ICD-10-PCS | Mod: S$GLB,,, | Performed by: INTERNAL MEDICINE

## 2022-11-11 PROCEDURE — 99499 UNLISTED E&M SERVICE: CPT | Mod: S$GLB,,, | Performed by: INTERNAL MEDICINE

## 2022-11-11 RX ORDER — TIOTROPIUM BROMIDE AND OLODATEROL 3.124; 2.736 UG/1; UG/1
2 SPRAY, METERED RESPIRATORY (INHALATION) DAILY
Qty: 4 G | Refills: 11 | Status: SHIPPED | OUTPATIENT
Start: 2022-11-11 | End: 2024-02-12

## 2022-11-11 RX ORDER — ALBUTEROL SULFATE 90 UG/1
2 AEROSOL, METERED RESPIRATORY (INHALATION) EVERY 4 HOURS PRN
Qty: 18 G | Refills: 11 | Status: SHIPPED | OUTPATIENT
Start: 2022-11-11 | End: 2023-11-16

## 2022-11-11 NOTE — TELEPHONE ENCOUNTER
New prescription for oxygen sent to [facility]  Called and discussed with patient

## 2022-11-11 NOTE — ASSESSMENT & PLAN NOTE
Blood pressure measurements reviewed, repeated and verified. Adverse effects of elevated blood pressure reviewed in detail. Importance of low sodium diet, medication compliance stressed. Patient advised and counseled concerning the adverse medical consequences of untreated hypertension.The patient's hypertension was monitored, evaluated, addressed and/or treated. Asked to follow up with PCP.  Isolated systolic hypertension is a blood pressure ?130/<80 mmHg

## 2022-11-11 NOTE — PROCEDURES
"[facility] - Pulmonary Function  Six Minute Walk     SUMMARY     Ordering Provider: Dr. Wilder   Interpreting Provider: Dr. Wilder  Performing nurse/tech/RT: ex. hypokalemia  Diagnosis:  (ex. hypoxemia)  Height: 5' 6" (167.6 cm)  Weight: 107.2 kg (236 lb 5.3 oz)  BMI (Calculated): 38.2   Patient Race:             Phase Oxygen Assessment Supplemental O2 Heart   Rate Blood Pressure Lexis Dyspnea Scale Rating   Resting 98 % Room Air 67 bpm 157/71 3   Exercise        Minute        1 90 % Room Air 117 bpm     2 86 % (90% when increased to 2L) Room Air (increased to 2L) 125 bpm     3 91 % 2 L/M 125 bpm     4 84 % (90% when increased to 3L) 2 L/M (increased to 3L) 133 bpm     5 87 % (90% when increased to 4L) 3 L/M (increased to 4L) 139 bpm     6  91 % 4 L/M 135 bpm 182/75 5-6   Recovery        Minute        1 96 % 4 L/M 109 bpm     2 96 % 4 L/M 95 bpm     3 100 % 4 L/M 92 bpm     4 99 % 4 L/M 89 bpm 139/65 2     Six Minute Walk Summary  6MWT Status: completed without stopping  Patient Reported: Leg pain, Dyspnea     Interpretation:  Did the patient stop or pause?: No                                         Total Time Walked (Calculated): 360 seconds  Final Partial Lap Distance (feet): 0 feet  Total Distance Meters (Calculated): 304.8 meters  Predicted Distance Meters (Calculated): 335.87 meters  Percentage of Predicted (Calculated): 90.75  Peak VO2 (Calculated): 13.12  Mets: 3.75  Has The Patient Had a Previous Six Minute Walk Test?: No       Previous 6MWT Results  Has The Patient Had a Previous Six Minute Walk Test?: No      Interpretation:  Total distance walked in six minutes is normal indicating normal functional capacity. There was severe oxygen desaturation to 86% with exercise . Supplemental oxygen was administered for the remainder of the test as noted above.  Patient met criteria for oxygen prescription. Clinical correlation suggested.    [] Mild exercise-induced hypoxemia described as an arterial oxygen " saturation of 93-95% (or 3-4% less than at rest),  [] Moderate exercise-induced hypoxemia as 89-93%  [x] Severe exercise induced hypoxemia as < 89% O2 saturation.  Medicare Criteria for oxygen prescription comments:   When arterial oxygen saturation is at or below 88% during exercise on room air (severe exercise induced hypoxemia) then the patient falls under Medicare Group 1 criteria for supplemental oxygen prescription.  Details about Medicare Group Criteria coverage can be found at http://[email or web address].[email or web address]/manuals/downloads/     Michoacano Wilder MD

## 2022-11-11 NOTE — PROCEDURES
See ABG results   Latest Reference Range & Units 11/11/22 14:09   Sample  ARTERIAL   POC PH 7.35 - 7.45  7.394   POC PCO2 35 - 45 mmHg 43.4   POC PO2 80 - 100 mmHg 73 (L)   POC HCO3 24 - 28 mmol/L 26.5   POC SATURATED O2 95 - 100 % 94 (L)   Allens Test  Pass   POC BE -2 to 2 mmol/L 2   FiO2  21   DelSys  Room Air   Site  RR   Mode  SPONT   (L): Data is abnormally low    Interpretation:  Arterial blood gases on room air are abnormal demonstrating hypoxemia.  Michoacano Wilder MD  Pulmonary / Critical Care Medicine      The table below summarizes the main interpretations of the relationship between the arterial blood gases, pH, pCO2 and HCO-3

## 2022-11-11 NOTE — PROGRESS NOTES
Subjective:     Patient ID: Kay Rosas is a 77 y.o. female.    Chief Complaint:  76 y/o former smoker 44 pack years with major abdominal surgery planned     HPI    Pre op Evaluation for ovarian surgery    Dyspnea  Patient complains of shortness of breath. Symptoms occur with moving around the apartment. Symptoms began  20  years ago, gradually worsening since. Associated symptoms include  dry cough, dyspnea on exertion, and shortness of breath. She denies chest pain, located left chest. She does not have had recent travel. Weight has been stable. Symptoms are exacerbated by minimal activity. Symptoms are alleviated by rest.   History of Deep Venous Thrombosis in legs     Hx of back surgery     Past Medical History:   Diagnosis Date    Arthritis     Degenerative disc disease, cervical     Disorder of kidney and ureter     CKD stage 3    Emphysema of lung     Hyperlipidemia     Hypertension     MVP (mitral valve prolapse)     Osteoporosis     feet    Thyroid condition      Past Surgical History:   Procedure Laterality Date    BACK SURGERY  2012    cataract surgery  Bilateral 10/ 2012    elbow spur removed Left     HYSTERECTOMY Left 1978    INJECTION OF ANESTHETIC AGENT INTO SACROILIAC JOINT Bilateral 11/11/2019    Procedure: Bilateral GT bursa + SIJ injection;  Surgeon: Noe Pleitez MD;  Location: [facility];  Service: Pain Management;  Laterality: Bilateral;    INJECTION OF JOINT Bilateral 11/11/2019    Procedure: Bilateral GT bursa + SIJ injection;  Surgeon: Noe Pleitez MD;  Location: [facility];  Service: Pain Management;  Laterality: Bilateral;    OOPHORECTOMY      OVARIAN CYST REMOVAL      two from back, one from axilla    VASCULAR SURGERY Right     high ligation due to blood clot      Review of patient's allergies indicates:   Allergen Reactions    Nsaids (non-steroidal anti-inflammatory drug)      CKD     Current Outpatient Medications on File Prior to Visit   Medication Sig Dispense  Refill    ELIQUIS 5 mg Tab TAKE 1 TABLET BY MOUTH TWICE A DAY 60 tablet 10    levothyroxine (SYNTHROID) 50 MCG tablet Take 1 tablet (50 mcg total) by mouth once daily. 90 tablet 3    losartan-hydrochlorothiazide 100-25 mg (HYZAAR) 100-25 mg per tablet Take 1 tablet by mouth once daily. 90 tablet 3    metFORMIN (GLUCOPHAGE-XR) 500 MG ER 24hr tablet Take 500 mg by mouth every morning.      sertraline (ZOLOFT) 100 MG tablet Take 1 tablet (100 mg total) by mouth once daily. 90 tablet 3    sertraline (ZOLOFT) 50 MG tablet Take 1 tablet (50 mg total) by mouth once daily. 90 tablet 3    traZODone (DESYREL) 150 MG tablet Take 1 tablet (150 mg total) by mouth every evening. 30 tablet 11     No current facility-administered medications on file prior to visit.     Social History     Socioeconomic History    Marital status:    Tobacco Use    Smoking status: Former     Packs/day: 1.00     Years: 44.00     Pack years: 44.00     Types: Cigarettes     Start date: 1962     Quit date: 2006     Years since quittin.8    Smokeless tobacco: Never   Substance and Sexual Activity    Alcohol use: No     Alcohol/week: 0.0 standard drinks    Drug use: No     Family History   Problem Relation Age of Onset    Heart disease Mother     Kidney disease Mother     Arthritis Mother     Breast cancer Mother     Cancer Father     Heart disease Father     Cancer Sister     Ovarian cancer Sister     Alzheimer's disease Sister        Review of Systems   Constitutional:  Negative for fever and fatigue.   HENT:  Negative for postnasal drip and rhinorrhea.    Eyes:  Negative for redness and itching.   Respiratory:  Positive for shortness of breath, dyspnea on extertion and use of rescue inhaler. Negative for cough, wheezing and Paroxysmal Nocturnal Dyspnea.    Cardiovascular:  Negative for chest pain.   Genitourinary:  Negative for difficulty urinating and hematuria.   Endocrine:  Negative for polyphagia, cold intolerance and heat  "intolerance.    Musculoskeletal:  Positive for arthralgias.   Skin:  Negative for rash.   Gastrointestinal:  Negative for nausea, vomiting, abdominal pain and abdominal distention.   Neurological:  Negative for dizziness and headaches.   Hematological:  Negative for adenopathy. Does not bruise/bleed easily and no excessive bruising.   Psychiatric/Behavioral:  The patient is not nervous/anxious.      Objective:      BP (!) 155/73   Pulse 75   Resp 16   Ht 5' 6" (1.676 m)   Wt 107.2 kg (236 lb 5.3 oz)   SpO2 (!) 93%   BMI 38.15 kg/m²   Physical Exam  Vitals and nursing note reviewed.   Constitutional:       Appearance: Normal appearance. She is well-developed.   HENT:      Head: Normocephalic and atraumatic.      Nose: Nose normal.   Eyes:      Conjunctiva/sclera: Conjunctivae normal.      Pupils: Pupils are equal, round, and reactive to light.   Neck:      Thyroid: No thyromegaly.      Vascular: No JVD.      Trachea: No tracheal deviation.   Cardiovascular:      Rate and Rhythm: Normal rate and regular rhythm.      Heart sounds: Normal heart sounds.   Pulmonary:      Effort: Pulmonary effort is normal. No respiratory distress.      Breath sounds: Normal breath sounds. Decreased air movement present. No wheezing or rales.   Chest:      Chest wall: No tenderness.   Abdominal:      General: Bowel sounds are normal.      Palpations: Abdomen is soft.   Musculoskeletal:         General: Normal range of motion.      Cervical back: Neck supple.   Lymphadenopathy:      Cervical: No cervical adenopathy.   Skin:     General: Skin is warm and dry.   Neurological:      Mental Status: She is alert and oriented to person, place, and time.     Personal Diagnostic Review  Spirometry shows moderate-severe obstruction. Spirometry remains unimproved following bronchodilator. Notes: The failure to demonstrate improvement in spirometry does not preclude a clinical response to a trial of bronchodilators. Consider DLCO determination. " (Physician Final: 11/11/2022 02:49PM, Electronically signed by Dr. Michoacano Suarez      ABG: Mild hypoxemia     Latest Reference Range & Units 11/11/22 14:09   Sample  ARTERIAL   POC PH 7.35 - 7.45  7.394   POC PCO2 35 - 45 mmHg 43.4   POC PO2 80 - 100 mmHg 73 (L)   POC HCO3 24 - 28 mmol/L 26.5   POC SATURATED O2 95 - 100 % 94 (L)   Allens Test  Pass   POC BE -2 to 2 mmol/L 2   FiO2  21   DelSys  Room Air   Site  RR   Mode  SPONT   (L): Data is abnormally low      PET/ CT reviewed  Changes of early emphysema in upper lungs    Procedure Orders   1. Home Sleep Studies [[id number]] ordered by Mayco Lynn IV, MD   Pre-procedure Diagnoses   1. Hypersomnia [G47.10]   2. Urinary frequency [R35.0]   Post-procedure Diagnoses   1. Hypersomnia [G47.10]   2. Urinary frequency [R35.0]   Home Sleep Studies  Date/Time: 4/17/2019 6:33 AM  Performed by: Jayy Tam MD  Authorized by: Mayco Lynn IV, MD        PHYSICIAN INTERPRETATION AND COMMENTS: Findings are consistent with mild, positional obstructive sleep  apnea (JOSEFINA). AHI was 5.0/hr with 6.1 hours sleep. Loud snoring.  CLINICAL HISTORY: 73 year old female presented with: SNORING AND WITNESSED APNEA. 15.8 inch neck, BMI  of 37, an [place] sleepiness score of 6, and history of hypertension, diabetes, depression. Based on the clinical history, the  patient has a high pre-test probability of having moderate JOSEFINA.  SLEEP STUDY FINDINGS: Patient underwent a one night Home Sleep Test and by behavioral criteria, slept for  approximately 6.1 hours, with a sleep latency of 3 minutes and a sleep efficiency of 43.4%. The patient slept supine 42.9% of  the night based on valid recording time of 6.08 hours and is 10 times as likely to have apneas/hypopneas when supine. When  considering more subtle measures of sleep disordered breathing, the overall respiratory disturbance index is mild (RDI=19)  based on a 1% hypopnea desaturation criteria with confirmation by surrogate arousal  indicators. The apneas/hypopneas are  accompanied by minimal oxygen desaturation (percent time below 90% SpO2: 3.9%, Min SpO2: 86.8%). The average  desaturation across all sleep disordered breathing events is 3.1%. Snoring occurs for 43.0% (30 dB) of the study, 34.3% is very  loud. The mean pulse rate is 60 BPM, with very frequent pulse rate variability (83 events with >= 6 BPM increase/decrease per  hour).  TREATMENT CONSIDERATIONS: For a patient with mild asymptomatic JOSEFINA, nasal continuous positive airway pressure  (CPAP/AutoPAP) therapy or alternative therapies for treatment should be considered, i.e., Mandibular advancement splint  (MAS), referral to an ENT for surgical modifications to the airway, and/or weight loss or behavioral therapy to reduce the  potential risk of daytime somnolence, hypertension, cardiovascular disease, stroke and diabetes. A Mandibular Advancement  Splint (MAS) will likely provide treatment benefit independent of JOSEFINA severity. The patient should avoid sleeping supine given  non-supine AHI is in the normal range.  DISEASE MANAGEMENT CONSIDERATIONS: None.        Progress Notes  Jayy Tam MD (Physician)   Pulmonary Disease  PHYSICIAN INTERPRETATION AND COMMENTS: Findings are consistent with mild, positional obstructive sleep  apnea (JOSEFINA). AHI was 5.0/hr with 6.1 hours sleep. Loud snoring.  CLINICAL HISTORY: 73 year old female presented with: SNORING AND WITNESSED APNEA. 15.8 inch neck, BMI  of 37, an [place] sleepiness score of 6, and history of hypertension, diabetes, depression. Based on the clinical history, the  patient has a high pre-test probability of having moderate JOSEFINA.  SLEEP STUDY FINDINGS: Patient underwent a one night Home Sleep Test and by behavioral criteria, slept for  approximately 6.1 hours, with a sleep latency of 3 minutes and a sleep efficiency of 43.4%. The patient slept supine 42.9% of  the night based on valid recording time of 6.08 hours and is 10 times  as likely to have apneas/hypopneas when supine. When  considering more subtle measures of sleep disordered breathing, the overall respiratory disturbance index is mild (RDI=19)  based on a 1% hypopnea desaturation criteria with confirmation by surrogate arousal indicators. The apneas/hypopneas are  accompanied by minimal oxygen desaturation (percent time below 90% SpO2: 3.9%, Min SpO2: 86.8%). The average  desaturation across all sleep disordered breathing events is 3.1%. Snoring occurs for 43.0% (30 dB) of the study, 34.3% is very  loud. The mean pulse rate is 60 BPM, with very frequent pulse rate variability (83 events with >= 6 BPM increase/decrease per  hour).  TREATMENT CONSIDERATIONS: For a patient with mild asymptomatic JOSEFINA, nasal continuous positive airway pressure  (CPAP/AutoPAP) therapy or alternative therapies for treatment should be considered, i.e., Mandibular advancement splint  (MAS), referral to an ENT for surgical modifications to the airway, and/or weight loss or behavioral therapy to reduce the  potential risk of daytime somnolence, hypertension, cardiovascular disease, stroke and diabetes. A Mandibular Advancement  Splint (MAS) will likely provide treatment benefit independent of JOSEFINA severity. The patient should avoid sleeping supine given  non-supine AHI is in the normal range.  DISEASE MANAGEMENT CONSIDERATIONS: None.        Instructions    After Visit Summary (Printed 4/15/2019)    See ABG    Pulmonary Studies Review 11/11/2022   SpO2 -   Ordering Provider Dr. Wilder   Interpreting Provider Dr. Wilder   Performing nurse/tech/RT ex. hypokalemia   Diagnosis (No Data)   Height 66   Weight 3781.33   BMI (Calculated) 38.2   Predicted Distance 196.55   Patient Race    6MWT Status completed without stopping   Patient Reported Leg pain;Dyspnea   Was O2 used? Yes   Delivery Method Cannula;Pull Tank   6MW Distance walked (feet) 1000   Distance walked (meters) 304.8   Did patient stop? No   Type  of assistive device(s) used? no assistive devices   Is extra documentation required for this patient? Yes   Oxygen Saturation 98   Supplemental Oxygen Room Air   Heart Rate 67   Blood Pressure 157/71   [place] Dyspnea Rating  moderate   Oxygen Saturation 91   Supplemental Oxygen 4 L/M   Heart Rate 135   Blood Pressure 182/75   [place] Dyspnea Rating  heavy   Recovery Time (seconds) 240   Oxygen Saturation 99   Supplemental Oxygen 4 L/M   Heart Rate 89   Blood Pressure 139/65   [place] Dyspnea Rating  light   Is procedure ready for interpretation? Yes   Did the patient stop or pause? No   Total Time Walked (Calculated) 360   Total Laps Walked 5   Final Partial Lap Distance (feet) 0   Total Distance Feet (Calculated) 1000   Total Distance Meters (Calculated) 304.8   Predicted Distance Meters (Calculated) 335.87   Percentage of Predicted (Calculated) 90.75   Peak VO2 (Calculated) 13.12   Mets 3.75   Has The Patient Had a Previous Six Minute Walk Test? No   Oxygen Qualification? Yes   Oxygen Saturation 98   Supplemental Oxygen Room Air   Heart Rate 67   Blood Pressure 157/71   [place] Dyspnea Rating  moderate   Oxygen Saturation 86   Supplemental Oxygen Room Air   Heart Rate 125   Blood Pressure 182/75   [place] Dyspnea Rating  heavy   Recovery Time (seconds) 240   Oxygen Saturation 99   Supplemental Oxygen 4 L/M   Heart Rate 89   Blood Pressure 139/65   [place] Dyspnea Rating  light       X-Ray Chest PA And Lateral  Narrative: EXAMINATION:  Two view chest radiograph.    CLINICAL HISTORY:  Chronic obstructive pulmonary disease, unspecifiedSOB;    TECHNIQUE:  2 view of the chest.    COMPARISON:  Chest radiograph 02/18/2021.    FINDINGS:  The lungs are clear without consolidation or effusion.  There is no pneumothorax.  The left mid lung granuloma is unchanged.  The cardiac silhouette is normal in size.  There is no acute osseous abnormality.  Impression: No acute cardiopulmonary abnormality.    Electronically signed by: Capo Owusu  MD  Date:    11/11/2022  Time:    14:45      Office Spirometry Results:     No flowsheet data found.  Pulmonary Studies Review 11/11/2022   SpO2 -   Ordering Provider Dr. Wilder   Interpreting Provider Dr. Wilder   Performing nurse/tech/RT ex. hypokalemia   Diagnosis (No Data)   Height 66   Weight 3781.33   BMI (Calculated) 38.2   Predicted Distance 196.55   Patient Race    6MWT Status completed without stopping   Patient Reported Leg pain;Dyspnea   Was O2 used? Yes   Delivery Method Cannula;Pull Tank   6MW Distance walked (feet) 1000   Distance walked (meters) 304.8   Did patient stop? No   Type of assistive device(s) used? no assistive devices   Is extra documentation required for this patient? Yes   Oxygen Saturation 98   Supplemental Oxygen Room Air   Heart Rate 67   Blood Pressure 157/71   [place] Dyspnea Rating  moderate   Oxygen Saturation 91   Supplemental Oxygen 4 L/M   Heart Rate 135   Blood Pressure 182/75   [place] Dyspnea Rating  heavy   Recovery Time (seconds) 240   Oxygen Saturation 99   Supplemental Oxygen 4 L/M   Heart Rate 89   Blood Pressure 139/65   [place] Dyspnea Rating  light   Is procedure ready for interpretation? Yes   Did the patient stop or pause? No   Total Time Walked (Calculated) 360   Total Laps Walked 5   Final Partial Lap Distance (feet) 0   Total Distance Feet (Calculated) 1000   Total Distance Meters (Calculated) 304.8   Predicted Distance Meters (Calculated) 335.87   Percentage of Predicted (Calculated) 90.75   Peak VO2 (Calculated) 13.12   Mets 3.75   Has The Patient Had a Previous Six Minute Walk Test? No   Oxygen Qualification? Yes   Oxygen Saturation 98   Supplemental Oxygen Room Air   Heart Rate 67   Blood Pressure 157/71   [place] Dyspnea Rating  moderate   Oxygen Saturation 86   Supplemental Oxygen Room Air   Heart Rate 125   Blood Pressure 182/75   [place] Dyspnea Rating  heavy   Recovery Time (seconds) 240   Oxygen Saturation 99   Supplemental Oxygen 4 L/M   Heart Rate 89  "  Blood Pressure 139/65   [place] Dyspnea Rating  light   [facility] - Pulmonary Function  Six Minute Walk      SUMMARY      Ordering Provider: Dr. Wilder              Interpreting Provider: Dr. Wilder  Performing nurse/tech/RT: ex. hypokalemia  Diagnosis:  (ex. hypoxemia)  Height: 5' 6" (167.6 cm)  Weight: 107.2 kg (236 lb 5.3 oz)  BMI (Calculated): 38.2              Patient Race:                                                                Phase Oxygen Assessment Supplemental O2 Heart   Rate Blood Pressure [place] Dyspnea Scale Rating   Resting 98 % Room Air 67 bpm 157/71 3   Exercise             Minute             1 90 % Room Air 117 bpm       2 86 % (90% when increased to 2L) Room Air (increased to 2L) 125 bpm       3 91 % 2 L/M 125 bpm       4 84 % (90% when increased to 3L) 2 L/M (increased to 3L) 133 bpm       5 87 % (90% when increased to 4L) 3 L/M (increased to 4L) 139 bpm       6  91 % 4 L/M 135 bpm 182/75 5-6   Recovery             Minute             1 96 % 4 L/M 109 bpm       2 96 % 4 L/M 95 bpm       3 100 % 4 L/M 92 bpm       4 99 % 4 L/M 89 bpm 139/65 2      Six Minute Walk Summary  6MWT Status: completed without stopping  Patient Reported: Leg pain, Dyspnea                 Interpretation:  Did the patient stop or pause?: No  Total Time Walked (Calculated): 360 seconds  Final Partial Lap Distance (feet): 0 feet  Total Distance Meters (Calculated): 304.8 meters  Predicted Distance Meters (Calculated): 335.87 meters  Percentage of Predicted (Calculated): 90.75  Peak VO2 (Calculated): 13.12  Mets: 3.75  Has The Patient Had a Previous Six Minute Walk Test?: No     Previous 6MWT Results  Has The Patient Had a Previous Six Minute Walk Test?: No     Interpretation:  Total distance walked in six minutes is normal indicating normal functional capacity. There was severe oxygen desaturation to 86 % with exercise . Supplemental oxygen was administered for the remainder of the test as noted above.  Patient met " criteria for oxygen prescription. Clinical correlation suggested.    [] Mild exercise-induced hypoxemia described as an arterial oxygen saturation of 93-95% (or 3-4% less than at rest),  [] Moderate exercise-induced hypoxemia as 89-93%  [x] Severe exercise induced hypoxemia as < 89% O2 saturation.  Medicare Criteria for oxygen prescription comments:   When arterial oxygen saturation is at or below 88% during exercise on room air (severe exercise induced hypoxemia) then the patient falls under Medicare Group 1 criteria for supplemental oxygen prescription.  Details about Medicare Group Criteria coverage can be found at http://www.cms.[facility].gov/manuals/downloads/     Michoacano Wilder MD        Assessment:       Pre-operative respiratory examination  Clearance for surgery:  The patient is at an increased risk of complications from surgery due to multiple medical problems including COPD. Vaccination status reviewed and updated. Risks of possible complications of surgery discussed in detail including risk of respiratory failure requiring mechanical ventilation, post operative pneumonia, deep venous thrombosis, pulmonary embolism and death.  Pulmonary function studies, arterial blood gases and chest X ray reports are independently reviewed. Methods of improving lung function prior to surgery including incentive spirometry, regular use of bronchodilators, exercise and respiratory toilet discussed. Patient voices understanding of increased risks involved due to compromised pulmonary status and the need to comply with treatment plan prior to surgery.  The patient is cleared for anesthesia and surgery from a pulmonary standpoint.      Deep vein thrombosis (DVT) of lower extremity  Stop Eliquis two days piror to surgery  Needs to have Deep Venous Thrombosis prophylaxis(mechanical)  in the perioperative period.    Obesity (BMI 35.0-39.9 without comorbidity)  Last documentation =       BMI noted to be elevated. Changes in weight over  time reviewed in chart (if available) and by patient recollection. Patient advised and counseled concerning the adverse medical consequences of obesity. Treatment options discussed - calorie restriction, increasing calorie expenditure, medical and surgical options noted.  The patient's severe obesity was monitored, evaluated, addressed and/or treated.   2013 AHA/ACC/TOS guideline for the management of overweight and obesity in adults: a report of the American College of Cardiology/American Heart Association Task Force on Practice Guidelines and The Obesity Society      Essential hypertension  Blood pressure measurements reviewed, repeated and verified. Adverse effects of elevated blood pressure reviewed in detail. Importance of low sodium diet, medication compliance stressed. Patient advised and counseled concerning the adverse medical consequences of untreated hypertension.The patient's hypertension was monitored, evaluated, addressed and/or treated. Asked to follow up with PCP.  Isolated systolic hypertension is a blood pressure ?130/<80 mmHg    Pre-operative respiratory examination    Exercise hypoxemia  -     Six Minute Walk Test to qualify for Home Oxygen; Future  -     OXYGEN FOR HOME USE    COPD, moderate  -     Ambulatory referral/consult to Pulmonology  -     Complete PFT with bronchodilator; Future; Expected date: 11/11/2022  -     albuterol (PROVENTIL/VENTOLIN HFA) 90 mcg/actuation inhaler; Inhale 2 puffs into the lungs every 4 (four) hours as needed for Wheezing or Shortness of Breath.  Dispense: 18 g; Refill: 11  -     tiotropium-olodateroL (STIOLTO RESPIMAT) 2.5-2.5 mcg/actuation Mist; Inhale 2 puffs into the lungs once daily. Controller  Dispense: 4 g; Refill: 11  -     POCT Arterial Blood Gas-Resp; Future; Expected date: 11/11/2022  -     OXYGEN FOR HOME USE    Adnexal mass  -     Ambulatory referral/consult to Pulmonology    Deep vein thrombosis (DVT) of other vein of lower extremity, unspecified  chronicity, unspecified laterality    Obesity (BMI 35.0-39.9 without comorbidity)    Essential hypertension        Outpatient Encounter Medications as of 11/11/2022   Medication Sig Dispense Refill    albuterol (PROVENTIL/VENTOLIN HFA) 90 mcg/actuation inhaler Inhale 2 puffs into the lungs every 4 (four) hours as needed for Wheezing or Shortness of Breath. 18 g 11    ELIQUIS 5 mg Tab TAKE 1 TABLET BY MOUTH TWICE A DAY 60 tablet 10    levothyroxine (SYNTHROID) 50 MCG tablet Take 1 tablet (50 mcg total) by mouth once daily. 90 tablet 3    losartan-hydrochlorothiazide 100-25 mg (HYZAAR) 100-25 mg per tablet Take 1 tablet by mouth once daily. 90 tablet 3    metFORMIN (GLUCOPHAGE-XR) 500 MG ER 24hr tablet Take 500 mg by mouth every morning.      sertraline (ZOLOFT) 100 MG tablet Take 1 tablet (100 mg total) by mouth once daily. 90 tablet 3    sertraline (ZOLOFT) 50 MG tablet Take 1 tablet (50 mg total) by mouth once daily. 90 tablet 3    tiotropium-olodateroL (STIOLTO RESPIMAT) 2.5-2.5 mcg/actuation Mist Inhale 2 puffs into the lungs once daily. Controller 4 g 11    traZODone (DESYREL) 150 MG tablet Take 1 tablet (150 mg total) by mouth every evening. 30 tablet 11     No facility-administered encounter medications on file as of 11/11/2022.     Plan:       Requested Prescriptions     Signed Prescriptions Disp Refills    albuterol (PROVENTIL/VENTOLIN HFA) 90 mcg/actuation inhaler 18 g 11     Sig: Inhale 2 puffs into the lungs every 4 (four) hours as needed for Wheezing or Shortness of Breath.    tiotropium-olodateroL (STIOLTO RESPIMAT) 2.5-2.5 mcg/actuation Mist 4 g 11     Sig: Inhale 2 puffs into the lungs once daily. Controller     Problem List Items Addressed This Visit       COPD, moderate    Relevant Medications    albuterol (PROVENTIL/VENTOLIN HFA) 90 mcg/actuation inhaler    tiotropium-olodateroL (STIOLTO RESPIMAT) 2.5-2.5 mcg/actuation Mist    Other Relevant Orders    Complete PFT with bronchodilator    POCT  Arterial Blood Gas-Resp    OXYGEN FOR HOME USE    Deep vein thrombosis (DVT) of lower extremity    Overview     Pt remains on Eliquis         Current Assessment & Plan     Stop Eliquis two days piror to surgery  Needs to have Deep Venous Thrombosis prophylaxis(mechanical)  in the perioperative period.         Essential hypertension    Current Assessment & Plan     Blood pressure measurements reviewed, repeated and verified. Adverse effects of elevated blood pressure reviewed in detail. Importance of low sodium diet, medication compliance stressed. Patient advised and counseled concerning the adverse medical consequences of untreated hypertension.The patient's hypertension was monitored, evaluated, addressed and/or treated. Asked to follow up with PCP.  Isolated systolic hypertension is a blood pressure ?130/<80 mmHg           Obesity (BMI 35.0-39.9 without comorbidity)    Current Assessment & Plan     Last documentation =       BMI noted to be elevated. Changes in weight over time reviewed in chart (if available) and by patient recollection. Patient advised and counseled concerning the adverse medical consequences of obesity. Treatment options discussed - calorie restriction, increasing calorie expenditure, medical and surgical options noted.  The patient's severe obesity was monitored, evaluated, addressed and/or treated.   2013 AHA/ACC/TOS guideline for the management of overweight and obesity in adults: a report of the American College of Cardiology/American Heart Association Task Force on Practice Guidelines and The Obesity Society           Pre-operative respiratory examination - Primary    Current Assessment & Plan     Clearance for surgery:  The patient is at an increased risk of complications from surgery due to multiple medical problems including COPD. Vaccination status reviewed and updated. Risks of possible complications of surgery discussed in detail including risk of respiratory failure requiring  mechanical ventilation, post operative pneumonia, deep venous thrombosis, pulmonary embolism and death.  Pulmonary function studies, arterial blood gases and chest X ray reports are independently reviewed. Methods of improving lung function prior to surgery including incentive spirometry, regular use of bronchodilators, exercise and respiratory toilet discussed. Patient voices understanding of increased risks involved due to compromised pulmonary status and the need to comply with treatment plan prior to surgery.  The patient is cleared for anesthesia and surgery from a pulmonary standpoint.            Other Visit Diagnoses       Exercise hypoxemia        Relevant Orders    OXYGEN FOR HOME USE    Adnexal mass                   Follow up in about 6 months (around 5/11/2023) for Review progress.    MEDICAL DECISION MAKING: Moderate to high complexity.  Overall, the multiple problems listed are of moderate to high severity that may impact quality of life and activities of daily living. Side effects of medications, treatment plan as well as options and alternatives reviewed and discussed with patient. There was counseling of patient concerning these issues.    Total time spent in counseling and coordination of care - 60  minutes of total time spent on the encounter, which includes face to face time and non-face to face time preparing to see the patient (eg, review of tests), Obtaining and/or reviewing separately obtained history, Documenting clinical information in the electronic or other health record, Independently interpreting results (not separately reported) and communicating results to the patient/family/caregiver, or Care coordination (not separately reported).    Time was used in discussion of prognosis, risks, benefits of treatment, instructions and compliance with regimen . Discussion with other physicians and/or health care providers - home health or for use of durable medical equipment (oxygen, nebulizers,  CPAP, BiPAP) occurred.

## 2022-11-11 NOTE — ASSESSMENT & PLAN NOTE
Stop Eliquis two days piror to surgery  Needs to have Deep Venous Thrombosis prophylaxis(mechanical)  in the perioperative period.

## 2022-11-15 ENCOUNTER — OFFICE VISIT (OUTPATIENT)
Dept: GYNECOLOGIC ONCOLOGY | Facility: CLINIC | Age: 77
End: 2022-11-15
Payer: MEDICARE

## 2022-11-15 DIAGNOSIS — N18.30 TYPE 2 DIABETES MELLITUS WITH STAGE 3 CHRONIC KIDNEY DISEASE, UNSPECIFIED WHETHER LONG TERM INSULIN USE, UNSPECIFIED WHETHER STAGE 3A OR 3B CKD: ICD-10-CM

## 2022-11-15 DIAGNOSIS — G47.34 NOCTURNAL OXYGEN DESATURATION: ICD-10-CM

## 2022-11-15 DIAGNOSIS — J44.9 COPD, MODERATE: ICD-10-CM

## 2022-11-15 DIAGNOSIS — E03.9 ACQUIRED HYPOTHYROIDISM: ICD-10-CM

## 2022-11-15 DIAGNOSIS — I10 ESSENTIAL HYPERTENSION: ICD-10-CM

## 2022-11-15 DIAGNOSIS — N94.89 ADNEXAL MASS: Primary | ICD-10-CM

## 2022-11-15 DIAGNOSIS — E11.22 TYPE 2 DIABETES MELLITUS WITH STAGE 3 CHRONIC KIDNEY DISEASE, UNSPECIFIED WHETHER LONG TERM INSULIN USE, UNSPECIFIED WHETHER STAGE 3A OR 3B CKD: ICD-10-CM

## 2022-11-15 DIAGNOSIS — I73.9 PAD (PERIPHERAL ARTERY DISEASE): ICD-10-CM

## 2022-11-15 PROCEDURE — 99215 PR OFFICE/OUTPT VISIT, EST, LEVL V, 40-54 MIN: ICD-10-PCS | Mod: 95,,, | Performed by: OBSTETRICS & GYNECOLOGY

## 2022-11-15 PROCEDURE — 1159F PR MEDICATION LIST DOCUMENTED IN MEDICAL RECORD: ICD-10-PCS | Mod: CPTII,95,, | Performed by: OBSTETRICS & GYNECOLOGY

## 2022-11-15 PROCEDURE — 99215 OFFICE O/P EST HI 40 MIN: CPT | Mod: 95,,, | Performed by: OBSTETRICS & GYNECOLOGY

## 2022-11-15 PROCEDURE — 1159F MED LIST DOCD IN RCRD: CPT | Mod: CPTII,95,, | Performed by: OBSTETRICS & GYNECOLOGY

## 2022-11-15 PROCEDURE — 1160F PR REVIEW ALL MEDS BY PRESCRIBER/CLIN PHARMACIST DOCUMENTED: ICD-10-PCS | Mod: CPTII,95,, | Performed by: OBSTETRICS & GYNECOLOGY

## 2022-11-15 PROCEDURE — 1160F RVW MEDS BY RX/DR IN RCRD: CPT | Mod: CPTII,95,, | Performed by: OBSTETRICS & GYNECOLOGY

## 2022-11-15 RX ORDER — LIDOCAINE HYDROCHLORIDE 10 MG/ML
1 INJECTION, SOLUTION EPIDURAL; INFILTRATION; INTRACAUDAL; PERINEURAL ONCE
Status: CANCELLED | OUTPATIENT
Start: 2022-11-15 | End: 2022-11-15

## 2022-11-16 ENCOUNTER — TELEPHONE (OUTPATIENT)
Dept: GYNECOLOGIC ONCOLOGY | Facility: CLINIC | Age: 77
End: 2022-11-16
Payer: MEDICARE

## 2022-11-25 ENCOUNTER — PATIENT MESSAGE (OUTPATIENT)
Dept: GYNECOLOGIC ONCOLOGY | Facility: CLINIC | Age: 77
End: 2022-11-25
Payer: MEDICARE

## 2022-11-25 ENCOUNTER — HOSPITAL ENCOUNTER (OUTPATIENT)
Dept: PREADMISSION TESTING | Facility: OTHER | Age: 77
Discharge: HOME | End: 2022-11-25
Attending: OBSTETRICS & GYNECOLOGY
Payer: MEDICARE

## 2022-11-25 ENCOUNTER — ANESTHESIA EVENT (OUTPATIENT)
Dept: SURGERY | Facility: OTHER | Age: 77
End: 2022-11-25
Payer: MEDICARE

## 2022-11-25 VITALS
HEART RATE: 70 BPM | BODY MASS INDEX: 38.09 KG/M2 | TEMPERATURE: 98 F | OXYGEN SATURATION: 96 % | WEIGHT: 236 LBS | DIASTOLIC BLOOD PRESSURE: 70 MMHG | SYSTOLIC BLOOD PRESSURE: 164 MMHG

## 2022-11-25 DIAGNOSIS — Z01.818 PREOP TESTING: Primary | ICD-10-CM

## 2022-11-25 LAB
ANION GAP SERPL CALC-SCNC: 10 MMOL/L (ref 8–16)
BASOPHILS # BLD AUTO: 0.02 K/UL (ref 0–0.2)
BASOPHILS NFR BLD: 0.3 % (ref 0–1.9)
BUN SERPL-MCNC: 29 MG/DL (ref 8–23)
CALCIUM SERPL-MCNC: 9.5 MG/DL (ref 8.7–10.5)
CHLORIDE SERPL-SCNC: 106 MMOL/L (ref 95–110)
CO2 SERPL-SCNC: 25 MMOL/L (ref 23–29)
CREAT SERPL-MCNC: 1.6 MG/DL (ref 0.5–1.4)
DIFFERENTIAL METHOD: ABNORMAL
EOSINOPHIL # BLD AUTO: 0.2 K/UL (ref 0–0.5)
EOSINOPHIL NFR BLD: 2.7 % (ref 0–8)
ERYTHROCYTE [DISTWIDTH] IN BLOOD BY AUTOMATED COUNT: 14.5 % (ref 11.5–14.5)
EST. GFR  (NO RACE VARIABLE): 33 ML/MIN/1.73 M^2
GLUCOSE SERPL-MCNC: 93 MG/DL (ref 70–110)
HCT VFR BLD AUTO: 37.8 % (ref 37–48.5)
HGB BLD-MCNC: 12 G/DL (ref 12–16)
IMM GRANULOCYTES # BLD AUTO: 0.02 K/UL (ref 0–0.04)
IMM GRANULOCYTES NFR BLD AUTO: 0.3 % (ref 0–0.5)
LYMPHOCYTES # BLD AUTO: 1.4 K/UL (ref 1–4.8)
LYMPHOCYTES NFR BLD: 23.2 % (ref 18–48)
MCH RBC QN AUTO: 28.5 PG (ref 27–31)
MCHC RBC AUTO-ENTMCNC: 31.7 G/DL (ref 32–36)
MCV RBC AUTO: 90 FL (ref 82–98)
MONOCYTES # BLD AUTO: 0.4 K/UL (ref 0.3–1)
MONOCYTES NFR BLD: 6.5 % (ref 4–15)
NEUTROPHILS # BLD AUTO: 3.9 K/UL (ref 1.8–7.7)
NEUTROPHILS NFR BLD: 67 % (ref 38–73)
NRBC BLD-RTO: 0 /100 WBC
PLATELET # BLD AUTO: 205 K/UL (ref 150–450)
PMV BLD AUTO: 10 FL (ref 9.2–12.9)
POTASSIUM SERPL-SCNC: 4.5 MMOL/L (ref 3.5–5.1)
RBC # BLD AUTO: 4.21 M/UL (ref 4–5.4)
SODIUM SERPL-SCNC: 141 MMOL/L (ref 136–145)
WBC # BLD AUTO: 5.83 K/UL (ref 3.9–12.7)

## 2022-11-25 PROCEDURE — 36415 COLL VENOUS BLD VENIPUNCTURE: CPT | Performed by: ANESTHESIOLOGY

## 2022-11-25 PROCEDURE — 80048 BASIC METABOLIC PNL TOTAL CA: CPT | Performed by: ANESTHESIOLOGY

## 2022-11-25 PROCEDURE — 85025 COMPLETE CBC W/AUTO DIFF WBC: CPT | Performed by: ANESTHESIOLOGY

## 2022-11-25 RX ORDER — LIDOCAINE HYDROCHLORIDE 10 MG/ML
0.5 INJECTION, SOLUTION EPIDURAL; INFILTRATION; INTRACAUDAL; PERINEURAL ONCE
Status: CANCELLED | OUTPATIENT
Start: 2022-11-25 | End: 2022-11-25

## 2022-11-25 RX ORDER — SODIUM CHLORIDE, SODIUM LACTATE, POTASSIUM CHLORIDE, CALCIUM CHLORIDE 600; 310; 30; 20 MG/100ML; MG/100ML; MG/100ML; MG/100ML
INJECTION, SOLUTION INTRAVENOUS CONTINUOUS
Status: CANCELLED | OUTPATIENT
Start: 2022-11-25

## 2022-11-25 RX ORDER — ACETAMINOPHEN 325 MG/1
975 TABLET ORAL
Status: CANCELLED | OUTPATIENT
Start: 2022-11-25 | End: 2022-11-25

## 2022-11-25 RX ORDER — ALBUTEROL SULFATE 2.5 MG/.5ML
2.5 SOLUTION RESPIRATORY (INHALATION)
Status: CANCELLED | OUTPATIENT
Start: 2022-11-25 | End: 2022-11-25

## 2022-11-25 RX ORDER — FAMOTIDINE 20 MG/1
20 TABLET, FILM COATED ORAL
Status: CANCELLED | OUTPATIENT
Start: 2022-11-25 | End: 2022-11-25

## 2022-11-25 NOTE — DISCHARGE INSTRUCTIONS
Information to Prepare you for your Surgery    PRE-ADMIT TESTING -  [phone]    [address] [facility]          Your surgery has been scheduled at [facility]. We are pleased to have the opportunity to serve you. For Further Information please call [phone].    On the day of surgery please report to the Information Desk on the 1st floor.    CONTACT YOUR PHYSICIAN'S OFFICE THE DAY PRIOR TO YOUR SURGERY TO OBTAIN YOUR ARRIVAL TIME.     The evening before surgery do not eat anything after 9 p.m. ( this includes hard candy, chewing gum and mints).  You may only have GATORADE, POWERADE AND WATER  from 9 p.m. until you leave your home.   DO NOT DRINK ANY LIQUIDS ON THE WAY TO THE HOSPITAL.      Why does your anesthesiologist allow you to drink Gatorade/Powerade before surgery?  Gatorade/Powerade helps to increase your comfort before surgery and to decrease your nausea after surgery. The carbohydrates in Gatorade/Powerade help reduce your body's stress response to surgery.  If you are a diabetic-drink only water prior to surgery.      Current Visitor policy(12/27/2021) - Patients may have 2 visitors pre and post procedure. Only 2 visitors will be allowed in the Surgical building with the patient.     SPECIAL MEDICATION INSTRUCTIONS: TAKE medications checked off by the Anesthesiologist on your Medication List.    Angiogram Patients: Take medications as instructed by your physician, including aspirin.     Surgery Patients:    If you take ASPIRIN - Your PHYSICIAN/SURGEON will need to inform you IF/OR when you need to stop taking aspirin prior to your surgery.     The week prior to surgery do not ot take any medications containing IBUPROFEN or NSAIDS ( Advil, Motrin, Goodys, BC, Aleve, Naproxen etc) If you are not sure if you should take a medicine please call your surgeon's office.  Ok to take Tylenol    Do Not Wear any make-up (especially eye make-up) to  surgery. Please remove any false eyelashes or eyelash extensions. If you arrive the day of surgery with makeup/eyelashes on you will be required to remove prior to surgery. (There is a risk of corneal abrasions if eye makeup/eyelash extensions are not removed)      Leave all valuables at home.   Do Not wear any jewelry or watches, including any metal in body piercings. Jewelry must be removed prior to coming to the hospital.  There is a possibility that rings that are unable to be removed may be cut off if they are on the surgical extremity.    Please remove all hair extensions, wigs, clips and any other metal accessories/ ornaments from your hair.  These items may pose a flammable/fire risk in Surgery and must be removed.    Do not shave your surgical area at least 5 days prior to your surgery. The surgical prep will be performed at the hospital according to Infection Control regulations.    Contact Lens must be removed before surgery. Either do not wear the contact lens or bring a case and solution for storage.  Please bring a container for eyeglasses or dentures as required.  Bring any paperwork your physician has provided, such as consent forms,  history and physicals, doctor's orders, etc.   Bring comfortable clothes that are loose fitting to wear upon discharge. Take into consideration the type of surgery being performed.  Maintain your diet as advised per your physician the day prior to surgery.      Adequate rest the night before surgery is advised.   Park in the Parking lot behind the hospital or in the [place] [facility] across the street from the parking lot. Parking is complimentary.  If you will be discharged the same day as your procedure, please arrange for a responsible adult to drive you home or to accompany you if traveling by taxi.   YOU WILL NOT BE PERMITTED TO DRIVE OR TO LEAVE THE HOSPITAL ALONE AFTER SURGERY.   If you are being discharged the same day, it is strongly recommended that you  arrange for someone to remain with you for the first 24 hrs following your surgery.    The Surgeon will speak to your family/visitor after your surgery regarding the outcome of your surgery and post op care.  The Surgeon may speak to you after your surgery, but there is a possibility you may not remember the details.  Please check with your family members regarding the conversation with the Surgeon.    We strongly recommend whoever is bringing you home be present for discharge instructions.  This will ensure a thorough understanding for your post op home care.    ALL CHILDREN MUST ALWAYS BE ACCOMPANIED BY AN ADULT.    Visitors-Refer to current Visitor policy handouts.    Thank you for your cooperation.  The Staff of [facility].            Bathing Instructions with Hibiclens    Shower the evening before and morning of your procedure with Chlorhexidine (Hibiclens)  do not use Chlorhexidine on your face or genitals. Do not get in your eyes.  Wash your face with water and your regular face wash/soap  Use your regular shampoo  Apply Chlorhexidine (Hibiclens) directly on your skin or on a wet washcloth and wash gently. When showering: Move away from the shower stream when applying Chlorhexidine (Hibiclens) to avoid rinsing off too soon.  Rinse thoroughly with warm water  Do not dilute Chlorhexidine (Hibiclens)   Dry off as usual, do not use any deodorant, powder, body lotions, perfume, after shave or cologne.

## 2022-11-25 NOTE — ANESTHESIA PREPROCEDURE EVALUATION
11/25/2022  Kay Rosas is a 77 y.o., female.      Pre-op Assessment    I have reviewed the Patient Summary Reports.     I have reviewed the Nursing Notes. I have reviewed the NPO Status.   I have reviewed the Medications.     Review of Systems  Anesthesia Hx:  Denies Family Hx of Anesthesia complications.   Denies Personal Hx of Anesthesia complications.   Social:  Former Smoker    Hematology/Oncology:     Oncology Normal    -- Anemia: Hematology Comments: Hx DVT, on chronic anticoag tx, Okd to stop eliquis 48 hours    EENT/Dental:EENT/Dental Normal   Cardiovascular:   Hypertension Valvular problems/Murmurs, MVP hyperlipidemia ECG has been reviewed.    Pulmonary:   COPD (home O2 at night), severe Shortness of breath (NAVA with minimal exersion) Sleep Apnea (mild, not given CPAP) RA ABG PaO2 73, CO2 43, sat 94%    Failed 6 min walk test, desat to 86%    PFTs obstructive disease   Renal/:   Chronic Renal Disease (creat 1.6), CKD    Hepatic/GI:  Hepatic/GI Normal    Musculoskeletal:   Arthritis     Neurological:  Neurology Normal    Endocrine:   Denies Diabetes. Hypothyroidism    Dermatological:  Skin Normal    Psych:  Psychiatric Normal           Physical Exam  General: Cooperative, Alert and Oriented    Airway:  Mallampati: III   Mouth Opening: Normal  TM Distance: Normal  Neck ROM: Normal ROM    Dental:  Dentures        Anesthesia Plan  Type of Anesthesia, risks & benefits discussed:    Anesthesia Type: Gen ETT  Intra-op Monitoring Plan: Standard ASA Monitors  Post Op Pain Control Plan: multimodal analgesia and IV/PO Opioids PRN  Induction:  IV  Airway Plan: Video  Informed Consent: Informed consent signed with the Patient and all parties understand the risks and agree with anesthesia plan.  All questions answered.   ASA Score: 3  Anesthesia Plan Notes: Labs today  Pulm clearance in [facility]    Ready For  Surgery From Anesthesia Perspective.     .

## 2022-12-01 ENCOUNTER — TELEPHONE (OUTPATIENT)
Dept: GYNECOLOGIC ONCOLOGY | Facility: CLINIC | Age: 77
End: 2022-12-01
Payer: MEDICARE

## 2022-12-01 NOTE — TELEPHONE ENCOUNTER
----- Message from Den Barragan MD sent at 11/15/2022  4:23 PM CST -----  Patient is listed for 12/5 at [facility].  She can be the 2nd start since she is from .  She is on Eliquis, and I told her and documented that she is to hold Eliquis for 3 days.  She will need teaching and doesn't need enemas.  Thanks.

## 2022-12-01 NOTE — TELEPHONE ENCOUNTER
spoke with pt regarding her arrival time of 8:30am for her surgery on 12/5.  Confirmed location/and reviewed surgery teaching via phone. Reviewed  with pt again regarding when to stop Eliquis.   Also sent copy of preop instuctions via portal.  She was instucted to call with any additional questions.

## 2022-12-02 ENCOUNTER — TELEPHONE (OUTPATIENT)
Dept: GYNECOLOGIC ONCOLOGY | Facility: CLINIC | Age: 77
End: 2022-12-02
Payer: MEDICARE

## 2022-12-05 ENCOUNTER — ANESTHESIA (OUTPATIENT)
Dept: SURGERY | Facility: OTHER | Age: 77
End: 2022-12-05
Payer: MEDICARE

## 2022-12-05 ENCOUNTER — HOSPITAL ENCOUNTER (OUTPATIENT)
Facility: OTHER | Age: 77
Discharge: HOME | End: 2022-12-05
Attending: OBSTETRICS & GYNECOLOGY | Admitting: OBSTETRICS & GYNECOLOGY
Payer: MEDICARE

## 2022-12-05 DIAGNOSIS — N94.89 ADNEXAL MASS: ICD-10-CM

## 2022-12-05 DIAGNOSIS — Z90.721 S/P LEFT OOPHORECTOMY: Primary | ICD-10-CM

## 2022-12-05 PROCEDURE — 58661 PR LAP,RMV  ADNEXAL STRUCTURE: ICD-10-PCS | Mod: LT,,, | Performed by: OBSTETRICS & GYNECOLOGY

## 2022-12-05 PROCEDURE — 94640 AIRWAY INHALATION TREATMENT: CPT

## 2022-12-05 PROCEDURE — 63600175 PHARM REV CODE 636 W HCPCS: Performed by: NURSE ANESTHETIST, CERTIFIED REGISTERED

## 2022-12-05 PROCEDURE — 36000710: Performed by: OBSTETRICS & GYNECOLOGY

## 2022-12-05 PROCEDURE — 71000033 HC RECOVERY, INTIAL HOUR: Performed by: OBSTETRICS & GYNECOLOGY

## 2022-12-05 PROCEDURE — 25000003 PHARM REV CODE 250: Performed by: NURSE ANESTHETIST, CERTIFIED REGISTERED

## 2022-12-05 PROCEDURE — 58661 LAPAROSCOPY REMOVE ADNEXA: CPT | Mod: LT,,, | Performed by: OBSTETRICS & GYNECOLOGY

## 2022-12-05 PROCEDURE — 58661 LAPAROSCOPY REMOVE ADNEXA: CPT | Mod: AS,LT,, | Performed by: NURSE PRACTITIONER

## 2022-12-05 PROCEDURE — 63600175 PHARM REV CODE 636 W HCPCS: Performed by: ANESTHESIOLOGY

## 2022-12-05 PROCEDURE — 25000003 PHARM REV CODE 250: Performed by: OBSTETRICS & GYNECOLOGY

## 2022-12-05 PROCEDURE — 25000003 PHARM REV CODE 250: Performed by: STUDENT IN AN ORGANIZED HEALTH CARE EDUCATION/TRAINING PROGRAM

## 2022-12-05 PROCEDURE — 88307 TISSUE EXAM BY PATHOLOGIST: CPT | Performed by: PATHOLOGY

## 2022-12-05 PROCEDURE — 71000016 HC POSTOP RECOV ADDL HR: Performed by: OBSTETRICS & GYNECOLOGY

## 2022-12-05 PROCEDURE — 88307 TISSUE EXAM BY PATHOLOGIST: CPT | Mod: 26,,, | Performed by: PATHOLOGY

## 2022-12-05 PROCEDURE — 36000711: Performed by: OBSTETRICS & GYNECOLOGY

## 2022-12-05 PROCEDURE — 25000003 PHARM REV CODE 250: Performed by: ANESTHESIOLOGY

## 2022-12-05 PROCEDURE — 27201423 OPTIME MED/SURG SUP & DEVICES STERILE SUPPLY: Performed by: OBSTETRICS & GYNECOLOGY

## 2022-12-05 PROCEDURE — 71000015 HC POSTOP RECOV 1ST HR: Performed by: OBSTETRICS & GYNECOLOGY

## 2022-12-05 PROCEDURE — 88307 PR  SURG PATH,LEVEL V: ICD-10-PCS | Mod: 26,,, | Performed by: PATHOLOGY

## 2022-12-05 PROCEDURE — 71000039 HC RECOVERY, EACH ADD'L HOUR: Performed by: OBSTETRICS & GYNECOLOGY

## 2022-12-05 PROCEDURE — 25000242 PHARM REV CODE 250 ALT 637 W/ HCPCS: Performed by: ANESTHESIOLOGY

## 2022-12-05 PROCEDURE — 37000008 HC ANESTHESIA 1ST 15 MINUTES: Performed by: OBSTETRICS & GYNECOLOGY

## 2022-12-05 PROCEDURE — 58661 PR LAP,RMV  ADNEXAL STRUCTURE: ICD-10-PCS | Mod: AS,LT,, | Performed by: NURSE PRACTITIONER

## 2022-12-05 PROCEDURE — 37000009 HC ANESTHESIA EA ADD 15 MINS: Performed by: OBSTETRICS & GYNECOLOGY

## 2022-12-05 PROCEDURE — 63600175 PHARM REV CODE 636 W HCPCS: Performed by: STUDENT IN AN ORGANIZED HEALTH CARE EDUCATION/TRAINING PROGRAM

## 2022-12-05 RX ORDER — OXYCODONE HYDROCHLORIDE 5 MG/1
5 TABLET ORAL EVERY 4 HOURS PRN
Status: DISCONTINUED | OUTPATIENT
Start: 2022-12-05 | End: 2022-12-05 | Stop reason: HOSPADM

## 2022-12-05 RX ORDER — IBUPROFEN 200 MG
24 TABLET ORAL
Status: DISCONTINUED | OUTPATIENT
Start: 2022-12-05 | End: 2022-12-05 | Stop reason: HOSPADM

## 2022-12-05 RX ORDER — HYDROMORPHONE HYDROCHLORIDE 2 MG/ML
0.4 INJECTION, SOLUTION INTRAMUSCULAR; INTRAVENOUS; SUBCUTANEOUS EVERY 5 MIN PRN
Status: DISCONTINUED | OUTPATIENT
Start: 2022-12-05 | End: 2022-12-05 | Stop reason: HOSPADM

## 2022-12-05 RX ORDER — ACETAMINOPHEN 500 MG
1000 TABLET ORAL EVERY 6 HOURS PRN
Qty: 40 TABLET | Refills: 0 | Status: SHIPPED | OUTPATIENT
Start: 2022-12-05 | End: 2024-02-12

## 2022-12-05 RX ORDER — PROCHLORPERAZINE EDISYLATE 5 MG/ML
5 INJECTION INTRAMUSCULAR; INTRAVENOUS EVERY 6 HOURS PRN
Status: DISCONTINUED | OUTPATIENT
Start: 2022-12-05 | End: 2022-12-05 | Stop reason: HOSPADM

## 2022-12-05 RX ORDER — IBUPROFEN 200 MG
16 TABLET ORAL
Status: DISCONTINUED | OUTPATIENT
Start: 2022-12-05 | End: 2022-12-05 | Stop reason: HOSPADM

## 2022-12-05 RX ORDER — SODIUM CHLORIDE 0.9 % (FLUSH) 0.9 %
3 SYRINGE (ML) INJECTION
Status: DISCONTINUED | OUTPATIENT
Start: 2022-12-05 | End: 2022-12-05 | Stop reason: HOSPADM

## 2022-12-05 RX ORDER — FENTANYL CITRATE 50 UG/ML
INJECTION, SOLUTION INTRAMUSCULAR; INTRAVENOUS
Status: DISCONTINUED | OUTPATIENT
Start: 2022-12-05 | End: 2022-12-05

## 2022-12-05 RX ORDER — HYDROMORPHONE HYDROCHLORIDE 1 MG/ML
0.4 INJECTION, SOLUTION INTRAMUSCULAR; INTRAVENOUS; SUBCUTANEOUS
Status: DISCONTINUED | OUTPATIENT
Start: 2022-12-05 | End: 2022-12-05 | Stop reason: HOSPADM

## 2022-12-05 RX ORDER — LIDOCAINE HYDROCHLORIDE 20 MG/ML
INJECTION, SOLUTION EPIDURAL; INFILTRATION; INTRACAUDAL; PERINEURAL
Status: DISCONTINUED | OUTPATIENT
Start: 2022-12-05 | End: 2022-12-05

## 2022-12-05 RX ORDER — DIPHENHYDRAMINE HYDROCHLORIDE 50 MG/ML
12.5 INJECTION INTRAMUSCULAR; INTRAVENOUS EVERY 30 MIN PRN
Status: DISCONTINUED | OUTPATIENT
Start: 2022-12-05 | End: 2022-12-05 | Stop reason: HOSPADM

## 2022-12-05 RX ORDER — ADHESIVE BANDAGE
30 BANDAGE TOPICAL 2 TIMES DAILY
Status: DISCONTINUED | OUTPATIENT
Start: 2022-12-05 | End: 2022-12-05 | Stop reason: HOSPADM

## 2022-12-05 RX ORDER — OXYCODONE HYDROCHLORIDE 5 MG/1
5 TABLET ORAL
Status: DISCONTINUED | OUTPATIENT
Start: 2022-12-05 | End: 2022-12-05 | Stop reason: HOSPADM

## 2022-12-05 RX ORDER — KETAMINE HCL IN 0.9 % NACL 50 MG/5 ML
SYRINGE (ML) INTRAVENOUS
Status: DISCONTINUED | OUTPATIENT
Start: 2022-12-05 | End: 2022-12-05

## 2022-12-05 RX ORDER — HYDRALAZINE HYDROCHLORIDE 20 MG/ML
10 INJECTION INTRAMUSCULAR; INTRAVENOUS EVERY 6 HOURS PRN
Status: DISCONTINUED | OUTPATIENT
Start: 2022-12-05 | End: 2022-12-05 | Stop reason: HOSPADM

## 2022-12-05 RX ORDER — ACETAMINOPHEN 325 MG/1
975 TABLET ORAL
Status: COMPLETED | OUTPATIENT
Start: 2022-12-05 | End: 2022-12-05

## 2022-12-05 RX ORDER — BUPIVACAINE HYDROCHLORIDE AND EPINEPHRINE 5; 5 MG/ML; UG/ML
INJECTION, SOLUTION EPIDURAL; INTRACAUDAL; PERINEURAL
Status: DISCONTINUED | OUTPATIENT
Start: 2022-12-05 | End: 2022-12-05 | Stop reason: HOSPADM

## 2022-12-05 RX ORDER — FAMOTIDINE 20 MG/1
20 TABLET, FILM COATED ORAL
Status: COMPLETED | OUTPATIENT
Start: 2022-12-05 | End: 2022-12-05

## 2022-12-05 RX ORDER — ALBUTEROL SULFATE 90 UG/1
2 AEROSOL, METERED RESPIRATORY (INHALATION) EVERY 4 HOURS PRN
Status: DISCONTINUED | OUTPATIENT
Start: 2022-12-05 | End: 2022-12-05 | Stop reason: HOSPADM

## 2022-12-05 RX ORDER — TRAZODONE HYDROCHLORIDE 50 MG/1
150 TABLET ORAL NIGHTLY PRN
Status: DISCONTINUED | OUTPATIENT
Start: 2022-12-05 | End: 2022-12-05 | Stop reason: HOSPADM

## 2022-12-05 RX ORDER — ONDANSETRON 2 MG/ML
4 INJECTION INTRAMUSCULAR; INTRAVENOUS EVERY 6 HOURS PRN
Status: DISCONTINUED | OUTPATIENT
Start: 2022-12-05 | End: 2022-12-05 | Stop reason: HOSPADM

## 2022-12-05 RX ORDER — SERTRALINE HYDROCHLORIDE 50 MG/1
100 TABLET, FILM COATED ORAL DAILY
Status: DISCONTINUED | OUTPATIENT
Start: 2022-12-05 | End: 2022-12-05 | Stop reason: HOSPADM

## 2022-12-05 RX ORDER — DEXAMETHASONE SODIUM PHOSPHATE 4 MG/ML
INJECTION, SOLUTION INTRA-ARTICULAR; INTRALESIONAL; INTRAMUSCULAR; INTRAVENOUS; SOFT TISSUE
Status: DISCONTINUED | OUTPATIENT
Start: 2022-12-05 | End: 2022-12-05

## 2022-12-05 RX ORDER — HEPARIN SODIUM 5000 [USP'U]/ML
5000 INJECTION, SOLUTION INTRAVENOUS; SUBCUTANEOUS EVERY 8 HOURS
Status: DISCONTINUED | OUTPATIENT
Start: 2022-12-05 | End: 2022-12-05 | Stop reason: HOSPADM

## 2022-12-05 RX ORDER — ZOLPIDEM TARTRATE 5 MG/1
5 TABLET ORAL NIGHTLY PRN
Status: DISCONTINUED | OUTPATIENT
Start: 2022-12-05 | End: 2022-12-05

## 2022-12-05 RX ORDER — SENNOSIDES 8.6 MG/1
8.6 TABLET ORAL 2 TIMES DAILY
Status: DISCONTINUED | OUTPATIENT
Start: 2022-12-05 | End: 2022-12-05 | Stop reason: HOSPADM

## 2022-12-05 RX ORDER — LIDOCAINE HYDROCHLORIDE 10 MG/ML
1 INJECTION, SOLUTION EPIDURAL; INFILTRATION; INTRACAUDAL; PERINEURAL ONCE
Status: DISCONTINUED | OUTPATIENT
Start: 2022-12-05 | End: 2022-12-05

## 2022-12-05 RX ORDER — LEVOTHYROXINE SODIUM 50 UG/1
50 TABLET ORAL
Status: DISCONTINUED | OUTPATIENT
Start: 2022-12-05 | End: 2022-12-05 | Stop reason: HOSPADM

## 2022-12-05 RX ORDER — MUPIROCIN 20 MG/G
OINTMENT TOPICAL 2 TIMES DAILY
Status: DISCONTINUED | OUTPATIENT
Start: 2022-12-05 | End: 2022-12-05 | Stop reason: HOSPADM

## 2022-12-05 RX ORDER — HEPARIN SODIUM 5000 [USP'U]/ML
5000 INJECTION, SOLUTION INTRAVENOUS; SUBCUTANEOUS EVERY 8 HOURS
Status: DISCONTINUED | OUTPATIENT
Start: 2022-12-05 | End: 2022-12-05

## 2022-12-05 RX ORDER — SIMETHICONE 80 MG
1 TABLET,CHEWABLE ORAL 3 TIMES DAILY PRN
Status: DISCONTINUED | OUTPATIENT
Start: 2022-12-05 | End: 2022-12-05 | Stop reason: HOSPADM

## 2022-12-05 RX ORDER — OXYCODONE HYDROCHLORIDE 5 MG/1
10 TABLET ORAL EVERY 4 HOURS PRN
Status: DISCONTINUED | OUTPATIENT
Start: 2022-12-05 | End: 2022-12-05 | Stop reason: HOSPADM

## 2022-12-05 RX ORDER — SODIUM CHLORIDE, SODIUM LACTATE, POTASSIUM CHLORIDE, CALCIUM CHLORIDE 600; 310; 30; 20 MG/100ML; MG/100ML; MG/100ML; MG/100ML
INJECTION, SOLUTION INTRAVENOUS CONTINUOUS
Status: DISCONTINUED | OUTPATIENT
Start: 2022-12-05 | End: 2022-12-05 | Stop reason: HOSPADM

## 2022-12-05 RX ORDER — TRAZODONE HYDROCHLORIDE 50 MG/1
150 TABLET ORAL NIGHTLY
Status: DISCONTINUED | OUTPATIENT
Start: 2022-12-05 | End: 2022-12-05

## 2022-12-05 RX ORDER — ONDANSETRON HYDROCHLORIDE 2 MG/ML
INJECTION, SOLUTION INTRAMUSCULAR; INTRAVENOUS
Status: DISCONTINUED | OUTPATIENT
Start: 2022-12-05 | End: 2022-12-05

## 2022-12-05 RX ORDER — PROPOFOL 10 MG/ML
VIAL (ML) INTRAVENOUS
Status: DISCONTINUED | OUTPATIENT
Start: 2022-12-05 | End: 2022-12-05

## 2022-12-05 RX ORDER — OXYCODONE HYDROCHLORIDE 5 MG/1
5 TABLET ORAL EVERY 4 HOURS PRN
Qty: 15 TABLET | Refills: 0 | Status: SHIPPED | OUTPATIENT
Start: 2022-12-05 | End: 2022-12-21

## 2022-12-05 RX ORDER — DOCUSATE SODIUM 100 MG/1
100 CAPSULE, LIQUID FILLED ORAL 2 TIMES DAILY
Qty: 30 CAPSULE | Refills: 0 | Status: SHIPPED | OUTPATIENT
Start: 2022-12-05 | End: 2023-02-28

## 2022-12-05 RX ORDER — ALBUTEROL SULFATE 2.5 MG/.5ML
2.5 SOLUTION RESPIRATORY (INHALATION)
Status: COMPLETED | OUTPATIENT
Start: 2022-12-05 | End: 2022-12-05

## 2022-12-05 RX ORDER — INSULIN ASPART 100 [IU]/ML
0-5 INJECTION, SOLUTION INTRAVENOUS; SUBCUTANEOUS
Status: DISCONTINUED | OUTPATIENT
Start: 2022-12-05 | End: 2022-12-05 | Stop reason: HOSPADM

## 2022-12-05 RX ORDER — PROPOFOL 10 MG/ML
VIAL (ML) INTRAVENOUS CONTINUOUS PRN
Status: DISCONTINUED | OUTPATIENT
Start: 2022-12-05 | End: 2022-12-05

## 2022-12-05 RX ORDER — ATROPA BELLADONNA AND OPIUM 16.2; 6 MG/1; MG/1
60 SUPPOSITORY RECTAL EVERY 6 HOURS PRN
Status: DISCONTINUED | OUTPATIENT
Start: 2022-12-05 | End: 2022-12-05 | Stop reason: HOSPADM

## 2022-12-05 RX ORDER — CEFAZOLIN SODIUM 1 G/3ML
INJECTION, POWDER, FOR SOLUTION INTRAMUSCULAR; INTRAVENOUS
Status: DISCONTINUED | OUTPATIENT
Start: 2022-12-05 | End: 2022-12-05

## 2022-12-05 RX ORDER — ACETAMINOPHEN 500 MG
1000 TABLET ORAL EVERY 6 HOURS
Status: DISCONTINUED | OUTPATIENT
Start: 2022-12-05 | End: 2022-12-05 | Stop reason: HOSPADM

## 2022-12-05 RX ORDER — ROCURONIUM BROMIDE 10 MG/ML
INJECTION, SOLUTION INTRAVENOUS
Status: DISCONTINUED | OUTPATIENT
Start: 2022-12-05 | End: 2022-12-05

## 2022-12-05 RX ORDER — GLUCAGON 1 MG
1 KIT INJECTION
Status: DISCONTINUED | OUTPATIENT
Start: 2022-12-05 | End: 2022-12-05 | Stop reason: HOSPADM

## 2022-12-05 RX ORDER — LIDOCAINE HYDROCHLORIDE 10 MG/ML
0.5 INJECTION, SOLUTION EPIDURAL; INFILTRATION; INTRACAUDAL; PERINEURAL ONCE
Status: DISCONTINUED | OUTPATIENT
Start: 2022-12-05 | End: 2022-12-05 | Stop reason: HOSPADM

## 2022-12-05 RX ORDER — NALOXONE HCL 0.4 MG/ML
0.02 VIAL (ML) INJECTION
Status: DISCONTINUED | OUTPATIENT
Start: 2022-12-05 | End: 2022-12-05 | Stop reason: HOSPADM

## 2022-12-05 RX ORDER — HEPARIN SODIUM 5000 [USP'U]/ML
5000 INJECTION, SOLUTION INTRAVENOUS; SUBCUTANEOUS ONCE
Status: COMPLETED | OUTPATIENT
Start: 2022-12-05 | End: 2022-12-05

## 2022-12-05 RX ORDER — PROCHLORPERAZINE EDISYLATE 5 MG/ML
5 INJECTION INTRAMUSCULAR; INTRAVENOUS EVERY 30 MIN PRN
Status: DISCONTINUED | OUTPATIENT
Start: 2022-12-05 | End: 2022-12-05 | Stop reason: HOSPADM

## 2022-12-05 RX ADMIN — LIDOCAINE HYDROCHLORIDE 50 MG: 20 INJECTION, SOLUTION EPIDURAL; INFILTRATION; INTRACAUDAL; PERINEURAL at 10:12

## 2022-12-05 RX ADMIN — ONDANSETRON 4 MG: 2 INJECTION INTRAMUSCULAR; INTRAVENOUS at 11:12

## 2022-12-05 RX ADMIN — PROPOFOL 50 MCG/KG/MIN: 10 INJECTION, EMULSION INTRAVENOUS at 10:12

## 2022-12-05 RX ADMIN — PROPOFOL 150 MG: 10 INJECTION, EMULSION INTRAVENOUS at 10:12

## 2022-12-05 RX ADMIN — OXYCODONE 5 MG: 5 TABLET ORAL at 12:12

## 2022-12-05 RX ADMIN — SIMETHICONE 80 MG: 80 TABLET, CHEWABLE ORAL at 12:12

## 2022-12-05 RX ADMIN — CEFAZOLIN 2 G: 330 INJECTION, POWDER, FOR SOLUTION INTRAMUSCULAR; INTRAVENOUS at 10:12

## 2022-12-05 RX ADMIN — FENTANYL CITRATE 100 MCG: 0.05 INJECTION, SOLUTION INTRAMUSCULAR; INTRAVENOUS at 10:12

## 2022-12-05 RX ADMIN — ESMOLOL HYDROCHLORIDE 10 MG: 100 INJECTION, SOLUTION INTRAVENOUS at 11:12

## 2022-12-05 RX ADMIN — FAMOTIDINE 20 MG: 20 TABLET ORAL at 08:12

## 2022-12-05 RX ADMIN — PROPOFOL 50 MG: 10 INJECTION, EMULSION INTRAVENOUS at 10:12

## 2022-12-05 RX ADMIN — FENTANYL CITRATE 50 MCG: 0.05 INJECTION, SOLUTION INTRAMUSCULAR; INTRAVENOUS at 11:12

## 2022-12-05 RX ADMIN — ROCURONIUM BROMIDE 10 MG: 10 INJECTION INTRAVENOUS at 11:12

## 2022-12-05 RX ADMIN — SUGAMMADEX 200 MG: 100 INJECTION, SOLUTION INTRAVENOUS at 11:12

## 2022-12-05 RX ADMIN — ROCURONIUM BROMIDE 50 MG: 10 INJECTION INTRAVENOUS at 10:12

## 2022-12-05 RX ADMIN — ACETAMINOPHEN 975 MG: 325 TABLET, FILM COATED ORAL at 08:12

## 2022-12-05 RX ADMIN — Medication 20 MG: at 10:12

## 2022-12-05 RX ADMIN — DEXAMETHASONE SODIUM PHOSPHATE 4 MG: 4 INJECTION, SOLUTION INTRAMUSCULAR; INTRAVENOUS at 10:12

## 2022-12-05 RX ADMIN — ESMOLOL HYDROCHLORIDE 10 MG: 100 INJECTION, SOLUTION INTRAVENOUS at 10:12

## 2022-12-05 RX ADMIN — SODIUM CHLORIDE, SODIUM LACTATE, POTASSIUM CHLORIDE, AND CALCIUM CHLORIDE: 600; 310; 30; 20 INJECTION, SOLUTION INTRAVENOUS at 08:12

## 2022-12-05 RX ADMIN — ROCURONIUM BROMIDE 20 MG: 10 INJECTION INTRAVENOUS at 10:12

## 2022-12-05 RX ADMIN — HYDROMORPHONE HYDROCHLORIDE 0.4 MG: 2 INJECTION INTRAMUSCULAR; INTRAVENOUS; SUBCUTANEOUS at 12:12

## 2022-12-05 RX ADMIN — ALBUTEROL SULFATE 2.5 MG: 2.5 SOLUTION RESPIRATORY (INHALATION) at 08:12

## 2022-12-05 RX ADMIN — FENTANYL CITRATE 50 MCG: 0.05 INJECTION, SOLUTION INTRAMUSCULAR; INTRAVENOUS at 10:12

## 2022-12-05 RX ADMIN — HEPARIN SODIUM 5000 UNITS: 5000 INJECTION, SOLUTION INTRAVENOUS; SUBCUTANEOUS at 10:12

## 2022-12-05 RX ADMIN — CARBOXYMETHYLCELLULOSE SODIUM 2 DROP: 2.5 SOLUTION/ DROPS OPHTHALMIC at 10:12

## 2022-12-05 NOTE — ANESTHESIA PROCEDURE NOTES
Intubation    Date/Time: 12/5/2022 10:11 AM  Performed by: Renita Hyde CRNA  Authorized by: Jessica Richardson MD     Intubation:     Induction:  Intravenous    Intubated:  Postinduction    Mask Ventilation:  Easy with oral airway    Attempts:  1    Attempted By:  Student    Method of Intubation:  Video laryngoscopy    Blade:  Mccall 4    Laryngeal View Grade: Grade I - full view of cords      Difficult Airway Encountered?: No      Complications:  None    Airway Device:  Oral endotracheal tube    Airway Device Size:  8.0    Style/Cuff Inflation:  Cuffed (inflated to minimal occlusive pressure)    Tube secured:  22    Secured at:  The lips    Placement Verified By:  Capnometry    Complicating Factors:  None    Findings Post-Intubation:  BS equal bilateral and atraumatic/condition of teeth unchanged

## 2022-12-05 NOTE — PLAN OF CARE
Kay Rosas has met all discharge criteria from Phase II. Vital Signs are stable, ambulating  without difficulty. Discharge instructions given, patient verbalized understanding. Discharged from facility via wheelchair in stable condition.

## 2022-12-05 NOTE — ANESTHESIA POSTPROCEDURE EVALUATION
Anesthesia Post Evaluation    Patient: Kay Rosas    Procedure(s) Performed: Procedure(s) (LRB):  XI ROBOTIC SALPINGO-OOPHORECTOMY (Left)  XI ROBOTIC LYSIS, ADHESIONS    Final Anesthesia Type: general      Patient location during evaluation: PACU  Patient participation: Yes- Able to Participate  Level of consciousness: awake and alert  Post-procedure vital signs: reviewed and stable  Pain management: adequate  Airway patency: patent    PONV status at discharge: No PONV  Anesthetic complications: no      Cardiovascular status: blood pressure returned to baseline and stable  Respiratory status: unassisted, spontaneous ventilation and nasal cannula  Hydration status: euvolemic  Follow-up not needed.          Vitals Value Taken Time   /65 12/05/22 1253   Temp 36.6 °C (97.8 °F) 12/05/22 1151   Pulse 76 12/05/22 1254   Resp 16 12/05/22 1251   SpO2 97 % 12/05/22 1254   Vitals shown include unvalidated device data.      No case tracking events are documented in the log.      Pain/Don Score: Pain Rating Prior to Med Admin: 5 (12/5/2022 12:51 PM)  Don Score: 8 (12/5/2022 12:21 PM)

## 2022-12-05 NOTE — OPERATIVE NOTE ADDENDUM
Certification of Assistant at Surgery       Surgery Date: 12/5/2022     Participating Surgeons:  Surgeon(s) and Role:     * Den Barragan MD - Primary    Procedures:  Procedure(s) (LRB):  XI ROBOTIC SALPINGO-OOPHORECTOMY (Left)  XI ROBOTIC LYSIS, ADHESIONS    Assistant Surgeon's Certification of Necessity:  I understand that section 1842 (b) (6) (d) of the Social Security Act generally prohibits Medicare Part B reasonable charge payment for the services of assistants at surgery in teaching hospitals when qualified residents are available to furnish such services. I certify that the services for which payment is claimed were medically necessary, and that no qualified resident was available to perform the services. I further understand that these services are subject to post-payment review by the Medicare carrier.      Erica Rai NP    12/05/2022  12:02 PM

## 2022-12-05 NOTE — H&P
The patient has been examined and the H&P has been reviewed:    I concur with the findings and no changes have occurred since H&P was written.  We will proceed with a Robotic BSO and as indicated.      Anesthesia/Surgery risks, benefits and alternative options discussed and understood by patient/family.

## 2022-12-05 NOTE — TRANSFER OF CARE
"Anesthesia Transfer of Care Note    Patient: Kay Rosas    Procedure(s) Performed: Procedure(s) (LRB):  XI ROBOTIC SALPINGO-OOPHORECTOMY (Left)  XI ROBOTIC LYSIS, ADHESIONS    Patient location: PACU    Anesthesia Type: general    Transport from OR: Transported from OR on 6-10 L/min O2 by face mask with adequate spontaneous ventilation    Post pain: adequate analgesia    Post assessment: no apparent anesthetic complications    Post vital signs: stable    Level of consciousness: awake    Nausea/Vomiting: no nausea/vomiting    Complications: none    Transfer of care protocol was followed      Last vitals:   Visit Vitals  BP (!) 163/79 (BP Location: Left arm, Patient Position: Sitting)   Pulse 94   Temp 36.7 °C (98.1 °F) (Oral)   Resp 18   Ht 5' 6" (1.676 m)   Wt 107 kg (236 lb)   SpO2 98%   Breastfeeding No   BMI 38.09 kg/m²     "

## 2022-12-06 VITALS
RESPIRATION RATE: 16 BRPM | HEIGHT: 66 IN | OXYGEN SATURATION: 96 % | HEART RATE: 84 BPM | DIASTOLIC BLOOD PRESSURE: 86 MMHG | SYSTOLIC BLOOD PRESSURE: 136 MMHG | TEMPERATURE: 98 F | BODY MASS INDEX: 37.93 KG/M2 | WEIGHT: 236 LBS

## 2022-12-06 NOTE — OP NOTE
[facility] Surgery (Opp)    Procedure(s) (LRB):  XI ROBOTIC SALPINGO-OOPHORECTOMY (Left)  XI ROBOTIC LYSIS, ADHESIONS (N/A)    DATE OF SURGERY  12/5/2022     Surgeon(s) and Role  Primary: Den Barragan MD       ANESTHESIA TYPE  General     PRE-OPERATIVE DIAGNOSIS  Adnexal mass [N94.89]    POST-OPERATIVE DIAGNOSIS  Post-Op Diagnosis Codes:     * Adnexal mass [N94.89]    FINDINGS  Normal diaphragms.  Normal liver capsule.  Extensive and filmy omental adhesions from umbilicus to the bladder.  These were tediously lysed with a combination of cautery and blunt dissection.  There was no obvious injury to the colon or small bowel.  Absent uterus and right fallopian tube and ovary.  Simple left adnexal mass that was the size of a golf ball with a smooth capsule.      Procedure in Detail: The patient was prepped and draped in synchronous position in [facility]. After sterile prep and drape the Carvalho catheter was inserted. Gloves and gowns were changed, and we began by directly entering the abdomen.  A small incision was made  2 cm above the umbilicus using a Hossan trocar, the peritoneal cavity was entered, and a pneumoperitoneum was established including trocar placement.  The laparoscope was inserted and examination of the peritoneal cavity revealed the operative findings above.   Intraperitoneal anatomy was normal and we proceeded with placement of our robotic trocars..  The remaining 3 robotic ports and assistant port were placed under direct vision and the robot was docked.   Adhesions described above were tediously lysed with a combination of sharp, blunt, and bipolar coagulation.  This took approximately 20 minutes.  There was no obvious injury to nearby structures.  Once intraperitoneal anatomy was normalized, we proceeded with the assigned procedure..  The left ureter was identified, and the ovarian vessel was sealed and transected using bipolar coagulation.   The specimen was freed from the sidewall and  was placed in a bag.  The ureter was followed from the pelvic brim to the area past the specimen and was not compromised. Hemostasis was satisfactory.  The trocars were removed, and the fascia of assistant port was not closed. The camera port was closed with Vicryl suture. The skin of all sites was closed with 4-0 Monocryl.  The patient was taken to recovery in stable condition.    Salt, needle, sponge, and instrument count was correct.  I was present and scrubbed for the entirety of the case.     ASSISTANT SURGEONS  Erica Rai's presence was critical to the completion of this due case to a qualified resident not being available.    UNUSUAL CIRCUMSTANCES  No    CONDITION  chronic    POST-OP COMPLICATION  No    DIABETIC  No    SPECIMENS  Specimens (From admission, onward)       Start     Ordered    12/05/22 1122  Specimen to Pathology, Surgery Gynecology and Obstetrics  Once        Comments: Pre-op Diagnosis: Adnexal mass [N94.89]Procedure(s):XI ROBOTIC LAPAROSCOPY,DIAGNOSTICXI ROBOTIC SALPINGO-OOPHORECTOMYXI ROBOTIC LYSIS, ADHESIONS Number of specimens: 1Name of specimens: 1.) LEFT FALLOPIAN TUBE AND OVARY     References:    Click here for ordering Quick Tip   Question Answer Comment   Procedure Type: Gynecology and Obstetrics    Specimen Class: Known or suspected malignancy    Which provider would you like to cc? ANDRIY GONZALEZ    Release to patient Immediate        12/05/22 1122                     DRAINS  N      ESTIMATED BLOOD LOSS  10 cc

## 2022-12-15 LAB
FINAL PATHOLOGIC DIAGNOSIS: NORMAL
GROSS: NORMAL
Lab: NORMAL

## 2022-12-18 NOTE — DISCHARGE SUMMARY
[facility]  Brief Op Note  Short Stay    Admit Date: 12/5/2022    Attending Physician: Den Barragan MD    Surgery Date: 12/5/2022     Surgeon(s) and Role:     * Den Barragan MD - Primary    Assisting Physician: Alejandra Faulkner MD PGY3    Pre-op Diagnosis:  Adnexal mass [N94.89]    Post-op Diagnosis:  Post-Op Diagnosis Codes:     * Adnexal mass [N94.89]    Procedure(s) (LRB):  XI ROBOTIC SALPINGO-OOPHORECTOMY (Left)  XI ROBOTIC LYSIS, ADHESIONS (N/A)    Anesthesia: General    Findings/Key Components: Normal diaphragms.  Normal liver capsule.  Extensive and filmy omental adhesions from umbilicus to the bladder.  These were tediously lysed with a combination of cautery and blunt dissection.  There was no obvious injury to the colon or small bowel.  Absent uterus and right fallopian tube and ovary.  Simple left adnexal mass that was the size of a golf ball with a smooth capsule.      Estimated Blood Loss: 10cc         Specimens:   Specimen (24h ago, onward)      None                Discharge Note    Discharge Date: 12/18/2022    Discharge Provider: Alejandra Faulkner    Diagnoses:  Active Hospital Problems    Diagnosis  POA    *S/P left oophorectomy [Z90.721]  Not Applicable      Resolved Hospital Problems   No resolved problems to display.       Discharged Condition: good    Hospital Course:   Patient was admitted for outpatient procedure as above, and tolerated the procedure well with no complications. Please see operative report for further details. Following the procedure, the patient was awakened from anesthesia and transferred to the recovery area in stable condition. She was discharged to home once ambulating, voiding, tolerating PO intake, and pain was well-controlled. Patient was given routine post-op instructions and prescriptions for pain medication to take as needed. Patient instructed to follow up with Dr Barragan in 6 weeks.    Final Diagnoses: Same as principal problem.    Disposition: Home or Self  Care    Follow up/Patient Instructions:    Medications:  Reconciled Home Medications:      Medication List        START taking these medications      acetaminophen 500 MG tablet  Commonly known as: TYLENOL  Take 2 tablets (1,000 mg total) by mouth every 6 (six) hours as needed for Pain.     docusate sodium 100 MG capsule  Commonly known as: COLACE  Take 1 capsule (100 mg total) by mouth 2 (two) times daily.     oxyCODONE 5 MG immediate release tablet  Commonly known as: ROXICODONE  Take 1 tablet (5 mg total) by mouth every 4 (four) hours as needed for Pain.            CONTINUE taking these medications      albuterol 90 mcg/actuation inhaler  Commonly known as: PROVENTIL/VENTOLIN HFA  Inhale 2 puffs into the lungs every 4 (four) hours as needed for Wheezing or Shortness of Breath.     ELIQUIS 5 mg Tab  Generic drug: apixaban  TAKE 1 TABLET BY MOUTH TWICE A DAY     levothyroxine 50 MCG tablet  Commonly known as: SYNTHROID  Take 1 tablet (50 mcg total) by mouth once daily.     losartan-hydrochlorothiazide 100-25 mg 100-25 mg per tablet  Commonly known as: HYZAAR  Take 1 tablet by mouth once daily.     * sertraline 50 MG tablet  Commonly known as: ZOLOFT  Take 1 tablet (50 mg total) by mouth once daily.     * sertraline 100 MG tablet  Commonly known as: ZOLOFT  Take 1 tablet (100 mg total) by mouth once daily.     STIOLTO RESPIMAT 2.5-2.5 mcg/actuation Mist  Generic drug: tiotropium-olodateroL  Inhale 2 puffs into the lungs once daily. Controller     traZODone 150 MG tablet  Commonly known as: DESYREL  Take 1 tablet (150 mg total) by mouth every evening.           * This list has 2 medication(s) that are the same as other medications prescribed for you. Read the directions carefully, and ask your doctor or other care provider to review them with you.                Discharge Procedure Orders   Diet general     Lifting restrictions   Order Comments: No lifting greater than 15 pounds for six weeks.     Other restrictions  (specify):   Order Comments: PELVIC REST (IF YOU HAD A HYSTERECTOMY):  No douching, tampons, or intercourse for 6 weeks.    If prescribed, vaginal estrogen cream may be used during the postoperative period.     DRIVING:  No driving while on narcotics. Driving may be resumed initially with a competent passenger one to two weeks after surgery if no longer taking narcotics.     EXERCISE:  For six weeks your exercise should be limited to walking. You may walk as far as you wish, as long as you increase your level of exertion gradually and avoid slippery surfaces. You may climb stairs as needed to get around, but should not use stair climbing for exercise.     Remove dressing in 24 hours   Order Comments: If you have a bandage on wound, you may remove it the day after dismissal.  If you had steri-strips remove them once they begin to peel off (usually 2 weeks).  If your steri-strips still haven't come off in 2 weeks, please remove them.  Keep incision clean and dry.  Inspect the incision daily for signs and symptoms of infection.     Wound care routine (specify)   Order Comments: WOUND CARE:  If you have a band-aid or bandage on your wound, you may remove it the day after dismissal.  You may wash the wound with mild soap and water.   You may shower at any time but should avoid immersing any abdominal incisions in water for at least two weeks after surgery or until the wound is completely healed. If given, please shower with Hibiclens soap until bottle is completely finished. Keep your wound clean and dry.  You should observe your incision for signs of infection which include redness, warmth, drainage or fever.     Call MD for:  temperature >100.4     Call MD for:  persistent nausea and vomiting     Call MD for:  severe uncontrolled pain     Call MD for:  difficulty breathing, headache or visual disturbances     Call MD for:  redness, tenderness, or signs of infection (pain, swelling, redness, odor or green/yellow  discharge around incision site)     Call MD for:  hives     Call MD for:   Order Comments: inability to void,urine is ketchup colored or you have large clots, vaginal bleeding is heavier than a period.    VAGINAL DISCHARGE: You may develop a vaginal discharge and intermittent vaginal spotting after surgery and up to 6 weeks postoperatively.  The discharge may have an odor and may change in color but it is normal.  This is due to dissolving stiches.  Contact your surgical team if you develop vaginal or vulvar irritation along with a discharge.  Also contact your surgical team if you have vaginal discharge that smells like urine or stool.    CONSTIPATION REMEDIES: Patients are often constipated after surgery or with use of oral narcotic medicine. You should continue to take the stool softener, Senokot-S during the next six weeks, and consume adequate amounts of water.  If you have not had a bowel movement for 3 days after dismissal, or are uncomfortable and unable to pass stool, please try one or all of the following measures:  1.  Milk of Magnesia - 30 cc by mouth every 12 hours   2.  Dulcolax suppository - One suppository per rectum every 4-6 hours   3.  Metamucil, Fibercon or other bulk former - use as directed  4.  Fleets Enema      PAIN MEDICATIONS:     Take your pain medications as instructed. It is best to take pain medications before your pain becomes severe. This will allow you to take less medication yet have better pain relief. For the first 2 or 3 days it may be helpful to take your pain medications on a regular schedule (e.g. every 4 to 6 hours). This will help you to keep your pain under better control. You should then begin to take fewer medications each day until you no longer need them. Do not take pain medication on an empty stomach. This may lead to nausea and vomiting.     Activity as tolerated   Order Comments: PELVIC REST:  No douching, tampons, or intercourse for 6 weeks.    If prescribed,  vaginal estrogen cream may be used during the postoperative period.     DRIVING:  No driving while on narcotics. Driving may be resumed initially with a competent passenger one to two weeks after surgery if no longer taking narcotics.     EXERCISE:  For six weeks your exercise should be limited to walking. You may walk as far as you wish, as long as you increase your level of exertion gradually and avoid slippery surfaces. You may climb stairs as needed to get around, but should not use stair climbing for exercise.     Shower on day dressing removed (No bath)   Order Comments: You may shower at any time but should avoid immersing any abdominal incisions in water for at least 2 weeks after surgery or until the wound is completely healed.  If given, please shower with Hibaclens soap until bottle is completely finish      Follow-up Information       Den Barragan MD Follow up in 6 week(s).    Specialty: Gynecologic Oncology  Why: Post op follow up  Contact information:  [address] [id number] [phone]                           Alejandra Faulkner MD PGY-3  Obstetrics and Gynecology

## 2022-12-21 ENCOUNTER — OFFICE VISIT (OUTPATIENT)
Dept: FAMILY MEDICINE | Facility: CLINIC | Age: 77
End: 2022-12-21
Payer: MEDICARE

## 2022-12-21 VITALS
BODY MASS INDEX: 38.88 KG/M2 | DIASTOLIC BLOOD PRESSURE: 80 MMHG | SYSTOLIC BLOOD PRESSURE: 126 MMHG | TEMPERATURE: 98 F | WEIGHT: 241.94 LBS | HEART RATE: 94 BPM | OXYGEN SATURATION: 100 % | HEIGHT: 66 IN

## 2022-12-21 DIAGNOSIS — R39.9 URINARY SYMPTOM OR SIGN: Primary | ICD-10-CM

## 2022-12-21 LAB
BILIRUB SERPL-MCNC: NORMAL MG/DL
BILIRUB UR QL STRIP: NEGATIVE
BLOOD URINE, POC: NORMAL
CLARITY UR REFRACT.AUTO: ABNORMAL
CLARITY, POC UA: CLEAR
COLOR UR AUTO: YELLOW
COLOR, POC UA: YELLOW
GLUCOSE UR QL STRIP: NEGATIVE
GLUCOSE UR QL STRIP: NORMAL
HGB UR QL STRIP: NEGATIVE
KETONES UR QL STRIP: NEGATIVE
KETONES UR QL STRIP: NORMAL
LEUKOCYTE ESTERASE UR QL STRIP: NEGATIVE
LEUKOCYTE ESTERASE URINE, POC: NORMAL
NITRITE UR QL STRIP: NEGATIVE
NITRITE, POC UA: NORMAL
PH UR STRIP: 7 [PH] (ref 5–8)
PH, POC UA: 7
PROT UR QL STRIP: NEGATIVE
PROTEIN, POC: NORMAL
SP GR UR STRIP: 1.02 (ref 1–1.03)
SPECIFIC GRAVITY, POC UA: 1.01
URN SPEC COLLECT METH UR: ABNORMAL
UROBILINOGEN, POC UA: NORMAL

## 2022-12-21 PROCEDURE — 1125F AMNT PAIN NOTED PAIN PRSNT: CPT | Mod: CPTII,S$GLB,, | Performed by: REGISTERED NURSE

## 2022-12-21 PROCEDURE — 1101F PR PT FALLS ASSESS DOC 0-1 FALLS W/OUT INJ PAST YR: ICD-10-PCS | Mod: CPTII,S$GLB,, | Performed by: REGISTERED NURSE

## 2022-12-21 PROCEDURE — 1101F PT FALLS ASSESS-DOCD LE1/YR: CPT | Mod: CPTII,S$GLB,, | Performed by: REGISTERED NURSE

## 2022-12-21 PROCEDURE — 3074F PR MOST RECENT SYSTOLIC BLOOD PRESSURE < 130 MM HG: ICD-10-PCS | Mod: CPTII,S$GLB,, | Performed by: REGISTERED NURSE

## 2022-12-21 PROCEDURE — 1159F MED LIST DOCD IN RCRD: CPT | Mod: CPTII,S$GLB,, | Performed by: REGISTERED NURSE

## 2022-12-21 PROCEDURE — 99999 PR PBB SHADOW E&M-EST. PATIENT-LVL III: CPT | Mod: PBBFAC,,, | Performed by: REGISTERED NURSE

## 2022-12-21 PROCEDURE — 1159F PR MEDICATION LIST DOCUMENTED IN MEDICAL RECORD: ICD-10-PCS | Mod: CPTII,S$GLB,, | Performed by: REGISTERED NURSE

## 2022-12-21 PROCEDURE — 3079F PR MOST RECENT DIASTOLIC BLOOD PRESSURE 80-89 MM HG: ICD-10-PCS | Mod: CPTII,S$GLB,, | Performed by: REGISTERED NURSE

## 2022-12-21 PROCEDURE — 81002 URINALYSIS NONAUTO W/O SCOPE: CPT | Mod: S$GLB,,, | Performed by: REGISTERED NURSE

## 2022-12-21 PROCEDURE — 3074F SYST BP LT 130 MM HG: CPT | Mod: CPTII,S$GLB,, | Performed by: REGISTERED NURSE

## 2022-12-21 PROCEDURE — 3288F FALL RISK ASSESSMENT DOCD: CPT | Mod: CPTII,S$GLB,, | Performed by: REGISTERED NURSE

## 2022-12-21 PROCEDURE — 3288F PR FALLS RISK ASSESSMENT DOCUMENTED: ICD-10-PCS | Mod: CPTII,S$GLB,, | Performed by: REGISTERED NURSE

## 2022-12-21 PROCEDURE — 3079F DIAST BP 80-89 MM HG: CPT | Mod: CPTII,S$GLB,, | Performed by: REGISTERED NURSE

## 2022-12-21 PROCEDURE — 99215 OFFICE O/P EST HI 40 MIN: CPT | Mod: 25,S$GLB,, | Performed by: REGISTERED NURSE

## 2022-12-21 PROCEDURE — 99999 PR PBB SHADOW E&M-EST. PATIENT-LVL III: ICD-10-PCS | Mod: PBBFAC,,, | Performed by: REGISTERED NURSE

## 2022-12-21 PROCEDURE — 1125F PR PAIN SEVERITY QUANTIFIED, PAIN PRESENT: ICD-10-PCS | Mod: CPTII,S$GLB,, | Performed by: REGISTERED NURSE

## 2022-12-21 PROCEDURE — 81003 URINALYSIS AUTO W/O SCOPE: CPT | Performed by: REGISTERED NURSE

## 2022-12-21 PROCEDURE — 81002 POCT URINE DIPSTICK WITHOUT MICROSCOPE: ICD-10-PCS | Mod: S$GLB,,, | Performed by: REGISTERED NURSE

## 2022-12-21 PROCEDURE — 99215 PR OFFICE/OUTPT VISIT, EST, LEVL V, 40-54 MIN: ICD-10-PCS | Mod: 25,S$GLB,, | Performed by: REGISTERED NURSE

## 2022-12-21 NOTE — PROGRESS NOTES
Subjective:      Kay Rosas is a 77 y.o. female, established patient of Kim Lakhani MD.    CHIEF COMPLAINT:    Cystitis    HPI    Patient complains of dysuria for a few days.    Patient also complains of  urinary frequency and pelvic pressure .   Patient denies fever and hematuria .    Patient does have a history of recurrent UTI or pyelonephritis.  Possible triggers:  Recent GYN surgery on 12/5/2022  Treatment to date for current issue:  Nothing  Admits to minimal water intake daily.      ROS:  Constitutional:  Denies fever, sweats, chills, fatigue or weight loss.  Denies activity or appetite change.  Genito-Urinary ROS:   positive for - dysuria, pelvic pain, and urinary frequency/urgency  negative for - hematuria or vulvar/vaginal symptoms.  Neuro:  Denies dizziness, syncope, weakness, headaches or numbness.      Review of patient's allergies indicates:   Allergen Reactions    Nsaids (non-steroidal anti-inflammatory drug)      CKD       Patient Active Problem List   Diagnosis    Essential hypertension    Acquired hypothyroidism    Hyperlipidemia    Type 2 diabetes mellitus with stage 3 chronic kidney disease, unspecified whether long term insulin use, unspecified whether stage 3a or 3b CKD    Arthritis    B12 deficiency    Depression    Lumbar spondylosis    COPD, moderate    PAD (peripheral artery disease)    Osteoarthritis of both knees    Chronic bilateral low back pain with right-sided sciatica    Lumbar radiculopathy    Greater trochanteric bursitis of both hips    Obesity (BMI 35.0-39.9 without comorbidity)    Secondary hyperparathyroidism of renal origin    Deep vein thrombosis (DVT) of lower extremity    Primary insomnia    Iron deficiency anemia due to chronic blood loss    Major depressive disorder, recurrent, mild    Pre-operative respiratory examination    S/P left oophorectomy         Current Outpatient Medications:     acetaminophen (TYLENOL) 500 MG tablet, Take 2 tablets (1,000 mg  "total) by mouth every 6 (six) hours as needed for Pain., Disp: 40 tablet, Rfl: 0    albuterol (PROVENTIL/VENTOLIN HFA) 90 mcg/actuation inhaler, Inhale 2 puffs into the lungs every 4 (four) hours as needed for Wheezing or Shortness of Breath., Disp: 18 g, Rfl: 11    ELIQUIS 5 mg Tab, TAKE 1 TABLET BY MOUTH TWICE A DAY, Disp: 60 tablet, Rfl: 10    levothyroxine (SYNTHROID) 50 MCG tablet, Take 1 tablet (50 mcg total) by mouth once daily., Disp: 90 tablet, Rfl: 3    losartan-hydrochlorothiazide 100-25 mg (HYZAAR) 100-25 mg per tablet, Take 1 tablet by mouth once daily., Disp: 90 tablet, Rfl: 3    sertraline (ZOLOFT) 100 MG tablet, Take 1 tablet (100 mg total) by mouth once daily., Disp: 90 tablet, Rfl: 3    sertraline (ZOLOFT) 50 MG tablet, Take 1 tablet (50 mg total) by mouth once daily., Disp: 90 tablet, Rfl: 3    tiotropium-olodateroL (STIOLTO RESPIMAT) 2.5-2.5 mcg/actuation Mist, Inhale 2 puffs into the lungs once daily. Controller, Disp: 4 g, Rfl: 11    traZODone (DESYREL) 150 MG tablet, Take 1 tablet (150 mg total) by mouth every evening., Disp: 30 tablet, Rfl: 11    docusate sodium (COLACE) 100 MG capsule, Take 1 capsule (100 mg total) by mouth 2 (two) times daily., Disp: 30 capsule, Rfl: 0      Past medical, surgical, family and social histories have been reviewed today.      Objective:     Vitals:    12/21/22 1537   BP: 126/80   Pulse: 94   Temp: 98.1 °F (36.7 °C)   SpO2: 100%   Weight: 109.8 kg (241 lb 15.3 oz)   Height: 5' 6" (1.676 m)   PainSc:   8   PainLoc: Abdomen       General: alert, appears stated age, and cooperative   Abdomen: soft, mild tenderness suprapubic area   Back: CVA tenderness absent   : defer exam       Results for orders placed or performed in visit on 12/21/22   POCT urine dipstick without microscope   Result Value Ref Range    Color, UA Yellow     pH, UA 7     WBC, UA Trace     Nitrite, UA NEG     Protein, POC Trace     Glucose, UA Normal     Ketones, UA NEG     Urobilinogen, UA " Normal     Bilirubin, POC +     Blood, UA NEG     Clarity, UA Clear     Spec Grav UA 1.010        Diagnosis/Assessment:     1. Urinary symptom or sign  - POCT urine dipstick without microscope  - Urine culture     Plan:     UA essentially clear today, urine culture pending.  Infection triggers/prevention discussed.    Follow-up:     Pending urine culture.  RTC as directed or on prn basis.        BRANDAN Lin  [facility]       40 minutes of total time spent on the encounter, which includes face to face time and non-face to face time preparing to see the patient.  This included obtaining and/or reviewing separately obtained history, and documenting clinical information in the electronic or other health record.   Also includes independent interpretation of results (not separately reported) and communicating results to the patient/family/caregiver, with care coordination (not separately reported).

## 2023-01-13 ENCOUNTER — LAB VISIT (OUTPATIENT)
Dept: LAB | Facility: HOSPITAL | Age: 78
End: 2023-01-13
Attending: INTERNAL MEDICINE
Payer: MEDICARE

## 2023-01-13 DIAGNOSIS — I10 ESSENTIAL HYPERTENSION: ICD-10-CM

## 2023-01-13 DIAGNOSIS — N18.32 STAGE 3B CHRONIC KIDNEY DISEASE: ICD-10-CM

## 2023-01-13 LAB
25(OH)D3+25(OH)D2 SERPL-MCNC: 13 NG/ML (ref 30–96)
ALBUMIN SERPL BCP-MCNC: 3.9 G/DL (ref 3.5–5.2)
ANION GAP SERPL CALC-SCNC: 11 MMOL/L (ref 8–16)
BUN SERPL-MCNC: 25 MG/DL (ref 8–23)
CALCIUM SERPL-MCNC: 9.9 MG/DL (ref 8.7–10.5)
CHLORIDE SERPL-SCNC: 102 MMOL/L (ref 95–110)
CO2 SERPL-SCNC: 26 MMOL/L (ref 23–29)
CREAT SERPL-MCNC: 1.5 MG/DL (ref 0.5–1.4)
EST. GFR  (NO RACE VARIABLE): 35.9 ML/MIN/1.73 M^2
GLUCOSE SERPL-MCNC: 84 MG/DL (ref 70–110)
PHOSPHATE SERPL-MCNC: 3.3 MG/DL (ref 2.7–4.5)
POTASSIUM SERPL-SCNC: 5.7 MMOL/L (ref 3.5–5.1)
PTH-INTACT SERPL-MCNC: 113 PG/ML (ref 9–77)
SODIUM SERPL-SCNC: 139 MMOL/L (ref 136–145)

## 2023-01-13 PROCEDURE — 80069 RENAL FUNCTION PANEL: CPT | Performed by: INTERNAL MEDICINE

## 2023-01-13 PROCEDURE — 83970 ASSAY OF PARATHORMONE: CPT | Performed by: INTERNAL MEDICINE

## 2023-01-13 PROCEDURE — 36415 COLL VENOUS BLD VENIPUNCTURE: CPT | Mod: PO | Performed by: INTERNAL MEDICINE

## 2023-01-13 PROCEDURE — 82306 VITAMIN D 25 HYDROXY: CPT | Performed by: INTERNAL MEDICINE

## 2023-01-20 ENCOUNTER — OFFICE VISIT (OUTPATIENT)
Dept: NEPHROLOGY | Facility: CLINIC | Age: 78
End: 2023-01-20
Payer: MEDICARE

## 2023-01-20 VITALS
WEIGHT: 248.88 LBS | HEIGHT: 66 IN | HEART RATE: 80 BPM | BODY MASS INDEX: 40 KG/M2 | DIASTOLIC BLOOD PRESSURE: 64 MMHG | SYSTOLIC BLOOD PRESSURE: 126 MMHG

## 2023-01-20 DIAGNOSIS — N25.81 SECONDARY HYPERPARATHYROIDISM OF RENAL ORIGIN: ICD-10-CM

## 2023-01-20 DIAGNOSIS — N18.32 STAGE 3B CHRONIC KIDNEY DISEASE: Primary | ICD-10-CM

## 2023-01-20 DIAGNOSIS — I10 PRIMARY HYPERTENSION: ICD-10-CM

## 2023-01-20 PROCEDURE — 3074F PR MOST RECENT SYSTOLIC BLOOD PRESSURE < 130 MM HG: ICD-10-PCS | Mod: CPTII,S$GLB,, | Performed by: INTERNAL MEDICINE

## 2023-01-20 PROCEDURE — 99499 RISK ADDL DX/OHS AUDIT: ICD-10-PCS | Mod: S$GLB,,, | Performed by: INTERNAL MEDICINE

## 2023-01-20 PROCEDURE — 1126F AMNT PAIN NOTED NONE PRSNT: CPT | Mod: CPTII,S$GLB,, | Performed by: INTERNAL MEDICINE

## 2023-01-20 PROCEDURE — 1160F RVW MEDS BY RX/DR IN RCRD: CPT | Mod: CPTII,S$GLB,, | Performed by: INTERNAL MEDICINE

## 2023-01-20 PROCEDURE — 3078F DIAST BP <80 MM HG: CPT | Mod: CPTII,S$GLB,, | Performed by: INTERNAL MEDICINE

## 2023-01-20 PROCEDURE — 1159F PR MEDICATION LIST DOCUMENTED IN MEDICAL RECORD: ICD-10-PCS | Mod: CPTII,S$GLB,, | Performed by: INTERNAL MEDICINE

## 2023-01-20 PROCEDURE — 3288F PR FALLS RISK ASSESSMENT DOCUMENTED: ICD-10-PCS | Mod: CPTII,S$GLB,, | Performed by: INTERNAL MEDICINE

## 2023-01-20 PROCEDURE — 3288F FALL RISK ASSESSMENT DOCD: CPT | Mod: CPTII,S$GLB,, | Performed by: INTERNAL MEDICINE

## 2023-01-20 PROCEDURE — 99214 OFFICE O/P EST MOD 30 MIN: CPT | Mod: S$GLB,,, | Performed by: INTERNAL MEDICINE

## 2023-01-20 PROCEDURE — 1160F PR REVIEW ALL MEDS BY PRESCRIBER/CLIN PHARMACIST DOCUMENTED: ICD-10-PCS | Mod: CPTII,S$GLB,, | Performed by: INTERNAL MEDICINE

## 2023-01-20 PROCEDURE — 3074F SYST BP LT 130 MM HG: CPT | Mod: CPTII,S$GLB,, | Performed by: INTERNAL MEDICINE

## 2023-01-20 PROCEDURE — 1126F PR PAIN SEVERITY QUANTIFIED, NO PAIN PRESENT: ICD-10-PCS | Mod: CPTII,S$GLB,, | Performed by: INTERNAL MEDICINE

## 2023-01-20 PROCEDURE — 3078F PR MOST RECENT DIASTOLIC BLOOD PRESSURE < 80 MM HG: ICD-10-PCS | Mod: CPTII,S$GLB,, | Performed by: INTERNAL MEDICINE

## 2023-01-20 PROCEDURE — 99999 PR PBB SHADOW E&M-EST. PATIENT-LVL III: CPT | Mod: PBBFAC,,, | Performed by: INTERNAL MEDICINE

## 2023-01-20 PROCEDURE — 99999 PR PBB SHADOW E&M-EST. PATIENT-LVL III: ICD-10-PCS | Mod: PBBFAC,,, | Performed by: INTERNAL MEDICINE

## 2023-01-20 PROCEDURE — 99214 PR OFFICE/OUTPT VISIT, EST, LEVL IV, 30-39 MIN: ICD-10-PCS | Mod: S$GLB,,, | Performed by: INTERNAL MEDICINE

## 2023-01-20 PROCEDURE — 1159F MED LIST DOCD IN RCRD: CPT | Mod: CPTII,S$GLB,, | Performed by: INTERNAL MEDICINE

## 2023-01-20 PROCEDURE — 1101F PR PT FALLS ASSESS DOC 0-1 FALLS W/OUT INJ PAST YR: ICD-10-PCS | Mod: CPTII,S$GLB,, | Performed by: INTERNAL MEDICINE

## 2023-01-20 PROCEDURE — 1101F PT FALLS ASSESS-DOCD LE1/YR: CPT | Mod: CPTII,S$GLB,, | Performed by: INTERNAL MEDICINE

## 2023-01-20 PROCEDURE — 99499 UNLISTED E&M SERVICE: CPT | Mod: S$GLB,,, | Performed by: INTERNAL MEDICINE

## 2023-01-20 RX ORDER — ERGOCALCIFEROL 1.25 MG/1
50000 CAPSULE ORAL
Qty: 12 CAPSULE | Refills: 3 | Status: SHIPPED | OUTPATIENT
Start: 2023-01-20 | End: 2023-02-28

## 2023-01-20 NOTE — PROGRESS NOTES
"Subjective:       Patient ID: Kay Rosas is a 77 y.o. female.    Chief Complaint: Chronic Kidney Disease    HPI    Since her last office visit she underwent surgery for mass on 1 of her ovaries.  Fortunately it was non cancers.  She was also started on home oxygen by her pulmonologist.  In the clinic today she is doing well.  Her primary complaint is around bilateral knee pain.  She states that she does not take nonsteroidal anti-inflammatory agents.  Only taking Tylenol on a p.r.n. basis.  She has follow-up with her orthopedist next couple of months.  Her laboratory studies medications were reviewed.  All Nephrology related questions were answered to her satisfaction.    Review of Systems   Constitutional: Negative.    HENT: Negative.     Eyes: Negative.    Respiratory: Negative.     Cardiovascular: Negative.    Gastrointestinal: Negative.    Genitourinary: Negative.    Musculoskeletal:  Positive for arthralgias.   Neurological: Negative.        /64   Pulse 80   Ht 5' 6" (1.676 m)   Wt 112.9 kg (248 lb 14.4 oz)   BMI 40.17 kg/m²     Lab Results   Component Value Date    WBC 5.83 11/25/2022    HGB 12.0 11/25/2022    HCT 37.8 11/25/2022    MCV 90 11/25/2022     11/25/2022      BMP  Lab Results   Component Value Date     01/13/2023    K 5.7 (H) 01/13/2023     01/13/2023    CO2 26 01/13/2023    BUN 25 (H) 01/13/2023    CREATININE 1.5 (H) 01/13/2023    CALCIUM 9.9 01/13/2023    ANIONGAP 11 01/13/2023    ESTGFRAFRICA 34 (A) 07/08/2022    EGFRNONAA 29 (A) 07/08/2022     CMP  Sodium   Date Value Ref Range Status   01/13/2023 139 136 - 145 mmol/L Final     Potassium   Date Value Ref Range Status   01/13/2023 5.7 (H) 3.5 - 5.1 mmol/L Final     Comment:     *No Visible Hemolysis     Chloride   Date Value Ref Range Status   01/13/2023 102 95 - 110 mmol/L Final     CO2   Date Value Ref Range Status   01/13/2023 26 23 - 29 mmol/L Final     Glucose   Date Value Ref Range Status   01/13/2023 84 " 70 - 110 mg/dL Final     BUN   Date Value Ref Range Status   01/13/2023 25 (H) 8 - 23 mg/dL Final     Creatinine   Date Value Ref Range Status   01/13/2023 1.5 (H) 0.5 - 1.4 mg/dL Final     Calcium   Date Value Ref Range Status   01/13/2023 9.9 8.7 - 10.5 mg/dL Final     Total Protein   Date Value Ref Range Status   10/20/2022 7.3 6.0 - 8.4 g/dL Final     Albumin   Date Value Ref Range Status   01/13/2023 3.9 3.5 - 5.2 g/dL Final     Total Bilirubin   Date Value Ref Range Status   10/20/2022 0.5 0.1 - 1.0 mg/dL Final     Comment:     For infants and newborns, interpretation of results should be based  on gestational age, weight and in agreement with clinical  observations.    Premature Infant recommended reference ranges:  Up to 24 hours.............<8.0 mg/dL  Up to 48 hours............<12.0 mg/dL  3-5 days..................<15.0 mg/dL  6-29 days.................<15.0 mg/dL       Alkaline Phosphatase   Date Value Ref Range Status   10/20/2022 62 55 - 135 U/L Final     AST   Date Value Ref Range Status   10/20/2022 13 10 - 40 U/L Final     ALT   Date Value Ref Range Status   10/20/2022 10 10 - 44 U/L Final     Anion Gap   Date Value Ref Range Status   01/13/2023 11 8 - 16 mmol/L Final     eGFR if    Date Value Ref Range Status   07/08/2022 34 (A) >60 mL/min/1.73 m^2 Final     eGFR if non    Date Value Ref Range Status   07/08/2022 29 (A) >60 mL/min/1.73 m^2 Final     Comment:     Calculation used to obtain the estimated glomerular filtration  rate (eGFR) is the CKD-EPI equation.        Current Outpatient Medications on File Prior to Visit   Medication Sig Dispense Refill    acetaminophen (TYLENOL) 500 MG tablet Take 2 tablets (1,000 mg total) by mouth every 6 (six) hours as needed for Pain. 40 tablet 0    albuterol (PROVENTIL/VENTOLIN HFA) 90 mcg/actuation inhaler Inhale 2 puffs into the lungs every 4 (four) hours as needed for Wheezing or Shortness of Breath. 18 g 11    docusate  sodium (COLACE) 100 MG capsule Take 1 capsule (100 mg total) by mouth 2 (two) times daily. 30 capsule 0    ELIQUIS 5 mg Tab TAKE 1 TABLET BY MOUTH TWICE A DAY 60 tablet 10    levothyroxine (SYNTHROID) 50 MCG tablet Take 1 tablet (50 mcg total) by mouth once daily. 90 tablet 3    losartan-hydrochlorothiazide 100-25 mg (HYZAAR) 100-25 mg per tablet Take 1 tablet by mouth once daily. 90 tablet 3    sertraline (ZOLOFT) 100 MG tablet Take 1 tablet (100 mg total) by mouth once daily. 90 tablet 3    sertraline (ZOLOFT) 50 MG tablet Take 1 tablet (50 mg total) by mouth once daily. 90 tablet 3    tiotropium-olodateroL (STIOLTO RESPIMAT) 2.5-2.5 mcg/actuation Mist Inhale 2 puffs into the lungs once daily. Controller 4 g 11    traZODone (DESYREL) 150 MG tablet Take 1 tablet (150 mg total) by mouth every evening. 30 tablet 11     No current facility-administered medications on file prior to visit.            Objective:            Physical Exam  Constitutional:       Appearance: Normal appearance.   HENT:      Head: Normocephalic and atraumatic.   Eyes:      General: No scleral icterus.     Extraocular Movements: Extraocular movements intact.      Pupils: Pupils are equal, round, and reactive to light.   Pulmonary:      Effort: Pulmonary effort is normal.      Breath sounds: No stridor.   Musculoskeletal:      Right lower leg: No edema.      Left lower leg: No edema.   Skin:     General: Skin is warm and dry.   Neurological:      General: No focal deficit present.      Mental Status: She is alert and oriented to person, place, and time.   Psychiatric:         Mood and Affect: Mood normal.         Behavior: Behavior normal.       Assessment:       1. Stage 3b chronic kidney disease    2. Primary hypertension    3. Secondary hyperparathyroidism of renal origin          Plan:       1. Creatinine has remained stable ranging between 1.4 and 1.8 for the past year.  Most recent was 1.5.      2. Blood pressure is well controlled on  current regimen.  She is on a RAAS inhibitor.    3. Intact PTH was elevated at 113.  Vitamin-D levels low at 13.  Will start ergocalciferol 01487 units weekly.  Phosphorus was normal at 3.3.  Calcium was normal at 9.9 with an albumin of 3.9.        Joshua Spivey MD

## 2023-02-27 PROBLEM — Z01.811 PRE-OPERATIVE RESPIRATORY EXAMINATION: Status: RESOLVED | Noted: 2022-11-11 | Resolved: 2023-02-27

## 2023-02-27 NOTE — PROGRESS NOTES
Subjective:      Kay Rosas is a 77 y.o. female, established patient of Kim Lakhani MD.    CHIEF COMPLAINT:    Urinary Tract Infection      HPI    Patient complains of dysuria for a few weeks.    Patient denies fever and hematuria .    Patient does have a history of recurrent UTI or pyelonephritis.  Possible triggers:  None identified  Treatment to date for current issue:  Increased water intake.      ROS:  Constitutional:  Denies fever, sweats, chills, fatigue or weight loss.  Denies activity or appetite change.  Genito-Urinary ROS: positive for - dysuria and pelvic pain  negative for - genital discharge, hematuria, nocturia, or urinary frequency/urgency.  Neuro:  Denies dizziness, syncope, weakness, headaches or numbness.      Review of patient's allergies indicates:   Allergen Reactions    Nsaids (non-steroidal anti-inflammatory drug)      CKD       Patient Active Problem List   Diagnosis    Essential hypertension    Acquired hypothyroidism    Hyperlipidemia    Type 2 diabetes mellitus with stage 3 chronic kidney disease, unspecified whether long term insulin use, unspecified whether stage 3a or 3b CKD    Arthritis    B12 deficiency    Depression    Lumbar spondylosis    COPD, moderate    PAD (peripheral artery disease)    Osteoarthritis of both knees    Chronic bilateral low back pain with right-sided sciatica    Lumbar radiculopathy    Greater trochanteric bursitis of both hips    Obesity (BMI 35.0-39.9 without comorbidity)    Secondary hyperparathyroidism of renal origin    Deep vein thrombosis (DVT) of lower extremity    Primary insomnia    Iron deficiency anemia due to chronic blood loss    Major depressive disorder, recurrent, mild    S/P left oophorectomy         Current Outpatient Medications:     acetaminophen (TYLENOL) 500 MG tablet, Take 2 tablets (1,000 mg total) by mouth every 6 (six) hours as needed for Pain., Disp: 40 tablet, Rfl: 0    albuterol (PROVENTIL/VENTOLIN HFA) 90  mcg/actuation inhaler, Inhale 2 puffs into the lungs every 4 (four) hours as needed for Wheezing or Shortness of Breath., Disp: 18 g, Rfl: 11    docusate sodium (COLACE) 100 MG capsule, Take 1 capsule (100 mg total) by mouth 2 (two) times daily., Disp: 30 capsule, Rfl: 0    ELIQUIS 5 mg Tab, TAKE 1 TABLET BY MOUTH TWICE A DAY, Disp: 60 tablet, Rfl: 10    ergocalciferol (ERGOCALCIFEROL) 50,000 unit Cap, Take 1 capsule (50,000 Units total) by mouth every 7 days., Disp: 12 capsule, Rfl: 3    levothyroxine (SYNTHROID) 50 MCG tablet, Take 1 tablet (50 mcg total) by mouth once daily., Disp: 90 tablet, Rfl: 3    losartan-hydrochlorothiazide 100-25 mg (HYZAAR) 100-25 mg per tablet, Take 1 tablet by mouth once daily., Disp: 90 tablet, Rfl: 3    sertraline (ZOLOFT) 100 MG tablet, Take 1 tablet (100 mg total) by mouth once daily., Disp: 90 tablet, Rfl: 3    sertraline (ZOLOFT) 50 MG tablet, Take 1 tablet (50 mg total) by mouth once daily., Disp: 90 tablet, Rfl: 3    tiotropium-olodateroL (STIOLTO RESPIMAT) 2.5-2.5 mcg/actuation Mist, Inhale 2 puffs into the lungs once daily. Controller, Disp: 4 g, Rfl: 11    traZODone (DESYREL) 150 MG tablet, Take 1 tablet (150 mg total) by mouth every evening., Disp: 30 tablet, Rfl: 11      Past medical, surgical, family and social histories have been reviewed today.      Objective:     There were no vitals filed for this visit.    General: alert, appears stated age, and cooperative   Abdomen: soft, non-tender, without masses or organomegaly    Back: CVA tenderness absent   : defer exam       Results for orders placed or performed in visit on 02/28/23   POCT urine dipstick without microscope   Result Value Ref Range    Color, UA Dark Yellow     pH, UA 7     WBC, UA +     Nitrite, UA Neg     Protein, POC Trace     Glucose, UA Normal     Ketones, UA Neg     Urobilinogen, UA 1     Bilirubin, POC ++     Blood, UA Neg     Clarity, UA Clear     Spec Grav UA 1.010        Diagnosis/Assessment:      1. Abnormal urine findings  - CULTURE, URINE; Future  - cephALEXin (KEFLEX) 500 MG capsule; Take 1 capsule (500 mg total) by mouth every 12 (twelve) hours. for 5 days  Dispense: 10 capsule; Refill: 0  - phenazopyridine (PYRIDIUM) 200 MG tablet; Take 1 tablet (200 mg total) by mouth 3 (three) times daily. for 3 days  Dispense: 9 tablet; Refill: 0    2. Urinary symptom or sign  - POCT urine dipstick without microscope  - CULTURE, URINE; Future  - cephALEXin (KEFLEX) 500 MG capsule; Take 1 capsule (500 mg total) by mouth every 12 (twelve) hours. for 5 days  Dispense: 10 capsule; Refill: 0  - phenazopyridine (PYRIDIUM) 200 MG tablet; Take 1 tablet (200 mg total) by mouth 3 (three) times daily. for 3 days  Dispense: 9 tablet; Refill: 0       Plan:     Unclear if true infection or not, culture pending.  Has had multiple abnormal UA dips in office with negative urine cultures.  Infection triggers & prevention discussed.    Follow-up:     Pending urine culture.  RTC as directed or on prn basis.        BRANDAN Lin  [facility]       20 minutes of total time spent on the encounter, which includes face to face time and non-face to face time preparing to see the patient.  This included obtaining and/or reviewing separately obtained history, and documenting clinical information in the electronic or other health record.   Also includes independent interpretation of results (not separately reported) and communicating results to the patient/family/caregiver, with care coordination (not separately reported).

## 2023-02-28 ENCOUNTER — OFFICE VISIT (OUTPATIENT)
Dept: FAMILY MEDICINE | Facility: CLINIC | Age: 78
End: 2023-02-28
Payer: MEDICARE

## 2023-02-28 VITALS
BODY MASS INDEX: 39.85 KG/M2 | WEIGHT: 247.94 LBS | HEART RATE: 76 BPM | DIASTOLIC BLOOD PRESSURE: 64 MMHG | SYSTOLIC BLOOD PRESSURE: 138 MMHG | OXYGEN SATURATION: 95 % | HEIGHT: 66 IN | TEMPERATURE: 97 F

## 2023-02-28 DIAGNOSIS — R82.90 ABNORMAL URINE FINDINGS: Primary | ICD-10-CM

## 2023-02-28 DIAGNOSIS — R39.9 URINARY SYMPTOM OR SIGN: ICD-10-CM

## 2023-02-28 LAB
BILIRUB SERPL-MCNC: NORMAL MG/DL
BLOOD URINE, POC: NORMAL
CLARITY, POC UA: CLEAR
COLOR, POC UA: NORMAL
GLUCOSE UR QL STRIP: NORMAL
KETONES UR QL STRIP: NORMAL
LEUKOCYTE ESTERASE URINE, POC: NORMAL
NITRITE, POC UA: NORMAL
PH, POC UA: 7
PROTEIN, POC: NORMAL
SPECIFIC GRAVITY, POC UA: 1.01
UROBILINOGEN, POC UA: 1

## 2023-02-28 PROCEDURE — 1101F PT FALLS ASSESS-DOCD LE1/YR: CPT | Mod: CPTII,S$GLB,, | Performed by: REGISTERED NURSE

## 2023-02-28 PROCEDURE — 99213 OFFICE O/P EST LOW 20 MIN: CPT | Mod: 25,S$GLB,, | Performed by: REGISTERED NURSE

## 2023-02-28 PROCEDURE — 81002 URINALYSIS NONAUTO W/O SCOPE: CPT | Mod: S$GLB,,, | Performed by: REGISTERED NURSE

## 2023-02-28 PROCEDURE — 3078F PR MOST RECENT DIASTOLIC BLOOD PRESSURE < 80 MM HG: ICD-10-PCS | Mod: CPTII,S$GLB,, | Performed by: REGISTERED NURSE

## 2023-02-28 PROCEDURE — 1125F PR PAIN SEVERITY QUANTIFIED, PAIN PRESENT: ICD-10-PCS | Mod: CPTII,S$GLB,, | Performed by: REGISTERED NURSE

## 2023-02-28 PROCEDURE — 99999 PR PBB SHADOW E&M-EST. PATIENT-LVL III: CPT | Mod: PBBFAC,,, | Performed by: REGISTERED NURSE

## 2023-02-28 PROCEDURE — 99213 PR OFFICE/OUTPT VISIT, EST, LEVL III, 20-29 MIN: ICD-10-PCS | Mod: 25,S$GLB,, | Performed by: REGISTERED NURSE

## 2023-02-28 PROCEDURE — 3078F DIAST BP <80 MM HG: CPT | Mod: CPTII,S$GLB,, | Performed by: REGISTERED NURSE

## 2023-02-28 PROCEDURE — 3288F PR FALLS RISK ASSESSMENT DOCUMENTED: ICD-10-PCS | Mod: CPTII,S$GLB,, | Performed by: REGISTERED NURSE

## 2023-02-28 PROCEDURE — 1101F PR PT FALLS ASSESS DOC 0-1 FALLS W/OUT INJ PAST YR: ICD-10-PCS | Mod: CPTII,S$GLB,, | Performed by: REGISTERED NURSE

## 2023-02-28 PROCEDURE — 1125F AMNT PAIN NOTED PAIN PRSNT: CPT | Mod: CPTII,S$GLB,, | Performed by: REGISTERED NURSE

## 2023-02-28 PROCEDURE — 3075F SYST BP GE 130 - 139MM HG: CPT | Mod: CPTII,S$GLB,, | Performed by: REGISTERED NURSE

## 2023-02-28 PROCEDURE — 1159F MED LIST DOCD IN RCRD: CPT | Mod: CPTII,S$GLB,, | Performed by: REGISTERED NURSE

## 2023-02-28 PROCEDURE — 3288F FALL RISK ASSESSMENT DOCD: CPT | Mod: CPTII,S$GLB,, | Performed by: REGISTERED NURSE

## 2023-02-28 PROCEDURE — 99999 PR PBB SHADOW E&M-EST. PATIENT-LVL III: ICD-10-PCS | Mod: PBBFAC,,, | Performed by: REGISTERED NURSE

## 2023-02-28 PROCEDURE — 3075F PR MOST RECENT SYSTOLIC BLOOD PRESS GE 130-139MM HG: ICD-10-PCS | Mod: CPTII,S$GLB,, | Performed by: REGISTERED NURSE

## 2023-02-28 PROCEDURE — 1159F PR MEDICATION LIST DOCUMENTED IN MEDICAL RECORD: ICD-10-PCS | Mod: CPTII,S$GLB,, | Performed by: REGISTERED NURSE

## 2023-02-28 PROCEDURE — 87086 URINE CULTURE/COLONY COUNT: CPT | Performed by: REGISTERED NURSE

## 2023-02-28 PROCEDURE — 81002 POCT URINE DIPSTICK WITHOUT MICROSCOPE: ICD-10-PCS | Mod: S$GLB,,, | Performed by: REGISTERED NURSE

## 2023-02-28 RX ORDER — CEPHALEXIN 500 MG/1
500 CAPSULE ORAL EVERY 12 HOURS
Qty: 10 CAPSULE | Refills: 0 | Status: SHIPPED | OUTPATIENT
Start: 2023-02-28 | End: 2023-03-05

## 2023-02-28 RX ORDER — PHENAZOPYRIDINE HYDROCHLORIDE 200 MG/1
200 TABLET, FILM COATED ORAL 3 TIMES DAILY
Qty: 9 TABLET | Refills: 0 | Status: SHIPPED | OUTPATIENT
Start: 2023-02-28 | End: 2023-03-03

## 2023-03-02 LAB — BACTERIA UR CULT: NORMAL

## 2023-03-10 ENCOUNTER — PATIENT OUTREACH (OUTPATIENT)
Dept: ADMINISTRATIVE | Facility: HOSPITAL | Age: 78
End: 2023-03-10
Payer: MEDICARE

## 2023-03-10 NOTE — Clinical Note
[employer] is indicating patient needs a statin. Please update Problem List on next visit. If patient cannot take statin medications due to allergy or intolerance the PCP must drop that code every year in a visit.  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

## 2023-03-10 NOTE — PROGRESS NOTES
Working [facility] Statin DM Report.    Per chart review pt had been on atorvastatin but stopped due to side effects/muscle pain. Dr indicated would try another, but unable to see where 2nd one was tried.Does not have an allergy or intolerance listed and no problem documented.  LM to discuss what meds she's tried.

## 2023-03-12 PROBLEM — Z90.721 S/P LEFT OOPHORECTOMY: Status: RESOLVED | Noted: 2022-12-05 | Resolved: 2023-03-12

## 2023-03-12 NOTE — PROGRESS NOTES
ADDENDUM:  03/16/2023 at 6:06 pm    Preop testing results reviewed.  K-level back to normal.  Chronic renal issues noted, followed by Nephrology.    CLEARED FOR SURGERY.      BMP  Lab Results   Component Value Date     03/13/2023    K 4.8 03/13/2023     03/13/2023    CO2 25 03/13/2023    BUN 27 (H) 03/13/2023    CREATININE 1.6 (H) 03/13/2023    CALCIUM 9.5 03/13/2023    ANIONGAP 8 03/13/2023    EGFRNORACEVR 33.2 (A) 03/13/2023           Subjective:      Kay Rosas is a 77 y.o. female here today for:   SURGICAL CLEARANCE    I have reviewed the patient's medical history in detail and updated the computerized patient record.  Denies any previous complications related to previous surgery or anesthesia.      Surgery:  Revision of RT knee replacement  MD:   Dr. Macho Prado  Date:   3/22/2023  Preop Testing:  []  None  [x]  EKG     [x]  CXR     [x]  Blood work     []  Urine     [x]  MRSA/MSSA swab     []  Other:      Review of Systems   Constitutional:  Positive for activity change. Negative for appetite change, chills, diaphoresis, fatigue and fever.   HENT: Negative.     Eyes:  Negative for photophobia, pain and visual disturbance.   Respiratory:  Negative for cough, chest tightness, shortness of breath and wheezing.    Cardiovascular:  Negative for chest pain, palpitations and leg swelling.   Gastrointestinal:  Negative for abdominal distention, abdominal pain, anal bleeding, blood in stool, constipation, diarrhea, nausea, rectal pain and vomiting.   Endocrine: Negative for polydipsia, polyphagia and polyuria.   Genitourinary: Negative.    Musculoskeletal:  Positive for arthralgias and gait problem. Negative for joint swelling, neck pain and neck stiffness.   Skin:  Negative for rash and wound.   Neurological:  Negative for dizziness, tremors, seizures, syncope, facial asymmetry, speech difficulty, weakness, light-headedness, numbness and headaches.   Hematological:  Negative for adenopathy. Does  not bruise/bleed easily.   Psychiatric/Behavioral: Negative.         Review of patient's allergies indicates:   Allergen Reactions    Nsaids (non-steroidal anti-inflammatory drug)      CKD       Patient Active Problem List   Diagnosis    Essential hypertension    Acquired hypothyroidism    Hyperlipidemia    Type 2 diabetes mellitus with stage 3 chronic kidney disease, unspecified whether long term insulin use, unspecified whether stage 3a or 3b CKD    Arthritis    B12 deficiency    Depression    Lumbar spondylosis    COPD, moderate    PAD (peripheral artery disease)    Osteoarthritis of both knees    Chronic bilateral low back pain with right-sided sciatica    Lumbar radiculopathy    Greater trochanteric bursitis of both hips    Obesity (BMI 35.0-39.9 without comorbidity)    Secondary hyperparathyroidism of renal origin    Deep vein thrombosis (DVT) of lower extremity    Primary insomnia    Iron deficiency anemia due to chronic blood loss    Major depressive disorder, recurrent, mild    Statin myopathy         Current Outpatient Medications:     acetaminophen (TYLENOL) 500 MG tablet, Take 2 tablets (1,000 mg total) by mouth every 6 (six) hours as needed for Pain., Disp: 40 tablet, Rfl: 0    albuterol (PROVENTIL/VENTOLIN HFA) 90 mcg/actuation inhaler, Inhale 2 puffs into the lungs every 4 (four) hours as needed for Wheezing or Shortness of Breath., Disp: 18 g, Rfl: 11    ELIQUIS 5 mg Tab, TAKE 1 TABLET BY MOUTH TWICE A DAY, Disp: 60 tablet, Rfl: 10    levothyroxine (SYNTHROID) 50 MCG tablet, Take 1 tablet (50 mcg total) by mouth once daily., Disp: 90 tablet, Rfl: 3    losartan-hydrochlorothiazide 100-25 mg (HYZAAR) 100-25 mg per tablet, Take 1 tablet by mouth once daily., Disp: 90 tablet, Rfl: 3    sertraline (ZOLOFT) 100 MG tablet, Take 1 tablet (100 mg total) by mouth once daily., Disp: 90 tablet, Rfl: 3    sertraline (ZOLOFT) 50 MG tablet, Take 1 tablet (50 mg total) by mouth once daily., Disp: 90 tablet, Rfl: 3     tiotropium-olodateroL (STIOLTO RESPIMAT) 2.5-2.5 mcg/actuation Mist, Inhale 2 puffs into the lungs once daily. Controller, Disp: 4 g, Rfl: 11    traZODone (DESYREL) 150 MG tablet, Take 1 tablet (150 mg total) by mouth every evening., Disp: 30 tablet, Rfl: 11      Past Medical History:   Diagnosis Date    Anticoagulant long-term use     Arthritis     Degenerative disc disease, cervical     Disorder of kidney and ureter     CKD stage 3    DVT (deep venous thrombosis)     Emphysema of lung     Hyperlipidemia     Hypertension     MVP (mitral valve prolapse)     On home oxygen therapy     3 liters as needed at night    Osteoporosis     feet    Thyroid condition        Past Surgical History:   Procedure Laterality Date    BACK SURGERY  2012    CATARACT EXTRACTION Bilateral 10/2012    HYSTERECTOMY Left 1978    INJECTION OF ANESTHETIC AGENT INTO SACROILIAC JOINT Bilateral 11/11/2019    Procedure: Bilateral GT bursa + SIJ injection;  Surgeon: Noe Pleitez MD;  Location: [facility];  Service: Pain Management;  Laterality: Bilateral;    INJECTION OF JOINT Bilateral 11/11/2019    Procedure: Bilateral GT bursa + SIJ injection;  Surgeon: Noe Pleitez MD;  Location: [facility];  Service: Pain Management;  Laterality: Bilateral;    JOINT REPLACEMENT Right 2021    knee    ROBOT-ASSISTED LAPAROSCOPIC LYSIS OF ADHESIONS USING DA SP XI N/A 12/05/2022    Procedure: XI ROBOTIC LYSIS, ADHESIONS;  Surgeon: Den Barragan MD;  Location: [facility];  Service: Oncology;  Laterality: N/A;    ROBOT-ASSISTED LAPAROSCOPIC SALPINGO-OOPHORECTOMY USING DA SP XI Left 12/05/2022    Procedure: XI ROBOTIC SALPINGO-OOPHORECTOMY;  Surgeon: Den Barragan MD;  Location: [facility];  Service: Oncology;  Laterality: Left;    SURGICAL REMOVAL OF BONE SPUR Left     left elbow    VASCULAR SURGERY Right     high ligation due to blood clot        Family History   Problem Relation Age of Onset    Heart disease Mother     Kidney disease Mother      "Arthritis Mother     Breast cancer Mother     Cancer Father     Heart disease Father     Cancer Sister     Ovarian cancer Sister     Alzheimer's disease Sister        Social History     Tobacco Use    Smoking status: Former     Packs/day: 1.00     Years: 44.00     Pack years: 44.00     Types: Cigarettes     Start date: 1962     Quit date: 2015     Years since quittin.7    Smokeless tobacco: Never   Substance Use Topics    Alcohol use: No    Drug use: No         Objective:     Vitals:    23 1553   BP: 124/64   Pulse: 77   Temp: 97.3 °F (36.3 °C)   SpO2: 95%   Weight: 111.7 kg (246 lb 4.1 oz)   Height: 5' 6" (1.676 m)       Physical Exam  Constitutional:       General: She is not in acute distress.     Appearance: She is well-developed. She is not diaphoretic.   HENT:      Head: Normocephalic and atraumatic.      Right Ear: Tympanic membrane, ear canal and external ear normal. There is no impacted cerumen.      Left Ear: Tympanic membrane, ear canal and external ear normal. There is no impacted cerumen.      Nose: Nose normal. No nasal deformity, mucosal edema, congestion or rhinorrhea.      Mouth/Throat:      Mouth: Mucous membranes are moist. Mucous membranes are not dry and not cyanotic. No oral lesions.      Dentition: Normal dentition.      Pharynx: Oropharynx is clear. Uvula midline. No oropharyngeal exudate, posterior oropharyngeal erythema or uvula swelling.      Tonsils: No tonsillar abscesses.   Eyes:      General: Lids are normal. Lids are everted, no foreign bodies appreciated. No scleral icterus.        Right eye: No discharge.         Left eye: No discharge.      Conjunctiva/sclera: Conjunctivae normal.      Right eye: Right conjunctiva is not injected. No exudate.     Left eye: Left conjunctiva is not injected. No exudate.     Pupils: Pupils are equal, round, and reactive to light.   Neck:      Thyroid: No thyroid mass or thyromegaly.      Vascular: No carotid bruit or JVD.      " Trachea: No tracheal deviation.   Cardiovascular:      Rate and Rhythm: Normal rate and regular rhythm.      Pulses: Normal pulses.      Heart sounds: Normal heart sounds. No murmur heard.    No friction rub. No gallop.   Pulmonary:      Effort: Pulmonary effort is normal. No respiratory distress.      Breath sounds: Normal breath sounds. No stridor. No wheezing.   Chest:      Chest wall: No tenderness.   Abdominal:      General: Bowel sounds are normal. There is no distension.      Palpations: Abdomen is soft. There is no mass.      Tenderness: There is no abdominal tenderness. There is no guarding or rebound.   Musculoskeletal:         General: Tenderness (RT knee --- mild swelling, increased crepitus, decreased ROM) present. No deformity. Normal range of motion.      Cervical back: Normal range of motion and neck supple. No edema or erythema. Normal range of motion.      Right lower leg: No edema.      Left lower leg: No edema.   Lymphadenopathy:      Cervical: No cervical adenopathy.   Skin:     General: Skin is warm and dry.      Capillary Refill: Capillary refill takes less than 2 seconds.      Coloration: Skin is not pale.      Findings: No bruising, erythema or rash.   Neurological:      Mental Status: She is alert and oriented to person, place, and time.      Sensory: No sensory deficit.      Motor: No weakness, tremor, atrophy or abnormal muscle tone.      Coordination: Coordination normal.      Gait: Gait normal.      Deep Tendon Reflexes: Reflexes are normal and symmetric.   Psychiatric:         Mood and Affect: Mood normal.         Speech: Speech normal.         Behavior: Behavior normal.         Thought Content: Thought content normal.         Cognition and Memory: Memory is not impaired.         Judgment: Judgment normal.         LABS REVIEWED DATED 3/6/23:    ESSENTIALLY WNL EXCEPT FOR:  Potassium  5.6  BUN  39  Creatinine  1.72  GFR  31  HgB  11.6      NORMAL:  EKG ---- NSR with rate of 63 bpm  CXR  ---- No acute cardiopulmonary disease identified        Diagnosis:       1. Preop examination  Basic Metabolic Panel      2. Osteoarthritis of right knee, unspecified osteoarthritis type        3. History of knee replacement procedure of right knee        4. Essential hypertension  Stable, controlled on medication as ordered.    BP Readings from Last 3 Encounters:   03/13/23 124/64   02/28/23 138/64   01/20/23 126/64         5. Type 2 diabetes mellitus with stage 3 chronic kidney disease, unspecified whether long term insulin use, unspecified whether stage 3a or 3b CKD  Stable, A1c well-controlled.    Lab Results   Component Value Date    HGBA1C 5.8 03/06/2023         6. Hyperkalemia  Basic Metabolic Panel      7. Stage 3b chronic kidney disease  Basic Metabolic Panel          Plan:     Surgical clearance pending.  Recheck BMP.  Denies smoking or alcohol intake.  Hold ASA, ASA-containing products and oral NSAIDs ---- denies taking as she is on Eliquis.  No vitamins, herbals and supplements btw now and surgery, to restart after sx done.  To hold morning of surgery ---- LOSARTAN.  Stop Eliquis 3 days before surgery, resume after.    Follow-up:     RTC as directed and/or prn.        BRANDAN Lin  [facility]       Patient Care Team:  Kim Lakhani MD as PCP - General (Family Medicine)  Dmitri Alonso MD (Inactive) as Consulting Physician (Nephrology)      30 minutes of total time spent on the encounter, which includes face to face time and non-face to face time preparing to see the patient.  This included obtaining and/or reviewing separately obtained history, and documenting clinical information in the electronic or other health record.   Also includes independent interpretation of results (not separately reported) and communicating results to the patient/family/caregiver, with care coordination (not separately reported).

## 2023-03-13 ENCOUNTER — LAB VISIT (OUTPATIENT)
Dept: LAB | Facility: HOSPITAL | Age: 78
End: 2023-03-13
Attending: REGISTERED NURSE
Payer: MEDICARE

## 2023-03-13 ENCOUNTER — OFFICE VISIT (OUTPATIENT)
Dept: FAMILY MEDICINE | Facility: CLINIC | Age: 78
End: 2023-03-13
Payer: MEDICARE

## 2023-03-13 VITALS
DIASTOLIC BLOOD PRESSURE: 64 MMHG | OXYGEN SATURATION: 95 % | HEIGHT: 66 IN | SYSTOLIC BLOOD PRESSURE: 124 MMHG | TEMPERATURE: 97 F | HEART RATE: 77 BPM | WEIGHT: 246.25 LBS | BODY MASS INDEX: 39.58 KG/M2

## 2023-03-13 DIAGNOSIS — N18.32 STAGE 3B CHRONIC KIDNEY DISEASE: ICD-10-CM

## 2023-03-13 DIAGNOSIS — E87.5 HYPERKALEMIA: ICD-10-CM

## 2023-03-13 DIAGNOSIS — Z01.818 PREOP EXAMINATION: Primary | ICD-10-CM

## 2023-03-13 DIAGNOSIS — Z96.651 HISTORY OF KNEE REPLACEMENT PROCEDURE OF RIGHT KNEE: ICD-10-CM

## 2023-03-13 DIAGNOSIS — N18.30 TYPE 2 DIABETES MELLITUS WITH STAGE 3 CHRONIC KIDNEY DISEASE, UNSPECIFIED WHETHER LONG TERM INSULIN USE, UNSPECIFIED WHETHER STAGE 3A OR 3B CKD: ICD-10-CM

## 2023-03-13 DIAGNOSIS — E11.22 TYPE 2 DIABETES MELLITUS WITH STAGE 3 CHRONIC KIDNEY DISEASE, UNSPECIFIED WHETHER LONG TERM INSULIN USE, UNSPECIFIED WHETHER STAGE 3A OR 3B CKD: ICD-10-CM

## 2023-03-13 DIAGNOSIS — Z01.818 PREOP EXAMINATION: ICD-10-CM

## 2023-03-13 DIAGNOSIS — I10 ESSENTIAL HYPERTENSION: ICD-10-CM

## 2023-03-13 DIAGNOSIS — M17.11 OSTEOARTHRITIS OF RIGHT KNEE, UNSPECIFIED OSTEOARTHRITIS TYPE: ICD-10-CM

## 2023-03-13 PROBLEM — T46.6X5A STATIN MYOPATHY: Status: ACTIVE | Noted: 2023-03-13

## 2023-03-13 PROBLEM — G72.0 STATIN MYOPATHY: Status: ACTIVE | Noted: 2023-03-13

## 2023-03-13 PROCEDURE — 1159F PR MEDICATION LIST DOCUMENTED IN MEDICAL RECORD: ICD-10-PCS | Mod: CPTII,S$GLB,, | Performed by: REGISTERED NURSE

## 2023-03-13 PROCEDURE — 1126F PR PAIN SEVERITY QUANTIFIED, NO PAIN PRESENT: ICD-10-PCS | Mod: CPTII,S$GLB,, | Performed by: REGISTERED NURSE

## 2023-03-13 PROCEDURE — 36415 COLL VENOUS BLD VENIPUNCTURE: CPT | Mod: PO | Performed by: REGISTERED NURSE

## 2023-03-13 PROCEDURE — 1101F PT FALLS ASSESS-DOCD LE1/YR: CPT | Mod: CPTII,S$GLB,, | Performed by: REGISTERED NURSE

## 2023-03-13 PROCEDURE — 99214 PR OFFICE/OUTPT VISIT, EST, LEVL IV, 30-39 MIN: ICD-10-PCS | Mod: S$GLB,,, | Performed by: REGISTERED NURSE

## 2023-03-13 PROCEDURE — 1101F PR PT FALLS ASSESS DOC 0-1 FALLS W/OUT INJ PAST YR: ICD-10-PCS | Mod: CPTII,S$GLB,, | Performed by: REGISTERED NURSE

## 2023-03-13 PROCEDURE — 99214 OFFICE O/P EST MOD 30 MIN: CPT | Mod: S$GLB,,, | Performed by: REGISTERED NURSE

## 2023-03-13 PROCEDURE — 3288F FALL RISK ASSESSMENT DOCD: CPT | Mod: CPTII,S$GLB,, | Performed by: REGISTERED NURSE

## 2023-03-13 PROCEDURE — 99999 PR PBB SHADOW E&M-EST. PATIENT-LVL III: ICD-10-PCS | Mod: PBBFAC,,, | Performed by: REGISTERED NURSE

## 2023-03-13 PROCEDURE — 3078F PR MOST RECENT DIASTOLIC BLOOD PRESSURE < 80 MM HG: ICD-10-PCS | Mod: CPTII,S$GLB,, | Performed by: REGISTERED NURSE

## 2023-03-13 PROCEDURE — 3074F SYST BP LT 130 MM HG: CPT | Mod: CPTII,S$GLB,, | Performed by: REGISTERED NURSE

## 2023-03-13 PROCEDURE — 1126F AMNT PAIN NOTED NONE PRSNT: CPT | Mod: CPTII,S$GLB,, | Performed by: REGISTERED NURSE

## 2023-03-13 PROCEDURE — 1159F MED LIST DOCD IN RCRD: CPT | Mod: CPTII,S$GLB,, | Performed by: REGISTERED NURSE

## 2023-03-13 PROCEDURE — 3288F PR FALLS RISK ASSESSMENT DOCUMENTED: ICD-10-PCS | Mod: CPTII,S$GLB,, | Performed by: REGISTERED NURSE

## 2023-03-13 PROCEDURE — 80048 BASIC METABOLIC PNL TOTAL CA: CPT | Performed by: REGISTERED NURSE

## 2023-03-13 PROCEDURE — 99999 PR PBB SHADOW E&M-EST. PATIENT-LVL III: CPT | Mod: PBBFAC,,, | Performed by: REGISTERED NURSE

## 2023-03-13 PROCEDURE — 3078F DIAST BP <80 MM HG: CPT | Mod: CPTII,S$GLB,, | Performed by: REGISTERED NURSE

## 2023-03-13 PROCEDURE — 3074F PR MOST RECENT SYSTOLIC BLOOD PRESSURE < 130 MM HG: ICD-10-PCS | Mod: CPTII,S$GLB,, | Performed by: REGISTERED NURSE

## 2023-03-13 NOTE — PATIENT INSTRUCTIONS
MEDICATION INSTRUCTIONS:    HOLD MORNING OF SURGERY ---- LOSARTAN-HCTZ  HOLD ELIQUIS 3 DAYS BEFORE SURGERY

## 2023-03-13 NOTE — PROGRESS NOTES
Pt returned call and left mess. She is not on a statin due to medication made her legs hurt.  Note placed for provider to review statin and update allergy-problem list as appropriate and drop code if needed.    Mess sent to provider.

## 2023-03-15 LAB
ANION GAP SERPL CALC-SCNC: 8 MMOL/L (ref 8–16)
BUN SERPL-MCNC: 27 MG/DL (ref 8–23)
CALCIUM SERPL-MCNC: 9.5 MG/DL (ref 8.7–10.5)
CHLORIDE SERPL-SCNC: 105 MMOL/L (ref 95–110)
CO2 SERPL-SCNC: 25 MMOL/L (ref 23–29)
CREAT SERPL-MCNC: 1.6 MG/DL (ref 0.5–1.4)
EST. GFR  (NO RACE VARIABLE): 33.2 ML/MIN/1.73 M^2
GLUCOSE SERPL-MCNC: 82 MG/DL (ref 70–110)
POTASSIUM SERPL-SCNC: 4.8 MMOL/L (ref 3.5–5.1)
SODIUM SERPL-SCNC: 138 MMOL/L (ref 136–145)

## 2023-03-16 ENCOUNTER — TELEPHONE (OUTPATIENT)
Dept: FAMILY MEDICINE | Facility: CLINIC | Age: 78
End: 2023-03-16
Payer: MEDICARE

## 2023-04-06 ENCOUNTER — PES CALL (OUTPATIENT)
Dept: ADMINISTRATIVE | Facility: CLINIC | Age: 78
End: 2023-04-06
Payer: MEDICARE

## 2023-04-13 ENCOUNTER — LAB VISIT (OUTPATIENT)
Dept: LAB | Facility: HOSPITAL | Age: 78
End: 2023-04-13
Attending: INTERNAL MEDICINE
Payer: MEDICARE

## 2023-04-13 DIAGNOSIS — N25.81 SECONDARY HYPERPARATHYROIDISM OF RENAL ORIGIN: ICD-10-CM

## 2023-04-13 DIAGNOSIS — N18.32 STAGE 3B CHRONIC KIDNEY DISEASE: ICD-10-CM

## 2023-04-13 DIAGNOSIS — I10 PRIMARY HYPERTENSION: ICD-10-CM

## 2023-04-13 LAB
25(OH)D3+25(OH)D2 SERPL-MCNC: 13 NG/ML (ref 30–96)
ALBUMIN SERPL BCP-MCNC: 3.7 G/DL (ref 3.5–5.2)
ANION GAP SERPL CALC-SCNC: 11 MMOL/L (ref 8–16)
BUN SERPL-MCNC: 23 MG/DL (ref 8–23)
CALCIUM SERPL-MCNC: 9.8 MG/DL (ref 8.7–10.5)
CHLORIDE SERPL-SCNC: 105 MMOL/L (ref 95–110)
CO2 SERPL-SCNC: 25 MMOL/L (ref 23–29)
CREAT SERPL-MCNC: 1.7 MG/DL (ref 0.5–1.4)
EST. GFR  (NO RACE VARIABLE): 30.9 ML/MIN/1.73 M^2
GLUCOSE SERPL-MCNC: 136 MG/DL (ref 70–110)
PHOSPHATE SERPL-MCNC: 3.7 MG/DL (ref 2.7–4.5)
POTASSIUM SERPL-SCNC: 4.7 MMOL/L (ref 3.5–5.1)
PTH-INTACT SERPL-MCNC: 104.1 PG/ML (ref 9–77)
SODIUM SERPL-SCNC: 141 MMOL/L (ref 136–145)

## 2023-04-13 PROCEDURE — 80069 RENAL FUNCTION PANEL: CPT | Performed by: INTERNAL MEDICINE

## 2023-04-13 PROCEDURE — 83970 ASSAY OF PARATHORMONE: CPT | Performed by: INTERNAL MEDICINE

## 2023-04-13 PROCEDURE — 36415 COLL VENOUS BLD VENIPUNCTURE: CPT | Mod: PO | Performed by: INTERNAL MEDICINE

## 2023-04-13 PROCEDURE — 82306 VITAMIN D 25 HYDROXY: CPT | Performed by: INTERNAL MEDICINE

## 2023-04-20 ENCOUNTER — OFFICE VISIT (OUTPATIENT)
Dept: NEPHROLOGY | Facility: CLINIC | Age: 78
End: 2023-04-20
Payer: MEDICARE

## 2023-04-20 VITALS
BODY MASS INDEX: 39.62 KG/M2 | HEART RATE: 72 BPM | HEIGHT: 66 IN | WEIGHT: 246.5 LBS | SYSTOLIC BLOOD PRESSURE: 130 MMHG | DIASTOLIC BLOOD PRESSURE: 70 MMHG

## 2023-04-20 DIAGNOSIS — N18.32 STAGE 3B CHRONIC KIDNEY DISEASE: Primary | ICD-10-CM

## 2023-04-20 DIAGNOSIS — I10 PRIMARY HYPERTENSION: ICD-10-CM

## 2023-04-20 DIAGNOSIS — N25.81 SECONDARY HYPERPARATHYROIDISM OF RENAL ORIGIN: ICD-10-CM

## 2023-04-20 PROCEDURE — 3288F FALL RISK ASSESSMENT DOCD: CPT | Mod: CPTII,S$GLB,, | Performed by: INTERNAL MEDICINE

## 2023-04-20 PROCEDURE — 1101F PR PT FALLS ASSESS DOC 0-1 FALLS W/OUT INJ PAST YR: ICD-10-PCS | Mod: CPTII,S$GLB,, | Performed by: INTERNAL MEDICINE

## 2023-04-20 PROCEDURE — 1159F MED LIST DOCD IN RCRD: CPT | Mod: CPTII,S$GLB,, | Performed by: INTERNAL MEDICINE

## 2023-04-20 PROCEDURE — 1160F PR REVIEW ALL MEDS BY PRESCRIBER/CLIN PHARMACIST DOCUMENTED: ICD-10-PCS | Mod: CPTII,S$GLB,, | Performed by: INTERNAL MEDICINE

## 2023-04-20 PROCEDURE — 3075F PR MOST RECENT SYSTOLIC BLOOD PRESS GE 130-139MM HG: ICD-10-PCS | Mod: CPTII,S$GLB,, | Performed by: INTERNAL MEDICINE

## 2023-04-20 PROCEDURE — 1159F PR MEDICATION LIST DOCUMENTED IN MEDICAL RECORD: ICD-10-PCS | Mod: CPTII,S$GLB,, | Performed by: INTERNAL MEDICINE

## 2023-04-20 PROCEDURE — 1160F RVW MEDS BY RX/DR IN RCRD: CPT | Mod: CPTII,S$GLB,, | Performed by: INTERNAL MEDICINE

## 2023-04-20 PROCEDURE — 1125F PR PAIN SEVERITY QUANTIFIED, PAIN PRESENT: ICD-10-PCS | Mod: CPTII,S$GLB,, | Performed by: INTERNAL MEDICINE

## 2023-04-20 PROCEDURE — 99999 PR PBB SHADOW E&M-EST. PATIENT-LVL III: CPT | Mod: PBBFAC,,, | Performed by: INTERNAL MEDICINE

## 2023-04-20 PROCEDURE — 3288F PR FALLS RISK ASSESSMENT DOCUMENTED: ICD-10-PCS | Mod: CPTII,S$GLB,, | Performed by: INTERNAL MEDICINE

## 2023-04-20 PROCEDURE — 1125F AMNT PAIN NOTED PAIN PRSNT: CPT | Mod: CPTII,S$GLB,, | Performed by: INTERNAL MEDICINE

## 2023-04-20 PROCEDURE — 99214 OFFICE O/P EST MOD 30 MIN: CPT | Mod: S$GLB,,, | Performed by: INTERNAL MEDICINE

## 2023-04-20 PROCEDURE — 3075F SYST BP GE 130 - 139MM HG: CPT | Mod: CPTII,S$GLB,, | Performed by: INTERNAL MEDICINE

## 2023-04-20 PROCEDURE — 99999 PR PBB SHADOW E&M-EST. PATIENT-LVL III: ICD-10-PCS | Mod: PBBFAC,,, | Performed by: INTERNAL MEDICINE

## 2023-04-20 PROCEDURE — 3078F PR MOST RECENT DIASTOLIC BLOOD PRESSURE < 80 MM HG: ICD-10-PCS | Mod: CPTII,S$GLB,, | Performed by: INTERNAL MEDICINE

## 2023-04-20 PROCEDURE — 3078F DIAST BP <80 MM HG: CPT | Mod: CPTII,S$GLB,, | Performed by: INTERNAL MEDICINE

## 2023-04-20 PROCEDURE — 99214 PR OFFICE/OUTPT VISIT, EST, LEVL IV, 30-39 MIN: ICD-10-PCS | Mod: S$GLB,,, | Performed by: INTERNAL MEDICINE

## 2023-04-20 PROCEDURE — 1101F PT FALLS ASSESS-DOCD LE1/YR: CPT | Mod: CPTII,S$GLB,, | Performed by: INTERNAL MEDICINE

## 2023-04-20 RX ORDER — OXYCODONE HYDROCHLORIDE 10 MG/1
TABLET ORAL
COMMUNITY
Start: 2023-03-22 | End: 2023-09-26

## 2023-04-20 RX ORDER — ERGOCALCIFEROL 1.25 MG/1
50000 CAPSULE ORAL
Qty: 12 CAPSULE | Refills: 3 | Status: SHIPPED | OUTPATIENT
Start: 2023-04-20

## 2023-04-20 NOTE — PROGRESS NOTES
"Subjective:       Patient ID: Kay Rosas is a 77 y.o. female.    Chief Complaint: Chronic Kidney Disease    HPI    She presents to clinic today for routine follow-up.  Since her last office visit she her right knee replaced.  She reports that she is still healing but is ambulatory with a walker.  Her laboratory studies and medications were reviewed.  All Nephrology related questions were answered to her satisfaction.      Review of Systems   Constitutional: Negative.    HENT: Negative.     Eyes: Negative.    Respiratory: Negative.     Cardiovascular: Negative.    Gastrointestinal: Negative.    Genitourinary: Negative.    Musculoskeletal: Negative.    Skin: Negative.    Neurological: Negative.        /70   Pulse 72   Ht 5' 6" (1.676 m)   Wt 111.8 kg (246 lb 7.6 oz)   BMI 39.78 kg/m²     Lab Results   Component Value Date    WBC 5.83 11/25/2022    HGB 12.0 11/25/2022    HCT 37.8 11/25/2022    MCV 90 11/25/2022     11/25/2022      BMP  Lab Results   Component Value Date     04/13/2023    K 4.7 04/13/2023     04/13/2023    CO2 25 04/13/2023    BUN 23 04/13/2023    CREATININE 1.7 (H) 04/13/2023    CALCIUM 9.8 04/13/2023    ANIONGAP 11 04/13/2023    ESTGFRAFRICA 34 (A) 07/08/2022    EGFRNONAA 29 (A) 07/08/2022     CMP  Sodium   Date Value Ref Range Status   04/13/2023 141 136 - 145 mmol/L Final     Potassium   Date Value Ref Range Status   04/13/2023 4.7 3.5 - 5.1 mmol/L Final     Chloride   Date Value Ref Range Status   04/13/2023 105 95 - 110 mmol/L Final     CO2   Date Value Ref Range Status   04/13/2023 25 23 - 29 mmol/L Final     Glucose   Date Value Ref Range Status   04/13/2023 136 (H) 70 - 110 mg/dL Final     BUN   Date Value Ref Range Status   04/13/2023 23 8 - 23 mg/dL Final     Creatinine   Date Value Ref Range Status   04/13/2023 1.7 (H) 0.5 - 1.4 mg/dL Final     Calcium   Date Value Ref Range Status   04/13/2023 9.8 8.7 - 10.5 mg/dL Final     Total Protein   Date Value Ref " Range Status   10/20/2022 7.3 6.0 - 8.4 g/dL Final     Albumin   Date Value Ref Range Status   04/13/2023 3.7 3.5 - 5.2 g/dL Final     Total Bilirubin   Date Value Ref Range Status   10/20/2022 0.5 0.1 - 1.0 mg/dL Final     Comment:     For infants and newborns, interpretation of results should be based  on gestational age, weight and in agreement with clinical  observations.    Premature Infant recommended reference ranges:  Up to 24 hours.............<8.0 mg/dL  Up to 48 hours............<12.0 mg/dL  3-5 days..................<15.0 mg/dL  6-29 days.................<15.0 mg/dL       Alkaline Phosphatase   Date Value Ref Range Status   10/20/2022 62 55 - 135 U/L Final     AST   Date Value Ref Range Status   10/20/2022 13 10 - 40 U/L Final     ALT   Date Value Ref Range Status   10/20/2022 10 10 - 44 U/L Final     Anion Gap   Date Value Ref Range Status   04/13/2023 11 8 - 16 mmol/L Final     eGFR if    Date Value Ref Range Status   07/08/2022 34 (A) >60 mL/min/1.73 m^2 Final     eGFR if non    Date Value Ref Range Status   07/08/2022 29 (A) >60 mL/min/1.73 m^2 Final     Comment:     Calculation used to obtain the estimated glomerular filtration  rate (eGFR) is the CKD-EPI equation.        Current Outpatient Medications on File Prior to Visit   Medication Sig Dispense Refill    acetaminophen (TYLENOL) 500 MG tablet Take 2 tablets (1,000 mg total) by mouth every 6 (six) hours as needed for Pain. 40 tablet 0    albuterol (PROVENTIL/VENTOLIN HFA) 90 mcg/actuation inhaler Inhale 2 puffs into the lungs every 4 (four) hours as needed for Wheezing or Shortness of Breath. 18 g 11    ELIQUIS 5 mg Tab TAKE 1 TABLET BY MOUTH TWICE A DAY 60 tablet 10    levothyroxine (SYNTHROID) 50 MCG tablet Take 1 tablet (50 mcg total) by mouth once daily. 90 tablet 3    losartan-hydrochlorothiazide 100-25 mg (HYZAAR) 100-25 mg per tablet Take 1 tablet by mouth once daily. 90 tablet 3    oxyCODONE (ROXICODONE)  10 mg Tab immediate release tablet Take by mouth.      sertraline (ZOLOFT) 100 MG tablet Take 1 tablet (100 mg total) by mouth once daily. 90 tablet 3    sertraline (ZOLOFT) 50 MG tablet Take 1 tablet (50 mg total) by mouth once daily. 90 tablet 3    tiotropium-olodateroL (STIOLTO RESPIMAT) 2.5-2.5 mcg/actuation Mist Inhale 2 puffs into the lungs once daily. Controller 4 g 11    traZODone (DESYREL) 150 MG tablet Take 1 tablet (150 mg total) by mouth every evening. 30 tablet 11     No current facility-administered medications on file prior to visit.            Objective:            Physical Exam  Constitutional:       Appearance: Normal appearance.   HENT:      Head: Normocephalic and atraumatic.   Eyes:      General: No scleral icterus.     Extraocular Movements: Extraocular movements intact.      Pupils: Pupils are equal, round, and reactive to light.   Pulmonary:      Effort: Pulmonary effort is normal.      Breath sounds: No stridor.   Musculoskeletal:      Right lower leg: No edema.      Left lower leg: No edema.   Skin:     General: Skin is warm and dry.   Neurological:      General: No focal deficit present.      Mental Status: She is alert and oriented to person, place, and time.   Psychiatric:         Mood and Affect: Mood normal.         Behavior: Behavior normal.       Assessment:       1. Stage 3b chronic kidney disease    2. Primary hypertension    3. Secondary hyperparathyroidism of renal origin        Plan:       1. Creatinine has remained relatively stable ranging between 1.4 and 1.7 for the past 6 months.  This fluctuation is hemodynamic in nature.  Urinalysis is bland without evidence of proteinuria.    2. Blood pressure is well controlled on current regimen.  She is on a RAAS inhibitor.    3. Intact PTH is elevated 104.  Vitamin-D was low at 13.  Will start 86473 units weekly of ergocalciferol.  Phosphorus was normal 3.7.  Calcium was normal at 9.8 with an albumin of 3.7.        Joshua Spivey  MD

## 2023-05-31 DIAGNOSIS — F51.01 PRIMARY INSOMNIA: ICD-10-CM

## 2023-05-31 RX ORDER — TRAZODONE HYDROCHLORIDE 150 MG/1
150 TABLET ORAL NIGHTLY
Qty: 90 TABLET | Refills: 1 | Status: SHIPPED | OUTPATIENT
Start: 2023-05-31 | End: 2024-01-30 | Stop reason: SDUPTHER

## 2023-05-31 NOTE — TELEPHONE ENCOUNTER
Refill Decision Note   Kay Rosas  is requesting a refill authorization.  Brief Assessment and Rationale for Refill:  Approve     Medication Therapy Plan:         Comments:     Note composed:8:24 AM 05/31/2023             Appointments     Last Visit   10/27/2022 Kim Lakhani MD   Next Visit   Visit date not found Kim Lakhani MD

## 2023-05-31 NOTE — TELEPHONE ENCOUNTER
No care due was identified.  Health [facility] Embedded Care Due Messages. Reference number: [id number].   5/31/2023 12:26:38 AM CDT

## 2023-06-14 DIAGNOSIS — E03.9 ACQUIRED HYPOTHYROIDISM: ICD-10-CM

## 2023-06-14 RX ORDER — LEVOTHYROXINE SODIUM 50 UG/1
TABLET ORAL
Qty: 90 TABLET | Refills: 0 | Status: SHIPPED | OUTPATIENT
Start: 2023-06-14 | End: 2023-08-23

## 2023-06-14 NOTE — TELEPHONE ENCOUNTER
Refill Decision Note   Kay Rosas  is requesting a refill authorization.  Brief Assessment and Rationale for Refill:  Approve     Medication Therapy Plan:  MRM resolved    Medication Reconciliation Completed: No   Comments:     No Care Gaps recommended.     Note composed:9:26 AM 06/14/2023

## 2023-06-14 NOTE — TELEPHONE ENCOUNTER
No care due was identified.  Health [facility] Embedded Care Due Messages. Reference number: [id number].   6/14/2023 12:29:00 AM CDT

## 2023-06-15 DIAGNOSIS — I10 ESSENTIAL HYPERTENSION: ICD-10-CM

## 2023-06-15 RX ORDER — LOSARTAN POTASSIUM AND HYDROCHLOROTHIAZIDE 25; 100 MG/1; MG/1
TABLET ORAL
Qty: 90 TABLET | Refills: 1 | Status: SHIPPED | OUTPATIENT
Start: 2023-06-15

## 2023-06-15 NOTE — TELEPHONE ENCOUNTER
No care due was identified.  [facility] Embedded Care Due Messages. Reference number: [id number].   6/15/2023 12:33:08 AM CDT

## 2023-06-15 NOTE — TELEPHONE ENCOUNTER
Refill Decision Note   Kay Rosas  is requesting a refill authorization.  Brief Assessment and Rationale for Refill:  Approve     Medication Therapy Plan:  Cr. is stable. Ok to approve    Medication Reconciliation Completed: No   Comments:     No Care Gaps recommended.     Note composed:2:54 AM 06/15/2023

## 2023-07-10 DIAGNOSIS — F33.0 MAJOR DEPRESSIVE DISORDER, RECURRENT, MILD: ICD-10-CM

## 2023-07-10 NOTE — TELEPHONE ENCOUNTER
No care due was identified.  Health [facility] Embedded Care Due Messages. Reference number: [id number].   7/10/2023 12:33:39 AM CDT

## 2023-07-10 NOTE — TELEPHONE ENCOUNTER
Refill Routing Note   Medication(s) are not appropriate for processing by [facility] for the following reason(s):      Drug-drug interaction    [facility] action(s):  Defer None identified   Medication Therapy Plan: Duplicate Medication/Therapy: sertraline      Appointments  past 12m or future 3m with PCP    Date Provider   Last Visit   10/27/2022 Kim Lakhani MD   Next Visit   Visit date not found Kim Lakhani MD   ED visits in past 90 days: 0        Note composed:9:45 AM 07/10/2023

## 2023-07-11 RX ORDER — SERTRALINE HYDROCHLORIDE 100 MG/1
TABLET, FILM COATED ORAL
Qty: 90 TABLET | Refills: 0 | Status: SHIPPED | OUTPATIENT
Start: 2023-07-11 | End: 2023-08-23

## 2023-07-22 NOTE — PROGRESS NOTES
"SUBJECTIVE:     Kay Rosas is a 77 y.o. female, here today for:  MEDICARE PREVENTATIVE HEALTH EXAM    HPI:    Kay Rosas has fasted to have bloodwork done today.  I have reviewed the patient's medical history in detail and updated the computerized patient record.  History obtained from the patient.      HEALTHCARE MAINTENANCE:    COMPLETED:  Health Maintenance Topics with due status: Not Due       Topic Last Completion Date    DEXA Scan 05/18/2022    Influenza Vaccine 10/13/2022    Hemoglobin A1c 03/06/2023       DUE:  Health Maintenance Due   Topic Date Due    TETANUS VACCINE  Never done    Shingles Vaccine (1 of 2) Never done    Eye Exam  02/21/2019    Diabetes Urine Screening  02/18/2020    LDCT Lung Screen  02/14/2021    Lipid Panel  04/26/2023         REVIEW OF SYSTEMS:  Review of Systems   Constitutional:  Positive for appetite change (reports as "fair"). Negative for activity change, chills, diaphoresis, fatigue, fever and unexpected weight change.   HENT: Negative.     Eyes:  Negative for discharge and visual disturbance.   Respiratory:  Positive for shortness of breath. Negative for cough and wheezing.         Former smoker, quit in 2006.  Uses oxygen as ordered --- followed by Pulmonary for chronic emphysema and lung granuloma.   Cardiovascular:  Negative for chest pain, palpitations and leg swelling.        HTN --- on med, no home checks.   Gastrointestinal: Negative.    Endocrine: Positive for diabetes (no home glucose checks).   Genitourinary: Negative.    Musculoskeletal:  Arthralgias: OA knees, followed by Orthopedics.   Integumentary:  Negative for rash and mole/lesion.   Neurological:  Negative for vertigo, seizures, syncope, numbness and headaches.   Hematological: Negative.    Psychiatric/Behavioral:  Negative for depression and sleep disturbance. The patient is not nervous/anxious.         On sertraline, stable   All other systems reviewed and are negative.  Breast: negative.  "         ALLERGIES:  Review of patient's allergies indicates:   Allergen Reactions    Nsaids (non-steroidal anti-inflammatory drug)      CKD         CURRENT PROBLEM LIST:  Patient Active Problem List   Diagnosis    Essential hypertension    Acquired hypothyroidism    Hyperlipidemia    Arthritis    B12 deficiency    Depression    Lumbar spondylosis    COPD, moderate    PAD (peripheral artery disease)    Osteoarthritis of both knees    Chronic bilateral low back pain with right-sided sciatica    Lumbar radiculopathy    Greater trochanteric bursitis of both hips    Obesity (BMI 35.0-39.9 without comorbidity)    Secondary hyperparathyroidism of renal origin    Deep vein thrombosis (DVT) of lower extremity    Primary insomnia    Iron deficiency anemia due to chronic blood loss    Embolism and thrombosis of unspecified artery    Major depressive disorder, recurrent, mild    Statin myopathy    Malignant neoplasm of left ovary         CURRENT MEDICATIONS:    Current Outpatient Medications:     acetaminophen (TYLENOL) 500 MG tablet, Take 2 tablets (1,000 mg total) by mouth every 6 (six) hours as needed for Pain., Disp: 40 tablet, Rfl: 0    albuterol (PROVENTIL/VENTOLIN HFA) 90 mcg/actuation inhaler, Inhale 2 puffs into the lungs every 4 (four) hours as needed for Wheezing or Shortness of Breath., Disp: 18 g, Rfl: 11    ELIQUIS 5 mg Tab, TAKE 1 TABLET BY MOUTH TWICE A DAY, Disp: 60 tablet, Rfl: 10    ergocalciferol (ERGOCALCIFEROL) 50,000 unit Cap, Take 1 capsule (50,000 Units total) by mouth every 7 days., Disp: 12 capsule, Rfl: 3    ibuprofen (ADVIL,MOTRIN) 800 MG tablet, Take by mouth., Disp: , Rfl:     levothyroxine (SYNTHROID) 50 MCG tablet, TAKE 1 TABLET BY MOUTH EVERY DAY, Disp: 90 tablet, Rfl: 0    losartan-hydrochlorothiazide 100-25 mg (HYZAAR) 100-25 mg per tablet, TAKE 1 TABLET BY MOUTH EVERY DAY, Disp: 90 tablet, Rfl: 1    oxyCODONE (ROXICODONE) 10 mg Tab immediate release tablet, Take by mouth., Disp: , Rfl:      sertraline (ZOLOFT) 100 MG tablet, TAKE 1 TABLET BY MOUTH EVERY DAY, Disp: 90 tablet, Rfl: 0    sertraline (ZOLOFT) 50 MG tablet, Take 1 tablet (50 mg total) by mouth once daily., Disp: 90 tablet, Rfl: 1    tiotropium-olodateroL (STIOLTO RESPIMAT) 2.5-2.5 mcg/actuation Mist, Inhale 2 puffs into the lungs once daily. Controller, Disp: 4 g, Rfl: 11    traMADoL (ULTRAM) 50 mg tablet, Take 50 mg by mouth., Disp: , Rfl:     traZODone (DESYREL) 150 MG tablet, Take 1 tablet (150 mg total) by mouth every evening., Disp: 90 tablet, Rfl: 1      HISTORY:    PAST MEDICAL HISTORY:  Past Medical History:   Diagnosis Date    Anticoagulant long-term use     Arthritis     Degenerative disc disease, cervical     Disorder of kidney and ureter     CKD stage 3    DVT (deep venous thrombosis)     Emphysema of lung     Hyperlipidemia     Hypertension     MVP (mitral valve prolapse)     On home oxygen therapy     3 liters as needed at night    Osteoporosis     feet    Thyroid condition        PAST SURGICAL HISTORY:  Past Surgical History:   Procedure Laterality Date    BACK SURGERY  2012    CATARACT EXTRACTION Bilateral 10/2012    HYSTERECTOMY Left 1978    INJECTION OF ANESTHETIC AGENT INTO SACROILIAC JOINT Bilateral 11/11/2019    Procedure: Bilateral GT bursa + SIJ injection;  Surgeon: Noe Pleitez MD;  Location: [facility] PAIN [facility]GT;  Service: Pain Management;  Laterality: Bilateral;    INJECTION OF JOINT Bilateral 11/11/2019    Procedure: Bilateral GT bursa + SIJ injection;  Surgeon: Noe Pleitez MD;  Location: [facility]IN [facility];  Service: Pain Management;  Laterality: Bilateral;    JOINT REPLACEMENT Right 2021    knee    ROBOT-ASSISTED LAPAROSCOPIC LYSIS OF ADHESIONS USING DA SP XI N/A 12/05/2022    Procedure: XI ROBOTIC LYSIS, ADHESIONS;  Surgeon: Den Barragan MD;  Location: [facility] OR;  Service: Oncology;  Laterality: N/A;    ROBOT-ASSISTED LAPAROSCOPIC SALPINGO-OOPHORECTOMY USING DA SP XI Left 12/05/2022    Procedure: XI ROBOTIC  "SALPINGO-OOPHORECTOMY;  Surgeon: Den Barragan MD;  Location: [facility];  Service: Oncology;  Laterality: Left;    SURGICAL REMOVAL OF BONE SPUR Left     left elbow    VASCULAR SURGERY Right     high ligation due to blood clot        FAMILY HISTORY:  Family History   Problem Relation Age of Onset    Heart disease Mother     Kidney disease Mother     Arthritis Mother     Breast cancer Mother     Cancer Father     Heart disease Father     Cancer Sister     Ovarian cancer Sister     Alzheimer's disease Sister        SOCIAL HISTORY:  Social History     Socioeconomic History    Marital status:    Tobacco Use    Smoking status: Former    Smokeless tobacco: Never   Substance and Sexual Activity    Alcohol use: No    Drug use: No    Sexual activity: Yes     Partners: Male     Birth control/protection: None         OBJECTIVE:     VITAL SIGNS:  Vitals:    07/26/23 1345   BP: 136/64   Pulse: 95   Temp: 98.1 °F (36.7 °C)   SpO2: 96%   Weight: 113.2 kg (249 lb 9 oz)   Height: 5' 6" (1.676 m)       CURRENT BMI:   Body mass index is 40.28 kg/m².    PHYSICAL EXAM:  Physical Exam  Constitutional:       General: She is not in acute distress.     Appearance: She is well-developed. She is not diaphoretic.   HENT:      Head: Normocephalic and atraumatic.      Right Ear: Tympanic membrane, ear canal and external ear normal. There is no impacted cerumen.      Left Ear: Tympanic membrane, ear canal and external ear normal. There is no impacted cerumen.      Nose: Nose normal. No nasal deformity, mucosal edema, congestion or rhinorrhea.      Mouth/Throat:      Mouth: Mucous membranes are moist. Mucous membranes are not dry and not cyanotic. No oral lesions.      Dentition: Normal dentition.      Pharynx: Oropharynx is clear. Uvula midline. No oropharyngeal exudate, posterior oropharyngeal erythema or uvula swelling.      Tonsils: No tonsillar abscesses.   Eyes:      General: Lids are normal. Lids are everted, no foreign bodies " appreciated. No scleral icterus.        Right eye: No discharge.         Left eye: No discharge.      Conjunctiva/sclera: Conjunctivae normal.      Right eye: Right conjunctiva is not injected. No exudate.     Left eye: Left conjunctiva is not injected. No exudate.     Pupils: Pupils are equal, round, and reactive to light.   Neck:      Thyroid: No thyroid mass or thyromegaly.      Vascular: No carotid bruit or JVD.      Trachea: No tracheal deviation.   Cardiovascular:      Rate and Rhythm: Normal rate and regular rhythm.      Pulses: Normal pulses.      Heart sounds: Normal heart sounds. No murmur heard.     No friction rub. No gallop.   Pulmonary:      Effort: Pulmonary effort is normal. No respiratory distress.      Breath sounds: Normal breath sounds. No stridor. No wheezing.   Chest:      Chest wall: No tenderness.   Abdominal:      General: Bowel sounds are normal. There is no distension.      Palpations: Abdomen is soft. There is no mass.      Tenderness: There is no abdominal tenderness. There is no guarding or rebound.   Musculoskeletal:         General: Tenderness (mild bilat knee joint pain/swelling) present. No deformity.      Cervical back: Normal range of motion and neck supple. No edema or erythema. Normal range of motion.      Right lower leg: No edema.      Left lower leg: No edema.   Lymphadenopathy:      Cervical: No cervical adenopathy.   Skin:     General: Skin is warm and dry.      Capillary Refill: Capillary refill takes less than 2 seconds.      Coloration: Skin is not pale.      Findings: No bruising, erythema or rash.   Neurological:      Mental Status: She is alert and oriented to person, place, and time. Mental status is at baseline.      Motor: No tremor, atrophy or abnormal muscle tone.      Gait: Gait normal.      Deep Tendon Reflexes: Reflexes are normal and symmetric.   Psychiatric:         Mood and Affect: Mood normal.         Speech: Speech normal.         Behavior: Behavior  normal.         Thought Content: Thought content normal.         Cognition and Memory: Memory is not impaired.         Judgment: Judgment normal.         ~~~~~~~~~~~~~~~~~~~~~~~~~~~~~~~~~~~~~~~~~~~~~~~    LABS REVIEWED:    HEMATOLOGY:    Lab Results   Component Value Date    WBC 5.83 11/25/2022    RBC 4.21 11/25/2022    HGB 12.0 11/25/2022    HCT 37.8 11/25/2022    MCV 90 11/25/2022    MCH 28.5 11/25/2022    MCHC 31.7 (L) 11/25/2022    RDW 14.5 11/25/2022     11/25/2022    MPV 10.0 11/25/2022    GRAN 3.9 11/25/2022    GRAN 67.0 11/25/2022    LYMPH 1.4 11/25/2022    LYMPH 23.2 11/25/2022    MONO 0.4 11/25/2022    MONO 6.5 11/25/2022    EOS 0.2 11/25/2022    BASO 0.02 11/25/2022    EOSINOPHIL 2.7 11/25/2022    BASOPHIL 0.3 11/25/2022     Lab Results   Component Value Date    FERRITIN 297.7 (H) 03/02/2021     Lab Results   Component Value Date    IRON 18 (L) 03/02/2021    TRANSFERRIN 231 07/23/2020    TIBC 228 03/02/2021    FESATURATED 20 07/23/2020      No results found for: RETICCTPCT    CHEMISTRY:    Lab Results   Component Value Date     04/13/2023    K 4.7 04/13/2023     04/13/2023    CO2 25 04/13/2023    ANIONGAP 11 04/13/2023     Lab Results   Component Value Date    .1 (H) 04/13/2023    CALCIUM 9.8 04/13/2023    PHOS 3.7 04/13/2023     No results found for: MG    LIPID PROFILE:    Lab Results   Component Value Date    CHOL 241 (H) 04/26/2022     Lab Results   Component Value Date    HDL 61 04/26/2022     Lab Results   Component Value Date    LDLCALC 152.0 04/26/2022     Lab Results   Component Value Date    TRIG 140 04/26/2022       RENAL:    Lab Results   Component Value Date    CREATININE 1.7 (H) 04/13/2023    BUN 23 04/13/2023          eGFR   Date Value Ref Range Status   04/13/2023 30.9 (A) >60 mL/min/1.73 m^2 Final       Lab Results   Component Value Date    URICACID 5.1 10/07/2019       LIVER:    Lab Results   Component Value Date    ALT 10 10/20/2022    AST 13 10/20/2022     ALKPHOS 62 10/20/2022    BILITOT 0.5 10/20/2022       THYROID:    Lab Results   Component Value Date    TSH 2.049 04/26/2022    FREET4 0.96 04/26/2022       DIABETES SCREENING:    Glucose   Date Value Ref Range Status   04/13/2023 136 (H) 70 - 110 mg/dL Final     No results found for: LABINSU  Lab Results   Component Value Date    HGBA1C 5.8 03/06/2023       STD/VIRAL DISEASE SCREEN:    Lab Results   Component Value Date    [id number] Negative 02/11/2020     Lab Results   Component Value Date    HEPAIGM Negative 02/11/2020    HEPBIGM Negative 02/11/2020    HEPCAB Negative 02/11/2020     No results found for: RPR  No results found for: HTO9UWI, HWR7QEF    NUTRITION/VITAMINS:    Lab Results   Component Value Date    [id number] 272 04/26/2022     Vit D, 25-Hydroxy   Date Value Ref Range Status   04/13/2023 13 (L) 30 - 96 ng/mL Final     Comment:     Vitamin D deficiency.........<10 ng/mL                              Vitamin D insufficiency......10-29 ng/mL       Vitamin D sufficiency........> or equal to 30 ng/mL  Vitamin D toxicity............>100 ng/mL       No results found for: FOLATE    ASSESSMENT:     1. Essential hypertension  Chronic issue, on med as ordered.  DASH diet, stay active, reg home monitoring.  -     CBC Auto Differential; Future; Expected date: 07/26/2023  -     Comprehensive Metabolic Panel; Future; Expected date: 07/26/2023  -     Lipid Panel; Future; Expected date: 07/26/2023  -     TSH; Future; Expected date: 07/26/2023    2. Hyperlipidemia, unspecified hyperlipidemia type  No med currently, update lab.  -     Lipid Panel; Future; Expected date: 07/26/2023    3. Coronary artery calcification  Seen on previous CT imaging, to Card for further evaluation.  -     Ambulatory referral/consult to Cardiology; Future; Expected date: 08/02/2023    4. Prediabetes  History of, last A1c normal range.  -     Comprehensive Metabolic Panel; Future; Expected date: 07/26/2023    5. Stage 3b chronic kidney  disease  Last GFR 30.9, discussed renal protection  -     Comprehensive Metabolic Panel; Future; Expected date: 07/26/2023    6. Embolism and thrombosis of unspecified artery  History of DVT, stable    7. Malignant neoplasm of left ovary  History of with subsequent hysterectomy, stable.    8. Anemia, unspecified type  Update lab  -     CBC Auto Differential; Future; Expected date: 07/26/2023  -     Ferritin; Future; Expected date: 07/26/2023  -     Iron and TIBC; Future; Expected date: 07/26/2023    9. Elevated parathyroid hormone  Update lab, last calcium level okay, VIt-D low  -     PTH, intact; Future; Expected date: 07/26/2023    10. Morbid obesity with BMI of 40.0-44.9, adult  Healthy dietary and lifestyle changes as discussed.    11. Encounter for routine eye and vision examination  Overdue, ref to Opt placed  -     Ambulatory referral/consult to Optometry; Future; Expected date: 08/02/2023      PLAN:     Healthy dietary and lifestyle changes discussed.      FOLLOW-UP:     Pending lab.  RTC as directed and/or prn.        BRANDAN Lin  [facility]       Patient Care Team:  Kim Lakhani MD as PCP - General (Family Medicine)  Macho Prado MD as Consulting Physician (Orthopedic Surgery)  Joshua Spivey MD as Consulting Physician (Nephrology)  Michoacano Wilder MD as Consulting Physician (Pulmonary Disease)  William Fernández NP as Nurse Practitioner (Family Medicine)      30 minutes of total time spent on the encounter, which includes face to face time and non-face to face time preparing to see the patient.  This includes obtaining and/or reviewing separately obtained history, performing a medically appropriate examination and/or evaluation, and counseling and educating the patient/family/caregiver.  Includes documenting clinical information in the electronic or other health record, independently interpreting results (not separately reported) and  communicating results to the patient/family/caregiver, with care coordination (not separately reported).  Medications, tests and/or procedures ordered as necessary along with referring and communicating with other health professionals (when not separately reported).

## 2023-07-26 ENCOUNTER — LAB VISIT (OUTPATIENT)
Dept: LAB | Facility: HOSPITAL | Age: 78
End: 2023-07-26
Attending: REGISTERED NURSE
Payer: MEDICARE

## 2023-07-26 ENCOUNTER — OFFICE VISIT (OUTPATIENT)
Dept: FAMILY MEDICINE | Facility: CLINIC | Age: 78
End: 2023-07-26
Payer: MEDICARE

## 2023-07-26 VITALS
OXYGEN SATURATION: 96 % | DIASTOLIC BLOOD PRESSURE: 64 MMHG | SYSTOLIC BLOOD PRESSURE: 136 MMHG | HEART RATE: 95 BPM | BODY MASS INDEX: 40.11 KG/M2 | TEMPERATURE: 98 F | WEIGHT: 249.56 LBS | HEIGHT: 66 IN

## 2023-07-26 DIAGNOSIS — I25.84 CORONARY ARTERY CALCIFICATION: ICD-10-CM

## 2023-07-26 DIAGNOSIS — N18.32 STAGE 3B CHRONIC KIDNEY DISEASE: ICD-10-CM

## 2023-07-26 DIAGNOSIS — I10 ESSENTIAL HYPERTENSION: ICD-10-CM

## 2023-07-26 DIAGNOSIS — I74.9 EMBOLISM AND THROMBOSIS OF UNSPECIFIED ARTERY: ICD-10-CM

## 2023-07-26 DIAGNOSIS — R73.03 PREDIABETES: ICD-10-CM

## 2023-07-26 DIAGNOSIS — I25.10 CORONARY ARTERY CALCIFICATION: ICD-10-CM

## 2023-07-26 DIAGNOSIS — R79.89 ELEVATED PARATHYROID HORMONE: ICD-10-CM

## 2023-07-26 DIAGNOSIS — D64.9 ANEMIA, UNSPECIFIED TYPE: ICD-10-CM

## 2023-07-26 DIAGNOSIS — Z01.00 ENCOUNTER FOR ROUTINE EYE AND VISION EXAMINATION: ICD-10-CM

## 2023-07-26 DIAGNOSIS — E66.01 MORBID OBESITY WITH BMI OF 40.0-44.9, ADULT: ICD-10-CM

## 2023-07-26 DIAGNOSIS — I10 ESSENTIAL HYPERTENSION: Primary | ICD-10-CM

## 2023-07-26 DIAGNOSIS — C56.2 MALIGNANT NEOPLASM OF LEFT OVARY: ICD-10-CM

## 2023-07-26 DIAGNOSIS — E78.5 HYPERLIPIDEMIA, UNSPECIFIED HYPERLIPIDEMIA TYPE: ICD-10-CM

## 2023-07-26 LAB
ALBUMIN SERPL BCP-MCNC: 3.7 G/DL (ref 3.5–5.2)
ALP SERPL-CCNC: 64 U/L (ref 55–135)
ALT SERPL W/O P-5'-P-CCNC: 13 U/L (ref 10–44)
ANION GAP SERPL CALC-SCNC: 13 MMOL/L (ref 8–16)
AST SERPL-CCNC: 16 U/L (ref 10–40)
BASOPHILS # BLD AUTO: 0.04 K/UL (ref 0–0.2)
BASOPHILS NFR BLD: 0.6 % (ref 0–1.9)
BILIRUB SERPL-MCNC: 0.2 MG/DL (ref 0.1–1)
BUN SERPL-MCNC: 25 MG/DL (ref 8–23)
CALCIUM SERPL-MCNC: 9.3 MG/DL (ref 8.7–10.5)
CHLORIDE SERPL-SCNC: 106 MMOL/L (ref 95–110)
CHOLEST SERPL-MCNC: 201 MG/DL (ref 120–199)
CHOLEST/HDLC SERPL: 3.9 {RATIO} (ref 2–5)
CO2 SERPL-SCNC: 25 MMOL/L (ref 23–29)
CREAT SERPL-MCNC: 1.5 MG/DL (ref 0.5–1.4)
DIFFERENTIAL METHOD: ABNORMAL
EOSINOPHIL # BLD AUTO: 0.2 K/UL (ref 0–0.5)
EOSINOPHIL NFR BLD: 3.1 % (ref 0–8)
ERYTHROCYTE [DISTWIDTH] IN BLOOD BY AUTOMATED COUNT: 15.1 % (ref 11.5–14.5)
EST. GFR  (NO RACE VARIABLE): 35.9 ML/MIN/1.73 M^2
FERRITIN SERPL-MCNC: 36 NG/ML (ref 20–300)
GLUCOSE SERPL-MCNC: 131 MG/DL (ref 70–110)
HCT VFR BLD AUTO: 35.1 % (ref 37–48.5)
HDLC SERPL-MCNC: 52 MG/DL (ref 40–75)
HDLC SERPL: 25.9 % (ref 20–50)
HGB BLD-MCNC: 10.6 G/DL (ref 12–16)
IMM GRANULOCYTES # BLD AUTO: 0.02 K/UL (ref 0–0.04)
IMM GRANULOCYTES NFR BLD AUTO: 0.3 % (ref 0–0.5)
IRON SERPL-MCNC: 31 UG/DL (ref 30–160)
LDLC SERPL CALC-MCNC: 116.6 MG/DL (ref 63–159)
LYMPHOCYTES # BLD AUTO: 1.8 K/UL (ref 1–4.8)
LYMPHOCYTES NFR BLD: 28.4 % (ref 18–48)
MCH RBC QN AUTO: 27.5 PG (ref 27–31)
MCHC RBC AUTO-ENTMCNC: 30.2 G/DL (ref 32–36)
MCV RBC AUTO: 91 FL (ref 82–98)
MONOCYTES # BLD AUTO: 0.4 K/UL (ref 0.3–1)
MONOCYTES NFR BLD: 6.6 % (ref 4–15)
NEUTROPHILS # BLD AUTO: 3.8 K/UL (ref 1.8–7.7)
NEUTROPHILS NFR BLD: 61 % (ref 38–73)
NONHDLC SERPL-MCNC: 149 MG/DL
NRBC BLD-RTO: 0 /100 WBC
PLATELET # BLD AUTO: 190 K/UL (ref 150–450)
PMV BLD AUTO: 12.3 FL (ref 9.2–12.9)
POTASSIUM SERPL-SCNC: 3.9 MMOL/L (ref 3.5–5.1)
PROT SERPL-MCNC: 7 G/DL (ref 6–8.4)
PTH-INTACT SERPL-MCNC: 113.3 PG/ML (ref 9–77)
RBC # BLD AUTO: 3.85 M/UL (ref 4–5.4)
SATURATED IRON: 8 % (ref 20–50)
SODIUM SERPL-SCNC: 144 MMOL/L (ref 136–145)
TOTAL IRON BINDING CAPACITY: 392 UG/DL (ref 250–450)
TRANSFERRIN SERPL-MCNC: 265 MG/DL (ref 200–375)
TRIGL SERPL-MCNC: 162 MG/DL (ref 30–150)
TSH SERPL DL<=0.005 MIU/L-ACNC: 2.08 UIU/ML (ref 0.4–4)
WBC # BLD AUTO: 6.19 K/UL (ref 3.9–12.7)

## 2023-07-26 PROCEDURE — 3288F PR FALLS RISK ASSESSMENT DOCUMENTED: ICD-10-PCS | Mod: CPTII,S$GLB,, | Performed by: REGISTERED NURSE

## 2023-07-26 PROCEDURE — 84466 ASSAY OF TRANSFERRIN: CPT | Performed by: REGISTERED NURSE

## 2023-07-26 PROCEDURE — 36415 COLL VENOUS BLD VENIPUNCTURE: CPT | Mod: PO | Performed by: REGISTERED NURSE

## 2023-07-26 PROCEDURE — 83970 ASSAY OF PARATHORMONE: CPT | Performed by: REGISTERED NURSE

## 2023-07-26 PROCEDURE — 1101F PR PT FALLS ASSESS DOC 0-1 FALLS W/OUT INJ PAST YR: ICD-10-PCS | Mod: CPTII,S$GLB,, | Performed by: REGISTERED NURSE

## 2023-07-26 PROCEDURE — 80061 LIPID PANEL: CPT | Performed by: REGISTERED NURSE

## 2023-07-26 PROCEDURE — 99999 PR PBB SHADOW E&M-EST. PATIENT-LVL V: CPT | Mod: PBBFAC,,, | Performed by: REGISTERED NURSE

## 2023-07-26 PROCEDURE — 1125F PR PAIN SEVERITY QUANTIFIED, PAIN PRESENT: ICD-10-PCS | Mod: CPTII,S$GLB,, | Performed by: REGISTERED NURSE

## 2023-07-26 PROCEDURE — 3078F DIAST BP <80 MM HG: CPT | Mod: CPTII,S$GLB,, | Performed by: REGISTERED NURSE

## 2023-07-26 PROCEDURE — 3075F SYST BP GE 130 - 139MM HG: CPT | Mod: CPTII,S$GLB,, | Performed by: REGISTERED NURSE

## 2023-07-26 PROCEDURE — 3288F FALL RISK ASSESSMENT DOCD: CPT | Mod: CPTII,S$GLB,, | Performed by: REGISTERED NURSE

## 2023-07-26 PROCEDURE — 99214 OFFICE O/P EST MOD 30 MIN: CPT | Mod: S$GLB,,, | Performed by: REGISTERED NURSE

## 2023-07-26 PROCEDURE — 1125F AMNT PAIN NOTED PAIN PRSNT: CPT | Mod: CPTII,S$GLB,, | Performed by: REGISTERED NURSE

## 2023-07-26 PROCEDURE — 3078F PR MOST RECENT DIASTOLIC BLOOD PRESSURE < 80 MM HG: ICD-10-PCS | Mod: CPTII,S$GLB,, | Performed by: REGISTERED NURSE

## 2023-07-26 PROCEDURE — 85025 COMPLETE CBC W/AUTO DIFF WBC: CPT | Performed by: REGISTERED NURSE

## 2023-07-26 PROCEDURE — 3075F PR MOST RECENT SYSTOLIC BLOOD PRESS GE 130-139MM HG: ICD-10-PCS | Mod: CPTII,S$GLB,, | Performed by: REGISTERED NURSE

## 2023-07-26 PROCEDURE — 80053 COMPREHEN METABOLIC PANEL: CPT | Performed by: REGISTERED NURSE

## 2023-07-26 PROCEDURE — 83540 ASSAY OF IRON: CPT | Performed by: REGISTERED NURSE

## 2023-07-26 PROCEDURE — 1101F PT FALLS ASSESS-DOCD LE1/YR: CPT | Mod: CPTII,S$GLB,, | Performed by: REGISTERED NURSE

## 2023-07-26 PROCEDURE — 99999 PR PBB SHADOW E&M-EST. PATIENT-LVL V: ICD-10-PCS | Mod: PBBFAC,,, | Performed by: REGISTERED NURSE

## 2023-07-26 PROCEDURE — 99214 PR OFFICE/OUTPT VISIT, EST, LEVL IV, 30-39 MIN: ICD-10-PCS | Mod: S$GLB,,, | Performed by: REGISTERED NURSE

## 2023-07-26 PROCEDURE — 84443 ASSAY THYROID STIM HORMONE: CPT | Performed by: REGISTERED NURSE

## 2023-07-26 PROCEDURE — 82728 ASSAY OF FERRITIN: CPT | Performed by: REGISTERED NURSE

## 2023-07-26 RX ORDER — TRAMADOL HYDROCHLORIDE 50 MG/1
50 TABLET ORAL
COMMUNITY
Start: 2023-05-12 | End: 2024-02-12

## 2023-07-26 RX ORDER — IBUPROFEN 800 MG/1
TABLET ORAL
COMMUNITY
Start: 2023-07-17 | End: 2024-02-12

## 2023-08-02 ENCOUNTER — OFFICE VISIT (OUTPATIENT)
Dept: CARDIOLOGY | Facility: CLINIC | Age: 78
End: 2023-08-02
Payer: MEDICARE

## 2023-08-02 ENCOUNTER — HOSPITAL ENCOUNTER (OUTPATIENT)
Dept: CARDIOLOGY | Facility: HOSPITAL | Age: 78
Discharge: HOME | End: 2023-08-02
Attending: STUDENT IN AN ORGANIZED HEALTH CARE EDUCATION/TRAINING PROGRAM
Payer: MEDICARE

## 2023-08-02 VITALS
OXYGEN SATURATION: 97 % | HEART RATE: 80 BPM | SYSTOLIC BLOOD PRESSURE: 140 MMHG | BODY MASS INDEX: 40.11 KG/M2 | DIASTOLIC BLOOD PRESSURE: 60 MMHG | WEIGHT: 249.56 LBS | HEIGHT: 66 IN

## 2023-08-02 DIAGNOSIS — E78.5 HYPERLIPIDEMIA, UNSPECIFIED HYPERLIPIDEMIA TYPE: ICD-10-CM

## 2023-08-02 DIAGNOSIS — I25.10 CORONARY ARTERY CALCIFICATION: Primary | ICD-10-CM

## 2023-08-02 DIAGNOSIS — M17.0 PRIMARY OSTEOARTHRITIS OF BOTH KNEES: ICD-10-CM

## 2023-08-02 DIAGNOSIS — I10 ESSENTIAL HYPERTENSION: ICD-10-CM

## 2023-08-02 DIAGNOSIS — J44.9 COPD, MODERATE: ICD-10-CM

## 2023-08-02 DIAGNOSIS — C56.2 MALIGNANT NEOPLASM OF LEFT OVARY: ICD-10-CM

## 2023-08-02 DIAGNOSIS — E66.01 MORBID OBESITY: ICD-10-CM

## 2023-08-02 DIAGNOSIS — I25.10 CORONARY ARTERY CALCIFICATION: ICD-10-CM

## 2023-08-02 DIAGNOSIS — I25.84 CORONARY ARTERY CALCIFICATION: ICD-10-CM

## 2023-08-02 DIAGNOSIS — E03.9 ACQUIRED HYPOTHYROIDISM: ICD-10-CM

## 2023-08-02 DIAGNOSIS — I25.84 CORONARY ARTERY CALCIFICATION: Primary | ICD-10-CM

## 2023-08-02 DIAGNOSIS — N18.9 CHRONIC KIDNEY DISEASE, UNSPECIFIED CKD STAGE: ICD-10-CM

## 2023-08-02 PROBLEM — Z86.718 HISTORY OF DVT (DEEP VEIN THROMBOSIS): Status: ACTIVE | Noted: 2020-02-13

## 2023-08-02 PROCEDURE — 3078F PR MOST RECENT DIASTOLIC BLOOD PRESSURE < 80 MM HG: ICD-10-PCS | Mod: CPTII,S$GLB,, | Performed by: STUDENT IN AN ORGANIZED HEALTH CARE EDUCATION/TRAINING PROGRAM

## 2023-08-02 PROCEDURE — 3288F FALL RISK ASSESSMENT DOCD: CPT | Mod: CPTII,S$GLB,, | Performed by: STUDENT IN AN ORGANIZED HEALTH CARE EDUCATION/TRAINING PROGRAM

## 2023-08-02 PROCEDURE — 3077F PR MOST RECENT SYSTOLIC BLOOD PRESSURE >= 140 MM HG: ICD-10-PCS | Mod: CPTII,S$GLB,, | Performed by: STUDENT IN AN ORGANIZED HEALTH CARE EDUCATION/TRAINING PROGRAM

## 2023-08-02 PROCEDURE — 99214 PR OFFICE/OUTPT VISIT, EST, LEVL IV, 30-39 MIN: ICD-10-PCS | Mod: S$GLB,,, | Performed by: STUDENT IN AN ORGANIZED HEALTH CARE EDUCATION/TRAINING PROGRAM

## 2023-08-02 PROCEDURE — 93010 EKG 12-LEAD: ICD-10-PCS | Mod: ,,, | Performed by: INTERNAL MEDICINE

## 2023-08-02 PROCEDURE — 1159F PR MEDICATION LIST DOCUMENTED IN MEDICAL RECORD: ICD-10-PCS | Mod: CPTII,S$GLB,, | Performed by: STUDENT IN AN ORGANIZED HEALTH CARE EDUCATION/TRAINING PROGRAM

## 2023-08-02 PROCEDURE — 99999 PR PBB SHADOW E&M-EST. PATIENT-LVL IV: ICD-10-PCS | Mod: PBBFAC,,, | Performed by: STUDENT IN AN ORGANIZED HEALTH CARE EDUCATION/TRAINING PROGRAM

## 2023-08-02 PROCEDURE — 1101F PR PT FALLS ASSESS DOC 0-1 FALLS W/OUT INJ PAST YR: ICD-10-PCS | Mod: CPTII,S$GLB,, | Performed by: STUDENT IN AN ORGANIZED HEALTH CARE EDUCATION/TRAINING PROGRAM

## 2023-08-02 PROCEDURE — 1126F PR PAIN SEVERITY QUANTIFIED, NO PAIN PRESENT: ICD-10-PCS | Mod: CPTII,S$GLB,, | Performed by: STUDENT IN AN ORGANIZED HEALTH CARE EDUCATION/TRAINING PROGRAM

## 2023-08-02 PROCEDURE — 99214 OFFICE O/P EST MOD 30 MIN: CPT | Mod: S$GLB,,, | Performed by: STUDENT IN AN ORGANIZED HEALTH CARE EDUCATION/TRAINING PROGRAM

## 2023-08-02 PROCEDURE — 3288F PR FALLS RISK ASSESSMENT DOCUMENTED: ICD-10-PCS | Mod: CPTII,S$GLB,, | Performed by: STUDENT IN AN ORGANIZED HEALTH CARE EDUCATION/TRAINING PROGRAM

## 2023-08-02 PROCEDURE — 1159F MED LIST DOCD IN RCRD: CPT | Mod: CPTII,S$GLB,, | Performed by: STUDENT IN AN ORGANIZED HEALTH CARE EDUCATION/TRAINING PROGRAM

## 2023-08-02 PROCEDURE — 99999 PR PBB SHADOW E&M-EST. PATIENT-LVL IV: CPT | Mod: PBBFAC,,, | Performed by: STUDENT IN AN ORGANIZED HEALTH CARE EDUCATION/TRAINING PROGRAM

## 2023-08-02 PROCEDURE — 1126F AMNT PAIN NOTED NONE PRSNT: CPT | Mod: CPTII,S$GLB,, | Performed by: STUDENT IN AN ORGANIZED HEALTH CARE EDUCATION/TRAINING PROGRAM

## 2023-08-02 PROCEDURE — 3077F SYST BP >= 140 MM HG: CPT | Mod: CPTII,S$GLB,, | Performed by: STUDENT IN AN ORGANIZED HEALTH CARE EDUCATION/TRAINING PROGRAM

## 2023-08-02 PROCEDURE — 93010 ELECTROCARDIOGRAM REPORT: CPT | Mod: ,,, | Performed by: INTERNAL MEDICINE

## 2023-08-02 PROCEDURE — 1101F PT FALLS ASSESS-DOCD LE1/YR: CPT | Mod: CPTII,S$GLB,, | Performed by: STUDENT IN AN ORGANIZED HEALTH CARE EDUCATION/TRAINING PROGRAM

## 2023-08-02 PROCEDURE — 3078F DIAST BP <80 MM HG: CPT | Mod: CPTII,S$GLB,, | Performed by: STUDENT IN AN ORGANIZED HEALTH CARE EDUCATION/TRAINING PROGRAM

## 2023-08-02 PROCEDURE — 93005 ELECTROCARDIOGRAM TRACING: CPT

## 2023-08-02 RX ORDER — EZETIMIBE 10 MG/1
10 TABLET ORAL DAILY
Qty: 30 TABLET | Refills: 6 | Status: SHIPPED | OUTPATIENT
Start: 2023-08-02 | End: 2023-11-15

## 2023-08-02 NOTE — PROGRESS NOTES
Section of Cardiology                  Cardiac Clinic Note    Chief Complaint/Reason for consultation: establish care       HPI:   Kay Rosas is a 77 y.o. female with h/o COPD, pre-diabertes, ovarian mass s/p ovary removal,       8/2/23  Last seen Dr. Luna in cardiology clinic 2019:  Reviewed brandy withe xercise no significant pad. Has a combination of neurogenic claudication osteoarthritis of knees and neuropathy.  Her leg swelling is related to varicosities and element of venous insufficiency.  Needs weight loss diet compliance ortho eval.  Leg elevation and compression stockings discussed.      Today   CT chest 2020 with coronary artery calcification   Reports she wears intermittent home O2  Gets SOB intermittently, stable   Has pain in her body at times  Walks with a walker, knee pain  Just put her mom in her NH  Mild chest pain, intermittent for years   BP elevated today, does not check at home   Did not take meds this morning   LE swelling, mostly in the left leg with knee pain     No longer takes eliquis for DVT due to being expensive (diagnosed several years ago per patient)  No falls at home     Denies PND, orthopnea    Stopped smoking 2006  Denies ETOH abuse  Family hx: mom- afib, CHF      EKG 8/2/23 NSR, no acute ST - T wave changes    ECHO  No results found for this or any previous visit.       STRESS TEST No results found for this or any previous visit.       LHC No results found for this or any previous visit.            ROS: All 10 systems reviewed. Please refer to the HPI for pertinent positives. All other systems negative.     Past Medical History  Past Medical History:   Diagnosis Date    Anticoagulant long-term use     Arthritis     Degenerative disc disease, cervical     Disorder of kidney and ureter     CKD stage 3    DVT (deep venous thrombosis)     Emphysema of lung     Hyperlipidemia     Hypertension     Malignant neoplasm of left ovary 7/26/2023    MVP (mitral valve  prolapse)     On home oxygen therapy     3 liters as needed at night    Osteoporosis     feet    Thyroid condition        Surgical History  Past Surgical History:   Procedure Laterality Date    BACK SURGERY  2012    CATARACT EXTRACTION Bilateral 10/2012    HYSTERECTOMY Left 1978    INJECTION OF ANESTHETIC AGENT INTO SACROILIAC JOINT Bilateral 2019    Procedure: Bilateral GT bursa + SIJ injection;  Surgeon: Noe Pleitez MD;  Location: [facility];  Service: Pain Management;  Laterality: Bilateral;    INJECTION OF JOINT Bilateral 2019    Procedure: Bilateral GT bursa + SIJ injection;  Surgeon: Noe Pleitez MD;  Location: [facility];  Service: Pain Management;  Laterality: Bilateral;    JOINT REPLACEMENT Right     knee    ROBOT-ASSISTED LAPAROSCOPIC LYSIS OF ADHESIONS USING DA SP XI N/A 2022    Procedure: XI ROBOTIC LYSIS, ADHESIONS;  Surgeon: Den Barragan MD;  Location: [facility];  Service: Oncology;  Laterality: N/A;    ROBOT-ASSISTED LAPAROSCOPIC SALPINGO-OOPHORECTOMY USING DA SP XI Left 2022    Procedure: XI ROBOTIC SALPINGO-OOPHORECTOMY;  Surgeon: Den Barragan MD;  Location: [facility];  Service: Oncology;  Laterality: Left;    SURGICAL REMOVAL OF BONE SPUR Left     left elbow    VASCULAR SURGERY Right     high ligation due to blood clot           Allergies:   Review of patient's allergies indicates:   Allergen Reactions    Nsaids (non-steroidal anti-inflammatory drug)      CKD       Social History:  Social History     Socioeconomic History    Marital status:    Tobacco Use    Smoking status: Former     Current packs/day: 0.00     Average packs/day: 1 pack/day for 53.5 years (53.5 ttl pk-yrs)     Types: Cigarettes     Start date: 1962     Quit date: 2015     Years since quittin.1    Smokeless tobacco: Never   Substance and Sexual Activity    Alcohol use: No    Drug use: No    Sexual activity: Yes     Partners: Male     Birth control/protection: None  "      Family History:  family history includes Alzheimer's disease in her sister; Arthritis in her mother; Breast cancer in her mother; Cancer in her father and sister; Heart disease in her father and mother; Kidney disease in her mother; Ovarian cancer in her sister.    Home Medications:  Current Outpatient Medications on File Prior to Visit   Medication Sig Dispense Refill    acetaminophen (TYLENOL) 500 MG tablet Take 2 tablets (1,000 mg total) by mouth every 6 (six) hours as needed for Pain. 40 tablet 0    albuterol (PROVENTIL/VENTOLIN HFA) 90 mcg/actuation inhaler Inhale 2 puffs into the lungs every 4 (four) hours as needed for Wheezing or Shortness of Breath. 18 g 11    ergocalciferol (ERGOCALCIFEROL) 50,000 unit Cap Take 1 capsule (50,000 Units total) by mouth every 7 days. 12 capsule 3    ibuprofen (ADVIL,MOTRIN) 800 MG tablet Take by mouth.      levothyroxine (SYNTHROID) 50 MCG tablet TAKE 1 TABLET BY MOUTH EVERY DAY 90 tablet 0    losartan-hydrochlorothiazide 100-25 mg (HYZAAR) 100-25 mg per tablet TAKE 1 TABLET BY MOUTH EVERY DAY 90 tablet 1    oxyCODONE (ROXICODONE) 10 mg Tab immediate release tablet Take by mouth.      sertraline (ZOLOFT) 100 MG tablet TAKE 1 TABLET BY MOUTH EVERY DAY 90 tablet 0    sertraline (ZOLOFT) 50 MG tablet Take 1 tablet (50 mg total) by mouth once daily. 90 tablet 1    tiotropium-olodateroL (STIOLTO RESPIMAT) 2.5-2.5 mcg/actuation Mist Inhale 2 puffs into the lungs once daily. Controller 4 g 11    traMADoL (ULTRAM) 50 mg tablet Take 50 mg by mouth.      traZODone (DESYREL) 150 MG tablet Take 1 tablet (150 mg total) by mouth every evening. 90 tablet 1    [DISCONTINUED] ELIQUIS 5 mg Tab TAKE 1 TABLET BY MOUTH TWICE A DAY 60 tablet 10     No current facility-administered medications on file prior to visit.       Physical exam:  BP (!) 140/60 (BP Location: Right arm, Patient Position: Sitting, BP Method: Large (Manual))   Pulse 80   Ht 5' 6" (1.676 m)   Wt 113.2 kg (249 lb 9 oz)  " " SpO2 97%   BMI 40.28 kg/m²         General: Pt is a 77 y.o. year old female who is AAOx3, in NAD, is pleasant, well nourished, looks stated age  HEENT: PERRL, EOMI, Oral mucosa pink & moist  CVS  No abnormal cardiac pulsations noted on inspection. JVP not raised. The apical impulse is normal on palpation, and is located in the left 5th intercostal space in the mid - clavicular line. No palpable thrills or abnormal pulsations noted. RR, S1 - S2 heard, no murmurs, rubs or gallops appreciated.   PUL : CTA B/L. No wheezes/crackles heard   ABD : BS +, soft. No tenderness elicited   LE : No C/C/E. Distal Pulses palpable B/L         LABS:    Chemistry:   Lab Results   Component Value Date     07/26/2023    K 3.9 07/26/2023     07/26/2023    CO2 25 07/26/2023    BUN 25 (H) 07/26/2023    CREATININE 1.5 (H) 07/26/2023    CALCIUM 9.3 07/26/2023     Cardiac Markers: No results found for: "CKTOTAL", "CKMB", "CKMBINDEX", "TROPONINI"  Cardiac Markers (Last 3): No results found for: "CKTOTAL", "CKMB", "CKMBINDEX", "TROPONINI"  CBC:   Lab Results   Component Value Date    WBC 6.19 07/26/2023    HGB 10.6 (L) 07/26/2023    HCT 35.1 (L) 07/26/2023    MCV 91 07/26/2023     07/26/2023     Lipids:   Lab Results   Component Value Date    CHOL 201 (H) 07/26/2023    TRIG 162 (H) 07/26/2023    HDL 52 07/26/2023     Coagulation:   Lab Results   Component Value Date    INR 1.0 03/06/2023    INR 0.9 02/18/2021    APTT 24.7 02/18/2021           Assessment      1. Coronary artery calcification    2. Morbid obesity    3. COPD, moderate    4. Primary osteoarthritis of both knees    5. Acquired hypothyroidism    6. Hyperlipidemia, unspecified hyperlipidemia type    7. Essential hypertension    8. Malignant neoplasm of left ovary    9. Chronic kidney disease, unspecified CKD stage         Plan:    Coronary artery calcification  Seen on CT chest in 2020  Denies chest pain    Hypertension  Elevated   Usually normotensive  Did not " take meds today   Continue losartan/hydrochlorothiazide    HLD   as of 7/23  Currently not on therapy  ASCVD 10yr risk: 25.8%  Start zetia  Reports significant issues with statins     COPD   Intermittent supplemental O2 at home    Ovarian mass   s/p ovarian removal    Osteoarthritis   Intermittent knee pain   Takes p.r.n. pain medication    CKD  Stable    Hypothyroidism  Stable   Continue Synthroid    Obesity, Body mass index is 40.28 kg/m².   Low salt, low fat diet  Exercise as tolerated, at least 30 min daily     This note was prepared using voice recognition system and is likely to have sound alike errors that may have been overlooked even after proofreading.     I have reviewed all pertinent chart information.  Plans and recommendations have been formulated under my direct supervision. All questions answered and patient voiced understanding.   If symptoms persist go to the ED.    RTC in        Gertrudiscarissa Carroll MD  Cardiology

## 2023-08-23 DIAGNOSIS — F33.0 MAJOR DEPRESSIVE DISORDER, RECURRENT, MILD: ICD-10-CM

## 2023-08-23 DIAGNOSIS — E03.9 ACQUIRED HYPOTHYROIDISM: ICD-10-CM

## 2023-08-23 RX ORDER — LEVOTHYROXINE SODIUM 50 UG/1
TABLET ORAL
Qty: 90 TABLET | Refills: 0 | Status: SHIPPED | OUTPATIENT
Start: 2023-08-23

## 2023-08-23 RX ORDER — SERTRALINE HYDROCHLORIDE 50 MG/1
TABLET, FILM COATED ORAL
Qty: 90 TABLET | Refills: 0 | Status: SHIPPED | OUTPATIENT
Start: 2023-08-23

## 2023-08-23 RX ORDER — SERTRALINE HYDROCHLORIDE 100 MG/1
TABLET, FILM COATED ORAL
Qty: 90 TABLET | Refills: 0 | Status: SHIPPED | OUTPATIENT
Start: 2023-08-23

## 2023-08-23 NOTE — TELEPHONE ENCOUNTER
Care Due:                  Date            Visit Type   Department     Provider  --------------------------------------------------------------------------------                                EP -                              PRIMARY      [facility]  Last Visit: 10-      CARE ([facility])   MEDICINE       Kim Lakhani  Next Visit: None Scheduled  None         None Found                                                            Last  Test          Frequency    Reason                     Performed    Due Date  --------------------------------------------------------------------------------    Office Visit  12 months..  losartan-hydrochlorothiaz  10-   10-                             caron, sertraline,                             traZODone................    [employer] Embedded Care Due Messages. Reference number: [id number].   8/23/2023 10:09:49 AM CDT

## 2023-08-23 NOTE — TELEPHONE ENCOUNTER
Provider Staff:  Action required for this patient     Please see care gap opportunities below in Care Due Message.    Thanks!  [facility]     Appointments      Date Provider   Last Visit   10/27/2022 Kim Lakhani MD   Next Visit   Visit date not found Kim Lakhani MD     Refill Decision Note   Kay Rosas  is requesting a refill authorization.  Brief Assessment and Rationale for Refill:  Approve     Medication Therapy Plan:         Comments:     Note composed:11:09 AM 08/23/2023

## 2023-08-24 ENCOUNTER — OFFICE VISIT (OUTPATIENT)
Dept: SPORTS MEDICINE | Facility: CLINIC | Age: 78
End: 2023-08-24
Payer: MEDICARE

## 2023-08-24 VITALS — HEIGHT: 66 IN | BODY MASS INDEX: 40.02 KG/M2 | WEIGHT: 249 LBS

## 2023-08-24 DIAGNOSIS — M25.562 CHRONIC PAIN OF LEFT KNEE: ICD-10-CM

## 2023-08-24 DIAGNOSIS — G89.29 CHRONIC PAIN OF LEFT KNEE: ICD-10-CM

## 2023-08-24 DIAGNOSIS — M17.12 PRIMARY OSTEOARTHRITIS OF LEFT KNEE: Primary | ICD-10-CM

## 2023-08-24 PROCEDURE — 99999 PR PBB SHADOW E&M-EST. PATIENT-LVL III: ICD-10-PCS | Mod: PBBFAC,,, | Performed by: STUDENT IN AN ORGANIZED HEALTH CARE EDUCATION/TRAINING PROGRAM

## 2023-08-24 PROCEDURE — 20611 LARGE JOINT ASPIRATION/INJECTION: L KNEE: ICD-10-PCS | Mod: LT,S$GLB,, | Performed by: STUDENT IN AN ORGANIZED HEALTH CARE EDUCATION/TRAINING PROGRAM

## 2023-08-24 PROCEDURE — 99999 PR PBB SHADOW E&M-EST. PATIENT-LVL III: CPT | Mod: PBBFAC,,, | Performed by: STUDENT IN AN ORGANIZED HEALTH CARE EDUCATION/TRAINING PROGRAM

## 2023-08-24 PROCEDURE — 99204 PR OFFICE/OUTPT VISIT, NEW, LEVL IV, 45-59 MIN: ICD-10-PCS | Mod: 25,S$GLB,, | Performed by: STUDENT IN AN ORGANIZED HEALTH CARE EDUCATION/TRAINING PROGRAM

## 2023-08-24 PROCEDURE — 1159F MED LIST DOCD IN RCRD: CPT | Mod: CPTII,S$GLB,, | Performed by: STUDENT IN AN ORGANIZED HEALTH CARE EDUCATION/TRAINING PROGRAM

## 2023-08-24 PROCEDURE — 99204 OFFICE O/P NEW MOD 45 MIN: CPT | Mod: 25,S$GLB,, | Performed by: STUDENT IN AN ORGANIZED HEALTH CARE EDUCATION/TRAINING PROGRAM

## 2023-08-24 PROCEDURE — 1125F AMNT PAIN NOTED PAIN PRSNT: CPT | Mod: CPTII,S$GLB,, | Performed by: STUDENT IN AN ORGANIZED HEALTH CARE EDUCATION/TRAINING PROGRAM

## 2023-08-24 PROCEDURE — 1159F PR MEDICATION LIST DOCUMENTED IN MEDICAL RECORD: ICD-10-PCS | Mod: CPTII,S$GLB,, | Performed by: STUDENT IN AN ORGANIZED HEALTH CARE EDUCATION/TRAINING PROGRAM

## 2023-08-24 PROCEDURE — 1125F PR PAIN SEVERITY QUANTIFIED, PAIN PRESENT: ICD-10-PCS | Mod: CPTII,S$GLB,, | Performed by: STUDENT IN AN ORGANIZED HEALTH CARE EDUCATION/TRAINING PROGRAM

## 2023-08-24 PROCEDURE — 20611 DRAIN/INJ JOINT/BURSA W/US: CPT | Mod: LT,S$GLB,, | Performed by: STUDENT IN AN ORGANIZED HEALTH CARE EDUCATION/TRAINING PROGRAM

## 2023-08-24 RX ORDER — TRIAMCINOLONE ACETONIDE 40 MG/ML
40 INJECTION, SUSPENSION INTRA-ARTICULAR; INTRAMUSCULAR
Status: DISCONTINUED | OUTPATIENT
Start: 2023-08-24 | End: 2023-08-24 | Stop reason: HOSPADM

## 2023-08-24 RX ORDER — BUPIVACAINE HYDROCHLORIDE 5 MG/ML
2 INJECTION, SOLUTION PERINEURAL
Status: DISCONTINUED | OUTPATIENT
Start: 2023-08-24 | End: 2023-08-24 | Stop reason: HOSPADM

## 2023-08-24 RX ORDER — LIDOCAINE HYDROCHLORIDE 10 MG/ML
2 INJECTION INFILTRATION; PERINEURAL
Status: DISCONTINUED | OUTPATIENT
Start: 2023-08-24 | End: 2023-08-24 | Stop reason: HOSPADM

## 2023-08-24 RX ADMIN — TRIAMCINOLONE ACETONIDE 40 MG: 40 INJECTION, SUSPENSION INTRA-ARTICULAR; INTRAMUSCULAR at 10:08

## 2023-08-24 RX ADMIN — LIDOCAINE HYDROCHLORIDE 2 ML: 10 INJECTION INFILTRATION; PERINEURAL at 10:08

## 2023-08-24 RX ADMIN — BUPIVACAINE HYDROCHLORIDE 2 ML: 5 INJECTION, SOLUTION PERINEURAL at 10:08

## 2023-08-24 NOTE — PROCEDURES
Large Joint Aspiration/Injection: L knee    Date/Time: 8/24/2023 10:40 AM    Performed by: Bakari Clark MD  Authorized by: Bakari Clark MD    Consent Done?:  Yes (Verbal)  Indications:  Pain and joint swelling  Site marked: the procedure site was marked    Timeout: prior to procedure the correct patient, procedure, and site was verified    Prep: patient was prepped and draped in usual sterile fashion      Local anesthesia used?: Yes    Local anesthetic:  Topical anesthetic    Details:  Needle Size:  22 G  Ultrasonic Guidance for needle placement?: Yes    Images are saved and documented.  Approach: Superior lateral.  Location:  Knee  Site:  L knee  Medications:  40 mg triamcinolone acetonide 40 mg/mL; 2 mL LIDOcaine HCL 10 mg/ml (1%) 10 mg/mL (1 %); 2 mL BUPivacaine 0.5 % (5 mg/mL)  Patient tolerance:  Patient tolerated the procedure well with no immediate complications     Ultrasound guidance was used for needle localization. Images were saved and stored for documentation. The appropriate structures were visualized. Dynamic visualization of the needle was continuous throughout the procedures and maintained good position.     We discussed the proper protocols after the injection such as no submerging pools, baths tubs, or hot tubs for 24 hr.  Showering is okay today.  We also discussed that blood sugars can be elevated after an injection and asked patient to properly checked her sugars over the next few days and contact their PCP if there are any concerns.  We discussed red flags such as fevers, chills, red, warm, tender joint at the area of injection to please seek medical care immediately.

## 2023-08-24 NOTE — PROGRESS NOTES
Patient ID: Kay Rosas  YOB: 1945  MRN: [id number]    Chief Complaint: Pain of the Left Knee    History of Present Illness: Kay Rosas is a 77 y.o. female who presents today with chronic left knee pain. She has a previous history of a double knee replacement on her right knee. Regarding her left knee she has previously seen Dr. Tiwari who injected the 3 series visco shots roughly three years ago. More recently she has been seeing  who has done 1 gel injection and several CSI. She reports the last CSI made her pain much worse and has not improved. Her chief complaints are sharp pains with walking, trouble standing up from chairs, and pain extending her knee.     The patient is active in none.  Occupation: retired    Past Medical History:   Past Medical History:   Diagnosis Date    Anticoagulant long-term use     Arthritis     Degenerative disc disease, cervical     Disorder of kidney and ureter     CKD stage 3    DVT (deep venous thrombosis)     Emphysema of lung     Hyperlipidemia     Hypertension     Malignant neoplasm of left ovary 7/26/2023    MVP (mitral valve prolapse)     On home oxygen therapy     3 liters as needed at night    Osteoporosis     feet    Thyroid condition      Past Surgical History:   Procedure Laterality Date    BACK SURGERY  2012    CATARACT EXTRACTION Bilateral 10/2012    HYSTERECTOMY Left 1978    INJECTION OF ANESTHETIC AGENT INTO SACROILIAC JOINT Bilateral 11/11/2019    Procedure: Bilateral GT bursa + SIJ injection;  Surgeon: Noe Pleitez MD;  Location: [facility];  Service: Pain Management;  Laterality: Bilateral;    INJECTION OF JOINT Bilateral 11/11/2019    Procedure: Bilateral GT bursa + SIJ injection;  Surgeon: Noe Pleitez MD;  [facility];  Service: Pain Management;  Laterality: Bilateral;    JOINT REPLACEMENT Right 2021    knee    ROBOT-ASSISTED LAPAROSCOPIC LYSIS OF ADHESIONS USING DA SP XI N/A 12/05/2022     Procedure: XI ROBOTIC LYSIS, ADHESIONS;  Surgeon: Den Barragan MD;  Location: [facility];  Service: Oncology;  Laterality: N/A;    ROBOT-ASSISTED LAPAROSCOPIC SALPINGO-OOPHORECTOMY USING DA SP XI Left 2022    Procedure: XI ROBOTIC SALPINGO-OOPHORECTOMY;  Surgeon: Den Barragan MD;  Location: [facility];  Service: Oncology;  Laterality: Left;    SURGICAL REMOVAL OF BONE SPUR Left     left elbow    VASCULAR SURGERY Right     high ligation due to blood clot      Family History   Problem Relation Age of Onset    Heart disease Mother     Kidney disease Mother     Arthritis Mother     Breast cancer Mother     Cancer Father     Heart disease Father     Cancer Sister     Ovarian cancer Sister     Alzheimer's disease Sister      Social History     Socioeconomic History    Marital status:    Tobacco Use    Smoking status: Former     Current packs/day: 0.00     Average packs/day: 1 pack/day for 53.5 years (53.5 ttl pk-yrs)     Types: Cigarettes     Start date: 1962     Quit date: 2015     Years since quittin.1    Smokeless tobacco: Never   Substance and Sexual Activity    Alcohol use: No    Drug use: No    Sexual activity: Yes     Partners: Male     Birth control/protection: None     Medication List with Changes/Refills   Current Medications    ACETAMINOPHEN (TYLENOL) 500 MG TABLET    Take 2 tablets (1,000 mg total) by mouth every 6 (six) hours as needed for Pain.    ALBUTEROL (PROVENTIL/VENTOLIN HFA) 90 MCG/ACTUATION INHALER    Inhale 2 puffs into the lungs every 4 (four) hours as needed for Wheezing or Shortness of Breath.    ERGOCALCIFEROL (ERGOCALCIFEROL) 50,000 UNIT CAP    Take 1 capsule (50,000 Units total) by mouth every 7 days.    EZETIMIBE (ZETIA) 10 MG TABLET    Take 1 tablet (10 mg total) by mouth once daily.    IBUPROFEN (ADVIL,MOTRIN) 800 MG TABLET    Take by mouth.    LEVOTHYROXINE (SYNTHROID) 50 MCG TABLET    TAKE 1 TABLET BY MOUTH EVERY DAY    LOSARTAN-HYDROCHLOROTHIAZIDE 100-25 MG  (HYZAAR) 100-25 MG PER TABLET    TAKE 1 TABLET BY MOUTH EVERY DAY    OXYCODONE (ROXICODONE) 10 MG TAB IMMEDIATE RELEASE TABLET    Take by mouth.    SERTRALINE (ZOLOFT) 100 MG TABLET    TAKE 1 TABLET BY MOUTH EVERY DAY    SERTRALINE (ZOLOFT) 50 MG TABLET    TAKE 1 TABLET BY MOUTH EVERY DAY    TIOTROPIUM-OLODATEROL (STIOLTO RESPIMAT) 2.5-2.5 MCG/ACTUATION MIST    Inhale 2 puffs into the lungs once daily. Controller    TRAMADOL (ULTRAM) 50 MG TABLET    Take 50 mg by mouth.    TRAZODONE (DESYREL) 150 MG TABLET    Take 1 tablet (150 mg total) by mouth every evening.     Review of patient's allergies indicates:   Allergen Reactions    Nsaids (non-steroidal anti-inflammatory drug)      CKD       Physical Exam:   Body mass index is 40.19 kg/m².    GENERAL: Well appearing, in no acute distress.  HEAD: Normocephalic and atraumatic.  ENT: External ears and nose grossly normal.  EYES: EOMI bilaterally  PULMONARY: Respirations are grossly even and non-labored.  NEURO: Awake, alert, and oriented x 3.  SKIN: No obvious rashes appreciated.  PSYCH: Mood & affect are appropriate.    Detailed MSK exam:     TTP along medial joint line and superior-lateral patella  Pain with knee extension     Valgus Stress test (-)    Imaging:  X-Ray Chest PA And Lateral  Narrative: EXAMINATION:  Two view chest radiograph.    CLINICAL HISTORY:  Chronic obstructive pulmonary disease, unspecifiedSOB;    TECHNIQUE:  2 view of the chest.    COMPARISON:  Chest radiograph 02/18/2021.    FINDINGS:  The lungs are clear without consolidation or effusion.  There is no pneumothorax.  The left mid lung granuloma is unchanged.  The cardiac silhouette is normal in size.  There is no acute osseous abnormality.  Impression: No acute cardiopulmonary abnormality.    Electronically signed by: Capo Owusu MD  Date:    11/11/2022  Time:    14:45      Relevant imaging results were reviewed and interpreted by me and per my read as above.  This was discussed with the patient  and / or family today.     Assessment:  Kay Rosas is a 77 y.o. female presents today for primary osteoarthritis left knee.  Status post knee replacement in May with Dr. Prado for her right knee has not been happy with the results.  Would like to hold off on any replacements at this time.  Recently had a gel injection in her left knee 1 month ago without much relief.  She is interested in the help with her pain today.  Discussed an intra-articular CSI today and possible Iovera in the future.  She is interested in this.  I discussed moving forward with this plan potentially may need to see our joint replacement doctor as well.  Follow-up with me for Iovera left knee.    Primary osteoarthritis of left knee    Chronic pain of left knee  -     ULS US Guidance for Needle Placement  -     Iovera; Future         A copy of today's visit note has been sent to the referring provider.       Bakari Clark MD    Disclaimer: This note was prepared using a voice recognition system and is likely to have sound alike errors within the text.

## 2023-08-24 NOTE — PATIENT INSTRUCTIONS
Assessment:  Kay Rosas is a 77 y.o. female   Chief Complaint   Patient presents with    Left Knee - Pain       Encounter Diagnoses   Name Primary?    Chronic pain of left knee     Primary osteoarthritis of left knee Yes        Plan:  Reviewed your x-rays with you today and discussed pertinent findings.  We have reviewed the natural history of this disorder and discussed treatment and management options moving forward  corticosteroid injection today  Iovera procedure for knee  We discussed the proper protocols after the injection such as no submerging pools, baths tubs, or hot tubs for 24 hr.  Showering is okay today.  We also discussed that blood sugars can be elevated after an injection and asked patient to properly checked her sugars over the next few days and contact their PCP if there are any concerns.  We discussed red flags such as fevers, chills, red, warm, tender joint at the area of injection to please seek medical care immediately.       General Arthritis info:    -shiny white stuff at end of a chicken bones is cartilage    -arthritis is wearing away of the cartilage that lines the end of your bones    -osteoarthritis is thought to be a wear and tear phenomenon    -symptoms are due to inflammation of joint causing stiffness, aching, and sometimes swelling    -occasionally sharp pain will occur causing a give way sensation    -Risk factors: genetic, weight, female > male, age    Treatment options:    -maintain healthy weight (every pound is 4 pounds of pressure on the knee)    -daily moderate exercise (walk, bike, swim 30 minutes per day) to keep joints moving    -daily strengthening exercises (through therapy or on own) to keep muscles supporting joint healthy and strong    -glucosamine 1500mg daily (look for USP label on bottle)    -tylenol as needed for pain (follow directions on the bottle)    -anti-inflammatory medication such as alleve may be helpful- take 1-2 tabs twice daily for 7 days. If  it helps your pain, continue. If you do not feel any change, you may stop and then take it as needed.    (you may be given a once daily anti-inflammatory such as MOBIC. If given, avoid other anti-inflammatory medications such as advil, ibuprofen, motrin, naprosyn, alleve, etc)    -if swollen and painful, ice, decrease activity, and take anti-inflammatory daily for 5-7 days and if no relief call your doctor for further options    -consider cortisone injection (every 3-4 months at most)- anti- inflammatory steroid medication that can be injected directly into the joint to reduce inflammation    -consider hyaluronic acid injections (eufflexxa, hyalgan, synvisc, supartz) (every 6 months at most)- protein injection that helps decrease pain and irritability in the joint. It is best used to help prolong intervals between cortisone injections to minimize steroid injections. These are currently approved for knee injections. Discuss with your doctor if other joint involved. Call to seek approval prior to the injections.    -long-term treatment may include a total joint replacement (keep diary of good days and bad days, then evaluate as to when you are ready)   Supplements for pain, inflammation, arthritis.    Glucosamine sulfate/chondroitin sulfate has been shown to be safe and effective for knee arthritis (and possibly other joints affected with arthritis). This is an over the counter medication/supplement and usually needs to be taken for 1-2 months before results are seen. If you do not see any results after this time, consider stopping it. The dose is usually found on the bottle, and is 1500mg to start (first 1-2 months) then you can back down to 1000 mg (current recommendations are 1500mg per day, all at once).    Some people can have stomach problems with this and if you do, you may stop taking it or divide up the doses. If you are ALLERGIC TO SHELLFISH, please do not take. Follow the instructions on the  bottle.    Other supplements that have been shown to help with joint and muscle pain include:    (Unless otherwise noted, as these supplements are not FDA controlled, please follow the recommendations on the bottle)    Turmeric 500mg PO BID    Flax seed oil    Avocado soybean unsaponifiables    JAYDON-e    MSM    Cherry juice extract (tart)    Benja    Anikinara    Diacerein    If there are any concerns about taking these supplements with other medications you may already be taking, you can speak to your pharmacist, physician, health care provider, or research other medical information available to you. Side effects and interactions are possible with any supplement or medication - be safe!            Follow-up: For Iovera or sooner if there are any problems between now and then.    Thank you for choosing [facility] and Dr. Bakari Clark for your orthopedic & sports medicine care. It is our goal to provide you with exceptional care that will help keep you healthy, active, and get you back in the game.    Please do not hesitate to reach out to us via email, phone, or MyChart with any questions, concerns, or feedback.    If you felt that you received exemplary care today, please consider leaving us feedback on [email or web address] at:  [email or web address]    If you are experiencing pain/discomfort ,or have questions after 5pm and would like to be connected to the [facility] on-call team, please call this number and specify which Sports Medicine provider is treating you: [phone]

## 2023-09-15 ENCOUNTER — PROCEDURE VISIT (OUTPATIENT)
Dept: SPORTS MEDICINE | Facility: CLINIC | Age: 78
End: 2023-09-15
Payer: MEDICARE

## 2023-09-15 VITALS — BODY MASS INDEX: 40.02 KG/M2 | WEIGHT: 249 LBS | HEIGHT: 66 IN

## 2023-09-15 DIAGNOSIS — G89.29 CHRONIC PAIN OF LEFT KNEE: ICD-10-CM

## 2023-09-15 DIAGNOSIS — M25.562 CHRONIC PAIN OF LEFT KNEE: ICD-10-CM

## 2023-09-15 PROCEDURE — 64640 INJECTION TREATMENT OF NERVE: CPT | Mod: LT,S$GLB,, | Performed by: STUDENT IN AN ORGANIZED HEALTH CARE EDUCATION/TRAINING PROGRAM

## 2023-09-15 PROCEDURE — 76942 ECHO GUIDE FOR BIOPSY: CPT | Mod: S$GLB,,, | Performed by: STUDENT IN AN ORGANIZED HEALTH CARE EDUCATION/TRAINING PROGRAM

## 2023-09-15 PROCEDURE — 76942 PR U/S GUIDANCE FOR NEEDLE GUIDANCE: ICD-10-PCS | Mod: S$GLB,,, | Performed by: STUDENT IN AN ORGANIZED HEALTH CARE EDUCATION/TRAINING PROGRAM

## 2023-09-15 PROCEDURE — 99499 NO LOS: ICD-10-PCS | Mod: S$GLB,,, | Performed by: STUDENT IN AN ORGANIZED HEALTH CARE EDUCATION/TRAINING PROGRAM

## 2023-09-15 PROCEDURE — 64640 PR DESTRUCT BY NEURO AGENT; OTHER PERIPH NERVE: ICD-10-PCS | Mod: LT,S$GLB,, | Performed by: STUDENT IN AN ORGANIZED HEALTH CARE EDUCATION/TRAINING PROGRAM

## 2023-09-15 PROCEDURE — 99499 UNLISTED E&M SERVICE: CPT | Mod: S$GLB,,, | Performed by: STUDENT IN AN ORGANIZED HEALTH CARE EDUCATION/TRAINING PROGRAM

## 2023-09-15 NOTE — PROCEDURES
CC: left knee pain    77 y.o. Female presents today for Iovera procedure for left knee pain.    INFORMED CONSENT: I verify that I personally obtained Kay Rosas's consent which was signed and uploaded into [employer].     PAIN SCORE:  Pre-procedure with step-up:  6/10    Inspection/Palpation:   +Skin warm and dry without open wounds or erythema  -Rubor   -Calor    Procedure Note Iovera:    DATE OF PROCEDURE:  09/15/2023    PREOPERATIVE DIAGNOSIS: left knee pain.     POSTOPERATIVE DIAGNOSIS: left knee pain.     PROCEDURE: Iovera treatment of anterior femoral cutaneous nerve, medial femoral cutaneous nerve, lateral femoral cutaneous nerve, and superior and inferior branches of infrapatellar saphenous nerve using landmarks to distribute 7 punctures across the pre-marked lines. (cpt 64640 x5)    COMPLICATIONS: None.     IMPLANTS:  None    ESTIMATED BLOOD LOSS:  < 5cc    SPECIMENS REMOVED:  None    ANESTHESIA: 10cc Local 1% lidocaine without epinephrine    INDICATIONS FOR PROCEDURE: This is a 77 y.o. female with longstanding knee pain. They have failed non operative management including injections. I discussed a new treatment therapy called Iovera, which is cryotherapy, to provide symptomatic relief along the sensory distribution of the infrapatellar tendon branch of the saphenous nerve  and FCN branches. The patient elected to move forward with this  We did discuss the fact that this is a fairly novel procedure and there is very limited scientific data around this.  However, it is FDA approved.  The patient was given patient information and literature to review prior to the procedure as well.  Based on this, the patient agreed to move forward with doing the procedure.    Time was spent discussing the cryoanalgesia procedure Iovera with the patient.  Risks and benefits were also discussed with patient.  X-rays were reviewed to use as a diagram to explain procedure. A detailed description of landmarks and placement of  anesthetic used during procedure were also explained to patient.  Contraindications for procedure were discussed with patient.    CONSENT:  Verbal and written consent were obtained from the patient.     PROCEDURE:    A surgical time out was performed, including verification of patient ID, procedure to be performed, site and side, availability of information and equipment, review of safety issues, and the patients agreement and consent.  The patient was placed supine on the exam table and the proximal medial aspect of the left tibia and anterior aspect of distal femur was prepped with sterile alcohol. Landmarks were used to locate the puncture line over the anterior knee using the outline of the patella and over the medial knee using the borders of the patellar tendon.   Ultrasound was used to visualize the superficial nerves with feedback from the patient after hydrodissection nerve block..  Once the nerves were visualized with the ultrasound a small dose of lidocaine i was used to hydrodissect with lidocaine without epinephrine using a 25g needle. We then introduced the Iovera device and penetrated the skin, creating cryotherapy to the superior and inferior branches of the infrapatellar saphenous nerve, a third treatment to the anterior femoral cutaneous nerve, a fourth treatment to the lateral femoral cutaneous nerve, and 5th treatment to the medial femoral cutaneous nerve.  There were a total of 5 nerves treated with iovera. The patient tolerated the procedure well with no problems.    PAIN SCORES:  Pre-procedure:  6/10  Post-procedure:  1/10      ASSESSMENT:      ICD-10-CM ICD-9-CM   1. Chronic pain of left knee  M25.562 719.46    G89.29 338.29         PLAN:  Post-procedure instructions given.   Follow up in 2 months.   All questions were answered to the best of my ability and all concerns were addressed at this time.

## 2023-09-15 NOTE — PATIENT INSTRUCTIONS
Assessment:  Kay Rosas is a 77 y.o. female   Chief Complaint   Patient presents with    Left Knee - Procedure       Encounter Diagnosis   Name Primary?    Chronic pain of left knee         Plan:  Iovera to left knee  You had the Iovera procedure done to your knee today.  There may be bruising over the procedure area for the next few days.  Avoid soaking in standing water (ie bath or hot tub) for the next 24hrs.             Follow-up: 2 months or sooner if there are any problems between now and then.    Thank you for choosing [facility] and Dr. Bakari Clark for your orthopedic & sports medicine care. It is our goal to provide you with exceptional care that will help keep you healthy, active, and get you back in the game.    Please do not hesitate to reach out to us via email, phone, or MyChart with any questions, concerns, or feedback.    If you felt that you received exemplary care today, please consider leaving us feedback on [employer] at:  [email or web address]    If you are experiencing pain/discomfort ,or have questions after 5pm and would like to be connected to the [facility] on-call team, please call this number and specify which Sports Medicine provider is treating you: [phone]

## 2023-09-18 ENCOUNTER — TELEPHONE (OUTPATIENT)
Dept: SPORTS MEDICINE | Facility: CLINIC | Age: 78
End: 2023-09-18
Payer: MEDICARE

## 2023-09-18 NOTE — TELEPHONE ENCOUNTER
Called to discuss how she is feeling s/p procedure. Pain still there with weight bearing, non weight bearing she has no pain. Endorses slightly reduced pain with walking post procedure vs pre procedure. Discussed deep genicular nerve blocks in future if needed.

## 2023-09-25 ENCOUNTER — PATIENT MESSAGE (OUTPATIENT)
Dept: ADMINISTRATIVE | Facility: HOSPITAL | Age: 78
End: 2023-09-25
Payer: MEDICARE

## 2023-09-25 PROBLEM — I70.0 AORTIC CALCIFICATION: Status: ACTIVE | Noted: 2023-09-25

## 2023-09-25 PROBLEM — R91.8 PULMONARY NODULES: Status: ACTIVE | Noted: 2023-09-25

## 2023-09-25 PROBLEM — E78.1 HYPERTRIGLYCERIDEMIA: Status: ACTIVE | Noted: 2023-09-25

## 2023-09-25 PROBLEM — N18.32 STAGE 3B CHRONIC KIDNEY DISEASE: Status: ACTIVE | Noted: 2023-08-02

## 2023-09-25 PROBLEM — R91.1 PULMONARY NODULE: Status: ACTIVE | Noted: 2023-09-25

## 2023-09-25 PROBLEM — R79.89 LOW VITAMIN D LEVEL: Status: ACTIVE | Noted: 2023-09-25

## 2023-09-26 ENCOUNTER — OFFICE VISIT (OUTPATIENT)
Dept: INTERNAL MEDICINE | Facility: CLINIC | Age: 78
End: 2023-09-26
Payer: MEDICARE

## 2023-09-26 VITALS
SYSTOLIC BLOOD PRESSURE: 142 MMHG | WEIGHT: 250.69 LBS | HEART RATE: 66 BPM | RESPIRATION RATE: 20 BRPM | BODY MASS INDEX: 40.29 KG/M2 | DIASTOLIC BLOOD PRESSURE: 80 MMHG | HEIGHT: 66 IN

## 2023-09-26 DIAGNOSIS — R53.81 DEBILITY: ICD-10-CM

## 2023-09-26 DIAGNOSIS — I73.9 PAD (PERIPHERAL ARTERY DISEASE): ICD-10-CM

## 2023-09-26 DIAGNOSIS — J84.10 LUNG GRANULOMA: ICD-10-CM

## 2023-09-26 DIAGNOSIS — E53.8 B12 DEFICIENCY: ICD-10-CM

## 2023-09-26 DIAGNOSIS — M54.16 LUMBAR RADICULOPATHY: ICD-10-CM

## 2023-09-26 DIAGNOSIS — M19.90 ARTHRITIS: ICD-10-CM

## 2023-09-26 DIAGNOSIS — N18.32 STAGE 3B CHRONIC KIDNEY DISEASE: ICD-10-CM

## 2023-09-26 DIAGNOSIS — F33.0 MAJOR DEPRESSIVE DISORDER, RECURRENT, MILD: ICD-10-CM

## 2023-09-26 DIAGNOSIS — E66.9 OBESITY (BMI 35.0-39.9 WITHOUT COMORBIDITY): ICD-10-CM

## 2023-09-26 DIAGNOSIS — N25.81 SECONDARY HYPERPARATHYROIDISM OF RENAL ORIGIN: ICD-10-CM

## 2023-09-26 DIAGNOSIS — F51.01 PRIMARY INSOMNIA: ICD-10-CM

## 2023-09-26 DIAGNOSIS — Z86.718 HISTORY OF DVT (DEEP VEIN THROMBOSIS): ICD-10-CM

## 2023-09-26 DIAGNOSIS — Z79.01 CHRONIC ANTICOAGULATION: ICD-10-CM

## 2023-09-26 DIAGNOSIS — Z00.00 ENCOUNTER FOR PREVENTATIVE ADULT HEALTH CARE EXAMINATION: Primary | ICD-10-CM

## 2023-09-26 DIAGNOSIS — E03.9 ACQUIRED HYPOTHYROIDISM: ICD-10-CM

## 2023-09-26 DIAGNOSIS — G89.29 CHRONIC BILATERAL LOW BACK PAIN WITH RIGHT-SIDED SCIATICA: ICD-10-CM

## 2023-09-26 DIAGNOSIS — I70.0 AORTIC CALCIFICATION: ICD-10-CM

## 2023-09-26 DIAGNOSIS — M54.41 CHRONIC BILATERAL LOW BACK PAIN WITH RIGHT-SIDED SCIATICA: ICD-10-CM

## 2023-09-26 DIAGNOSIS — E78.1 HYPERTRIGLYCERIDEMIA: ICD-10-CM

## 2023-09-26 DIAGNOSIS — Z86.39 HISTORY OF DIABETES MELLITUS: ICD-10-CM

## 2023-09-26 DIAGNOSIS — R26.9 ABNORMALITY OF GAIT AND MOBILITY: ICD-10-CM

## 2023-09-26 DIAGNOSIS — F17.211 NICOTINE DEPENDENCE, CIGARETTES, IN REMISSION: ICD-10-CM

## 2023-09-26 DIAGNOSIS — M70.62 GREATER TROCHANTERIC BURSITIS OF BOTH HIPS: ICD-10-CM

## 2023-09-26 DIAGNOSIS — I74.9 EMBOLISM AND THROMBOSIS OF UNSPECIFIED ARTERY: ICD-10-CM

## 2023-09-26 DIAGNOSIS — R91.1 PULMONARY NODULE: ICD-10-CM

## 2023-09-26 DIAGNOSIS — M17.0 OSTEOARTHRITIS OF BOTH KNEES, UNSPECIFIED OSTEOARTHRITIS TYPE: ICD-10-CM

## 2023-09-26 DIAGNOSIS — C56.2 MALIGNANT NEOPLASM OF LEFT OVARY: ICD-10-CM

## 2023-09-26 DIAGNOSIS — M70.61 GREATER TROCHANTERIC BURSITIS OF BOTH HIPS: ICD-10-CM

## 2023-09-26 DIAGNOSIS — Z99.81 DEPENDENCE ON SUPPLEMENTAL OXYGEN: ICD-10-CM

## 2023-09-26 DIAGNOSIS — J44.9 COPD, MODERATE: ICD-10-CM

## 2023-09-26 DIAGNOSIS — I10 PRIMARY HYPERTENSION: ICD-10-CM

## 2023-09-26 DIAGNOSIS — Z87.891 EX-SMOKER: ICD-10-CM

## 2023-09-26 DIAGNOSIS — M47.816 LUMBAR SPONDYLOSIS: ICD-10-CM

## 2023-09-26 DIAGNOSIS — Z59.9 FINANCIAL DIFFICULTIES: ICD-10-CM

## 2023-09-26 DIAGNOSIS — R79.89 LOW VITAMIN D LEVEL: ICD-10-CM

## 2023-09-26 DIAGNOSIS — D50.0 IRON DEFICIENCY ANEMIA DUE TO CHRONIC BLOOD LOSS: ICD-10-CM

## 2023-09-26 DIAGNOSIS — E78.5 HYPERLIPIDEMIA, UNSPECIFIED HYPERLIPIDEMIA TYPE: ICD-10-CM

## 2023-09-26 DIAGNOSIS — J43.9 PULMONARY EMPHYSEMA, UNSPECIFIED EMPHYSEMA TYPE: ICD-10-CM

## 2023-09-26 PROCEDURE — 99999 PR PBB SHADOW E&M-EST. PATIENT-LVL V: ICD-10-PCS | Mod: PBBFAC,,, | Performed by: NURSE PRACTITIONER

## 2023-09-26 PROCEDURE — 1170F FXNL STATUS ASSESSED: CPT | Mod: CPTII,S$GLB,, | Performed by: NURSE PRACTITIONER

## 2023-09-26 PROCEDURE — 1160F PR REVIEW ALL MEDS BY PRESCRIBER/CLIN PHARMACIST DOCUMENTED: ICD-10-PCS | Mod: CPTII,S$GLB,, | Performed by: NURSE PRACTITIONER

## 2023-09-26 PROCEDURE — 1159F MED LIST DOCD IN RCRD: CPT | Mod: CPTII,S$GLB,, | Performed by: NURSE PRACTITIONER

## 2023-09-26 PROCEDURE — 1159F PR MEDICATION LIST DOCUMENTED IN MEDICAL RECORD: ICD-10-PCS | Mod: CPTII,S$GLB,, | Performed by: NURSE PRACTITIONER

## 2023-09-26 PROCEDURE — 3077F PR MOST RECENT SYSTOLIC BLOOD PRESSURE >= 140 MM HG: ICD-10-PCS | Mod: CPTII,S$GLB,, | Performed by: NURSE PRACTITIONER

## 2023-09-26 PROCEDURE — 3079F PR MOST RECENT DIASTOLIC BLOOD PRESSURE 80-89 MM HG: ICD-10-PCS | Mod: CPTII,S$GLB,, | Performed by: NURSE PRACTITIONER

## 2023-09-26 PROCEDURE — 99999 PR PBB SHADOW E&M-EST. PATIENT-LVL V: CPT | Mod: PBBFAC,,, | Performed by: NURSE PRACTITIONER

## 2023-09-26 PROCEDURE — 3288F PR FALLS RISK ASSESSMENT DOCUMENTED: ICD-10-PCS | Mod: CPTII,S$GLB,, | Performed by: NURSE PRACTITIONER

## 2023-09-26 PROCEDURE — 3077F SYST BP >= 140 MM HG: CPT | Mod: CPTII,S$GLB,, | Performed by: NURSE PRACTITIONER

## 2023-09-26 PROCEDURE — 3079F DIAST BP 80-89 MM HG: CPT | Mod: CPTII,S$GLB,, | Performed by: NURSE PRACTITIONER

## 2023-09-26 PROCEDURE — 1170F PR FUNCTIONAL STATUS ASSESSED: ICD-10-PCS | Mod: CPTII,S$GLB,, | Performed by: NURSE PRACTITIONER

## 2023-09-26 PROCEDURE — 3288F FALL RISK ASSESSMENT DOCD: CPT | Mod: CPTII,S$GLB,, | Performed by: NURSE PRACTITIONER

## 2023-09-26 PROCEDURE — 1101F PT FALLS ASSESS-DOCD LE1/YR: CPT | Mod: CPTII,S$GLB,, | Performed by: NURSE PRACTITIONER

## 2023-09-26 PROCEDURE — G0439 PPPS, SUBSEQ VISIT: HCPCS | Mod: S$GLB,,, | Performed by: NURSE PRACTITIONER

## 2023-09-26 PROCEDURE — G0439 PR MEDICARE ANNUAL WELLNESS SUBSEQUENT VISIT: ICD-10-PCS | Mod: S$GLB,,, | Performed by: NURSE PRACTITIONER

## 2023-09-26 PROCEDURE — 1160F RVW MEDS BY RX/DR IN RCRD: CPT | Mod: CPTII,S$GLB,, | Performed by: NURSE PRACTITIONER

## 2023-09-26 PROCEDURE — 1101F PR PT FALLS ASSESS DOC 0-1 FALLS W/OUT INJ PAST YR: ICD-10-PCS | Mod: CPTII,S$GLB,, | Performed by: NURSE PRACTITIONER

## 2023-09-26 SDOH — SOCIAL DETERMINANTS OF HEALTH (SDOH): PROBLEM RELATED TO HOUSING AND ECONOMIC CIRCUMSTANCES, UNSPECIFIED: Z59.9

## 2023-09-26 NOTE — PATIENT INSTRUCTIONS
Counseling and Referral of Other Preventative  (Italic type indicates deductible and co-insurance are waived)    Patient Name: Kay Rosas  Today's Date: 9/26/2023    Health Maintenance       Date Due Completion Date    TETANUS VACCINE Never done ---    Shingles Vaccine (1 of 2) Never done ---    Eye Exam 02/21/2019 2/21/2018    LDCT Lung Screen 02/14/2021 2/14/2020    Influenza Vaccine (1) 09/01/2023 10/13/2022 (Done)    Override on 10/13/2022: Done    Override on 10/25/2017: Done    Hemoglobin A1c 09/06/2023 3/6/2023    COVID-19 Vaccine (1) 10/27/2023 (Originally 3/21/1946) ---    Lipid Panel 07/26/2024 7/26/2023    DEXA Scan 05/18/2025 5/18/2022        Orders Placed This Encounter   Procedures    CT Chest Lung Screening Low Dose    HEMOGLOBIN A1C    Ambulatory referral/consult to Outpatient Case Management    Ambulatory referral/consult to Outpatient Case Management    Ambulatory referral/consult to Pharmacy Assistance     The following information is provided to all patients.  This information is to help you find resources for any of the problems found today that may be affecting your health:                Living healthy guide: www.[facility].[place].gov      Understanding Diabetes: www.diabetes.org      Eating healthy: www.cdc.gov/healthyweight      CDC home safety checklist: www.cdc.gov/steadi/patient.html      Agency on Aging: www.goea.[place].[facility]      Alcoholics anonymous (AA): www.aa.org      Physical Activity: www.ismael.nih.gov/hp1wkgt      Tobacco use: www.quitwithusla.org

## 2023-09-26 NOTE — PROGRESS NOTES
"  Kay Rosas presented for a  Medicare AWV and comprehensive Health Risk Assessment today. The following components were reviewed and updated:    Medical history  Family History  Social history  Allergies and Current Medications  Health Risk Assessment  Health Maintenance  Care Team         ** See Completed Assessments for Annual Wellness Visit within the encounter summary.**         The following assessments were completed:  Living Situation  CAGE  Depression Screening  Timed Get Up and Go  Whisper Test  Cognitive Function Screening  Nutrition Screening  ADL Screening  PAQ Screening        Vitals:    09/26/23 1523   BP: (!) 142/80   BP Location: Right arm   Patient Position: Sitting   Pulse: 66   Resp: 20   Weight: 113.7 kg (250 lb 10.6 oz)   Height:    Pain scale 5' 6" (1.676 m)    Chronic left knee pain     Body mass index is 40.46 kg/m².  Physical Exam  Constitutional:       General: She is not in acute distress.     Appearance: She is not ill-appearing or diaphoretic.   HENT:      Head: Normocephalic and atraumatic.      Mouth/Throat:      Mouth: Mucous membranes are moist.   Eyes:      General:         Right eye: No discharge.         Left eye: No discharge.      Conjunctiva/sclera: Conjunctivae normal.   Cardiovascular:      Rate and Rhythm: Normal rate and regular rhythm.      Heart sounds: Normal heart sounds.      Comments: BLE varicose veins  Pulmonary:      Effort: Pulmonary effort is normal. No respiratory distress.      Comments: BBS diminished  Abdominal:      Comments: Obese   Skin:     General: Skin is warm and dry.   Neurological:      Mental Status: She is alert and oriented to person, place, and time. Mental status is at baseline.   Psychiatric:         Mood and Affect: Mood normal.         Behavior: Behavior normal.         Thought Content: Thought content normal.         Judgment: Judgment normal.     Diagnoses and health risks identified today and associated recommendations/orders:    1. " Encounter for preventative adult health care examination, Financial difficulties  - Ambulatory referral/consult to Outpatient Case Management- ? Meals on Wheels  - Ambulatory referral/consult to Outpatient Case Management- ? [facility] patient assistance program  Review for opioid screening: Patient does have Rx for Opioids- tramadol  Patient on chronic opioids-.tramadol  Followed by physician  Risk factors reviewed. Risk factors may include Sleep-disordered breathing, renal or hepatic insufficiency, increased risk for falls and fractures, risk of opioid misuse/overdose/opioid use disorder.  Pain evaluated during visit, documented under vitals.  Discussed nonpharmacologic treatments options, will follow up with prescribing provider to discuss further    Review for substance use disorder: Patient does not use substance per chart    Patient states she does not drink alcohol    I offered to discuss advanced care planning, including how to pick a person who would make decisions for you if you were unable to make them for yourself, called a health care power of , and what kind of decisions you might make such as use of life sustaining treatments such as ventilators and tube feeding when faced with a life limiting illness recorded on a living will that they will need to know. (How you want to be cared for as you near the end of your natural life)     X Patient is interested in learning more about how to make advanced directives.  I provided them paperwork and offered to discuss this with them.    2. Major depressive disorder, recurrent, mild, Primary insomnia  Monitor  Stable  Follow up with PCP  Continue home zoloft, desyrel    3. COPD, moderate, Pulmonary emphysema , Lung granuloma, Pulmonary nodule,  Ex-smoker, Nicotine dependence, cigarettes, in remission, Dependence on supplemental oxygen  Monitor  Patient states she no longer smokes and has not smoked in past 15 years but has been exposed to second hand smoke  "daily at  her apartment complex  Continue home albuterol  Patient states she has no been able to afford to buy the stiolto respimat  - Ambulatory referral/consult to Pharmacy Assistance; Future- stiolto respimat  Follow up with Pulmonology  - CT Chest Lung Screening Low Dose; Future  Patient states she uses Home O2 at 2-3l per nc as needed    4. PAD (peripheral artery disease), Aortic calcification, Hyperlipidemia, unspecified hyperlipidemia type, Hypertriglyceridemia  Monitor  Follow up as directed  Continue home zetia  Blood pressure control    5. Stage 3b chronic kidney disease, Secondary hyperparathyroidism of renal origin  Monitor  Follow up with Nephrology    6. History of DVT (deep vein thrombosis) lower extremity x 2 different times, Embolism and thrombosis of unspecified artery, Chronic anticoagulation- Apixaban  Monitor  Follow up with Hematology- Dr. Cartwright or his NP ASAP  Patient states she has no been able to afford to buy the Eliquis, so she has not been taking it  - Ambulatory referral/consult to Pharmacy Assistance; Future-  Eliquis  Explained to patient the importance of taking an anticoagulant due to risk of recurrent DVT which could break off and goes to her lungs and kill her. Also reviewed/ showed her the last clinic note from Dr. Cartwright which said she would need chronic anticoagulation FOR LIFE- Patient verbalized understanding  Patient to get with Dr. Cartwright office about anticoagulation needs ASAP- Message sent to patient's Hematologist- Dr Cartwright and also her PCP letting them know she has been off her Eliquis.  Patient instructed to wear GALA hose and to get up and move around frequently to help prevent recurrent blood clots - Patient verbalized understanding    7. Malignant neoplasm of left ovary, Hx removal  Monitor  Follow up with Oncology, GYN as directed    8. Obesity (BMI 35.0-39.9 without comorbidity)  Monitor  Follow up with PCP  Patient states she has "tried everything" to loose " "weight and is agreeable to try weight loss program  - Ambulatory referral/consult to Weight Management Program; Future    9. Iron deficiency anemia due to chronic blood loss  Monitor  Follow up with PCP    10. Acquired hypothyroidism  Monitor  Follow up with PCP  Continue home synthroid     11. B12 deficiency  Monitor  Follow up with PCP    12. Low vitamin D level  Monitor  Follow up with PCP  Continue home ergocalciferol     13. History of diabetes mellitus  Monitor  Follow up with PCP  Patient states had Hx of DM "years ago" but no longer takes DM medication now and her levels have been WNL  - HEMOGLOBIN A1C; Future    14. Lumbar spondylosis, Lumbar radiculopathy, Arthritis, Osteoarthritis of both knees, unspecified osteoarthritis type, Greater trochanteric bursitis of both hips, Chronic bilateral low back pain with right-sided sciatica, Debility, Abnormality of gait and mobility  Monitor  Follow up as directed  Continue home prn tylenol, tramadol  Fall precautions  - WALKER FOR HOME USE- ordered to have wheels and seat    15. Primary hypertension  Monitor  Follow up with PCP  Continue home hyzaar                            Provided Kay with a 5-10 year written screening schedule and personal prevention plan. Recommendations were developed using the USPSTF age appropriate recommendations. Education, counseling, and referrals were provided as needed. After Visit Summary printed and given to patient which includes a list of additional screenings\tests needed.    Follow up in about 1 year (around 9/26/2024) for Annual wellness visit.    RAY Philippe    "

## 2023-09-27 ENCOUNTER — PATIENT OUTREACH (OUTPATIENT)
Dept: ADMINISTRATIVE | Facility: OTHER | Age: 78
End: 2023-09-27
Payer: MEDICARE

## 2023-09-27 PROBLEM — R53.81 DEBILITY: Status: ACTIVE | Noted: 2023-09-27

## 2023-09-27 PROBLEM — Z79.01 CHRONIC ANTICOAGULATION: Status: ACTIVE | Noted: 2023-09-27

## 2023-09-27 PROBLEM — Z99.81 DEPENDENCE ON SUPPLEMENTAL OXYGEN: Status: ACTIVE | Noted: 2023-09-27

## 2023-09-27 NOTE — PROGRESS NOTES
CHW - Outreach Attempt    Community Health Worker left a voicemail message for 1st attempt to contact patient regarding: Meals for Wheels    Community Health Worker to attempt to contact patient on: 9/27/23

## 2023-10-02 NOTE — PROGRESS NOTES
CHW - Outreach Attempt    Community Health Worker left a voicemail message for 2nd attempt to contact patient regarding: Meals for Wheels    Community Health Worker to attempt to contact patient on: 10/2/23

## 2023-10-03 ENCOUNTER — TELEPHONE (OUTPATIENT)
Dept: PHARMACY | Facility: CLINIC | Age: 78
End: 2023-10-03
Payer: MEDICARE

## 2023-10-03 NOTE — TELEPHONE ENCOUNTER
I have reached out to Kay Rosas to inform her of the [employer]  application process for Eliquis and Stiolto and whats required to apply.  Kay Rosas did not answer. I left a voicemail and mailed a letter introducing her to the pharmacy patient assistance program. I will follow up in 5 business days.

## 2023-10-05 NOTE — PROGRESS NOTES
CHW - Outreach Attempt    Community Health Worker left a voicemail message for 3rd attempt to contact patient regarding: meals on wheels    Community Health Worker to attempt to contact patient on: 10/5/23    CHW - Unable to Contact    Community Health Worker to close episode at this time due to three missed attempts for patient contact.

## 2023-10-12 ENCOUNTER — OFFICE VISIT (OUTPATIENT)
Dept: FAMILY MEDICINE | Facility: CLINIC | Age: 78
End: 2023-10-12
Payer: MEDICARE

## 2023-10-12 ENCOUNTER — HOSPITAL ENCOUNTER (OUTPATIENT)
Dept: RADIOLOGY | Facility: HOSPITAL | Age: 78
Discharge: HOME | End: 2023-10-12
Attending: INTERNAL MEDICINE
Payer: MEDICARE

## 2023-10-12 VITALS
DIASTOLIC BLOOD PRESSURE: 78 MMHG | TEMPERATURE: 98 F | HEART RATE: 99 BPM | BODY MASS INDEX: 39.86 KG/M2 | RESPIRATION RATE: 20 BRPM | SYSTOLIC BLOOD PRESSURE: 136 MMHG | OXYGEN SATURATION: 97 % | HEIGHT: 66 IN | WEIGHT: 248 LBS

## 2023-10-12 DIAGNOSIS — J44.9 COPD, MODERATE: Primary | ICD-10-CM

## 2023-10-12 DIAGNOSIS — R09.81 SINUS CONGESTION: ICD-10-CM

## 2023-10-12 DIAGNOSIS — Z99.81 DEPENDENCE ON SUPPLEMENTAL OXYGEN: ICD-10-CM

## 2023-10-12 PROCEDURE — 99999 PR PBB SHADOW E&M-EST. PATIENT-LVL V: ICD-10-PCS | Mod: PBBFAC,,, | Performed by: INTERNAL MEDICINE

## 2023-10-12 PROCEDURE — 3288F PR FALLS RISK ASSESSMENT DOCUMENTED: ICD-10-PCS | Mod: CPTII,S$GLB,, | Performed by: INTERNAL MEDICINE

## 2023-10-12 PROCEDURE — 1159F MED LIST DOCD IN RCRD: CPT | Mod: CPTII,S$GLB,, | Performed by: INTERNAL MEDICINE

## 2023-10-12 PROCEDURE — 1101F PR PT FALLS ASSESS DOC 0-1 FALLS W/OUT INJ PAST YR: ICD-10-PCS | Mod: CPTII,S$GLB,, | Performed by: INTERNAL MEDICINE

## 2023-10-12 PROCEDURE — 99214 OFFICE O/P EST MOD 30 MIN: CPT | Mod: S$GLB,,, | Performed by: INTERNAL MEDICINE

## 2023-10-12 PROCEDURE — 99214 PR OFFICE/OUTPT VISIT, EST, LEVL IV, 30-39 MIN: ICD-10-PCS | Mod: S$GLB,,, | Performed by: INTERNAL MEDICINE

## 2023-10-12 PROCEDURE — 1101F PT FALLS ASSESS-DOCD LE1/YR: CPT | Mod: CPTII,S$GLB,, | Performed by: INTERNAL MEDICINE

## 2023-10-12 PROCEDURE — 70210 X-RAY EXAM OF SINUSES: CPT | Mod: TC,FY,PO

## 2023-10-12 PROCEDURE — 3075F SYST BP GE 130 - 139MM HG: CPT | Mod: CPTII,S$GLB,, | Performed by: INTERNAL MEDICINE

## 2023-10-12 PROCEDURE — 1159F PR MEDICATION LIST DOCUMENTED IN MEDICAL RECORD: ICD-10-PCS | Mod: CPTII,S$GLB,, | Performed by: INTERNAL MEDICINE

## 2023-10-12 PROCEDURE — 3078F DIAST BP <80 MM HG: CPT | Mod: CPTII,S$GLB,, | Performed by: INTERNAL MEDICINE

## 2023-10-12 PROCEDURE — 99999 PR PBB SHADOW E&M-EST. PATIENT-LVL V: CPT | Mod: PBBFAC,,, | Performed by: INTERNAL MEDICINE

## 2023-10-12 PROCEDURE — 70210 XR SINUSES LTD LESS THAN 3 VIEWS: ICD-10-PCS | Mod: 26,,, | Performed by: RADIOLOGY

## 2023-10-12 PROCEDURE — 70210 X-RAY EXAM OF SINUSES: CPT | Mod: 26,,, | Performed by: RADIOLOGY

## 2023-10-12 PROCEDURE — 3078F PR MOST RECENT DIASTOLIC BLOOD PRESSURE < 80 MM HG: ICD-10-PCS | Mod: CPTII,S$GLB,, | Performed by: INTERNAL MEDICINE

## 2023-10-12 PROCEDURE — 3288F FALL RISK ASSESSMENT DOCD: CPT | Mod: CPTII,S$GLB,, | Performed by: INTERNAL MEDICINE

## 2023-10-12 PROCEDURE — 3075F PR MOST RECENT SYSTOLIC BLOOD PRESS GE 130-139MM HG: ICD-10-PCS | Mod: CPTII,S$GLB,, | Performed by: INTERNAL MEDICINE

## 2023-10-12 NOTE — PROGRESS NOTES
Subjective:       Patient ID: Kay Rosas is a 78 y.o. female.    Chief Complaint: Sinus Problem    6-12 months worsening sinus congestion------thinks do to her using O2 at night and makes her nose congested, dry mouth----has to remove O2 to blow nose.        Sinus Problem  Associated symptoms include congestion. Pertinent negatives include no chills, coughing, headaches, neck pain or shortness of breath.     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    MVP (mitral valve prolapse)     On home oxygen therapy     3 liters as needed at night    Osteoporosis     feet    Thyroid condition      Past Surgical History:   Procedure Laterality Date    BACK SURGERY  2012    CATARACT EXTRACTION Bilateral 10/2012    HYSTERECTOMY Left 1978    INJECTION OF ANESTHETIC AGENT INTO SACROILIAC JOINT Bilateral 11/11/2019    Procedure: Bilateral GT bursa + SIJ injection;  Surgeon: Noe Pleitez MD;  Location: [facility];  Service: Pain Management;  Laterality: Bilateral;    INJECTION OF JOINT Bilateral 11/11/2019    Procedure: Bilateral GT bursa + SIJ injection;  Surgeon: Noe Pleitez MD;  Location: [facility];  Service: Pain Management;  Laterality: Bilateral;    JOINT REPLACEMENT Right 2021    knee    ROBOT-ASSISTED LAPAROSCOPIC LYSIS OF ADHESIONS USING DA SP XI N/A 12/05/2022    Procedure: XI ROBOTIC LYSIS, ADHESIONS;  Surgeon: Den Barragan MD;  Location: [facility];  Service: Oncology;  Laterality: N/A;    ROBOT-ASSISTED LAPAROSCOPIC SALPINGO-OOPHORECTOMY USING DA SP XI Left 12/05/2022    Procedure: XI ROBOTIC SALPINGO-OOPHORECTOMY;  Surgeon: Den Barragan MD;  Location:  [facility] OR;  Service: Oncology;  Laterality: Left;    SURGICAL REMOVAL OF BONE SPUR Left     left elbow    VASCULAR SURGERY Right     high ligation due to blood clot      Family History   Problem Relation Age of Onset    Heart disease Mother     Kidney disease Mother     Arthritis Mother     Breast cancer Mother     Cancer Father     Heart disease Father     Cancer Sister     Ovarian cancer Sister     Alzheimer's disease Sister      Social History     Socioeconomic History    Marital status:    Tobacco Use    Smoking status: Former     Current packs/day: 0.00     Average packs/day: 1 pack/day for 53.5 years (53.5 ttl pk-yrs)     Types: Cigarettes     Start date: 1962     Quit date: 2015     Years since quittin.3    Smokeless tobacco: Never   Substance and Sexual Activity    Alcohol use: No    Drug use: No    Sexual activity: Yes     Partners: Male     Birth control/protection: None     Social Determinants of Health     Financial Resource Strain: Low Risk  (2023)    Overall Financial Resource Strain (CARDIA)     Difficulty of Paying Living Expenses: Not very hard   Food Insecurity: Food Insecurity Present (2023)    Hunger Vital Sign     Worried About Running Out of Food in the Last Year: Sometimes true     Ran Out of Food in the Last Year: Sometimes true   Transportation Needs: No Transportation Needs (2023)    PRAPARE - Transportation     Lack of Transportation (Medical): No     Lack of Transportation (Non-Medical): No   Physical Activity: Inactive (2023)    Exercise Vital Sign     Days of Exercise per Week: 0 days     Minutes of Exercise per Session: 0 min   Stress: Stress Concern Present (2023)    [employer] [place] [employer] - Occupational Stress Questionnaire     Feeling of Stress : To some extent   Social Connections: Moderately Integrated (2023)    Social Connection and Isolation Panel [NHANES]     Frequency of Communication with Friends and Family:  More than three times a week     Frequency of Social Gatherings with Friends and Family: Once a week     Attends Rastafarian Services: More than 4 times per year     Active Member of Clubs or Organizations: Yes     Attends Club or Organization Meetings: More than 4 times per year     Marital Status:    Housing Stability: Low Risk  (9/26/2023)    Housing Stability Vital Sign     Unable to Pay for Housing in the Last Year: No     Number of Places Lived in the Last Year: 1     Unstable Housing in the Last Year: No     Review of Systems   Constitutional:  Negative for chills and fever.   HENT:  Positive for congestion and rhinorrhea. Negative for hearing loss and trouble swallowing.    Eyes:  Negative for discharge.   Respiratory:  Negative for apnea, cough, choking, chest tightness, shortness of breath, wheezing and stridor.    Cardiovascular:  Negative for chest pain and palpitations.   Gastrointestinal:  Negative for abdominal pain, blood in stool, constipation, diarrhea and vomiting.   Endocrine: Negative for polydipsia and polyuria.   Genitourinary:  Negative for difficulty urinating, dysuria, hematuria and menstrual problem.   Musculoskeletal:  Positive for arthralgias. Negative for neck pain.   Neurological:  Negative for weakness and headaches.   Psychiatric/Behavioral:  Positive for dysphoric mood. Negative for confusion.        Objective:      Physical Exam  Vitals and nursing note reviewed.   Constitutional:       General: She is not in acute distress.     Appearance: Normal appearance. She is well-developed. She is not diaphoretic.   HENT:      Head: Normocephalic and atraumatic.      Nose: Nose normal. No congestion.      Mouth/Throat:      Pharynx: No oropharyngeal exudate or posterior oropharyngeal erythema.   Eyes:      General: No scleral icterus.  Neck:      Thyroid: No thyromegaly.      Vascular: No carotid bruit or JVD.      Trachea: No tracheal deviation.   Cardiovascular:      Rate and Rhythm:  Normal rate and regular rhythm.      Heart sounds: Normal heart sounds.   Pulmonary:      Effort: Pulmonary effort is normal. No respiratory distress.      Breath sounds: Normal breath sounds. No wheezing or rales.   Chest:      Chest wall: No tenderness.   Abdominal:      General: Bowel sounds are normal. There is no distension.      Palpations: Abdomen is soft.      Tenderness: There is no abdominal tenderness. There is no guarding or rebound.   Musculoskeletal:         General: No tenderness. Normal range of motion.      Cervical back: Normal range of motion and neck supple.   Lymphadenopathy:      Cervical: No cervical adenopathy.   Skin:     General: Skin is warm and dry.      Coloration: Skin is not pale.      Findings: No erythema or rash.   Neurological:      Mental Status: She is alert and oriented to person, place, and time.   Psychiatric:         Behavior: Behavior normal.         Thought Content: Thought content normal.         Judgment: Judgment normal.         CMP  Sodium   Date Value Ref Range Status   07/26/2023 144 136 - 145 mmol/L Final     Potassium   Date Value Ref Range Status   07/26/2023 3.9 3.5 - 5.1 mmol/L Final     Chloride   Date Value Ref Range Status   07/26/2023 106 95 - 110 mmol/L Final     CO2   Date Value Ref Range Status   07/26/2023 25 23 - 29 mmol/L Final     Glucose   Date Value Ref Range Status   07/26/2023 131 (H) 70 - 110 mg/dL Final     BUN   Date Value Ref Range Status   07/26/2023 25 (H) 8 - 23 mg/dL Final     Creatinine   Date Value Ref Range Status   07/26/2023 1.5 (H) 0.5 - 1.4 mg/dL Final     Calcium   Date Value Ref Range Status   07/26/2023 9.3 8.7 - 10.5 mg/dL Final     Total Protein   Date Value Ref Range Status   07/26/2023 7.0 6.0 - 8.4 g/dL Final     Albumin   Date Value Ref Range Status   07/26/2023 3.7 3.5 - 5.2 g/dL Final     Total Bilirubin   Date Value Ref Range Status   07/26/2023 0.2 0.1 - 1.0 mg/dL Final     Comment:     For infants and newborns,  interpretation of results should be based  on gestational age, weight and in agreement with clinical  observations.    Premature Infant recommended reference ranges:  Up to 24 hours.............<8.0 mg/dL  Up to 48 hours............<12.0 mg/dL  3-5 days..................<15.0 mg/dL  6-29 days.................<15.0 mg/dL       Alkaline Phosphatase   Date Value Ref Range Status   07/26/2023 64 55 - 135 U/L Final     AST   Date Value Ref Range Status   07/26/2023 16 10 - 40 U/L Final     ALT   Date Value Ref Range Status   07/26/2023 13 10 - 44 U/L Final     Anion Gap   Date Value Ref Range Status   07/26/2023 13 8 - 16 mmol/L Final     eGFR if    Date Value Ref Range Status   07/08/2022 34 (A) >60 mL/min/1.73 m^2 Final     eGFR    Date Value Ref Range Status   03/06/2023 31 mL/min/1.73mSq Final     Comment:     In accordance with NKF-ASN Task Force recommendation, calculation based on the Chronic Kidney Disease Epidemiology Collaboration (CKD-EPI) equation without adjustment for race. eGFR adjusted for gender and age and calculated in ml/min/1.73mSquared. eGFR cannot be calculated if patient is under 18 years of age.     Reference Range:   >= 60 ml/min/1.73mSquared.     eGFR if non    Date Value Ref Range Status   07/08/2022 29 (A) >60 mL/min/1.73 m^2 Final     Comment:     Calculation used to obtain the estimated glomerular filtration  rate (eGFR) is the CKD-EPI equation.        Lab Results   Component Value Date    WBC 6.19 07/26/2023    HGB 10.6 (L) 07/26/2023    HCT 35.1 (L) 07/26/2023    MCV 91 07/26/2023     07/26/2023     Lab Results   Component Value Date    CHOL 201 (H) 07/26/2023     Lab Results   Component Value Date    HDL 52 07/26/2023     Lab Results   Component Value Date    LDLCALC 116.6 07/26/2023     Lab Results   Component Value Date    TRIG 162 (H) 07/26/2023     Lab Results   Component Value Date    CHOLHDL 25.9 07/26/2023     Lab Results    Component Value Date    TSH 2.085 07/26/2023     Lab Results   Component Value Date    HGBA1C 5.8 03/06/2023     Assessment:       1. COPD, moderate    2. Dependence on supplemental oxygen    3. Sinus congestion        Plan:   COPD, moderate  -     Ambulatory referral/consult to Pulmonology; Future; Expected date: 10/19/2023    Dependence on supplemental oxygen  -     Ambulatory referral/consult to Pulmonology; Future; Expected date: 10/19/2023    Sinus congestion---------------try mucinex------------  -     Ambulatory referral/consult to ENT; Future; Expected date: 10/19/2023  -     X-Ray Sinuses Ltd Less Than 3 Views; Future; Expected date: 10/12/2023      As above------------f/u 1 month------------

## 2023-10-16 ENCOUNTER — HOSPITAL ENCOUNTER (OUTPATIENT)
Dept: RADIOLOGY | Facility: HOSPITAL | Age: 78
Discharge: HOME | End: 2023-10-16
Attending: STUDENT IN AN ORGANIZED HEALTH CARE EDUCATION/TRAINING PROGRAM
Payer: MEDICARE

## 2023-10-16 ENCOUNTER — OFFICE VISIT (OUTPATIENT)
Dept: SPORTS MEDICINE | Facility: CLINIC | Age: 78
End: 2023-10-16
Payer: MEDICARE

## 2023-10-16 VITALS — WEIGHT: 248 LBS | BODY MASS INDEX: 39.86 KG/M2 | HEIGHT: 66 IN

## 2023-10-16 DIAGNOSIS — G89.29 CHRONIC PAIN OF LEFT KNEE: Primary | ICD-10-CM

## 2023-10-16 DIAGNOSIS — M17.12 PRIMARY OSTEOARTHRITIS OF LEFT KNEE: Primary | ICD-10-CM

## 2023-10-16 DIAGNOSIS — Z96.651 H/O TOTAL KNEE REPLACEMENT, RIGHT: ICD-10-CM

## 2023-10-16 DIAGNOSIS — M25.562 CHRONIC PAIN OF LEFT KNEE: Primary | ICD-10-CM

## 2023-10-16 DIAGNOSIS — M17.12 PRIMARY OSTEOARTHRITIS OF LEFT KNEE: ICD-10-CM

## 2023-10-16 PROCEDURE — 99214 OFFICE O/P EST MOD 30 MIN: CPT | Mod: S$GLB,,, | Performed by: STUDENT IN AN ORGANIZED HEALTH CARE EDUCATION/TRAINING PROGRAM

## 2023-10-16 PROCEDURE — 3288F FALL RISK ASSESSMENT DOCD: CPT | Mod: CPTII,S$GLB,, | Performed by: STUDENT IN AN ORGANIZED HEALTH CARE EDUCATION/TRAINING PROGRAM

## 2023-10-16 PROCEDURE — 1159F MED LIST DOCD IN RCRD: CPT | Mod: CPTII,S$GLB,, | Performed by: STUDENT IN AN ORGANIZED HEALTH CARE EDUCATION/TRAINING PROGRAM

## 2023-10-16 PROCEDURE — 1101F PT FALLS ASSESS-DOCD LE1/YR: CPT | Mod: CPTII,S$GLB,, | Performed by: STUDENT IN AN ORGANIZED HEALTH CARE EDUCATION/TRAINING PROGRAM

## 2023-10-16 PROCEDURE — 1159F PR MEDICATION LIST DOCUMENTED IN MEDICAL RECORD: ICD-10-PCS | Mod: CPTII,S$GLB,, | Performed by: STUDENT IN AN ORGANIZED HEALTH CARE EDUCATION/TRAINING PROGRAM

## 2023-10-16 PROCEDURE — 99999 PR PBB SHADOW E&M-EST. PATIENT-LVL IV: ICD-10-PCS | Mod: PBBFAC,,, | Performed by: STUDENT IN AN ORGANIZED HEALTH CARE EDUCATION/TRAINING PROGRAM

## 2023-10-16 PROCEDURE — 3288F PR FALLS RISK ASSESSMENT DOCUMENTED: ICD-10-PCS | Mod: CPTII,S$GLB,, | Performed by: STUDENT IN AN ORGANIZED HEALTH CARE EDUCATION/TRAINING PROGRAM

## 2023-10-16 PROCEDURE — 1101F PR PT FALLS ASSESS DOC 0-1 FALLS W/OUT INJ PAST YR: ICD-10-PCS | Mod: CPTII,S$GLB,, | Performed by: STUDENT IN AN ORGANIZED HEALTH CARE EDUCATION/TRAINING PROGRAM

## 2023-10-16 PROCEDURE — 73564 X-RAY EXAM KNEE 4 OR MORE: CPT | Mod: TC,50

## 2023-10-16 PROCEDURE — 99214 PR OFFICE/OUTPT VISIT, EST, LEVL IV, 30-39 MIN: ICD-10-PCS | Mod: S$GLB,,, | Performed by: STUDENT IN AN ORGANIZED HEALTH CARE EDUCATION/TRAINING PROGRAM

## 2023-10-16 PROCEDURE — 1125F PR PAIN SEVERITY QUANTIFIED, PAIN PRESENT: ICD-10-PCS | Mod: CPTII,S$GLB,, | Performed by: STUDENT IN AN ORGANIZED HEALTH CARE EDUCATION/TRAINING PROGRAM

## 2023-10-16 PROCEDURE — 1125F AMNT PAIN NOTED PAIN PRSNT: CPT | Mod: CPTII,S$GLB,, | Performed by: STUDENT IN AN ORGANIZED HEALTH CARE EDUCATION/TRAINING PROGRAM

## 2023-10-16 PROCEDURE — 73564 XR KNEE ORTHO BILAT WITH FLEXION: ICD-10-PCS | Mod: 26,50,, | Performed by: RADIOLOGY

## 2023-10-16 PROCEDURE — 99999 PR PBB SHADOW E&M-EST. PATIENT-LVL IV: CPT | Mod: PBBFAC,,, | Performed by: STUDENT IN AN ORGANIZED HEALTH CARE EDUCATION/TRAINING PROGRAM

## 2023-10-16 PROCEDURE — 73564 X-RAY EXAM KNEE 4 OR MORE: CPT | Mod: 26,50,, | Performed by: RADIOLOGY

## 2023-10-16 NOTE — PROGRESS NOTES
Patient ID: Kay Rosas  YOB: 1945  MRN: [id number]    Chief Complaint: Pain of the Left Lower Leg    History of Present Illness: Kay Rosas is a  78 y.o. female who is here for f/u evaluation of left knee.     Last Appointment: 9/15/23  Diagnosis: Primary OA left knee  Prior Procedure: Iovera (9/15/23)    The patient returns today reporting that the symptoms have persisted and is interested in proceeding with further treatment options. States she had continual of pain along anteromedial knee that radiates into lower leg with weight bearing and walking.     To review her history, right knee replacement within last 1 2 years with Dr. Cadena.  Status post Visco corticosteroid and crown-rump lysis without any significant improvements to her knee.  Most of pain over the medial joint line medial femoral condyle.  Interested in surgical referral if no other conservative management.    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    MVP (mitral valve prolapse)     On home oxygen therapy     3 liters as needed at night    Osteoporosis     feet    Thyroid condition      Past Surgical History:   Procedure Laterality Date    BACK SURGERY  2012    CATARACT EXTRACTION Bilateral 10/2012    HYSTERECTOMY Left 1978    INJECTION OF ANESTHETIC AGENT INTO SACROILIAC JOINT Bilateral 11/11/2019    Procedure: Bilateral GT bursa + SIJ injection;  Surgeon: Noe Pleitez MD;  Location: [facility];  Service: Pain Management;  Laterality: Bilateral;    INJECTION OF JOINT Bilateral 11/11/2019    Procedure: Bilateral GT bursa + SIJ injection;   Surgeon: Noe Pleitez MD;  Location: [facility];  Service: Pain Management;  Laterality: Bilateral;    JOINT REPLACEMENT Right     knee    ROBOT-ASSISTED LAPAROSCOPIC LYSIS OF ADHESIONS USING DA SP XI N/A 2022    Procedure: XI ROBOTIC LYSIS, ADHESIONS;  Surgeon: Den Barragan MD;  Location: [facility];  Service: Oncology;  Laterality: N/A;    ROBOT-ASSISTED LAPAROSCOPIC SALPINGO-OOPHORECTOMY USING DA SP XI Left 2022    Procedure: XI ROBOTIC SALPINGO-OOPHORECTOMY;  Surgeon: Den Barragan MD;  Location: [facility];  Service: Oncology;  Laterality: Left;    SURGICAL REMOVAL OF BONE SPUR Left     left elbow    VASCULAR SURGERY Right     high ligation due to blood clot      Family History   Problem Relation Age of Onset    Heart disease Mother     Kidney disease Mother     Arthritis Mother     Breast cancer Mother     Cancer Father     Heart disease Father     Cancer Sister     Ovarian cancer Sister     Alzheimer's disease Sister      Social History     Socioeconomic History    Marital status:    Tobacco Use    Smoking status: Former     Current packs/day: 0.00     Average packs/day: 1 pack/day for 53.5 years (53.5 ttl pk-yrs)     Types: Cigarettes     Start date: 1962     Quit date: 2015     Years since quittin.3    Smokeless tobacco: Never   Substance and Sexual Activity    Alcohol use: No    Drug use: No    Sexual activity: Yes     Partners: Male     Birth control/protection: None     Social Determinants of Health     Financial Resource Strain: Low Risk  (2023)    Overall Financial Resource Strain (CARDIA)     Difficulty of Paying Living Expenses: Not very hard   Food Insecurity: Food Insecurity Present (2023)    Hunger Vital Sign     Worried About Running Out of Food in the Last Year: Sometimes true     Ran Out of Food in the Last Year: Sometimes true   Transportation Needs: No Transportation Needs (2023)    PRAPARE - Transportation     Lack of Transportation  (Medical): No     Lack of Transportation (Non-Medical): No   Physical Activity: Inactive (9/26/2023)    Exercise Vital Sign     Days of Exercise per Week: 0 days     Minutes of Exercise per Session: 0 min   Stress: Stress Concern Present (9/26/2023)    [employer] [place] [employer] - Occupational Stress Questionnaire     Feeling of Stress : To some extent   Social Connections: Moderately Integrated (9/26/2023)    Social Connection and Isolation Panel [NHANES]     Frequency of Communication with Friends and Family: More than three times a week     Frequency of Social Gatherings with Friends and Family: Once a week     Attends [employer] Services: More than 4 times per year     Active Member of Clubs or Organizations: Yes     Attends Club or Organization Meetings: More than 4 times per year     Marital Status:    Housing Stability: Low Risk  (9/26/2023)    Housing Stability Vital Sign     Unable to Pay for Housing in the Last Year: No     Number of Places Lived in the Last Year: 1     Unstable Housing in the Last Year: No     Medication List with Changes/Refills   Current Medications    ACETAMINOPHEN (TYLENOL) 500 MG TABLET    Take 2 tablets (1,000 mg total) by mouth every 6 (six) hours as needed for Pain.    ALBUTEROL (PROVENTIL/VENTOLIN HFA) 90 MCG/ACTUATION INHALER    Inhale 2 puffs into the lungs every 4 (four) hours as needed for Wheezing or Shortness of Breath.    APIXABAN (ELIQUIS) 5 MG TAB    Take 5 mg by mouth 2 (two) times daily.    ERGOCALCIFEROL (ERGOCALCIFEROL) 50,000 UNIT CAP    Take 1 capsule (50,000 Units total) by mouth every 7 days.    EZETIMIBE (ZETIA) 10 MG TABLET    Take 1 tablet (10 mg total) by mouth once daily.    IBUPROFEN (ADVIL,MOTRIN) 800 MG TABLET    Take by mouth.    LEVOTHYROXINE (SYNTHROID) 50 MCG TABLET    TAKE 1 TABLET BY MOUTH EVERY DAY    LOSARTAN-HYDROCHLOROTHIAZIDE 100-25 MG (HYZAAR) 100-25 MG PER TABLET    TAKE 1 TABLET BY MOUTH EVERY DAY    SERTRALINE (ZOLOFT)  100 MG TABLET    TAKE 1 TABLET BY MOUTH EVERY DAY    SERTRALINE (ZOLOFT) 50 MG TABLET    TAKE 1 TABLET BY MOUTH EVERY DAY    TIOTROPIUM-OLODATEROL (STIOLTO RESPIMAT) 2.5-2.5 MCG/ACTUATION MIST    Inhale 2 puffs into the lungs once daily. Controller    TRAMADOL (ULTRAM) 50 MG TABLET    Take 50 mg by mouth.    TRAZODONE (DESYREL) 150 MG TABLET    Take 1 tablet (150 mg total) by mouth every evening.     Review of patient's allergies indicates:   Allergen Reactions    Lipitor [atorvastatin]      Severe Leg cramps    Nsaids (non-steroidal anti-inflammatory drug)      CKD       Physical Exam:   Body mass index is 40.03 kg/m².    GENERAL: Well appearing, in no acute distress.  HEAD: Normocephalic and atraumatic.  ENT: External ears and nose grossly normal.  EYES: EOMI bilaterally  PULMONARY: Respirations are grossly even and non-labored.  NEURO: Awake, alert, and oriented x 3.  SKIN: No obvious rashes appreciated.  PSYCH: Mood & affect are appropriate.    Detailed MSK exam:     Tenderness over the medial femoral condyle left knee as well as the medial joint line and medial tibial plateau.  Crepitance with active range of motion no large effusion.    Imaging:      Per my review, left knee, cortical bone changes to the medial femoral condyle with significant medial joint line narrowing    Relevant imaging results were reviewed and interpreted by me and per my read as above.  This was discussed with the patient and / or family today.     Assessment:  Kay Rosas is a 78 y.o. female presents today for history of right total knee replacement with primary osteoarthritis left knee.  Has failed multiple conservative management, left knee with significant medial joint line narrowing where she is most symptomatic and possible insufficiency injury noted to the medial femoral condyle.  We discussed unloading as much as possible we would not have an unloading brace today for her.  Discussed ice oral anti-inflammatories as  needed, and referral to our joint surgeon here for further evaluation.  Can follow up with me as needed in the future.    Primary osteoarthritis of left knee  -     X-ray Knee Ortho Bilateral with Flexion; Future; Expected date: 10/16/2023  -     Ambulatory referral/consult to Orthopedics; Future; Expected date: 10/23/2023    H/O total knee replacement, right  -     X-ray Knee Ortho Bilateral with Flexion; Future; Expected date: 10/16/2023           Bakari Clark MD    Disclaimer: This note was prepared using a voice recognition system and is likely to have sound alike errors within the text.

## 2023-11-12 DIAGNOSIS — J44.9 COPD, MODERATE: ICD-10-CM

## 2023-11-15 RX ORDER — EZETIMIBE 10 MG/1
10 TABLET ORAL
Qty: 90 TABLET | Refills: 1 | Status: SHIPPED | OUTPATIENT
Start: 2023-11-15

## 2023-11-16 RX ORDER — ALBUTEROL SULFATE 90 UG/1
AEROSOL, METERED RESPIRATORY (INHALATION)
Qty: 6.7 G | Refills: 11 | Status: SHIPPED | OUTPATIENT
Start: 2023-11-16

## 2023-11-27 ENCOUNTER — OFFICE VISIT (OUTPATIENT)
Dept: ORTHOPEDICS | Facility: CLINIC | Age: 78
End: 2023-11-27
Payer: MEDICARE

## 2023-11-27 ENCOUNTER — OFFICE VISIT (OUTPATIENT)
Dept: CARDIOLOGY | Facility: CLINIC | Age: 78
End: 2023-11-27
Payer: MEDICARE

## 2023-11-27 VITALS
OXYGEN SATURATION: 94 % | SYSTOLIC BLOOD PRESSURE: 128 MMHG | HEART RATE: 72 BPM | DIASTOLIC BLOOD PRESSURE: 72 MMHG | BODY MASS INDEX: 40.85 KG/M2 | HEIGHT: 66 IN | WEIGHT: 254.19 LBS

## 2023-11-27 VITALS — BODY MASS INDEX: 39.86 KG/M2 | HEIGHT: 66 IN | WEIGHT: 248 LBS

## 2023-11-27 DIAGNOSIS — E03.9 ACQUIRED HYPOTHYROIDISM: Primary | ICD-10-CM

## 2023-11-27 DIAGNOSIS — E78.5 HYPERLIPIDEMIA, UNSPECIFIED HYPERLIPIDEMIA TYPE: ICD-10-CM

## 2023-11-27 DIAGNOSIS — M17.12 PRIMARY OSTEOARTHRITIS OF LEFT KNEE: ICD-10-CM

## 2023-11-27 DIAGNOSIS — N18.32 STAGE 3B CHRONIC KIDNEY DISEASE: ICD-10-CM

## 2023-11-27 DIAGNOSIS — I73.9 PAD (PERIPHERAL ARTERY DISEASE): ICD-10-CM

## 2023-11-27 DIAGNOSIS — E66.01 MORBID OBESITY: ICD-10-CM

## 2023-11-27 DIAGNOSIS — I10 PRIMARY HYPERTENSION: ICD-10-CM

## 2023-11-27 DIAGNOSIS — D50.0 IRON DEFICIENCY ANEMIA DUE TO CHRONIC BLOOD LOSS: ICD-10-CM

## 2023-11-27 DIAGNOSIS — M17.0 OSTEOARTHRITIS OF BOTH KNEES, UNSPECIFIED OSTEOARTHRITIS TYPE: ICD-10-CM

## 2023-11-27 DIAGNOSIS — J44.9 COPD, MODERATE: ICD-10-CM

## 2023-11-27 DIAGNOSIS — Z86.718 HISTORY OF DVT (DEEP VEIN THROMBOSIS): ICD-10-CM

## 2023-11-27 PROCEDURE — 1159F MED LIST DOCD IN RCRD: CPT | Mod: CPTII,S$GLB,, | Performed by: STUDENT IN AN ORGANIZED HEALTH CARE EDUCATION/TRAINING PROGRAM

## 2023-11-27 PROCEDURE — 99999 PR PBB SHADOW E&M-EST. PATIENT-LVL III: CPT | Mod: PBBFAC,,, | Performed by: PHYSICIAN ASSISTANT

## 2023-11-27 PROCEDURE — 3288F PR FALLS RISK ASSESSMENT DOCUMENTED: ICD-10-PCS | Mod: CPTII,S$GLB,, | Performed by: STUDENT IN AN ORGANIZED HEALTH CARE EDUCATION/TRAINING PROGRAM

## 2023-11-27 PROCEDURE — 1101F PR PT FALLS ASSESS DOC 0-1 FALLS W/OUT INJ PAST YR: ICD-10-PCS | Mod: CPTII,S$GLB,, | Performed by: STUDENT IN AN ORGANIZED HEALTH CARE EDUCATION/TRAINING PROGRAM

## 2023-11-27 PROCEDURE — 1101F PR PT FALLS ASSESS DOC 0-1 FALLS W/OUT INJ PAST YR: ICD-10-PCS | Mod: CPTII,S$GLB,, | Performed by: PHYSICIAN ASSISTANT

## 2023-11-27 PROCEDURE — 99999 PR PBB SHADOW E&M-EST. PATIENT-LVL III: ICD-10-PCS | Mod: PBBFAC,,, | Performed by: PHYSICIAN ASSISTANT

## 2023-11-27 PROCEDURE — 3288F FALL RISK ASSESSMENT DOCD: CPT | Mod: CPTII,S$GLB,, | Performed by: STUDENT IN AN ORGANIZED HEALTH CARE EDUCATION/TRAINING PROGRAM

## 2023-11-27 PROCEDURE — 99024 PR POST-OP FOLLOW-UP VISIT: ICD-10-PCS | Mod: S$GLB,,, | Performed by: PHYSICIAN ASSISTANT

## 2023-11-27 PROCEDURE — 1159F PR MEDICATION LIST DOCUMENTED IN MEDICAL RECORD: ICD-10-PCS | Mod: CPTII,S$GLB,, | Performed by: STUDENT IN AN ORGANIZED HEALTH CARE EDUCATION/TRAINING PROGRAM

## 2023-11-27 PROCEDURE — 99214 PR OFFICE/OUTPT VISIT, EST, LEVL IV, 30-39 MIN: ICD-10-PCS | Mod: S$GLB,,, | Performed by: STUDENT IN AN ORGANIZED HEALTH CARE EDUCATION/TRAINING PROGRAM

## 2023-11-27 PROCEDURE — 99214 OFFICE O/P EST MOD 30 MIN: CPT | Mod: S$GLB,,, | Performed by: STUDENT IN AN ORGANIZED HEALTH CARE EDUCATION/TRAINING PROGRAM

## 2023-11-27 PROCEDURE — 3288F PR FALLS RISK ASSESSMENT DOCUMENTED: ICD-10-PCS | Mod: CPTII,S$GLB,, | Performed by: PHYSICIAN ASSISTANT

## 2023-11-27 PROCEDURE — 1160F RVW MEDS BY RX/DR IN RCRD: CPT | Mod: CPTII,S$GLB,, | Performed by: PHYSICIAN ASSISTANT

## 2023-11-27 PROCEDURE — 3074F PR MOST RECENT SYSTOLIC BLOOD PRESSURE < 130 MM HG: ICD-10-PCS | Mod: CPTII,S$GLB,, | Performed by: STUDENT IN AN ORGANIZED HEALTH CARE EDUCATION/TRAINING PROGRAM

## 2023-11-27 PROCEDURE — 3078F DIAST BP <80 MM HG: CPT | Mod: CPTII,S$GLB,, | Performed by: STUDENT IN AN ORGANIZED HEALTH CARE EDUCATION/TRAINING PROGRAM

## 2023-11-27 PROCEDURE — 99024 POSTOP FOLLOW-UP VISIT: CPT | Mod: S$GLB,,, | Performed by: PHYSICIAN ASSISTANT

## 2023-11-27 PROCEDURE — 1159F PR MEDICATION LIST DOCUMENTED IN MEDICAL RECORD: ICD-10-PCS | Mod: CPTII,S$GLB,, | Performed by: PHYSICIAN ASSISTANT

## 2023-11-27 PROCEDURE — 1159F MED LIST DOCD IN RCRD: CPT | Mod: CPTII,S$GLB,, | Performed by: PHYSICIAN ASSISTANT

## 2023-11-27 PROCEDURE — 99999 PR PBB SHADOW E&M-EST. PATIENT-LVL III: ICD-10-PCS | Mod: PBBFAC,,, | Performed by: STUDENT IN AN ORGANIZED HEALTH CARE EDUCATION/TRAINING PROGRAM

## 2023-11-27 PROCEDURE — 1125F PR PAIN SEVERITY QUANTIFIED, PAIN PRESENT: ICD-10-PCS | Mod: CPTII,S$GLB,, | Performed by: PHYSICIAN ASSISTANT

## 2023-11-27 PROCEDURE — 1101F PT FALLS ASSESS-DOCD LE1/YR: CPT | Mod: CPTII,S$GLB,, | Performed by: STUDENT IN AN ORGANIZED HEALTH CARE EDUCATION/TRAINING PROGRAM

## 2023-11-27 PROCEDURE — 99999 PR PBB SHADOW E&M-EST. PATIENT-LVL III: CPT | Mod: PBBFAC,,, | Performed by: STUDENT IN AN ORGANIZED HEALTH CARE EDUCATION/TRAINING PROGRAM

## 2023-11-27 PROCEDURE — 1125F AMNT PAIN NOTED PAIN PRSNT: CPT | Mod: CPTII,S$GLB,, | Performed by: PHYSICIAN ASSISTANT

## 2023-11-27 PROCEDURE — 1160F PR REVIEW ALL MEDS BY PRESCRIBER/CLIN PHARMACIST DOCUMENTED: ICD-10-PCS | Mod: CPTII,S$GLB,, | Performed by: PHYSICIAN ASSISTANT

## 2023-11-27 PROCEDURE — 1101F PT FALLS ASSESS-DOCD LE1/YR: CPT | Mod: CPTII,S$GLB,, | Performed by: PHYSICIAN ASSISTANT

## 2023-11-27 PROCEDURE — 3288F FALL RISK ASSESSMENT DOCD: CPT | Mod: CPTII,S$GLB,, | Performed by: PHYSICIAN ASSISTANT

## 2023-11-27 PROCEDURE — 3074F SYST BP LT 130 MM HG: CPT | Mod: CPTII,S$GLB,, | Performed by: STUDENT IN AN ORGANIZED HEALTH CARE EDUCATION/TRAINING PROGRAM

## 2023-11-27 PROCEDURE — 3078F PR MOST RECENT DIASTOLIC BLOOD PRESSURE < 80 MM HG: ICD-10-PCS | Mod: CPTII,S$GLB,, | Performed by: STUDENT IN AN ORGANIZED HEALTH CARE EDUCATION/TRAINING PROGRAM

## 2023-11-27 NOTE — PROGRESS NOTES
Section of Cardiology                  Cardiac Clinic Note    Chief Complaint/Reason for consultation: preop risk stratification      HPI:   Kay Rosas is a 78 y.o. female with h/o obesity, HTN, HLD, OA, history of DVT (no longer on eliquis due expenses), CoPD on home o2     11/27/23  Will be having left knee replacement  Had right knee replacement x 2 in May 2023  Denies any complications with surgery   Ambulates with walker   Wears portable oxygen, intermittent  SOB is stable  Left leg swelling at times    Denies chest pain/discomfort, syncope, palpitations   Stopped smoking 10 yrs ago  Denies ETOH abuse    Family hx: dad- heart issues, Mom- CHF, afib         EKG 11/27/23 NSR, no acute ST - T wave changes    ECHO  No results found for this or any previous visit.       STRESS TEST No results found for this or any previous visit.       LHC No results found for this or any previous visit.            ROS: All 10 systems reviewed. Please refer to the HPI for pertinent positives. All other systems negative.     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    MVP (mitral valve prolapse)     On home oxygen therapy     3 liters as needed at night    Osteoporosis     feet    Thyroid condition        Surgical History  Past Surgical History:   Procedure Laterality Date    BACK SURGERY  2012    CATARACT EXTRACTION Bilateral 10/2012    HYSTERECTOMY Left 1978    INJECTION OF ANESTHETIC AGENT INTO SACROILIAC JOINT Bilateral 11/11/2019    Procedure: Bilateral GT bursa + SIJ injection;  Surgeon: Noe Pleitez MD;  Location: [facility];  Service:  Pain Management;  Laterality: Bilateral;    INJECTION OF JOINT Bilateral 2019    Procedure: Bilateral GT bursa + SIJ injection;  Surgeon: Noe Pleitez MD;  Location: [facility];  Service: Pain Management;  Laterality: Bilateral;    JOINT REPLACEMENT Right     knee    ROBOT-ASSISTED LAPAROSCOPIC LYSIS OF ADHESIONS USING DA SP XI N/A 2022    Procedure: XI ROBOTIC LYSIS, ADHESIONS;  Surgeon: Den Barragan MD;  Location: [facility] OR;  Service: Oncology;  Laterality: N/A;    ROBOT-ASSISTED LAPAROSCOPIC SALPINGO-OOPHORECTOMY USING DA SP XI Left 2022    Procedure: XI ROBOTIC SALPINGO-OOPHORECTOMY;  Surgeon: Den Barragan MD;  Location: [facility] OR;  Service: Oncology;  Laterality: Left;    SURGICAL REMOVAL OF BONE SPUR Left     left elbow    VASCULAR SURGERY Right     high ligation due to blood clot           Allergies:   Review of patient's allergies indicates:   Allergen Reactions    Lipitor [atorvastatin]      Severe Leg cramps    Nsaids (non-steroidal anti-inflammatory drug)      CKD       Social History:  Social History     Socioeconomic History    Marital status:    Tobacco Use    Smoking status: Former     Current packs/day: 0.00     Average packs/day: 1 pack/day for 53.5 years (53.5 ttl pk-yrs)     Types: Cigarettes     Start date: 1962     Quit date: 2015     Years since quittin.4    Smokeless tobacco: Never   Substance and Sexual Activity    Alcohol use: No    Drug use: No    Sexual activity: Yes     Partners: Male     Birth control/protection: None     Social Determinants of Health     Financial Resource Strain: Low Risk  (2023)    Overall Financial Resource Strain (CARDIA)     Difficulty of Paying Living Expenses: Not very hard   Food Insecurity: Food Insecurity Present (2023)    Hunger Vital Sign     Worried About Running Out of Food in the Last Year: Sometimes true     Ran Out of Food in the Last Year: Sometimes true   Transportation Needs: No  Transportation Needs (9/26/2023)    PRAPARE - Transportation     Lack of Transportation (Medical): No     Lack of Transportation (Non-Medical): No   Physical Activity: Inactive (9/26/2023)    Exercise Vital Sign     Days of Exercise per Week: 0 days     Minutes of Exercise per Session: 0 min   Stress: Stress Concern Present (9/26/2023)    [employer] [place] [employer] - Occupational Stress Questionnaire     Feeling of Stress : To some extent   Social Connections: Moderately Integrated (9/26/2023)    Social Connection and Isolation Panel [NHANES]     Frequency of Communication with Friends and Family: More than three times a week     Frequency of Social Gatherings with Friends and Family: Once a week     Attends Buddhism Services: More than 4 times per year     Active Member of Clubs or Organizations: Yes     Attends Club or Organization Meetings: More than 4 times per year     Marital Status:    Housing Stability: Low Risk  (9/26/2023)    Housing Stability Vital Sign     Unable to Pay for Housing in the Last Year: No     Number of Places Lived in the Last Year: 1     Unstable Housing in the Last Year: No       Family History:  family history includes Alzheimer's disease in her sister; Arthritis in her mother; Breast cancer in her mother; Cancer in her father and sister; Heart disease in her father and mother; Kidney disease in her mother; Ovarian cancer in her sister.    Home Medications:  Current Outpatient Medications on File Prior to Visit   Medication Sig Dispense Refill    acetaminophen (TYLENOL) 500 MG tablet Take 2 tablets (1,000 mg total) by mouth every 6 (six) hours as needed for Pain. 40 tablet 0    albuterol (PROVENTIL/VENTOLIN HFA) 90 mcg/actuation inhaler INHALE 2 PUFFS INTO THE LUNGS EVERY 4 HOURS AS NEEDED FOR WHEEZING OR SHORTNESS OF BREATH. 6.7 g 11    apixaban (ELIQUIS) 5 mg Tab Take 5 mg by mouth 2 (two) times daily.      ergocalciferol (ERGOCALCIFEROL) 50,000 unit Cap Take 1  "capsule (50,000 Units total) by mouth every 7 days. 12 capsule 3    ezetimibe (ZETIA) 10 mg tablet TAKE 1 TABLET BY MOUTH EVERY DAY 90 tablet 1    ibuprofen (ADVIL,MOTRIN) 800 MG tablet Take by mouth.      levothyroxine (SYNTHROID) 50 MCG tablet TAKE 1 TABLET BY MOUTH EVERY DAY 90 tablet 0    losartan-hydrochlorothiazide 100-25 mg (HYZAAR) 100-25 mg per tablet TAKE 1 TABLET BY MOUTH EVERY DAY 90 tablet 1    sertraline (ZOLOFT) 100 MG tablet TAKE 1 TABLET BY MOUTH EVERY DAY 90 tablet 0    sertraline (ZOLOFT) 50 MG tablet TAKE 1 TABLET BY MOUTH EVERY DAY 90 tablet 0    tiotropium-olodateroL (STIOLTO RESPIMAT) 2.5-2.5 mcg/actuation Mist Inhale 2 puffs into the lungs once daily. Controller 4 g 11    traMADoL (ULTRAM) 50 mg tablet Take 50 mg by mouth.      traZODone (DESYREL) 150 MG tablet Take 1 tablet (150 mg total) by mouth every evening. 90 tablet 1     No current facility-administered medications on file prior to visit.       Physical exam:  /72   Pulse 72   Ht 5' 6" (1.676 m)   Wt 115.3 kg (254 lb 3.1 oz)   SpO2 (!) 94%   BMI 41.03 kg/m²         General: Pt is a 78 y.o. year old female who is AAOx3, in NAD, is pleasant, well nourished, looks stated age  HEENT: PERRL, EOMI, Oral mucosa pink & moist  CVS  No abnormal cardiac pulsations noted on inspection. JVP not raised. The apical impulse is normal on palpation, and is located in the left 5th intercostal space in the mid - clavicular line. No palpable thrills or abnormal pulsations noted. RR, S1 - S2 heard, no murmurs, rubs or gallops appreciated.   PUL : CTA B/L. No wheezes/crackles heard   ABD : BS +, soft. No tenderness elicited   LE : mld left leg swelling, Distal Pulses palpable B/L         LABS:    Chemistry:   Lab Results   Component Value Date     07/26/2023    K 3.9 07/26/2023     07/26/2023    CO2 25 07/26/2023    BUN 25 (H) 07/26/2023    CREATININE 1.5 (H) 07/26/2023    CALCIUM 9.3 07/26/2023     Cardiac Markers: No results found " "for: "CKTOTAL", "CKMB", "CKMBINDEX", "TROPONINI"  Cardiac Markers (Last 3): No results found for: "CKTOTAL", "CKMB", "CKMBINDEX", "TROPONINI"  CBC:   Lab Results   Component Value Date    WBC 6.19 07/26/2023    HGB 10.6 (L) 07/26/2023    HCT 35.1 (L) 07/26/2023    MCV 91 07/26/2023     07/26/2023     Lipids:   Lab Results   Component Value Date    CHOL 201 (H) 07/26/2023    TRIG 162 (H) 07/26/2023    HDL 52 07/26/2023     Coagulation:   Lab Results   Component Value Date    INR 1.0 03/06/2023    INR 0.9 02/18/2021    APTT 24.7 02/18/2021           Assessment        1. Acquired hypothyroidism    2. Osteoarthritis of both knees, unspecified osteoarthritis type    3. Morbid obesity    4. Iron deficiency anemia due to chronic blood loss    5. Stage 3b chronic kidney disease    6. Hyperlipidemia, unspecified hyperlipidemia type    7. Primary hypertension    8. PAD (peripheral artery disease)    9. COPD, moderate         Plan:    Preop risk stratification  Ambulates with walker  Elevated risk for knee replacement surgery  No cardiac symptoms  Normal EKG  Recent knee surgery in May 2023 without complications per patient  Given history of DVT x2, recommend to restart eliquis post surgery  No further cardiac workup needed     H/o DVT  Stopped eliquis 1 month ago due to expenses  Open to coumadin  Will refer to hem/onc on discussion in regards to duration of OAC    COPD  Stable  On chronic home O2    PAD  Reports seeing Vascular surgery in the past    HTN  Stable  Losartan/hctz    CkD  Stable    Hypothyroidism  Continue synthroid    Obesity, Body mass index is 41.03 kg/m².   Low salt, low fat diet  Exercise as tolerated, at least 30 min daily     This note was prepared using voice recognition system and is likely to have sound alike errors that may have been overlooked even after proofreading.     I have reviewed all pertinent chart information.  Plans and recommendations have been formulated under my direct " supervision. All questions answered and patient voiced understanding.   If symptoms persist go to the ED.    RTC prpaige Carroll MD  Cardiology

## 2023-11-27 NOTE — PROGRESS NOTES
Patient ID: Kay Rosas is a 78 y.o. female.    Chief Complaint: Pain of the Left Knee      HPI: Kay Rosas  is a 78 y.o. female who c/o Pain of the Left Knee       Patient presents as a new patient today with chief complaint of left knee pain.  Patient notes pain is at worst a 6/10 affecting activity of daily living and thus her quality of life.  She is undergone the right TKA 03/01/2021 and had a knee revision in which she states the kneecap was changed out and scar tissue was removed on 03/12/2023 both surgeries performed by Dr. Macho bell.  She states in regards to the left knee she is received multiple steroid injections, gel injections, I Evera treatment most recently performed 09 that she feels hurt more than provided relief.  She is ready to undergo left total knee replacement.  She is a hard time walking long distances, going from a sitting to standing or standing to seated position, unlevel terrain or stairs.  She is using a Rollator to assist with ambulation.  She is not wearing any knee braces.  She still remains fully independent of her daily routine and drove to her appointment today.  It is interfering with her quality of life.  She is taking ibuprofen as needed to help with the pain.  She is come to our office in hopes to undergoing left total knee replacement.    Patient is presently denying any shortness of breath, chest pain, fever/chills, nausea/vomiting, loss of taste or smell, numbness/tingling or sensation changes, loss of bladder or bowel function.    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    MVP (mitral valve prolapse)     On home  oxygen therapy     3 liters as needed at night    Osteoporosis     feet    Thyroid condition        Past Surgical History:   Procedure Laterality Date    BACK SURGERY  2012    CATARACT EXTRACTION Bilateral 10/2012    HYSTERECTOMY Left 1978    INJECTION OF ANESTHETIC AGENT INTO SACROILIAC JOINT Bilateral 2019    Procedure: Bilateral GT bursa + SIJ injection;  Surgeon: Noe Pleitez MD;  Location: [facility];  Service: Pain Management;  Laterality: Bilateral;    INJECTION OF JOINT Bilateral 2019    Procedure: Bilateral GT bursa + SIJ injection;  Surgeon: Noe Pleitez MD;  Location: [facility];  Service: Pain Management;  Laterality: Bilateral;    JOINT REPLACEMENT Right     knee    ROBOT-ASSISTED LAPAROSCOPIC LYSIS OF ADHESIONS USING DA SP XI N/A 2022    Procedure: XI ROBOTIC LYSIS, ADHESIONS;  Surgeon: Den Barragan MD;  Location: [facility];  Service: Oncology;  Laterality: N/A;    ROBOT-ASSISTED LAPAROSCOPIC SALPINGO-OOPHORECTOMY USING DA SP XI Left 2022    Procedure: XI ROBOTIC SALPINGO-OOPHORECTOMY;  Surgeon: Den Barragan MD;  Location: [facility];  Service: Oncology;  Laterality: Left;    SURGICAL REMOVAL OF BONE SPUR Left     left elbow    VASCULAR SURGERY Right     high ligation due to blood clot        Family History   Problem Relation Age of Onset    Heart disease Mother     Kidney disease Mother     Arthritis Mother     Breast cancer Mother     Cancer Father     Heart disease Father     Cancer Sister     Ovarian cancer Sister     Alzheimer's disease Sister        Social History     Socioeconomic History    Marital status:    Tobacco Use    Smoking status: Former     Current packs/day: 0.00     Average packs/day: 1 pack/day for 53.5 years (53.5 ttl pk-yrs)     Types: Cigarettes     Start date: 1962     Quit date: 2015     Years since quittin.4    Smokeless tobacco: Never   Substance and Sexual Activity    Alcohol use: No    Drug use: No     Sexual activity: Yes     Partners: Male     Birth control/protection: None     Social Determinants of Health     Financial Resource Strain: Low Risk  (9/26/2023)    Overall Financial Resource Strain (CARDIA)     Difficulty of Paying Living Expenses: Not very hard   Food Insecurity: Food Insecurity Present (9/26/2023)    Hunger Vital Sign     Worried About Running Out of Food in the Last Year: Sometimes true     Ran Out of Food in the Last Year: Sometimes true   Transportation Needs: No Transportation Needs (9/26/2023)    PRAPARE - Transportation     Lack of Transportation (Medical): No     Lack of Transportation (Non-Medical): No   Physical Activity: Inactive (9/26/2023)    Exercise Vital Sign     Days of Exercise per Week: 0 days     Minutes of Exercise per Session: 0 min   Stress: Stress Concern Present (9/26/2023)    [employer] [place] [employer] - Occupational Stress Questionnaire     Feeling of Stress : To some extent   Social Connections: Moderately Integrated (9/26/2023)    Social Connection and Isolation Panel [NHANES]     Frequency of Communication with Friends and Family: More than three times a week     Frequency of Social Gatherings with Friends and Family: Once a week     Attends Sabianist Services: More than 4 times per year     Active Member of Clubs or Organizations: Yes     Attends Club or Organization Meetings: More than 4 times per year     Marital Status:    Housing Stability: Low Risk  (9/26/2023)    Housing Stability Vital Sign     Unable to Pay for Housing in the Last Year: No     Number of Places Lived in the Last Year: 1     Unstable Housing in the Last Year: No       Medication List with Changes/Refills   Current Medications    ACETAMINOPHEN (TYLENOL) 500 MG TABLET    Take 2 tablets (1,000 mg total) by mouth every 6 (six) hours as needed for Pain.    ALBUTEROL (PROVENTIL/VENTOLIN HFA) 90 MCG/ACTUATION INHALER    INHALE 2 PUFFS INTO THE LUNGS EVERY 4 HOURS AS NEEDED FOR  WHEEZING OR SHORTNESS OF BREATH.    APIXABAN (ELIQUIS) 5 MG TAB    Take 5 mg by mouth 2 (two) times daily.    ERGOCALCIFEROL (ERGOCALCIFEROL) 50,000 UNIT CAP    Take 1 capsule (50,000 Units total) by mouth every 7 days.    EZETIMIBE (ZETIA) 10 MG TABLET    TAKE 1 TABLET BY MOUTH EVERY DAY    IBUPROFEN (ADVIL,MOTRIN) 800 MG TABLET    Take by mouth.    LEVOTHYROXINE (SYNTHROID) 50 MCG TABLET    TAKE 1 TABLET BY MOUTH EVERY DAY    LOSARTAN-HYDROCHLOROTHIAZIDE 100-25 MG (HYZAAR) 100-25 MG PER TABLET    TAKE 1 TABLET BY MOUTH EVERY DAY    SERTRALINE (ZOLOFT) 100 MG TABLET    TAKE 1 TABLET BY MOUTH EVERY DAY    SERTRALINE (ZOLOFT) 50 MG TABLET    TAKE 1 TABLET BY MOUTH EVERY DAY    TIOTROPIUM-OLODATEROL (STIOLTO RESPIMAT) 2.5-2.5 MCG/ACTUATION MIST    Inhale 2 puffs into the lungs once daily. Controller    TRAMADOL (ULTRAM) 50 MG TABLET    Take 50 mg by mouth.    TRAZODONE (DESYREL) 150 MG TABLET    Take 1 tablet (150 mg total) by mouth every evening.       Review of patient's allergies indicates:   Allergen Reactions    Lipitor [atorvastatin]      Severe Leg cramps    Nsaids (non-steroidal anti-inflammatory drug)      CKD         Objective:     Left Lower Extremity    KNEE:  ROM: passive flex/ ext full slightly hyperflexed roughly 3°  Patella midling, moderate crepitus noted   Ligaments stable  Pain on palpation to medial and lateral aspect  Calf NT, soft   No defect in the patellar quadriceps tendon  (-) Barrett sign  DF/PF full  Wiggles toes  Sensation intact to light touch   No pitting edema appreciated   NVI  Cap refill < 2 sec    Skin warm to touch, no obvious lesion noted       IMAGING:    XRAY:  FINDINGS:  Right knee total arthroplasty changes present without evidence of loosening.  Anatomic alignment noted.  No large joint effusion.     Left knee demonstrates multi compartment degenerative findings most prevalent within the medial compartment with significant joint space loss.  10 mm osteochondral defect versus  subcortical cyst within the medial femoral condyle, new from prior.  Correlate with MRI imaging.  Suprapatellar joint effusion possible.     Impression:     As above    Kellgren Calvin scale : 4    Personally noted cruciate sparring implant  Tibia looks to be revised with added stem   Patella is resurfaced   Sizing looks to be appropriate        Assessment:       Encounter Diagnosis   Name Primary?    Primary osteoarthritis of left knee           Plan:       Kay was seen today for pain.    Diagnoses and all orders for this visit:    Primary osteoarthritis of left knee  -     Ambulatory referral/consult to Orthopedics        Kay Rosas is a new patient who presents to me today with chief complaint of left knee pain. We had a long discussion today regarding degenerative arthritis in the knees. The patient understands that arthritis is chronic and will worsen over time.  The patient also understands that arthritis may cause episodic flare-ups in pain. Management or if arthritis is achieved through a multi-modal approach including weight loss in obese individuals, activity modification, NSAIDs (topical vs oral) where appropriate, periodic intra-articular steroid injections, viscosupplementation, physical therapy, knee bracing, ambulatory aids, as well as geniculate nerve blocks.  Patient presented to discuss left total knee.  Risks benefits and surgical procedure as well as all questions were answered.  Ask that the patient reach out to her establish cardiology to discuss left total knee clearance.  Once this is obtained we can get her scheduled to discuss surgery further with Dr. Urena.    Dr. Urena is aware of the patient & current presentation. He agrees with the current plan above.     Patient verbalized understanding of all instructions and agreed with the above plan.    No follow-ups on file.    The patient understands, chooses and consents to this plan and accepts all   the risks which  include but are not limited to the risks mentioned above.     Disclaimer: This note was prepared using a voice recognition system and is likely to have sound alike errors within the text.

## 2023-11-29 ENCOUNTER — OFFICE VISIT (OUTPATIENT)
Dept: FAMILY MEDICINE | Facility: CLINIC | Age: 78
End: 2023-11-29
Payer: MEDICARE

## 2023-11-29 VITALS
SYSTOLIC BLOOD PRESSURE: 142 MMHG | BODY MASS INDEX: 40.5 KG/M2 | RESPIRATION RATE: 18 BRPM | TEMPERATURE: 98 F | OXYGEN SATURATION: 95 % | HEIGHT: 66 IN | WEIGHT: 252 LBS | DIASTOLIC BLOOD PRESSURE: 68 MMHG | HEART RATE: 86 BPM

## 2023-11-29 DIAGNOSIS — R39.9 UTI SYMPTOMS: Primary | ICD-10-CM

## 2023-11-29 LAB
BILIRUB UR QL STRIP: NEGATIVE
CLARITY UR REFRACT.AUTO: ABNORMAL
COLOR UR AUTO: YELLOW
GLUCOSE UR QL STRIP: NEGATIVE
HGB UR QL STRIP: NEGATIVE
KETONES UR QL STRIP: NEGATIVE
LEUKOCYTE ESTERASE UR QL STRIP: NEGATIVE
NITRITE UR QL STRIP: NEGATIVE
PH UR STRIP: 7 [PH] (ref 5–8)
PROT UR QL STRIP: NEGATIVE
SP GR UR STRIP: 1.01 (ref 1–1.03)
URN SPEC COLLECT METH UR: ABNORMAL

## 2023-11-29 PROCEDURE — 99999 PR PBB SHADOW E&M-EST. PATIENT-LVL III: CPT | Mod: PBBFAC,,, | Performed by: INTERNAL MEDICINE

## 2023-11-29 PROCEDURE — 3078F DIAST BP <80 MM HG: CPT | Mod: CPTII,S$GLB,, | Performed by: INTERNAL MEDICINE

## 2023-11-29 PROCEDURE — 1125F PR PAIN SEVERITY QUANTIFIED, PAIN PRESENT: ICD-10-PCS | Mod: CPTII,S$GLB,, | Performed by: INTERNAL MEDICINE

## 2023-11-29 PROCEDURE — 1101F PT FALLS ASSESS-DOCD LE1/YR: CPT | Mod: CPTII,S$GLB,, | Performed by: INTERNAL MEDICINE

## 2023-11-29 PROCEDURE — 3288F FALL RISK ASSESSMENT DOCD: CPT | Mod: CPTII,S$GLB,, | Performed by: INTERNAL MEDICINE

## 2023-11-29 PROCEDURE — 99999 PR PBB SHADOW E&M-EST. PATIENT-LVL III: ICD-10-PCS | Mod: PBBFAC,,, | Performed by: INTERNAL MEDICINE

## 2023-11-29 PROCEDURE — 3288F PR FALLS RISK ASSESSMENT DOCUMENTED: ICD-10-PCS | Mod: CPTII,S$GLB,, | Performed by: INTERNAL MEDICINE

## 2023-11-29 PROCEDURE — 3077F SYST BP >= 140 MM HG: CPT | Mod: CPTII,S$GLB,, | Performed by: INTERNAL MEDICINE

## 2023-11-29 PROCEDURE — 99213 OFFICE O/P EST LOW 20 MIN: CPT | Mod: S$GLB,,, | Performed by: INTERNAL MEDICINE

## 2023-11-29 PROCEDURE — 81003 URINALYSIS AUTO W/O SCOPE: CPT | Performed by: INTERNAL MEDICINE

## 2023-11-29 PROCEDURE — 1125F AMNT PAIN NOTED PAIN PRSNT: CPT | Mod: CPTII,S$GLB,, | Performed by: INTERNAL MEDICINE

## 2023-11-29 PROCEDURE — 1101F PR PT FALLS ASSESS DOC 0-1 FALLS W/OUT INJ PAST YR: ICD-10-PCS | Mod: CPTII,S$GLB,, | Performed by: INTERNAL MEDICINE

## 2023-11-29 PROCEDURE — 3078F PR MOST RECENT DIASTOLIC BLOOD PRESSURE < 80 MM HG: ICD-10-PCS | Mod: CPTII,S$GLB,, | Performed by: INTERNAL MEDICINE

## 2023-11-29 PROCEDURE — 99213 PR OFFICE/OUTPT VISIT, EST, LEVL III, 20-29 MIN: ICD-10-PCS | Mod: S$GLB,,, | Performed by: INTERNAL MEDICINE

## 2023-11-29 PROCEDURE — 3077F PR MOST RECENT SYSTOLIC BLOOD PRESSURE >= 140 MM HG: ICD-10-PCS | Mod: CPTII,S$GLB,, | Performed by: INTERNAL MEDICINE

## 2023-11-29 RX ORDER — NITROFURANTOIN 25; 75 MG/1; MG/1
100 CAPSULE ORAL 2 TIMES DAILY
Qty: 10 CAPSULE | Refills: 0 | Status: SHIPPED | OUTPATIENT
Start: 2023-11-29 | End: 2023-12-04

## 2023-11-29 NOTE — PROGRESS NOTES
Subjective     Patient ID: Kay Rosas is a 78 y.o. female.    Chief Complaint: Urinary Tract Infection      HPI  Patient presents due to UTI symptoms. She reports she has been having urinary frequency, burning and bladder pressure. She reports no blood in urine.    Review of Systems   Constitutional:  Negative for chills and fatigue.   Respiratory:  Negative for chest tightness and shortness of breath.    Cardiovascular:  Negative for chest pain and palpitations.   Gastrointestinal:  Negative for abdominal pain, constipation, diarrhea, nausea and vomiting.   Genitourinary:  Positive for dysuria, frequency and urgency. Negative for hematuria.   Neurological:  Negative for dizziness, numbness and headaches.          Objective       Current Outpatient Medications:     acetaminophen (TYLENOL) 500 MG tablet, Take 2 tablets (1,000 mg total) by mouth every 6 (six) hours as needed for Pain., Disp: 40 tablet, Rfl: 0    albuterol (PROVENTIL/VENTOLIN HFA) 90 mcg/actuation inhaler, INHALE 2 PUFFS INTO THE LUNGS EVERY 4 HOURS AS NEEDED FOR WHEEZING OR SHORTNESS OF BREATH., Disp: 6.7 g, Rfl: 11    apixaban (ELIQUIS) 5 mg Tab, Take 5 mg by mouth 2 (two) times daily., Disp: , Rfl:     ergocalciferol (ERGOCALCIFEROL) 50,000 unit Cap, Take 1 capsule (50,000 Units total) by mouth every 7 days., Disp: 12 capsule, Rfl: 3    ezetimibe (ZETIA) 10 mg tablet, TAKE 1 TABLET BY MOUTH EVERY DAY, Disp: 90 tablet, Rfl: 1    ibuprofen (ADVIL,MOTRIN) 800 MG tablet, Take by mouth., Disp: , Rfl:     levothyroxine (SYNTHROID) 50 MCG tablet, TAKE 1 TABLET BY MOUTH EVERY DAY, Disp: 90 tablet, Rfl: 0    losartan-hydrochlorothiazide 100-25 mg (HYZAAR) 100-25 mg per tablet, TAKE 1 TABLET BY MOUTH EVERY DAY, Disp: 90 tablet, Rfl: 1    sertraline (ZOLOFT) 100 MG tablet, TAKE 1 TABLET BY MOUTH EVERY DAY, Disp: 90 tablet, Rfl: 0    sertraline (ZOLOFT) 50 MG tablet, TAKE 1 TABLET BY MOUTH EVERY DAY, Disp: 90 tablet, Rfl: 0    tiotropium-olodateroL  (STIOLTO RESPIMAT) 2.5-2.5 mcg/actuation Mist, Inhale 2 puffs into the lungs once daily. Controller, Disp: 4 g, Rfl: 11    traMADoL (ULTRAM) 50 mg tablet, Take 50 mg by mouth., Disp: , Rfl:     traZODone (DESYREL) 150 MG tablet, Take 1 tablet (150 mg total) by mouth every evening., Disp: 90 tablet, Rfl: 1    nitrofurantoin, macrocrystal-monohydrate, (MACROBID) 100 MG capsule, Take 1 capsule (100 mg total) by mouth 2 (two) times daily. for 5 days, Disp: 10 capsule, Rfl: 0     Physical Exam  Constitutional:       General: She is not in acute distress.     Appearance: Normal appearance. She is obese.   HENT:      Head: Normocephalic and atraumatic.   Cardiovascular:      Rate and Rhythm: Normal rate and regular rhythm.      Heart sounds: Normal heart sounds. No murmur heard.     No friction rub. No gallop.   Pulmonary:      Effort: Pulmonary effort is normal.      Breath sounds: Normal breath sounds. No wheezing, rhonchi or rales.   Abdominal:      General: Bowel sounds are normal. There is no distension.      Palpations: Abdomen is soft.      Tenderness: There is no abdominal tenderness. There is no rebound.   Skin:     General: Skin is warm and dry.      Coloration: Skin is not jaundiced.   Neurological:      General: No focal deficit present.      Mental Status: She is alert and oriented to person, place, and time. Mental status is at baseline.   Psychiatric:         Mood and Affect: Mood normal.         Behavior: Behavior normal.            Assessment and Plan     1. UTI symptoms  -     Urinalysis, Reflex to Urine Culture Urine, Clean Catch  -     nitrofurantoin, macrocrystal-monohydrate, (MACROBID) 100 MG capsule; Take 1 capsule (100 mg total) by mouth 2 (two) times daily. for 5 days  Dispense: 10 capsule; Refill: 0    Will obtain urinalysis  Will start patient on nitrofurantoin. Will call patient with urinalysis and culture results           No follow-ups on file.

## 2023-11-30 ENCOUNTER — TELEPHONE (OUTPATIENT)
Dept: ORTHOPEDICS | Facility: CLINIC | Age: 78
End: 2023-11-30
Payer: MEDICARE

## 2023-11-30 NOTE — TELEPHONE ENCOUNTER
Returned the patient's phone call in regards to their message. Informed the patient that since they are cleared by cardiology I can get them schedule to see Dr. Urena to discuss surgery. I got them schedule to see Dr. Urena on 01/11/24 at 2:00. Patient verbalized understanding

## 2023-11-30 NOTE — TELEPHONE ENCOUNTER
----- Message from Wild Mao sent at 11/30/2023  9:01 AM CST -----  Contact: self [phone]  Pt is calling stating she have been cleared by cardiologist  . Please call back at [phone] . thanksdj

## 2023-12-05 ENCOUNTER — PATIENT MESSAGE (OUTPATIENT)
Dept: ADMINISTRATIVE | Facility: HOSPITAL | Age: 78
End: 2023-12-05
Payer: MEDICARE

## 2023-12-06 ENCOUNTER — TELEPHONE (OUTPATIENT)
Dept: HEMATOLOGY/ONCOLOGY | Facility: CLINIC | Age: 78
End: 2023-12-06
Payer: MEDICARE

## 2023-12-06 NOTE — TELEPHONE ENCOUNTER
Spoke to patient in reference to Hematology referral from Dr. Carroll and need to schedule f/u d/t already being an established patient.  Appointment scheduled per request    notice via pt portal.

## 2023-12-08 DIAGNOSIS — I74.9 EMBOLISM AND THROMBOSIS OF UNSPECIFIED ARTERY: Primary | ICD-10-CM

## 2024-01-11 ENCOUNTER — LAB VISIT (OUTPATIENT)
Dept: LAB | Facility: HOSPITAL | Age: 79
End: 2024-01-11
Attending: NURSE PRACTITIONER
Payer: MEDICARE

## 2024-01-11 ENCOUNTER — OFFICE VISIT (OUTPATIENT)
Dept: ORTHOPEDICS | Facility: CLINIC | Age: 79
End: 2024-01-11
Payer: MEDICARE

## 2024-01-11 ENCOUNTER — OFFICE VISIT (OUTPATIENT)
Dept: HEMATOLOGY/ONCOLOGY | Facility: CLINIC | Age: 79
End: 2024-01-11
Payer: MEDICARE

## 2024-01-11 VITALS
TEMPERATURE: 97 F | BODY MASS INDEX: 39.2 KG/M2 | SYSTOLIC BLOOD PRESSURE: 141 MMHG | OXYGEN SATURATION: 97 % | HEART RATE: 97 BPM | DIASTOLIC BLOOD PRESSURE: 71 MMHG | HEIGHT: 66 IN | WEIGHT: 243.94 LBS

## 2024-01-11 VITALS — WEIGHT: 251 LBS | BODY MASS INDEX: 40.34 KG/M2 | HEIGHT: 66 IN

## 2024-01-11 DIAGNOSIS — E53.8 B12 DEFICIENCY: ICD-10-CM

## 2024-01-11 DIAGNOSIS — Z86.718 CURRENT LONG-TERM USE OF ANTICOAGULANT MEDICATION WITH HISTORY OF DEEP VENOUS THROMBOSIS (DVT): ICD-10-CM

## 2024-01-11 DIAGNOSIS — M21.162 ACQUIRED VARUS DEFORMITY KNEE, LEFT: ICD-10-CM

## 2024-01-11 DIAGNOSIS — E53.8 FOLATE DEFICIENCY: ICD-10-CM

## 2024-01-11 DIAGNOSIS — D50.0 IRON DEFICIENCY ANEMIA DUE TO CHRONIC BLOOD LOSS: ICD-10-CM

## 2024-01-11 DIAGNOSIS — I74.9 EMBOLISM AND THROMBOSIS OF UNSPECIFIED ARTERY: ICD-10-CM

## 2024-01-11 DIAGNOSIS — I74.9 EMBOLISM AND THROMBOSIS OF UNSPECIFIED ARTERY: Primary | ICD-10-CM

## 2024-01-11 DIAGNOSIS — E66.01 MORBID OBESITY: ICD-10-CM

## 2024-01-11 DIAGNOSIS — M17.12 ARTHRITIS OF KNEE, LEFT: Primary | ICD-10-CM

## 2024-01-11 DIAGNOSIS — Z98.1 HISTORY OF LUMBAR FUSION: ICD-10-CM

## 2024-01-11 DIAGNOSIS — Z86.718 HISTORY OF DVT (DEEP VEIN THROMBOSIS): ICD-10-CM

## 2024-01-11 DIAGNOSIS — Z96.651 HISTORY OF TOTAL RIGHT KNEE REPLACEMENT: ICD-10-CM

## 2024-01-11 DIAGNOSIS — I73.9 PERIPHERAL ARTERIAL DISEASE: ICD-10-CM

## 2024-01-11 DIAGNOSIS — D64.9 ANEMIA, UNSPECIFIED TYPE: ICD-10-CM

## 2024-01-11 DIAGNOSIS — Z79.01 CURRENT LONG-TERM USE OF ANTICOAGULANT MEDICATION WITH HISTORY OF DEEP VENOUS THROMBOSIS (DVT): ICD-10-CM

## 2024-01-11 DIAGNOSIS — E66.9 OBESITY, UNSPECIFIED CLASSIFICATION, UNSPECIFIED OBESITY TYPE, UNSPECIFIED WHETHER SERIOUS COMORBIDITY PRESENT: ICD-10-CM

## 2024-01-11 LAB
ALBUMIN SERPL BCP-MCNC: 4 G/DL (ref 3.5–5.2)
ALP SERPL-CCNC: 72 U/L (ref 55–135)
ALT SERPL W/O P-5'-P-CCNC: 22 U/L (ref 10–44)
ANION GAP SERPL CALC-SCNC: 12 MMOL/L (ref 8–16)
AST SERPL-CCNC: 17 U/L (ref 10–40)
BASOPHILS # BLD AUTO: 0.02 K/UL (ref 0–0.2)
BASOPHILS NFR BLD: 0.3 % (ref 0–1.9)
BILIRUB SERPL-MCNC: 0.5 MG/DL (ref 0.1–1)
BUN SERPL-MCNC: 25 MG/DL (ref 8–23)
CALCIUM SERPL-MCNC: 9.9 MG/DL (ref 8.7–10.5)
CHLORIDE SERPL-SCNC: 104 MMOL/L (ref 95–110)
CO2 SERPL-SCNC: 25 MMOL/L (ref 23–29)
CREAT SERPL-MCNC: 1.6 MG/DL (ref 0.5–1.4)
DIFFERENTIAL METHOD BLD: ABNORMAL
EOSINOPHIL # BLD AUTO: 0.1 K/UL (ref 0–0.5)
EOSINOPHIL NFR BLD: 1.8 % (ref 0–8)
ERYTHROCYTE [DISTWIDTH] IN BLOOD BY AUTOMATED COUNT: 14.2 % (ref 11.5–14.5)
EST. GFR  (NO RACE VARIABLE): 33 ML/MIN/1.73 M^2
GLUCOSE SERPL-MCNC: 92 MG/DL (ref 70–110)
HCT VFR BLD AUTO: 39.4 % (ref 37–48.5)
HGB BLD-MCNC: 12.5 G/DL (ref 12–16)
IMM GRANULOCYTES # BLD AUTO: 0.02 K/UL (ref 0–0.04)
IMM GRANULOCYTES NFR BLD AUTO: 0.3 % (ref 0–0.5)
LYMPHOCYTES # BLD AUTO: 1.8 K/UL (ref 1–4.8)
LYMPHOCYTES NFR BLD: 28.7 % (ref 18–48)
MCH RBC QN AUTO: 28.1 PG (ref 27–31)
MCHC RBC AUTO-ENTMCNC: 31.7 G/DL (ref 32–36)
MCV RBC AUTO: 89 FL (ref 82–98)
MONOCYTES # BLD AUTO: 0.5 K/UL (ref 0.3–1)
MONOCYTES NFR BLD: 7.7 % (ref 4–15)
NEUTROPHILS # BLD AUTO: 3.8 K/UL (ref 1.8–7.7)
NEUTROPHILS NFR BLD: 61.2 % (ref 38–73)
NRBC BLD-RTO: 0 /100 WBC
PLATELET # BLD AUTO: 280 K/UL (ref 150–450)
PMV BLD AUTO: 10.4 FL (ref 9.2–12.9)
POTASSIUM SERPL-SCNC: 4.4 MMOL/L (ref 3.5–5.1)
PROT SERPL-MCNC: 8 G/DL (ref 6–8.4)
RBC # BLD AUTO: 4.45 M/UL (ref 4–5.4)
SODIUM SERPL-SCNC: 141 MMOL/L (ref 136–145)
WBC # BLD AUTO: 6.24 K/UL (ref 3.9–12.7)

## 2024-01-11 PROCEDURE — 36415 COLL VENOUS BLD VENIPUNCTURE: CPT | Performed by: NURSE PRACTITIONER

## 2024-01-11 PROCEDURE — 99214 OFFICE O/P EST MOD 30 MIN: CPT | Mod: S$GLB,,, | Performed by: ORTHOPAEDIC SURGERY

## 2024-01-11 PROCEDURE — 99999 PR PBB SHADOW E&M-EST. PATIENT-LVL III: CPT | Mod: PBBFAC,,, | Performed by: ORTHOPAEDIC SURGERY

## 2024-01-11 PROCEDURE — 99214 OFFICE O/P EST MOD 30 MIN: CPT | Mod: S$GLB,,, | Performed by: NURSE PRACTITIONER

## 2024-01-11 PROCEDURE — 80053 COMPREHEN METABOLIC PANEL: CPT | Performed by: NURSE PRACTITIONER

## 2024-01-11 PROCEDURE — 85025 COMPLETE CBC W/AUTO DIFF WBC: CPT | Performed by: NURSE PRACTITIONER

## 2024-01-11 PROCEDURE — 99999 PR PBB SHADOW E&M-EST. PATIENT-LVL IV: CPT | Mod: PBBFAC,,, | Performed by: NURSE PRACTITIONER

## 2024-01-11 NOTE — ASSESSMENT & PLAN NOTE
Recommended lifelong anticoagulation. Taking Eliquis 1 tablet daily for unclear reasons    Educated to take Eliquis 5 mg PO BID. New prescription sent to [facility] pharmacy. Will f/u 3 months with cbc, cmp iron ferritin. Discussed S&S to report sooner

## 2024-01-11 NOTE — PROGRESS NOTES
Subjective:       Patient ID: Kay Rosas is a 78 y.o. female.    Chief Complaint: h/o recurrent DVT. H/o anemia    HPI: 78 y.o female with pre-diabetes, HTN, HLD, OA, s/p TIM and R oophorectomy in  (patient unclear on reason noted to have cyst (, no known history of malignancy).  She was followed in hematology clinic by Dr. Cartwright in past due to thromboembolic disease initial episode right lower extremity DVT circa  with recurrence in  left lower extremity DVT at which point indefinite anticoagulation was recommended however she discontinued after 1 year due to easy bruisability.  She was lost to follow up in Hematology Clinic for a couple years.  Of note patient had CT abdomen w/o contrast 2022 with primary care due to abdominal/pelvic pain which revealed left adnexal abnormality and follow-up pelvic ultrasound.  22 pelvic US:  Exophytic/immediately adjacent to the left ovary is a heterogeneous more solid appearing lesion corresponding to CT.  This measures 3.4 x 3.1 x 3.1 cm  Ultimately underwent biopsy with benign pathology.     During prior visit she was asked to resume Eliquis which she agreed. She reports initially taking BID but for unclear reasons has been taking daily. She admits to not following with primary care routinely. Taking care of sick mom. We discussed preventative screenings but she would like to revisit this at a later date.   Social History     Socioeconomic History    Marital status:    Tobacco Use    Smoking status: Former     Current packs/day: 0.00     Average packs/day: 1 pack/day for 53.5 years (53.5 ttl pk-yrs)     Types: Cigarettes     Start date: 1962     Quit date: 2015     Years since quittin.5    Smokeless tobacco: Never   Substance and Sexual Activity    Alcohol use: No    Drug use: No    Sexual activity: Yes     Partners: Male     Birth control/protection: None     Social Determinants of Health     Financial Resource Strain: Low  Risk  (9/26/2023)    Overall Financial Resource Strain (CARDIA)     Difficulty of Paying Living Expenses: Not very hard   Food Insecurity: Food Insecurity Present (9/26/2023)    Hunger Vital Sign     Worried About Running Out of Food in the Last Year: Sometimes true     Ran Out of Food in the Last Year: Sometimes true   Transportation Needs: No Transportation Needs (9/26/2023)    PRAPARE - Transportation     Lack of Transportation (Medical): No     Lack of Transportation (Non-Medical): No   Physical Activity: Inactive (9/26/2023)    Exercise Vital Sign     Days of Exercise per Week: 0 days     Minutes of Exercise per Session: 0 min   Stress: Stress Concern Present (9/26/2023)    [employer] - Occupational Stress Questionnaire     Feeling of Stress : To some extent   Social Connections: Moderately Integrated (9/26/2023)    Social Connection and Isolation Panel [NHANES]     Frequency of Communication with Friends and Family: More than three times a week     Frequency of Social Gatherings with Friends and Family: Once a week     Attends Yarsanism Services: More than 4 times per year     Active Member of Clubs or Organizations: Yes     Attends Club or Organization Meetings: More than 4 times per year     Marital Status:    Housing Stability: Low Risk  (9/26/2023)    Housing Stability Vital Sign     Unable to Pay for Housing in the Last Year: No     Number of Places Lived in the Last Year: 1     Unstable Housing in the Last Year: No       Past Medical History:   Diagnosis Date    Anticoagulant long-term use     Arthritis     Cataract- bilateral with extraction- patient reports no lens implants     Degenerative disc disease, cervical     Disorder of kidney and ureter     CKD stage 3    DVT (deep venous thrombosis)     Emphysema of lung     Hyperlipidemia     Hypertension     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    MVP (mitral valve prolapse)     On  home oxygen therapy     3 liters as needed at night    Osteoporosis     feet    Thyroid condition        Family History   Problem Relation Age of Onset    Heart disease Mother     Kidney disease Mother     Arthritis Mother     Breast cancer Mother     Cancer Father     Heart disease Father     Cancer Sister     Ovarian cancer Sister     Alzheimer's disease Sister        Past Surgical History:   Procedure Laterality Date    BACK SURGERY  2012    CATARACT EXTRACTION Bilateral 10/2012    HYSTERECTOMY Left 1978    INJECTION OF ANESTHETIC AGENT INTO SACROILIAC JOINT Bilateral 11/11/2019    Procedure: Bilateral GT bursa + SIJ injection;  Surgeon: Noe Pleitez MD;  Location: [facility]IN [facility];  Service: Pain Management;  Laterality: Bilateral;    INJECTION OF JOINT Bilateral 11/11/2019    Procedure: Bilateral GT bursa + SIJ injection;  Surgeon: Noe Pleitez MD;  Location: [facility];  Service: Pain Management;  Laterality: Bilateral;    JOINT REPLACEMENT Right 2021    knee    ROBOT-ASSISTED LAPAROSCOPIC LYSIS OF ADHESIONS USING DA SP XI N/A 12/05/2022    Procedure: XI ROBOTIC LYSIS, ADHESIONS;  Surgeon: Den Barragan MD;  Location: [facility] OR;  Service: Oncology;  Laterality: N/A;    ROBOT-ASSISTED LAPAROSCOPIC SALPINGO-OOPHORECTOMY USING DA SP XI Left 12/05/2022    Procedure: XI ROBOTIC SALPINGO-OOPHORECTOMY;  Surgeon: Den Barragan MD;  Location: [facility] OR;  Service: Oncology;  Laterality: Left;    SURGICAL REMOVAL OF BONE SPUR Left     left elbow    VASCULAR SURGERY Right     high ligation due to blood clot        Review of Systems   Constitutional:  Negative for activity change, appetite change, chills, fatigue, fever and unexpected weight change.   HENT:  Negative for congestion, mouth sores, nosebleeds, sore throat, trouble swallowing and voice change.    Respiratory:  Negative for cough, chest tightness, shortness of breath and wheezing.    Cardiovascular:  Negative for chest pain and  leg swelling.   Gastrointestinal:  Negative for abdominal distention, abdominal pain, blood in stool, constipation, diarrhea, nausea and vomiting.   Genitourinary:  Negative for difficulty urinating, dysuria and hematuria.   Musculoskeletal:  Negative for arthralgias, back pain and myalgias.   Skin:  Negative for pallor, rash and wound.   Neurological:  Negative for dizziness, syncope, weakness and headaches.   Hematological:  Negative for adenopathy. Does not bruise/bleed easily.   Psychiatric/Behavioral:  The patient is nervous/anxious.          Medication List with Changes/Refills   New Medications    APIXABAN (ELIQUIS) 5 MG TAB    Take 1 tablet (5 mg total) by mouth 2 (two) times daily.   Current Medications    ACETAMINOPHEN (TYLENOL) 500 MG TABLET    Take 2 tablets (1,000 mg total) by mouth every 6 (six) hours as needed for Pain.    ALBUTEROL (PROVENTIL/VENTOLIN HFA) 90 MCG/ACTUATION INHALER    INHALE 2 PUFFS INTO THE LUNGS EVERY 4 HOURS AS NEEDED FOR WHEEZING OR SHORTNESS OF BREATH.    APIXABAN (ELIQUIS) 5 MG TAB    Take 5 mg by mouth 2 (two) times daily.    ERGOCALCIFEROL (ERGOCALCIFEROL) 50,000 UNIT CAP    Take 1 capsule (50,000 Units total) by mouth every 7 days.    EZETIMIBE (ZETIA) 10 MG TABLET    TAKE 1 TABLET BY MOUTH EVERY DAY    IBUPROFEN (ADVIL,MOTRIN) 800 MG TABLET    Take by mouth.    LEVOTHYROXINE (SYNTHROID) 50 MCG TABLET    TAKE 1 TABLET BY MOUTH EVERY DAY    LOSARTAN-HYDROCHLOROTHIAZIDE 100-25 MG (HYZAAR) 100-25 MG PER TABLET    TAKE 1 TABLET BY MOUTH EVERY DAY    SERTRALINE (ZOLOFT) 100 MG TABLET    TAKE 1 TABLET BY MOUTH EVERY DAY    SERTRALINE (ZOLOFT) 50 MG TABLET    TAKE 1 TABLET BY MOUTH EVERY DAY    TIOTROPIUM-OLODATEROL (STIOLTO RESPIMAT) 2.5-2.5 MCG/ACTUATION MIST    Inhale 2 puffs into the lungs once daily. Controller    TRAMADOL (ULTRAM) 50 MG TABLET    Take 50 mg by mouth.    TRAZODONE (DESYREL) 150 MG TABLET    Take 1 tablet (150 mg total) by mouth every evening.     Objective:      Vitals:    01/11/24 1514   BP: (!) 141/71   Pulse: 97   Temp: 97.1 °F (36.2 °C)     Lab Results   Component Value Date    WBC 6.24 01/11/2024    HGB 12.5 01/11/2024    HCT 39.4 01/11/2024    MCV 89 01/11/2024     01/11/2024       BMP  Lab Results   Component Value Date     01/11/2024    K 4.4 01/11/2024     01/11/2024    CO2 25 01/11/2024    BUN 25 (H) 01/11/2024    CREATININE 1.6 (H) 01/11/2024    CALCIUM 9.9 01/11/2024    ANIONGAP 12 01/11/2024    EGFRNORACEVR 33 (A) 01/11/2024     Lab Results   Component Value Date    IRON 31 07/26/2023    TRANSFERRIN 265 07/26/2023    TIBC 392 07/26/2023    FESATURATED 8 (L) 07/26/2023      Lab Results   Component Value Date    YYWITSSK55 272 04/26/2022     Lab Results   Component Value Date    FOLATE 4.6 (L) 03/02/2021         Physical Exam  Vitals reviewed.   Constitutional:       Appearance: She is well-developed.   HENT:      Head: Normocephalic.      Right Ear: External ear normal.      Left Ear: External ear normal.      Nose: Nose normal.   Eyes:      General: Lids are normal. No scleral icterus.        Right eye: No discharge.         Left eye: No discharge.      Conjunctiva/sclera: Conjunctivae normal.   Neck:      Thyroid: No thyroid mass.   Cardiovascular:      Rate and Rhythm: Normal rate and regular rhythm.      Heart sounds: Normal heart sounds.   Pulmonary:      Effort: Pulmonary effort is normal. No respiratory distress.      Breath sounds: Normal breath sounds. No wheezing or rales.   Abdominal:      General: There is no distension.   Genitourinary:     Comments: deferred  Musculoskeletal:         General: Normal range of motion.      Cervical back: Normal range of motion.   Skin:     General: Skin is warm and dry.   Neurological:      Mental Status: She is alert and oriented to person, place, and time.   Psychiatric:         Speech: Speech normal.         Behavior: Behavior normal. Behavior is cooperative.         Thought Content: Thought  content normal.        Assessment:     Problem List Items Addressed This Visit          Hematology    History of DVT (deep vein thrombosis) lower extremity on 2 different occasions     Recommended lifelong anticoagulation. Taking Eliquis 1 tablet daily for unclear reasons    Educated to take Eliquis 5 mg PO BID. New prescription sent to [facility] choice pharmacy. Will f/u 3 months with cbc, cmp iron ferritin. Discussed S&S to report sooner          Embolism and thrombosis of unspecified artery - Primary    Relevant Medications    apixaban (ELIQUIS) 5 mg Tab    Other Relevant Orders    Ambulatory referral/consult to Oncology Social Work    CBC Auto Differential    Comprehensive Metabolic Panel    Iron and TIBC    Ferritin       Oncology    Iron deficiency anemia due to chronic blood loss     Most recent CBC without anemia. F/u with repeat labs in 3 months             Endocrine    Morbid obesity     Encourage healthy nutrition along with portion control. Encourage daily exercise          Other Visit Diagnoses       Anemia, unspecified type        Relevant Medications    apixaban (ELIQUIS) 5 mg Tab    Other Relevant Orders    Ambulatory referral/consult to Oncology Social Work    CBC Auto Differential    Comprehensive Metabolic Panel    Iron and TIBC    Ferritin              Plan:     Embolism and thrombosis of unspecified artery  -     apixaban (ELIQUIS) 5 mg Tab; Take 1 tablet (5 mg total) by mouth 2 (two) times daily.  Dispense: 60 tablet; Refill: 3  -     Ambulatory referral/consult to Oncology Social Work; Future; Expected date: 01/18/2024  -     CBC Auto Differential; Future; Expected date: 01/11/2024  -     Comprehensive Metabolic Panel; Future; Expected date: 01/11/2024  -     Iron and TIBC; Future; Expected date: 01/11/2024  -     Ferritin; Future; Expected date: 01/11/2024    Anemia, unspecified type  -     apixaban (ELIQUIS) 5 mg Tab; Take 1 tablet (5 mg total) by mouth 2 (two) times daily.  Dispense: 60  tablet; Refill: 3  -     Ambulatory referral/consult to Oncology Social Work; Future; Expected date: 01/18/2024  -     CBC Auto Differential; Future; Expected date: 01/11/2024  -     Comprehensive Metabolic Panel; Future; Expected date: 01/11/2024  -     Iron and TIBC; Future; Expected date: 01/11/2024  -     Ferritin; Future; Expected date: 01/11/2024    History of DVT (deep vein thrombosis) lower extremity on 2 different occasions    Iron deficiency anemia due to chronic blood loss    Morbid obesity          Med Onc Chart Routing      Follow up with physician    Follow up with JAKE 3 months.   Infusion scheduling note    Injection scheduling note    Labs CBC, CMP, ferritin, iron and TIBC, vitamin B12 and folate   Scheduling:  Preferred lab:  Lab interval:  1-2 days prior   Imaging None      Pharmacy appointment No pharmacy appointment needed      Other referrals       No additional referrals needed           TAIWO Garcia

## 2024-01-11 NOTE — PROGRESS NOTES
Subjective:     Patient ID: Kay Rosas is a 78 y.o. female.    Chief Complaint: Pain of the Right Knee and Pain of the Left Knee    HPI:  Left knee pain   01/11/2024  Patient stated the right knee still hurting her she had a right total knee replacement done by Dr. Cadena at [facility].  She said she had the 1st 1 done on 03/01/2021 and then on 03/22/2023 she had a revision.  She thought the problem was with the kneecap however I reviewed the electronic records from [facility] where he only change the poly insert from size 10 to size 13/DJO total knee tibia base plate size 5.  She said she is doing okay with the knee but still having hypersensitivity and tenderness anteriorly.  She does have history of lumbar fusion and reviewed x-rays in the electronic record from 2016 showing hardware in the lumbar spine with fractured facet screws.  She said she can manage with the back.  There is no workup of numbness or tingling in the legs with nerve conduction studies.  She said she received numerous injections in her knees on both of them she wants the left total knee replaced.  She takes ibuprofen 800 she is on Eliquis she has history of bilateral lower extremities blood clots even prior to having her knee replacement.  She takes ibuprofen 800 and Eliquis.  She knows she is stops the Eliquis for it for surgery.  She complains of burning and anterior right knee pain I did tell her every total knee is numb on the outside of the knee and she can use Voltaren cream to help with that.  Instruct her how to use it.  As far as the left knee is concerned I told her that she is a candidate for total knee replacement however can not get rid of all the pain in the knees.  She does have back issues and lumbar fusion she could have pinched nerves going down the legs that could be causing some of her pains.  She lives alone but has children around town and states they all work.  I did tell her the need  to step up and help after surgery if she undergoes total knee replacement because it is outpatient surgery she goes home the same day.  She needs help from the family members they should take turns to help her out.  She has using a Rollator to walk around.  Other treatments included Kenalog injection 08/24/2023, also other steroid prior to that and Euflexxa injections 06/24/2020.  Also she had genicular nerve block done on 09/15/2023/  MEMO vera which did not help at all    I did tell her some of her pains could be coming from her back and total knee replacement might not solve that problem they will only help inside the knee joint.    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    MVP (mitral valve prolapse)     On home oxygen therapy     3 liters as needed at night    Osteoporosis     feet    Thyroid condition      Past Surgical History:   Procedure Laterality Date    BACK SURGERY  2012    CATARACT EXTRACTION Bilateral 10/2012    HYSTERECTOMY Left 1978    INJECTION OF ANESTHETIC AGENT INTO SACROILIAC JOINT Bilateral 11/11/2019    Procedure: Bilateral GT bursa + SIJ injection;  Surgeon: Noe Pleitez MD;  Location: [facility];  Service: Pain Management;  Laterality: Bilateral;    INJECTION OF JOINT Bilateral 11/11/2019    Procedure: Bilateral GT bursa + SIJ injection;  Surgeon: Noe Pleitez MD;  Location: [facility];  Service: Pain Management;  Laterality: Bilateral;    JOINT REPLACEMENT Right 2021    knee    ROBOT-ASSISTED LAPAROSCOPIC LYSIS OF ADHESIONS USING DA SP XI N/A 12/05/2022    Procedure: XI ROBOTIC LYSIS, ADHESIONS;  Surgeon: Den Barragan MD;   Location: [facility] OR;  Service: Oncology;  Laterality: N/A;    ROBOT-ASSISTED LAPAROSCOPIC SALPINGO-OOPHORECTOMY USING DA SP XI Left 2022    Procedure: XI ROBOTIC SALPINGO-OOPHORECTOMY;  Surgeon: Den Barragan MD;  Location: [facility] OR;  Service: Oncology;  Laterality: Left;    SURGICAL REMOVAL OF BONE SPUR Left     left elbow    VASCULAR SURGERY Right     high ligation due to blood clot      Family History   Problem Relation Age of Onset    Heart disease Mother     Kidney disease Mother     Arthritis Mother     Breast cancer Mother     Cancer Father     Heart disease Father     Cancer Sister     Ovarian cancer Sister     Alzheimer's disease Sister      Social History     Socioeconomic History    Marital status:    Tobacco Use    Smoking status: Former     Current packs/day: 0.00     Average packs/day: 1 pack/day for 53.5 years (53.5 ttl pk-yrs)     Types: Cigarettes     Start date: 1962     Quit date: 2015     Years since quittin.5    Smokeless tobacco: Never   Substance and Sexual Activity    Alcohol use: No    Drug use: No    Sexual activity: Yes     Partners: Male     Birth control/protection: None     Social Determinants of Health     Financial Resource Strain: Low Risk  (2023)    Overall Financial Resource Strain (CARDIA)     Difficulty of Paying Living Expenses: Not very hard   Food Insecurity: Food Insecurity Present (2023)    Hunger Vital Sign     Worried About Running Out of Food in the Last Year: Sometimes true     Ran Out of Food in the Last Year: Sometimes true   Transportation Needs: No Transportation Needs (2023)    PRAPARE - Transportation     Lack of Transportation (Medical): No     Lack of Transportation (Non-Medical): No   Physical Activity: Inactive (2023)    Exercise Vital Sign     Days of Exercise per Week: 0 days     Minutes of Exercise per Session: 0 min   Stress: Stress Concern Present (2023)    [employer] -  Occupational Stress Questionnaire     Feeling of Stress : To some extent   Social Connections: Moderately Integrated (9/26/2023)    Social Connection and Isolation Panel [NHANES]     Frequency of Communication with Friends and Family: More than three times a week     Frequency of Social Gatherings with Friends and Family: Once a week     Attends Muslim Services: More than 4 times per year     Active Member of Clubs or Organizations: Yes     Attends Club or Organization Meetings: More than 4 times per year     Marital Status:    Housing Stability: Low Risk  (9/26/2023)    Housing Stability Vital Sign     Unable to Pay for Housing in the Last Year: No     Number of Places Lived in the Last Year: 1     Unstable Housing in the Last Year: No     Medication List with Changes/Refills   New Medications    APIXABAN (ELIQUIS) 5 MG TAB    Take 1 tablet (5 mg total) by mouth 2 (two) times daily.   Current Medications    ACETAMINOPHEN (TYLENOL) 500 MG TABLET    Take 2 tablets (1,000 mg total) by mouth every 6 (six) hours as needed for Pain.    ALBUTEROL (PROVENTIL/VENTOLIN HFA) 90 MCG/ACTUATION INHALER    INHALE 2 PUFFS INTO THE LUNGS EVERY 4 HOURS AS NEEDED FOR WHEEZING OR SHORTNESS OF BREATH.    APIXABAN (ELIQUIS) 5 MG TAB    Take 5 mg by mouth 2 (two) times daily.    ERGOCALCIFEROL (ERGOCALCIFEROL) 50,000 UNIT CAP    Take 1 capsule (50,000 Units total) by mouth every 7 days.    EZETIMIBE (ZETIA) 10 MG TABLET    TAKE 1 TABLET BY MOUTH EVERY DAY    IBUPROFEN (ADVIL,MOTRIN) 800 MG TABLET    Take by mouth.    LEVOTHYROXINE (SYNTHROID) 50 MCG TABLET    TAKE 1 TABLET BY MOUTH EVERY DAY    LOSARTAN-HYDROCHLOROTHIAZIDE 100-25 MG (HYZAAR) 100-25 MG PER TABLET    TAKE 1 TABLET BY MOUTH EVERY DAY    SERTRALINE (ZOLOFT) 100 MG TABLET    TAKE 1 TABLET BY MOUTH EVERY DAY    SERTRALINE (ZOLOFT) 50 MG TABLET    TAKE 1 TABLET BY MOUTH EVERY DAY    TIOTROPIUM-OLODATEROL (STIOLTO RESPIMAT) 2.5-2.5 MCG/ACTUATION MIST    Inhale 2 puffs  into the lungs once daily. Controller    TRAMADOL (ULTRAM) 50 MG TABLET    Take 50 mg by mouth.    TRAZODONE (DESYREL) 150 MG TABLET    Take 1 tablet (150 mg total) by mouth every evening.     Review of patient's allergies indicates:   Allergen Reactions    Lipitor [atorvastatin]      Severe Leg cramps    Nsaids (non-steroidal anti-inflammatory drug)      CKD     Review of Systems   Constitutional: Negative for decreased appetite.   HENT:  Negative for tinnitus.    Eyes:  Negative for double vision.   Cardiovascular:  Negative for chest pain.   Respiratory:  Negative for wheezing.    Hematologic/Lymphatic: Negative for bleeding problem.   Skin:  Negative for dry skin.   Musculoskeletal:  Positive for arthritis, back pain, joint pain and myalgias. Negative for gout, neck pain and stiffness.   Gastrointestinal:  Negative for abdominal pain.   Genitourinary:  Negative for bladder incontinence.   Neurological:  Negative for numbness, paresthesias and sensory change.   Psychiatric/Behavioral:  Negative for altered mental status.        Objective:   Body mass index is 40.51 kg/m².  There were no vitals filed for this visit.       General    Constitutional: She is oriented to person, place, and time. She appears well-developed.   HENT:   Head: Atraumatic.   Eyes: EOM are normal.   Pulmonary/Chest: Effort normal.   Neurological: She is alert and oriented to person, place, and time.   Psychiatric: Judgment normal.           Ambulating with a Rollator  Pelvis is level  Bilateral hips passive motion no pain   There is tenderness in the lumbar spine there is questionable straight leg raising mild bilaterally   There is no pain in the greater trochanters to palpation   Hip flexors, abductors adductors quads hamstrings ankle extensors and flexors slightly weak at 5-/5  Right TKA surgical incision healed well she has 0 to 120° of flexion.  She is stable in extension to varus and valgus stressing.  Very minor instability at 45°.   There is decreased sensation in the lateral aspect of the knee joint consistent with infrapatellar branch of the saphenous nerve distribution.  There is no defect in the patella or quadriceps tendon there is no swelling that has not warm to touch  Left knee with medial joint severe pain.  There is minimal swelling.  There is crepitus with motion.  Collaterals and cruciates are stable.  There is no defect in the patella or quadriceps tendon.  Active motion is 0-110 degrees.  Calves are soft nontender with some varicosities bilaterally  Around the ankle there is some mild swelling  Skin is warm to touch no obvious lesions    Relevant imaging results reviewed and interpreted by me, discussed with the patient and / or family today     X-ray 10/16/2023 right TKA/DJO cruciate sparing with a stemmed tibial component patella midline no evidence of failure alignment okay, left knee with complete loss of medial joint space with marginal osteophytic changes varus deformity consistent with severe arthritis  Assessment:     Encounter Diagnoses   Name Primary?    Arthritis of knee, left Yes    Acquired varus deformity knee, left     History of lumbar fusion     Current long-term use of anticoagulant medication with history of deep venous thrombosis (DVT)     Obesity, unspecified classification, unspecified obesity type, unspecified whether serious comorbidity present     History of total right knee replacement         Plan:   Arthritis of knee, left    Acquired varus deformity knee, left    History of lumbar fusion    Current long-term use of anticoagulant medication with history of deep venous thrombosis (DVT)    Obesity, unspecified classification, unspecified obesity type, unspecified whether serious comorbidity present    History of total right knee replacement         Patient Instructions   I reviewed the operative report from Dr. Cadena where he took her back 2nd time around change the plastic insert from 10-13 to give you  more stability/components our DJO size 5-13 insert  You having some numbness which is normal to have after total knee on the outside   You can try Voltaren cream or gel apply around 2 in 3 to 4 times a day over that area to help desensitize it   The left knee x-ray show complete loss of joint space with severe arthritis.  You are experiencing giving way and catching   You already received injections in you can not take any anti-inflammatories because you are taking Eliquis for blood clot in each legs  Blood clot in each leg was before surgery   You live by herself you using a Rollator to get around and you taking care of a mother that is 101 years  You do have children around that could help in case you had surgery  Surgery on the left knee is considered outpatient surgery you will have you surgery go home the same day.  You need help from the children some people go to the children's house and stayed there for a couple weeks  Surgery itself is around 2 hours you go home afterwards we get you up in recovery room make you walk with the therapist.  We will arrange for home physical therapy and we will have home health nurse I will come check on your wound and check on you see how you doing.  She will change the dressing for you.  After 2 weeks we see you in the office we take stitches out if we have to and then you can get in the shower after that  You need to see the cardiologist to make sure you heart could withstand the operation  There is a mandatory class you have to take in the hospital  I do procedures at [facility] on [address]  Procedure, common risks and benefits,alternatives discussed in details.All questions answered.Patient expressed understanding.Patient instructed to call for any questions that could arise in the future.    Most common Risks:  Infection/less than 2% chance  Leg-length discrepancy    Neuro-vascular injury ( resulting in loss of motor and sensory functions)/you will be numb  on the outside of the knee.  Very rarely we do get somebody we have a footdrop that means they can not bring the foot up.  If that happens we give you a brace to wear.  Almost 80% of foot drops recuperate within 6 months to a year's  Pain  Blood clot/will restart you on Eliquis the next day.  You need to stop her Eliquis 2 days prior to surgery  Fat clot  Loss of motion/we heal with scar tissue.  You need to start exercising immediately even though it hurts.  Work hard with the physical therapist.  Fracture of bone  Failure of procedure to achieve its intended purpose/total knee replacement is 80% successful in decreasing pain and increasing function.  It has not perfect you might ache here and there afterwards with changes of the weather.  You might feel some clicking from metal touching plastic  Failure of hardware/the metal and plastic components they will last on the average 15 years  Non-union or mal-union of bone  Malalignment  Death/you need to see the cardiologist before surgery    Patient instructions for joint replacement    Before surgery  1.  Shower with Hibiclens soap/antibacterial for 3 days prior to surgery to decrease risk of infection  2..  Stop all blood thinners/aspirin, Coumadin, warfarin, Plavix, Eliquis, Xarelto etc 7days prior to surgery.  You are on Eliquis you only need to stop it for 2 days prior to surgery.  If you are taking vitamins the only when you allowed to take his vitamin-D otherwise you need to stop all other vitamins including omega-3 or natural products that you are taking 7 days prior to surgery  3.  No eating or drinking after midnight the night before surgery.  I would like you to drink a bottle of Gatorade or Powerade or Pedialyte the night before surgery prior to midnight so you do not get dehydrated waiting for the surgery  4. I would like you to take Tylenol 650 mg or a 1000 mg the night before surgery prior to midnight    Ask physicians for prescription of Celebrex or  Mobic if needed    After surgery at home  1.  Take Tylenol 650 mg 3 times a day for 14 days then as needed for mild pain  2.  Take gabapentin 300 mg nightly for 6 weeks  3.  You will resume all your medications after surgery  4.  Must take aspirin 81 mg twice a day for 6 weeks unless you are on other blood thinners/Plavix, Eliquis, Xarelto, Coumadin etc  5.  Must wear compressive stockings for 6 weeks minimum to decrease the risk of blood clot and swelling  6.  Hydrocodone/Norco or oxycodone/Percocet will be prescribed to take every 6 hr as needed for breakthrough pain  7.  May apply ice on the knee to help with decreasing pain  8.  Keep wound dry for 2 weeks until stitches/staples removed than you will be allowed to shower in 24 hr and get the wound wet.  No soaking of the wound in the tub or swimming for 4 weeks after surgery  9.  No driving for 4 weeks unless specified by physician  10.  Avoid touching the wound or surrounding area /at least 2 inches on each side of the surgical incision until staples are removed/stitches   11.  May change the surgical dressing if extremely bloody or has drainage on it. May clean the wound with peroxide or Betadine and apply sterile dressing and Ace wrap over it  12.  Leave hospital dressing on for 3 days then may change by applying sterile 4 x 4 and Ace wrap after cleaning with Betadine or peroxide.  May leave this dressing change to home health nurses            Disclaimer: This note was prepared using a voice recognition system and is likely to have sound alike errors within the text.

## 2024-01-11 NOTE — PATIENT INSTRUCTIONS
I reviewed the operative report from Dr. Cadena where he took her back 2nd time around change the plastic insert from 10-13 to give you more stability/components our DJO size 5-13 insert  You having some numbness which is normal to have after total knee on the outside   You can try Voltaren cream or gel apply around 2 in 3 to 4 times a day over that area to help desensitize it   The left knee x-ray show complete loss of joint space with severe arthritis.  You are experiencing giving way and catching   You already received injections in you can not take any anti-inflammatories because you are taking Eliquis for blood clot in each legs  Blood clot in each leg was before surgery   You live by herself you using a Rollator to get around and you taking care of a mother that is 101 years  You do have children around that could help in case you had surgery  Surgery on the left knee is considered outpatient surgery you will have you surgery go home the same day.  You need help from the children some people go to the children's house and stayed there for a couple weeks  Surgery itself is around 2 hours you go home afterwards we get you up in recovery room make you walk with the therapist.  We will arrange for home physical therapy and we will have home health nurse I will come check on your wound and check on you see how you doing.  She will change the dressing for you.  After 2 weeks we see you in the office we take stitches out if we have to and then you can get in the shower after that  You need to see the cardiologist to make sure you heart could withstand the operation  There is a mandatory class you have to take in the hospital  I do procedures at [facility] on [address]  Procedure, common risks and benefits,alternatives discussed in details.All questions answered.Patient expressed understanding.Patient instructed to call for any questions that could arise in the future.    Most common Risks:  Infection/less  than 2% chance  Leg-length discrepancy    Neuro-vascular injury ( resulting in loss of motor and sensory functions)/you will be numb on the outside of the knee.  Very rarely we do get somebody we have a footdrop that means they can not bring the foot up.  If that happens we give you a brace to wear.  Almost 80% of foot drops recuperate within 6 months to a year's  Pain  Blood clot/will restart you on Eliquis the next day.  You need to stop her Eliquis 2 days prior to surgery  Fat clot  Loss of motion/we heal with scar tissue.  You need to start exercising immediately even though it hurts.  Work hard with the physical therapist.  Fracture of bone  Failure of procedure to achieve its intended purpose/total knee replacement is 80% successful in decreasing pain and increasing function.  It has not perfect you might ache here and there afterwards with changes of the weather.  You might feel some clicking from metal touching plastic  Failure of hardware/the metal and plastic components they will last on the average 15 years  Non-union or mal-union of bone  Malalignment  Death/you need to see the cardiologist before surgery    Patient instructions for joint replacement    Before surgery  1.  Shower with Hibiclens soap/antibacterial for 3 days prior to surgery to decrease risk of infection  2..  Stop all blood thinners/aspirin, Coumadin, warfarin, Plavix, Eliquis, Xarelto etc 7days prior to surgery.  You are on Eliquis you only need to stop it for 2 days prior to surgery.  If you are taking vitamins the only when you allowed to take his vitamin-D otherwise you need to stop all other vitamins including omega-3 or natural products that you are taking 7 days prior to surgery  3.  No eating or drinking after midnight the night before surgery.  I would like you to drink a bottle of Gatorade or Powerade or Pedialyte the night before surgery prior to midnight so you do not get dehydrated waiting for the surgery  4. I would like  you to take Tylenol 650 mg or a 1000 mg the night before surgery prior to midnight    Ask physicians for prescription of Celebrex or Mobic if needed    After surgery at home  1.  Take Tylenol 650 mg 3 times a day for 14 days then as needed for mild pain  2.  Take gabapentin 300 mg nightly for 6 weeks  3.  You will resume all your medications after surgery  4.  Must take aspirin 81 mg twice a day for 6 weeks unless you are on other blood thinners/Plavix, Eliquis, Xarelto, Coumadin etc  5.  Must wear compressive stockings for 6 weeks minimum to decrease the risk of blood clot and swelling  6.  Hydrocodone/Norco or oxycodone/Percocet will be prescribed to take every 6 hr as needed for breakthrough pain  7.  May apply ice on the knee to help with decreasing pain  8.  Keep wound dry for 2 weeks until stitches/staples removed than you will be allowed to shower in 24 hr and get the wound wet.  No soaking of the wound in the tub or swimming for 4 weeks after surgery  9.  No driving for 4 weeks unless specified by physician  10.  Avoid touching the wound or surrounding area /at least 2 inches on each side of the surgical incision until staples are removed/stitches   11.  May change the surgical dressing if extremely bloody or has drainage on it. May clean the wound with peroxide or Betadine and apply sterile dressing and Ace wrap over it  12.  Leave hospital dressing on for 3 days then may change by applying sterile 4 x 4 and Ace wrap after cleaning with Betadine or peroxide.  May leave this dressing change to home health nurses

## 2024-01-12 DIAGNOSIS — E11.9 TYPE 2 DIABETES MELLITUS WITHOUT COMPLICATION, UNSPECIFIED WHETHER LONG TERM INSULIN USE: ICD-10-CM

## 2024-01-17 ENCOUNTER — PATIENT MESSAGE (OUTPATIENT)
Dept: HEMATOLOGY/ONCOLOGY | Facility: CLINIC | Age: 79
End: 2024-01-17
Payer: MEDICARE

## 2024-01-17 ENCOUNTER — TELEPHONE (OUTPATIENT)
Dept: HEMATOLOGY/ONCOLOGY | Facility: CLINIC | Age: 79
End: 2024-01-17
Payer: MEDICARE

## 2024-01-17 NOTE — TELEPHONE ENCOUNTER
Swer was consulted to address pt distress score of 5 from most recent appt time with Neda Marx NP on 1/11/2024. Swer called pt ([phone]) to discuss.   Swer received no telephone pickup. Joser was able to leave a  for a call back. Swer will also send pt [employer] message. Swer will remain available.         1/11/2024     3:12 PM 10/17/2022     5:40 PM 8/22/2022    11:07 AM 8/19/2022     6:31 PM   DISTRESS SCREENING   Distress Score 10 - Extreme Distress 7 6 7   Practical Concerns  None of these None of these None of these   Social Concerns  None of these Family Health Issues None of these   Emotional Concerns Sadness or depression;Worry or anxiety Depression;Sadness Depression;Fears;Nervousness;Sadness;Worry Depression;Sadness   Retire Spiritual or Mosque Concerns  No No No   Physical Concerns Sleep;Pain;Fatigue;Loss or change of physical abilities Feeling Swollen;Indigestion;Nausea;Sexual Pain;Changes in urination;Breathing Breathing;Constipation;Fatigue

## 2024-01-30 DIAGNOSIS — F51.01 PRIMARY INSOMNIA: ICD-10-CM

## 2024-01-30 RX ORDER — TRAZODONE HYDROCHLORIDE 150 MG/1
150 TABLET ORAL NIGHTLY
Qty: 90 TABLET | Refills: 1 | Status: SHIPPED | OUTPATIENT
Start: 2024-01-30

## 2024-01-30 NOTE — TELEPHONE ENCOUNTER
No care due was identified.  [facility] Embedded Care Due Messages. Reference number: [id number].   1/30/2024 9:51:43 AM CST

## 2024-02-02 ENCOUNTER — TELEPHONE (OUTPATIENT)
Dept: ORTHOPEDICS | Facility: CLINIC | Age: 79
End: 2024-02-02
Payer: MEDICARE

## 2024-02-02 DIAGNOSIS — Z01.818 PREOPERATIVE EXAMINATION: ICD-10-CM

## 2024-02-02 DIAGNOSIS — M17.12 ARTHRITIS OF KNEE, LEFT: Primary | ICD-10-CM

## 2024-02-02 DIAGNOSIS — Z01.818 PREOP TESTING: ICD-10-CM

## 2024-02-02 DIAGNOSIS — M25.562 LEFT KNEE PAIN, UNSPECIFIED CHRONICITY: Primary | ICD-10-CM

## 2024-02-02 NOTE — TELEPHONE ENCOUNTER
Called and spoke with the patient to schedule surgery. Patient is schedule for 02/28/24 for surgery all post-op and x-ray appointments are scheduled. Verbalized understanding.

## 2024-02-02 NOTE — TELEPHONE ENCOUNTER
----- Message from Marlen Bullock MA sent at 1/31/2024  4:49 PM CST -----  Regarding: FW: Clearance for surgery  She is cleared   ----- Message -----  From: cL Carroll MD  Sent: 1/31/2024  12:50 PM CST  To: Marlen Bullock MA  Subject: RE: Clearance for surgery                        I mentioned clearance in my last note from 11/2023  ----- Message -----  From: Marlen Bullock MA  Sent: 1/31/2024  10:47 AM CST  To: Lc Carroll MD  Subject: FW: Clearance for surgery                        Does she need to come in for clearance or do you want to clear her from her last visit. Please advise  ----- Message -----  From: Betty Junior MA  Sent: 1/31/2024  10:45 AM CST  To: [facility] Staff  Subject: Clearance for surgery                            Good Morning, this patient saw Dr. Carroll on 11/27/23. We are trying to schedule them for a left total knee with Dr. Urena. If you could please let us know if the patient is cleared to proceed with surgery that would be great.     Thanks

## 2024-02-07 DIAGNOSIS — I10 PRIMARY HYPERTENSION: Primary | ICD-10-CM

## 2024-02-07 DIAGNOSIS — I73.9 PAD (PERIPHERAL ARTERY DISEASE): ICD-10-CM

## 2024-02-08 ENCOUNTER — HOSPITAL ENCOUNTER (OUTPATIENT)
Dept: CARDIOLOGY | Facility: HOSPITAL | Age: 79
Discharge: HOME | End: 2024-02-08
Attending: STUDENT IN AN ORGANIZED HEALTH CARE EDUCATION/TRAINING PROGRAM
Payer: MEDICARE

## 2024-02-08 ENCOUNTER — OFFICE VISIT (OUTPATIENT)
Dept: CARDIOLOGY | Facility: CLINIC | Age: 79
End: 2024-02-08
Payer: MEDICARE

## 2024-02-08 VITALS
WEIGHT: 249.13 LBS | DIASTOLIC BLOOD PRESSURE: 78 MMHG | SYSTOLIC BLOOD PRESSURE: 130 MMHG | HEART RATE: 65 BPM | BODY MASS INDEX: 40.21 KG/M2 | OXYGEN SATURATION: 98 %

## 2024-02-08 DIAGNOSIS — I73.9 PAD (PERIPHERAL ARTERY DISEASE): ICD-10-CM

## 2024-02-08 DIAGNOSIS — E66.01 MORBID OBESITY: ICD-10-CM

## 2024-02-08 DIAGNOSIS — D50.0 IRON DEFICIENCY ANEMIA DUE TO CHRONIC BLOOD LOSS: ICD-10-CM

## 2024-02-08 DIAGNOSIS — E78.5 HYPERLIPIDEMIA, UNSPECIFIED HYPERLIPIDEMIA TYPE: ICD-10-CM

## 2024-02-08 DIAGNOSIS — N18.32 STAGE 3B CHRONIC KIDNEY DISEASE: ICD-10-CM

## 2024-02-08 DIAGNOSIS — J44.9 COPD, MODERATE: ICD-10-CM

## 2024-02-08 DIAGNOSIS — M17.0 PRIMARY OSTEOARTHRITIS OF BOTH KNEES: ICD-10-CM

## 2024-02-08 DIAGNOSIS — M17.0 OSTEOARTHRITIS OF BOTH KNEES, UNSPECIFIED OSTEOARTHRITIS TYPE: ICD-10-CM

## 2024-02-08 DIAGNOSIS — E03.9 ACQUIRED HYPOTHYROIDISM: ICD-10-CM

## 2024-02-08 DIAGNOSIS — I10 PRIMARY HYPERTENSION: Primary | ICD-10-CM

## 2024-02-08 DIAGNOSIS — I10 PRIMARY HYPERTENSION: ICD-10-CM

## 2024-02-08 DIAGNOSIS — Z86.718 HISTORY OF DVT (DEEP VEIN THROMBOSIS): ICD-10-CM

## 2024-02-08 LAB
OHS QRS DURATION: 90 MS
OHS QTC CALCULATION: 406 MS

## 2024-02-08 PROCEDURE — 99999 PR PBB SHADOW E&M-EST. PATIENT-LVL III: CPT | Mod: PBBFAC,,, | Performed by: STUDENT IN AN ORGANIZED HEALTH CARE EDUCATION/TRAINING PROGRAM

## 2024-02-08 PROCEDURE — 93005 ELECTROCARDIOGRAM TRACING: CPT

## 2024-02-08 PROCEDURE — 99214 OFFICE O/P EST MOD 30 MIN: CPT | Mod: S$GLB,,, | Performed by: STUDENT IN AN ORGANIZED HEALTH CARE EDUCATION/TRAINING PROGRAM

## 2024-02-08 PROCEDURE — 93010 ELECTROCARDIOGRAM REPORT: CPT | Mod: ,,, | Performed by: INTERNAL MEDICINE

## 2024-02-08 NOTE — PROGRESS NOTES
Section of Cardiology                  Cardiac Clinic Note    Chief Complaint/Reason for consultation: preop risk stratification      HPI:   Kay Rosas is a 78 y.o. female with h/o obesity, HTN, HLD, OA, history of DVT (no longer on eliquis due expenses), CoPD on home o2     11/27/23  Will be having left knee replacement  Had right knee replacement x 2 in May 2023  Denies any complications with surgery   Ambulates with walker   Wears portable oxygen, intermittent  SOB is stable  Left leg swelling at times    Denies chest pain/discomfort, syncope, palpitations   Stopped smoking 10 yrs ago  Denies ETOH abuse    Family hx: dad- heart issues, Mom- CHF, afib         2/8/24  Did not get the left knee replaced yet  Uses prn oxygen for COPD, so has chronic SOB  Palpitations with over-exertion   Still using walker   BP stable   Good appetite   Considering gastric sleeve     Denies chest pain, dizziness, falls        EKG 2/8/24 NSR  EKG 11/27/23 NSR, no acute ST - T wave changes    ECHO  No results found for this or any previous visit.       STRESS TEST No results found for this or any previous visit.       LHC No results found for this or any previous visit.            ROS: All 10 systems reviewed. Please refer to the HPI for pertinent positives. All other systems negative.     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    MVP (mitral valve prolapse)     On home oxygen therapy     3 liters as needed at night    Osteoporosis     feet    Thyroid condition        Surgical History  Past Surgical History:   Procedure Laterality Date    BACK SURGERY       CATARACT EXTRACTION Bilateral 10/2012    HYSTERECTOMY Left 1978    INJECTION OF ANESTHETIC AGENT INTO SACROILIAC JOINT Bilateral 2019    Procedure: Bilateral GT bursa + SIJ injection;  Surgeon: Noe Pleitez MD;  Location: [facility] PAIN MGT;  Service: Pain Management;  Laterality: Bilateral;    INJECTION OF JOINT Bilateral 2019    Procedure: Bilateral GT bursa + SIJ injection;  Surgeon: Noe Pleitez MD;  Location: [facility] PAIN MGT;  Service: Pain Management;  Laterality: Bilateral;    JOINT REPLACEMENT Right     knee    ROBOT-ASSISTED LAPAROSCOPIC LYSIS OF ADHESIONS USING DA SP XI N/A 2022    Procedure: XI ROBOTIC LYSIS, ADHESIONS;  Surgeon: Den Barragan MD;  Location: [facility] OR;  Service: Oncology;  Laterality: N/A;    ROBOT-ASSISTED LAPAROSCOPIC SALPINGO-OOPHORECTOMY USING DA SP XI Left 2022    Procedure: XI ROBOTIC SALPINGO-OOPHORECTOMY;  Surgeon: Den Barragan MD;  Location: [facility] OR;  Service: Oncology;  Laterality: Left;    SURGICAL REMOVAL OF BONE SPUR Left     left elbow    VASCULAR SURGERY Right     high ligation due to blood clot           Allergies:   Review of patient's allergies indicates:   Allergen Reactions    Lipitor [atorvastatin]      Severe Leg cramps    Nsaids (non-steroidal anti-inflammatory drug)      CKD       Social History:  Social History     Socioeconomic History    Marital status:    Tobacco Use    Smoking status: Former     Current packs/day: 0.00     Average packs/day: 1 pack/day for 53.5 years (53.5 ttl pk-yrs)     Types: Cigarettes     Start date: 1962     Quit date: 2015     Years since quittin.6    Smokeless tobacco: Never   Substance and Sexual Activity    Alcohol use: No    Drug use: No    Sexual activity: Yes     Partners: Male     Birth control/protection: None     Social Determinants of Health     Financial Resource Strain: Low Risk  (2023)    Overall Financial Resource Strain (CARDIA)     Difficulty of Paying  Living Expenses: Not very hard   Food Insecurity: Food Insecurity Present (9/26/2023)    Hunger Vital Sign     Worried About Running Out of Food in the Last Year: Sometimes true     Ran Out of Food in the Last Year: Sometimes true   Transportation Needs: No Transportation Needs (9/26/2023)    PRAPARE - Transportation     Lack of Transportation (Medical): No     Lack of Transportation (Non-Medical): No   Physical Activity: Inactive (9/26/2023)    Exercise Vital Sign     Days of Exercise per Week: 0 days     Minutes of Exercise per Session: 0 min   Stress: Stress Concern Present (9/26/2023)    [employer] - Occupational Stress Questionnaire     Feeling of Stress : To some extent   Social Connections: Moderately Integrated (9/26/2023)    Social Connection and Isolation Panel [NHANES]     Frequency of Communication with Friends and Family: More than three times a week     Frequency of Social Gatherings with Friends and Family: Once a week     Attends [employer] Services: More than 4 times per year     Active Member of Clubs or Organizations: Yes     Attends Club or Organization Meetings: More than 4 times per year     Marital Status:    Housing Stability: Low Risk  (9/26/2023)    Housing Stability Vital Sign     Unable to Pay for Housing in the Last Year: No     Number of Places Lived in the Last Year: 1     Unstable Housing in the Last Year: No       Family History:  family history includes Alzheimer's disease in her sister; Arthritis in her mother; Breast cancer in her mother; Cancer in her father and sister; Heart disease in her father and mother; Kidney disease in her mother; Ovarian cancer in her sister.    Home Medications:  Current Outpatient Medications on File Prior to Visit   Medication Sig Dispense Refill    albuterol (PROVENTIL/VENTOLIN HFA) 90 mcg/actuation inhaler INHALE 2 PUFFS INTO THE LUNGS EVERY 4 HOURS AS NEEDED FOR WHEEZING OR SHORTNESS OF BREATH. 6.7 g 11     apixaban (ELIQUIS) 5 mg Tab Take 1 tablet (5 mg total) by mouth 2 (two) times daily. 60 tablet 3    ergocalciferol (ERGOCALCIFEROL) 50,000 unit Cap Take 1 capsule (50,000 Units total) by mouth every 7 days. 12 capsule 3    ezetimibe (ZETIA) 10 mg tablet TAKE 1 TABLET BY MOUTH EVERY DAY 90 tablet 1    ibuprofen (ADVIL,MOTRIN) 800 MG tablet Take by mouth.      levothyroxine (SYNTHROID) 50 MCG tablet TAKE 1 TABLET BY MOUTH EVERY DAY 90 tablet 0    losartan-hydrochlorothiazide 100-25 mg (HYZAAR) 100-25 mg per tablet TAKE 1 TABLET BY MOUTH EVERY DAY 90 tablet 1    sertraline (ZOLOFT) 100 MG tablet TAKE 1 TABLET BY MOUTH EVERY DAY 90 tablet 0    sertraline (ZOLOFT) 50 MG tablet TAKE 1 TABLET BY MOUTH EVERY DAY 90 tablet 0    traZODone (DESYREL) 150 MG tablet Take 1 tablet (150 mg total) by mouth every evening. 90 tablet 1    acetaminophen (TYLENOL) 500 MG tablet Take 2 tablets (1,000 mg total) by mouth every 6 (six) hours as needed for Pain. (Patient not taking: Reported on 2/8/2024) 40 tablet 0    apixaban (ELIQUIS) 5 mg Tab Take 5 mg by mouth 2 (two) times daily.      tiotropium-olodateroL (STIOLTO RESPIMAT) 2.5-2.5 mcg/actuation Mist Inhale 2 puffs into the lungs once daily. Controller (Patient not taking: Reported on 2/8/2024) 4 g 11    traMADoL (ULTRAM) 50 mg tablet Take 50 mg by mouth.       No current facility-administered medications on file prior to visit.       Physical exam:  /78 (BP Location: Right arm, Patient Position: Sitting, BP Method: Large (Manual))   Pulse 65   Wt 113 kg (249 lb 1.9 oz)   SpO2 98%   BMI 40.21 kg/m²         General: Pt is a 78 y.o. year old female who is AAOx3, in NAD, is pleasant, well nourished, looks stated age  HEENT: PERRL, EOMI, Oral mucosa pink & moist  CVS  No abnormal cardiac pulsations noted on inspection. JVP not raised. The apical impulse is normal on palpation, and is located in the left 5th intercostal space in the mid - clavicular line. No palpable thrills or  "abnormal pulsations noted. RR, S1 - S2 heard, no murmurs, rubs or gallops appreciated.   PUL : CTA B/L. No wheezes/crackles heard   ABD : BS +, soft. No tenderness elicited   LE : mld left leg swelling, Distal Pulses palpable B/L         LABS:    Chemistry:   Lab Results   Component Value Date     01/11/2024    K 4.4 01/11/2024     01/11/2024    CO2 25 01/11/2024    BUN 25 (H) 01/11/2024    CREATININE 1.6 (H) 01/11/2024    CALCIUM 9.9 01/11/2024     Cardiac Markers: No results found for: "CKTOTAL", "CKMB", "CKMBINDEX", "TROPONINI"  Cardiac Markers (Last 3): No results found for: "CKTOTAL", "CKMB", "CKMBINDEX", "TROPONINI"  CBC:   Lab Results   Component Value Date    WBC 6.24 01/11/2024    HGB 12.5 01/11/2024    HCT 39.4 01/11/2024    MCV 89 01/11/2024     01/11/2024     Lipids:   Lab Results   Component Value Date    CHOL 201 (H) 07/26/2023    TRIG 162 (H) 07/26/2023    HDL 52 07/26/2023     Coagulation:   Lab Results   Component Value Date    INR 1.0 03/06/2023    INR 0.9 02/18/2021    APTT 24.7 02/18/2021           Assessment        1. Primary hypertension    2. PAD (peripheral artery disease)    3. Acquired hypothyroidism    4. Iron deficiency anemia due to chronic blood loss    5. Morbid obesity    6. Osteoarthritis of both knees, unspecified osteoarthritis type    7. Stage 3b chronic kidney disease    8. COPD, moderate    9. History of DVT (deep vein thrombosis) lower extremity on 2 different occasions    10. Hyperlipidemia, unspecified hyperlipidemia type    11. Primary osteoarthritis of both knees           Plan:    Preop risk stratification  Ambulates with walker  Elevated risk for knee replacement surgery  No cardiac symptoms  Normal EKG  Recent knee surgery in May 2023 without complications per patient  Hold eliquis 3 days prior to surgery   Given history of DVT x2, recommend to restart eliquis post surgery  No further cardiac workup needed     H/o DVT  Reports eliquis is " expensive  Considering coumadin    COPD  Stable  On chronic home O2    PAD  Reports seeing Vascular surgery in the past    HTN  Stable  Losartan/hctz    CkD  Stable    Hypothyroidism  Continue synthroid    Obesity, Body mass index is 40.21 kg/m².   Low salt, low fat diet  Exercise as tolerated, at least 30 min daily     This note was prepared using voice recognition system and is likely to have sound alike errors that may have been overlooked even after proofreading.     I have reviewed all pertinent chart information.  Plans and recommendations have been formulated under my direct supervision. All questions answered and patient voiced understanding.   If symptoms persist go to the ED.    RTC prn        Lc Carroll MD  Cardiology

## 2024-02-12 ENCOUNTER — OFFICE VISIT (OUTPATIENT)
Dept: INTERNAL MEDICINE | Facility: CLINIC | Age: 79
End: 2024-02-12
Payer: MEDICARE

## 2024-02-12 ENCOUNTER — HOSPITAL ENCOUNTER (OUTPATIENT)
Dept: RADIOLOGY | Facility: HOSPITAL | Age: 79
Discharge: HOME | End: 2024-02-12
Attending: NURSE PRACTITIONER
Payer: MEDICARE

## 2024-02-12 VITALS
DIASTOLIC BLOOD PRESSURE: 74 MMHG | OXYGEN SATURATION: 99 % | HEART RATE: 82 BPM | TEMPERATURE: 98 F | SYSTOLIC BLOOD PRESSURE: 170 MMHG

## 2024-02-12 DIAGNOSIS — Z01.818 PREOPERATIVE EXAMINATION: ICD-10-CM

## 2024-02-12 DIAGNOSIS — Z86.718 HISTORY OF DVT (DEEP VEIN THROMBOSIS): ICD-10-CM

## 2024-02-12 DIAGNOSIS — M17.0 OSTEOARTHRITIS OF BOTH KNEES, UNSPECIFIED OSTEOARTHRITIS TYPE: Primary | ICD-10-CM

## 2024-02-12 DIAGNOSIS — I10 PRIMARY HYPERTENSION: ICD-10-CM

## 2024-02-12 DIAGNOSIS — Z99.81 DEPENDENCE ON SUPPLEMENTAL OXYGEN: ICD-10-CM

## 2024-02-12 DIAGNOSIS — J43.9 PULMONARY EMPHYSEMA, UNSPECIFIED EMPHYSEMA TYPE: ICD-10-CM

## 2024-02-12 PROCEDURE — 99999 PR PBB SHADOW E&M-EST. PATIENT-LVL III: CPT | Mod: PBBFAC,,,

## 2024-02-12 PROCEDURE — 71046 X-RAY EXAM CHEST 2 VIEWS: CPT | Mod: TC

## 2024-02-12 PROCEDURE — 99214 OFFICE O/P EST MOD 30 MIN: CPT | Mod: S$GLB,,, | Performed by: SPECIALIST

## 2024-02-12 PROCEDURE — 71046 X-RAY EXAM CHEST 2 VIEWS: CPT | Mod: 26,,, | Performed by: RADIOLOGY

## 2024-02-12 NOTE — PRE ADMISSION SCREENING
Pre op instructions reviewed with patient during Clinic Visit with Provider, verbalized understanding.    To confirm, Surgery is scheduled on 2/28/24. We will call you late afternoon the business day prior to surgery with your arrival time.    *Please report to the [facility] (1st Floor) located off of [facility] (2nd Entrance/Building on the left, in front of the flag pole).  Address: [address] [place] [address] [place]. [address]    Your Type & Screen appointment is scheduled on 2/27/24, between the hours of 7:00am and 5:00pm @ [facility] ([facility]). Please DO NOT remove the red arm band applied.      INSTRUCTIONS IMPORTANT!!!  DO NOT Eat, Drink, or Smoke after 12 midnight unless instructed otherwise by your Surgeon. OK to brush teeth, no gum, candy or mints!  - TAKE TYLENOL 650 MG. WITH YOUR EVENING MEAL, NIGHT BEFORE SURGERY  - DRINK 1 BOTTLE OF GATORADE OR POWERADE  PRIOR TO MIDNIGHT, EVENING BEFORE SURGERY    MORNING OF SURGERY, drink small sip of water with the following medications instructed by Pre-Admit Provider:  Levothyroxine  Zoloft    *Additional Medication Instructions: TAKE YOUR LAST DOSE OF ELIQUIS, BEFORE SURGERY, ON 2/24.    TAKE YOUR LAST DOSE OF VITAMIN D, BEFORE SURGERY, 2/20/24.    Diabetic Patients: If you take diabetic or weight loss medication, Do NOT take morning of surgery unless instructed by Doctor. Metformin to be stopped 24 hrs prior to surgery. Ozempic/ Mounjaro/ Wegovy/ Trulicity/ Semaglutide or any weight loss injections to be stopped 7 days prior to surgery. DO NOT take long-acting insulin the evening before surgery. Blood sugars will be checked in pre-op by Nurse.    *Patients should HOLD all vitamins, herbal supplements, weight loss medication, aspirin products & NSAIDS 7 days prior to surgery, as these can thin the blood. Ok to take Tylenol.    ____  Avoid Alcoholic beverages 3 days prior to surgery, as it can thin the blood.  ____  NO Acrylic/fake nails  or nail polish worn day of surgery (specifically hand/arm & foot surgeries).  ____  NO powder, lotions, deodorants, oils or cream on body.  ____  Remove all jewelry, piercings, & foreign objects prior to arrival and leave at home.  ____  Remove Dentures, Hearing Aids & Contact Lens prior to surgery.  ____  Bring photo ID and insurance information to hospital (Leave Valuables at Home).  ____  If going home the same day, arrange for a ride home. You will not be able to drive for 24 hrs if Anesthesia was used.   ____  Wear clean, loose fitting clothing to allow for dressings/ bandages.    Bathing Instructions:    -Do not shave your face or body the day before or the day of surgery.              -Do not shave your body for three days before surgery            -Shower & Rinse your body as usual with anti-bacterial Soap, (Dial), for three days before surgery                - on the evening prior to surgery and the morning of surgery, apply one packet of Hibiclens soap to the surgical site.               -Wash the site gently for 5 full minutes. Do Not scrub your skin too hard.               -Rinse your body thoroughly.                -Pat yourself day with a clean, soft towel.               -Do not use lotion, cream, powder or deodorant               -Dress in clean clothes      [facility] Visitor/Ride Policy:  Only 2 adults allowed in pre op/recovery area during your procedure. You MUST HAVE A RIDE HOME from a responsible adult that you know and trust. Medical Transport, Uber or Lyft can ONLY be used if patient has a responsible adult to accompany them during ride home.    Discharge Instructions: You will receive Post-op/Discharge instructions by your Discharge Nurse prior to going home.   *Prevention of surgical site infections:   -Keep incisions clean and dry.   -Do not soak/submerge incisions in water until completely healed.   -Do not apply lotions, powders, creams, or deodorants to site.   -Always make sure hands are  cleaned with antibacterial soap/ alcohol-based  prior to touching the surgical site.        *Signs and symptoms of Infection Before or After Surgery:               !!!If you experience any fever, chills, nausea/ vomiting, foul odor/ excessive drainage from surgical site, flu-like symptoms, new wounds or cuts, PLEASE CALL THE SURGEON OFFICE at [phone] or SEND MESSAGE THROUGH [employer]!!!     *If you are running late day of surgery, please call the Surgery Dept @ [phone].    *Billing question, please call  [phone]     Thank you,  -[facility] Pre Admit Dept.  [phone] -Tete  or [phone]  M-F 7:30 am-4:00 pm (Closed Major Holidays)    Additional Tests Scheduled Today:  LABS and URINE TEST (1ST Floor) Check in at the !

## 2024-02-12 NOTE — ASSESSMENT & PLAN NOTE
Planned for ARTHROPLASTY, KNEE, TOTAL , Left with Dr. Terrance Urena on 2/28/24.     Known risk factors for perioperative complications: Anemia  Chronic pulmonary disease    Difficulty with intubation is not anticipated.    Cardiac Risk Estimation: Based on the Revised Cardiac Risk index, patient is a Class 2 risk with a 6.0% risk of a major cardiac event in a low risk procedure.    1.) Preoperative workup as follows: ECG, hemoglobin, hematocrit, electrolytes, creatinine, glucose, liver function studies, urinalysis (urinary tract instrumentation planned).  2.) Change in medication regimen before surgery: discontinue ASA, NSAIDs 7 days before surgery, hold Metformin 24 hours prior to surgery.  3.) Prophylaxis for cardiac events with perioperative beta-blockers: not indicated.  4.) Invasive hemodynamic monitoring perioperatively: at the discretion of anesthesiologist.  5.) Deep vein thrombosis prophylaxis postoperatively: intermittent pneumatic compression boots and regimen to be chosen by surgical team.  6.) Surveillance for postoperative MI with ECG immediately postoperatively and on postoperati ve days 1 and 2 AND troponin levels 24 hours postoperatively and on day 4 or hospital discharge (whichever comes first): not indicated.  7.) Current medications which may produce withdrawal symptoms if withheld perioperatively: none  8.) Other measures:  None      --Morning of Procedure medications: levothyroxine, sertraline   --Hold all other medications morning of surgery   --Resume all medications post-operatively  --Hold ASA, NSAIDs and all vitamins/supplements 7 days prior to procedure  --Last dose of Eliquis 2/24 per Cardiology    UA: Pending    Per Dr. Urena:      --Drink 1 bottle of gatorade prior to midnight the night before surgery      --Take Tylenol 650mg PO the night before surgery

## 2024-02-12 NOTE — DISCHARGE INSTRUCTIONS
Tete Dickerson, RN  Registered Nurse  Specialty: Surgery     Pre Admission Screening     Signed     Creation Time: 2/12/2024  9:41 AM       Show:Clear all  [x]Written[]Templated[x]Copied    Added by:  [x]Tete Dickerson RN    []Mitra for details  Pre op instructions reviewed with patient during Clinic Visit with Provider, verbalized understanding.     To confirm, Surgery is scheduled on 2/28/24. We will call you late afternoon the business day prior to surgery with your arrival time.     *Please report to the [facility] (1st Floor) located off of [facility] (2nd Entrance/Building on the left, in front of the flag pole).  Address: [address] [place] [address] [place]. [address]     Your Type & Screen appointment is scheduled on 2/27/24, between the hours of 7:00am and 5:00pm @ [facility] ([facility]). Please DO NOT remove the red arm band applied.      INSTRUCTIONS IMPORTANT!!!  DO NOT Eat, Drink, or Smoke after 12 midnight unless instructed otherwise by your Surgeon. OK to brush teeth, no gum, candy or mints!  - TAKE TYLENOL 650 MG. WITH YOUR EVENING MEAL, NIGHT BEFORE SURGERY  - DRINK 1 BOTTLE OF GATORADE OR POWERADE  PRIOR TO MIDNIGHT, EVENING BEFORE SURGERY     MORNING OF SURGERY, drink small sip of water with the following medications instructed by Pre-Admit Provider:  Levothyroxine  Zoloft     *Additional Medication Instructions: TAKE YOUR LAST DOSE OF ELIQUIS, BEFORE SURGERY, ON 2/24.     TAKE YOUR LAST DOSE OF VITAMIN D, BEFORE SURGERY, 2/20/24.     Diabetic Patients: If you take diabetic or weight loss medication, Do NOT take morning of surgery unless instructed by Doctor. Metformin to be stopped 24 hrs prior to surgery. Ozempic/ Mounjaro/ Wegovy/ Trulicity/ Semaglutide or any weight loss injections to be stopped 7 days prior to surgery. DO NOT take long-acting insulin the evening before surgery. Blood sugars will be checked in pre-op by Nurse.     *Patients should HOLD all vitamins, herbal  supplements, weight loss medication, aspirin products & NSAIDS 7 days prior to surgery, as these can thin the blood. Ok to take Tylenol.     ____  Avoid Alcoholic beverages 3 days prior to surgery, as it can thin the blood.  ____  NO Acrylic/fake nails or nail polish worn day of surgery (specifically hand/arm & foot surgeries).  ____  NO powder, lotions, deodorants, oils or cream on body.  ____  Remove all jewelry, piercings, & foreign objects prior to arrival and leave at home.  ____  Remove Dentures, Hearing Aids & Contact Lens prior to surgery.  ____  Bring photo ID and insurance information to hospital (Leave Valuables at Home).  ____  If going home the same day, arrange for a ride home. You will not be able to drive for 24 hrs if Anesthesia was used.   ____  Wear clean, loose fitting clothing to allow for dressings/ bandages.     Bathing Instructions:               -Do not shave your face or body the day before or the day of surgery.              -Do not shave your body for three days before surgery              -Shower & Rinse your body as usual with anti-bacterial Soap, (Dial), for three days before surgery                - on the evening prior to surgery and the morning of surgery, apply one packet of Hibiclens soap to the surgical site.               -Wash the site gently for 5 full minutes. Do Not scrub your skin too hard.               -Rinse your body thoroughly.                -Pat yourself day with a clean, soft towel.               -Do not use lotion, cream, powder or deodorant               -Dress in clean clothes       [facility] Visitor/Ride Policy:  Only 2 adults allowed in pre op/recovery area during your procedure. You MUST HAVE A RIDE HOME from a responsible adult that you know and trust. Medical Transport, Uber or Lyft can ONLY be used if patient has a responsible adult to accompany them during ride home.     Discharge Instructions: You will receive Post-op/Discharge instructions by your  Discharge Nurse prior to going home.   *Prevention of surgical site infections:   -Keep incisions clean and dry.   -Do not soak/submerge incisions in water until completely healed.   -Do not apply lotions, powders, creams, or deodorants to site.   -Always make sure hands are cleaned with antibacterial soap/ alcohol-based  prior to touching the surgical site.        *Signs and symptoms of Infection Before or After Surgery:               !!!If you experience any fever, chills, nausea/ vomiting, foul odor/ excessive drainage from surgical site, flu-like symptoms, new wounds or cuts, PLEASE CALL THE SURGEON OFFICE at [phone] or SEND MESSAGE THROUGH "[employer]"!!!     *If you are running late day of surgery, please call the Surgery Dept @ [phone].     *Billing question, please call  [phone]      Thank you,  -[facility] Pre Admit Dept.  [phone] Tete  or [phone]  M-F 7:30 am-4:00 pm (Closed Major Holidays)     Additional Tests Scheduled Today:  LABS and URINE TEST (1ST Floor) Check in at the !

## 2024-02-12 NOTE — PROGRESS NOTES
Preoperative History and Physical                                                              Hospital Medicine                                                                      Chief Complaint: Preoperative evaluation     History of Present Illness:      Kay Rosas is a 78 y.o. female who presents to the office today for a preoperative consultation at the request of Dr. Terrance Urena who plans on performing ARTHROPLASTY, KNEE, TOTAL, Left on February 28.     Functional Status:      The patient is not able to climb a flight of stairs. The patient is able to ambulate without difficulty. The patient's functional status is affected by the surgical problem. The patient's functional status is affected by shortness of breath, chest pain, dyspnea on exertion and fatigue.    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    MVP (mitral valve prolapse)     On home oxygen therapy     3 liters as needed at night    Osteoporosis     feet    Thyroid condition         Past Surgical History:      Past Surgical History:   Procedure Laterality Date    BACK SURGERY  2012    CATARACT EXTRACTION Bilateral 10/2012    HYSTERECTOMY Left 1978    INJECTION OF ANESTHETIC AGENT INTO SACROILIAC JOINT Bilateral 11/11/2019    Procedure: Bilateral GT bursa + SIJ injection;  Surgeon: Noe Pleitez MD;  Location: [facility];  Service: Pain Management;  Laterality: Bilateral;    INJECTION OF JOINT Bilateral 11/11/2019    Procedure: Bilateral GT bursa + SIJ injection;   Surgeon: Noe Pleitez MD;  Location: [facility];  Service: Pain Management;  Laterality: Bilateral;    JOINT REPLACEMENT Right     knee    ROBOT-ASSISTED LAPAROSCOPIC LYSIS OF ADHESIONS USING DA SP XI N/A 2022    Procedure: XI ROBOTIC LYSIS, ADHESIONS;  Surgeon: Den Barragan MD;  Location: [facility];  Service: Oncology;  Laterality: N/A;    ROBOT-ASSISTED LAPAROSCOPIC SALPINGO-OOPHORECTOMY USING DA SP XI Left 2022    Procedure: XI ROBOTIC SALPINGO-OOPHORECTOMY;  Surgeon: Den Barragan MD;  Location: [facility];  Service: Oncology;  Laterality: Left;    SURGICAL REMOVAL OF BONE SPUR Left     left elbow    VASCULAR SURGERY Right     high ligation due to blood clot         Social History:      Social History     Socioeconomic History    Marital status:    Tobacco Use    Smoking status: Former     Current packs/day: 0.00     Average packs/day: 1 pack/day for 53.5 years (53.5 ttl pk-yrs)     Types: Cigarettes     Start date: 1962     Quit date: 2015     Years since quittin.6    Smokeless tobacco: Never   Substance and Sexual Activity    Alcohol use: No    Drug use: No    Sexual activity: Yes     Partners: Male     Birth control/protection: None     Social Determinants of Health     Financial Resource Strain: Low Risk  (2023)    Overall Financial Resource Strain (CARDIA)     Difficulty of Paying Living Expenses: Not very hard   Food Insecurity: Food Insecurity Present (2023)    Hunger Vital Sign     Worried About Running Out of Food in the Last Year: Sometimes true     Ran Out of Food in the Last Year: Sometimes true   Transportation Needs: No Transportation Needs (2023)    PRAPARE - Transportation     Lack of Transportation (Medical): No     Lack of Transportation (Non-Medical): No   Physical Activity: Inactive (2023)    Exercise Vital Sign     Days of Exercise per Week: 0 days     Minutes of Exercise per Session: 0 min   Stress: Stress Concern Present  (9/26/2023)    [employer] - Occupational Stress Questionnaire     Feeling of Stress : To some extent   Social Connections: Moderately Integrated (9/26/2023)    Social Connection and Isolation Panel [NHANES]     Frequency of Communication with Friends and Family: More than three times a week     Frequency of Social Gatherings with Friends and Family: Once a week     Attends Gnosticism Services: More than 4 times per year     Active Member of Clubs or Organizations: Yes     Attends Club or Organization Meetings: More than 4 times per year     Marital Status:    Housing Stability: Low Risk  (9/26/2023)    Housing Stability Vital Sign     Unable to Pay for Housing in the Last Year: No     Number of Places Lived in the Last Year: 1     Unstable Housing in the Last Year: No        Family History:      Family History   Problem Relation Age of Onset    Heart disease Mother     Kidney disease Mother     Arthritis Mother     Breast cancer Mother     Cancer Father     Heart disease Father     Cancer Sister     Ovarian cancer Sister     Alzheimer's disease Sister        Allergies:      Review of patient's allergies indicates:   Allergen Reactions    Lipitor [atorvastatin]      Severe Leg cramps    Nsaids (non-steroidal anti-inflammatory drug)      CKD       Medications:      Current Outpatient Medications   Medication Sig    albuterol (PROVENTIL/VENTOLIN HFA) 90 mcg/actuation inhaler INHALE 2 PUFFS INTO THE LUNGS EVERY 4 HOURS AS NEEDED FOR WHEEZING OR SHORTNESS OF BREATH.    ergocalciferol (ERGOCALCIFEROL) 50,000 unit Cap Take 1 capsule (50,000 Units total) by mouth every 7 days.    ezetimibe (ZETIA) 10 mg tablet TAKE 1 TABLET BY MOUTH EVERY DAY    levothyroxine (SYNTHROID) 50 MCG tablet TAKE 1 TABLET BY MOUTH EVERY DAY    losartan-hydrochlorothiazide 100-25 mg (HYZAAR) 100-25 mg per tablet TAKE 1 TABLET BY MOUTH EVERY DAY    sertraline (ZOLOFT) 100 MG tablet TAKE 1 TABLET BY MOUTH EVERY DAY     sertraline (ZOLOFT) 50 MG tablet TAKE 1 TABLET BY MOUTH EVERY DAY    traZODone (DESYREL) 150 MG tablet Take 1 tablet (150 mg total) by mouth every evening.    apixaban (ELIQUIS) 5 mg Tab Take 5 mg by mouth 2 (two) times daily.    apixaban (ELIQUIS) 5 mg Tab Take 1 tablet (5 mg total) by mouth 2 (two) times daily.     No current facility-administered medications for this visit.       Vitals:      Vitals:    02/12/24 1015   BP: (!) 170/74   Pulse: 82   Temp: 97.5 °F (36.4 °C)       Review of Systems:        Constitutional: Negative for fever, chills, weight loss, malaise/fatigue and diaphoresis.   HENT: Negative for hearing loss, ear pain, nosebleeds, congestion, sore throat, neck pain, tinnitus and ear discharge.    Eyes: Negative for blurred vision, double vision, photophobia, pain, discharge and redness.   Respiratory: Negative for cough, hemoptysis, sputum production, shortness of breath, wheezing and stridor.    Cardiovascular: Negative for chest pain, palpitations, orthopnea, claudication, leg swelling and PND.   Gastrointestinal: Negative for heartburn, nausea, vomiting, abdominal pain, diarrhea, constipation, blood in stool and melena.   Genitourinary: Negative for dysuria, urgency, frequency, hematuria and flank pain.   Musculoskeletal: Negative for myalgias, back pain, joint pain and falls.   Skin: Negative for itching and rash.   Neurological: Negative for dizziness, tingling, tremors, sensory change, speech change, focal weakness, seizures, loss of consciousness, weakness and headaches.   Endo/Heme/Allergies: Negative for environmental allergies and polydipsia. Does not bruise/bleed easily.   Psychiatric/Behavioral: Negative for depression, suicidal ideas, hallucinations, memory loss and substance abuse. The patient is not nervous/anxious and does not have insomnia.    All 14 systems reviewed and negative except as noted above.    Physical Exam:      Constitutional: Appears well-developed,  well-nourished and in no acute distress.  Patient is oriented to person, place, and time.   Head: Normocephalic and atraumatic. Mucous membranes moist.  Neck: Neck supple no mass.   Cardiovascular: Normal rate and regular rhythm.  S1 S2 appreciated by ascultation.  Pulmonary/Chest: Effort normal and clear to auscultation bilaterally. No respiratory distress.   Abdomen: Soft. Non-tender and non-distended. Bowel sounds are normal.   Neurological: Patient is alert and oriented to person, place and time. Moves all extremities.  Skin: Warm and dry. No lesions.  Extremities: No clubbing, cyanosis or edema.    Laboratory data:      Reviewed and noted in plan where applicable. Please see chart for full laboratory data.          Lab Results   Component Value Date    WBC 6.51 02/12/2024    HGB 12.8 02/12/2024    HCT 40.7 02/12/2024    MCV 89 02/12/2024     02/12/2024           Predictors of intubation difficulty:       Morbid obesity? no   Anatomically abnormal facies? no   Prominent incisors? no   Receding mandible? no   Short, thick neck? no   Neck range of motion: normal   Dentition:  Full dentures top and bottom  Based on the Modified Mallampati, patient is a mallampati score: II (hard and soft palate, upper portion of tonsils anduvula visible)    Cardiographics:      ECG: Normal sinus rhythm   Normal ECG   When compared with ECG of 02-AUG-2023 16:01,   No significant change was found   Confirmed by RASHI PA MD ([id number]) on 2/8/2024 1:46:15 PM     Echocardiogram: not indicated    Imaging:      Chest x-ray:  Negative for acute process    Assessment and Plan:      Osteoarthritis of both knees  Planned for ARTHROPLASTY, KNEE, TOTAL , Left with Dr. Terrance Urena on 2/28/24.     Known risk factors for perioperative complications: Anemia  Chronic pulmonary disease    Difficulty with intubation is not anticipated.    Cardiac Risk Estimation: Based on the Revised Cardiac Risk index, patient is a Class 2 risk with a 6.0%  "risk of a major cardiac event in a low risk procedure.    1.) Preoperative workup as follows: ECG, hemoglobin, hematocrit, electrolytes, creatinine, glucose, liver function studies, urinalysis (urinary tract instrumentation planned).  2.) Change in medication regimen before surgery: discontinue ASA, NSAIDs 7 days before surgery, hold Metformin 24 hours prior to surgery.  3.) Prophylaxis for cardiac events with perioperative beta-blockers: not indicated.  4.) Invasive hemodynamic monitoring perioperatively: at the discretion of anesthesiologist.  5.) Deep vein thrombosis prophylaxis postoperatively: intermittent pneumatic compression boots and regimen to be chosen by surgical team.  6.) Surveillance for postoperative MI with ECG immediately postoperatively and on postoperati ve days 1 and 2 AND troponin levels 24 hours postoperatively and on day 4 or hospital discharge (whichever comes first): not indicated.  7.) Current medications which may produce withdrawal symptoms if withheld perioperatively: none  8.) Other measures:  None      --Morning of Procedure medications: levothyroxine, sertraline   --Hold all other medications morning of surgery   --Resume all medications post-operatively  --Hold ASA, NSAIDs and all vitamins/supplements 7 days prior to procedure  --Last dose of Eliquis 2/24 per Cardiology    UA: Pending    Per Dr. Urena:      --Drink 1 bottle of gatorade prior to midnight the night before surgery      --Take Tylenol 650mg PO the night before surgery     Primary hypertension  Chronic, well controlled    --See medication recommendations as above   --Evaluated by Cardiology on 2/8/24, per review of note, see recommendations a below:  "Preop risk stratification  Ambulates with walker  Elevated risk for knee replacement surgery  No cardiac symptoms  Normal EKG  Recent knee surgery in May 2023 without complications per patient  Hold eliquis 3 days prior to surgery   Given history of DVT x2, recommend to " "restart eliquis post surgery  No further cardiac workup needed "    Pulmonary emphysema  Chronic, well controlled    --See medication recommendations as above     Dependence on supplemental oxygen  Chronic, stable    --2L per NC @ HS  --Intermittent use during the day for SOB    History of DVT (deep vein thrombosis) lower extremity on 2 different occasions  Chronic use 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   Patient at high risk for DVT during perioperative period, recommend to resume AC post-op    --Resume Eliquis Post-op when appropriate per surgeon          Electronically signed by Josefina Damon NP on 2/12/2024 at 9:50 AM.     "

## 2024-02-13 NOTE — ASSESSMENT & PLAN NOTE
"Chronic, well controlled    --See medication recommendations as above   --Evaluated by Cardiology on 2/8/24, per review of note, see recommendations a below:  "Preop risk stratification  Ambulates with walker  Elevated risk for knee replacement surgery  No cardiac symptoms  Normal EKG  Recent knee surgery in May 2023 without complications per patient  Hold eliquis 3 days prior to surgery   Given history of DVT x2, recommend to restart eliquis post surgery  No further cardiac workup needed "  "

## 2024-02-13 NOTE — ASSESSMENT & PLAN NOTE
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   Patient at high risk for DVT during perioperative period, recommend to resume AC post-op    --Resume Eliquis Post-op when appropriate per surgeon

## 2024-02-14 LAB
COMMENTS: ABNORMAL
EST. AVERAGE GLUCOSE BLD GHB EST-MCNC: 123 MG/DL
HBA1C MFR BLD: 5.9 % (ref 4.8–5.6)

## 2024-02-27 ENCOUNTER — LAB VISIT (OUTPATIENT)
Dept: LAB | Facility: HOSPITAL | Age: 79
End: 2024-02-27
Attending: ORTHOPAEDIC SURGERY
Payer: MEDICARE

## 2024-02-27 ENCOUNTER — TELEPHONE (OUTPATIENT)
Dept: PREADMISSION TESTING | Facility: HOSPITAL | Age: 79
End: 2024-02-27
Payer: MEDICARE

## 2024-02-27 DIAGNOSIS — Z01.818 PREOP TESTING: ICD-10-CM

## 2024-02-27 LAB
ABO + RH BLD: NORMAL
BLD GP AB SCN CELLS X3 SERPL QL: NORMAL
SPECIMEN OUTDATE: NORMAL

## 2024-02-27 PROCEDURE — 86850 RBC ANTIBODY SCREEN: CPT | Performed by: ORTHOPAEDIC SURGERY

## 2024-02-27 PROCEDURE — 36415 COLL VENOUS BLD VENIPUNCTURE: CPT | Performed by: ORTHOPAEDIC SURGERY

## 2024-02-27 NOTE — TELEPHONE ENCOUNTER
Called and lvm about the following:     Please arrive to [facility] ([facility] at 8:15 am on 2/28/24 for your scheduled procedure.  Address: [address] [place] [address] [place]. [id number] ([address] Building on left, 1st Floor Lobby)  >>>NO eating or drinking after midnight unless instructed otherwise by your Surgeon<<<

## 2024-02-28 ENCOUNTER — ANESTHESIA EVENT (OUTPATIENT)
Dept: SURGERY | Facility: HOSPITAL | Age: 79
End: 2024-02-28
Payer: MEDICARE

## 2024-02-28 ENCOUNTER — ANESTHESIA (OUTPATIENT)
Dept: SURGERY | Facility: HOSPITAL | Age: 79
End: 2024-02-28
Payer: MEDICARE

## 2024-02-28 ENCOUNTER — HOSPITAL ENCOUNTER (OUTPATIENT)
Facility: HOSPITAL | Age: 79
Discharge: HOME | End: 2024-02-28
Attending: ORTHOPAEDIC SURGERY | Admitting: ORTHOPAEDIC SURGERY
Payer: MEDICARE

## 2024-02-28 DIAGNOSIS — Z01.818 PREOP TESTING: ICD-10-CM

## 2024-02-28 DIAGNOSIS — M17.12 ARTHRITIS OF LEFT KNEE: Primary | ICD-10-CM

## 2024-02-28 LAB
HCT VFR BLD AUTO: 36.2 % (ref 37–48.5)
HGB BLD-MCNC: 11.5 G/DL (ref 12–16)

## 2024-02-28 PROCEDURE — 25000003 PHARM REV CODE 250: Performed by: NURSE ANESTHETIST, CERTIFIED REGISTERED

## 2024-02-28 PROCEDURE — 71000033 HC RECOVERY, INTIAL HOUR: Performed by: ORTHOPAEDIC SURGERY

## 2024-02-28 PROCEDURE — 97161 PT EVAL LOW COMPLEX 20 MIN: CPT

## 2024-02-28 PROCEDURE — 71000039 HC RECOVERY, EACH ADD'L HOUR: Performed by: ORTHOPAEDIC SURGERY

## 2024-02-28 PROCEDURE — 37000008 HC ANESTHESIA 1ST 15 MINUTES: Performed by: ORTHOPAEDIC SURGERY

## 2024-02-28 PROCEDURE — 71000015 HC POSTOP RECOV 1ST HR: Performed by: ORTHOPAEDIC SURGERY

## 2024-02-28 PROCEDURE — 25000003 PHARM REV CODE 250: Performed by: ORTHOPAEDIC SURGERY

## 2024-02-28 PROCEDURE — 37000009 HC ANESTHESIA EA ADD 15 MINS: Performed by: ORTHOPAEDIC SURGERY

## 2024-02-28 PROCEDURE — C1776 JOINT DEVICE (IMPLANTABLE): HCPCS | Performed by: ORTHOPAEDIC SURGERY

## 2024-02-28 PROCEDURE — 27447 TOTAL KNEE ARTHROPLASTY: CPT | Mod: AS,LT,, | Performed by: PHYSICIAN ASSISTANT

## 2024-02-28 PROCEDURE — C1713 ANCHOR/SCREW BN/BN,TIS/BN: HCPCS | Performed by: ORTHOPAEDIC SURGERY

## 2024-02-28 PROCEDURE — 63600175 PHARM REV CODE 636 W HCPCS: Performed by: NURSE ANESTHETIST, CERTIFIED REGISTERED

## 2024-02-28 PROCEDURE — 27201423 OPTIME MED/SURG SUP & DEVICES STERILE SUPPLY: Performed by: ORTHOPAEDIC SURGERY

## 2024-02-28 PROCEDURE — 85014 HEMATOCRIT: CPT | Performed by: ORTHOPAEDIC SURGERY

## 2024-02-28 PROCEDURE — 97116 GAIT TRAINING THERAPY: CPT

## 2024-02-28 PROCEDURE — 71000016 HC POSTOP RECOV ADDL HR: Performed by: ORTHOPAEDIC SURGERY

## 2024-02-28 PROCEDURE — 63600175 PHARM REV CODE 636 W HCPCS: Performed by: ANESTHESIOLOGY

## 2024-02-28 PROCEDURE — 63600175 PHARM REV CODE 636 W HCPCS: Performed by: ORTHOPAEDIC SURGERY

## 2024-02-28 PROCEDURE — 27447 TOTAL KNEE ARTHROPLASTY: CPT | Mod: LT,,, | Performed by: ORTHOPAEDIC SURGERY

## 2024-02-28 PROCEDURE — 36000710: Performed by: ORTHOPAEDIC SURGERY

## 2024-02-28 PROCEDURE — 85018 HEMOGLOBIN: CPT | Performed by: ORTHOPAEDIC SURGERY

## 2024-02-28 PROCEDURE — 36000711: Performed by: ORTHOPAEDIC SURGERY

## 2024-02-28 DEVICE — ATTUNE PINNING SYSTEM
Type: IMPLANTABLE DEVICE | Site: KNEE | Status: FUNCTIONAL
Brand: ATTUNE

## 2024-02-28 DEVICE — IMPLANTABLE DEVICE: Type: IMPLANTABLE DEVICE | Site: KNEE | Status: FUNCTIONAL

## 2024-02-28 DEVICE — CEMENT BONE SMPLX HV GENTMYCN: Type: IMPLANTABLE DEVICE | Site: KNEE | Status: FUNCTIONAL

## 2024-02-28 DEVICE — ATTUNE PATELLA MEDIALIZED DOME 38MM CEMENTED AOX
Type: IMPLANTABLE DEVICE | Site: KNEE | Status: FUNCTIONAL
Brand: ATTUNE

## 2024-02-28 RX ORDER — CEFAZOLIN SODIUM 2 G/50ML
2 SOLUTION INTRAVENOUS
Status: COMPLETED | OUTPATIENT
Start: 2024-02-28 | End: 2024-02-28

## 2024-02-28 RX ORDER — OXYCODONE AND ACETAMINOPHEN 10; 325 MG/1; MG/1
1 TABLET ORAL EVERY 6 HOURS PRN
Qty: 30 TABLET | Refills: 0 | Status: SHIPPED | OUTPATIENT
Start: 2024-02-28 | End: 2024-03-12 | Stop reason: SDUPTHER

## 2024-02-28 RX ORDER — PROPOFOL 10 MG/ML
VIAL (ML) INTRAVENOUS
Status: DISCONTINUED | OUTPATIENT
Start: 2024-02-28 | End: 2024-02-28

## 2024-02-28 RX ORDER — ACETAMINOPHEN 325 MG/1
650 TABLET ORAL EVERY 8 HOURS PRN
Status: CANCELLED | OUTPATIENT
Start: 2024-02-28

## 2024-02-28 RX ORDER — BISACODYL 10 MG/1
10 SUPPOSITORY RECTAL DAILY PRN
Status: CANCELLED | OUTPATIENT
Start: 2024-02-28

## 2024-02-28 RX ORDER — ONDANSETRON HYDROCHLORIDE 2 MG/ML
4 INJECTION, SOLUTION INTRAVENOUS EVERY 12 HOURS PRN
Status: CANCELLED | OUTPATIENT
Start: 2024-02-28

## 2024-02-28 RX ORDER — LIDOCAINE HYDROCHLORIDE 10 MG/ML
INJECTION, SOLUTION EPIDURAL; INFILTRATION; INTRACAUDAL; PERINEURAL
Status: DISCONTINUED | OUTPATIENT
Start: 2024-02-28 | End: 2024-02-28

## 2024-02-28 RX ORDER — CHLORHEXIDINE GLUCONATE ORAL RINSE 1.2 MG/ML
10 SOLUTION DENTAL 2 TIMES DAILY
Status: CANCELLED | OUTPATIENT
Start: 2024-02-28 | End: 2024-03-04

## 2024-02-28 RX ORDER — SODIUM CHLORIDE 9 MG/ML
INJECTION, SOLUTION INTRAVENOUS CONTINUOUS
Status: ACTIVE | OUTPATIENT
Start: 2024-02-28

## 2024-02-28 RX ORDER — KETAMINE HCL IN 0.9 % NACL 50 MG/5 ML
SYRINGE (ML) INTRAVENOUS
Status: DISCONTINUED | OUTPATIENT
Start: 2024-02-28 | End: 2024-02-28

## 2024-02-28 RX ORDER — DEXAMETHASONE SODIUM PHOSPHATE 4 MG/ML
8 INJECTION, SOLUTION INTRA-ARTICULAR; INTRALESIONAL; INTRAMUSCULAR; INTRAVENOUS; SOFT TISSUE
Status: ACTIVE | OUTPATIENT
Start: 2024-02-28

## 2024-02-28 RX ORDER — HYDRALAZINE HYDROCHLORIDE 20 MG/ML
INJECTION INTRAMUSCULAR; INTRAVENOUS
Status: DISCONTINUED | OUTPATIENT
Start: 2024-02-28 | End: 2024-02-28

## 2024-02-28 RX ORDER — HYDROMORPHONE HYDROCHLORIDE 2 MG/ML
0.2 INJECTION, SOLUTION INTRAMUSCULAR; INTRAVENOUS; SUBCUTANEOUS EVERY 5 MIN PRN
Status: COMPLETED | OUTPATIENT
Start: 2024-02-28 | End: 2024-02-28

## 2024-02-28 RX ORDER — OXYCODONE AND ACETAMINOPHEN 5; 325 MG/1; MG/1
1 TABLET ORAL
Status: DISCONTINUED | OUTPATIENT
Start: 2024-02-28 | End: 2024-02-28 | Stop reason: HOSPADM

## 2024-02-28 RX ORDER — DOCUSATE SODIUM 100 MG/1
100 CAPSULE, LIQUID FILLED ORAL 2 TIMES DAILY
Status: CANCELLED | OUTPATIENT
Start: 2024-02-28

## 2024-02-28 RX ORDER — GABAPENTIN 300 MG/1
300 CAPSULE ORAL DAILY
Status: DISPENSED | OUTPATIENT
Start: 2024-02-28

## 2024-02-28 RX ORDER — GABAPENTIN 300 MG/1
300 CAPSULE ORAL NIGHTLY
Qty: 30 CAPSULE | Refills: 11 | Status: SHIPPED | OUTPATIENT
Start: 2024-02-28 | End: 2025-02-27

## 2024-02-28 RX ORDER — LABETALOL HYDROCHLORIDE 5 MG/ML
INJECTION, SOLUTION INTRAVENOUS
Status: DISCONTINUED | OUTPATIENT
Start: 2024-02-28 | End: 2024-02-28

## 2024-02-28 RX ORDER — CEFAZOLIN SODIUM 2 G/50ML
2 SOLUTION INTRAVENOUS
Status: CANCELLED | OUTPATIENT
Start: 2024-02-28 | End: 2024-02-29

## 2024-02-28 RX ORDER — TRANEXAMIC ACID 10 MG/ML
1000 INJECTION, SOLUTION INTRAVENOUS
Status: COMPLETED | OUTPATIENT
Start: 2024-02-28 | End: 2024-02-28

## 2024-02-28 RX ORDER — ONDANSETRON HYDROCHLORIDE 2 MG/ML
INJECTION, SOLUTION INTRAVENOUS
Status: DISCONTINUED | OUTPATIENT
Start: 2024-02-28 | End: 2024-02-28

## 2024-02-28 RX ORDER — MORPHINE SULFATE 4 MG/ML
2 INJECTION, SOLUTION INTRAMUSCULAR; INTRAVENOUS EVERY 4 HOURS PRN
Status: CANCELLED | OUTPATIENT
Start: 2024-02-28

## 2024-02-28 RX ORDER — MEPERIDINE HYDROCHLORIDE 25 MG/ML
12.5 INJECTION INTRAMUSCULAR; INTRAVENOUS; SUBCUTANEOUS 2 TIMES DAILY PRN
Status: DISCONTINUED | OUTPATIENT
Start: 2024-02-28 | End: 2024-02-28 | Stop reason: HOSPADM

## 2024-02-28 RX ORDER — ACETAMINOPHEN 325 MG/1
650 TABLET ORAL EVERY 8 HOURS PRN
Status: ACTIVE | OUTPATIENT
Start: 2024-02-28

## 2024-02-28 RX ORDER — ROPIVACAINE/EPI/CLONIDINE/KET 2.46-0.005
SYRINGE (ML) INJECTION
Status: DISCONTINUED | OUTPATIENT
Start: 2024-02-28 | End: 2024-02-28 | Stop reason: HOSPADM

## 2024-02-28 RX ORDER — ONDANSETRON 4 MG/1
8 TABLET, ORALLY DISINTEGRATING ORAL EVERY 8 HOURS PRN
Qty: 20 TABLET | Refills: 0 | Status: SHIPPED | OUTPATIENT
Start: 2024-02-28

## 2024-02-28 RX ORDER — OXYCODONE HYDROCHLORIDE 5 MG/1
5 TABLET ORAL
Status: CANCELLED | OUTPATIENT
Start: 2024-02-28

## 2024-02-28 RX ORDER — FENTANYL CITRATE 50 UG/ML
25 INJECTION, SOLUTION INTRAMUSCULAR; INTRAVENOUS EVERY 5 MIN PRN
Status: DISCONTINUED | OUTPATIENT
Start: 2024-02-28 | End: 2024-02-28 | Stop reason: HOSPADM

## 2024-02-28 RX ORDER — CHLORHEXIDINE GLUCONATE ORAL RINSE 1.2 MG/ML
10 SOLUTION DENTAL
Status: DISPENSED | OUTPATIENT
Start: 2024-02-28

## 2024-02-28 RX ORDER — SODIUM CHLORIDE 9 MG/ML
INJECTION, SOLUTION INTRAVENOUS CONTINUOUS
Status: CANCELLED | OUTPATIENT
Start: 2024-02-28

## 2024-02-28 RX ORDER — ONDANSETRON 8 MG/1
8 TABLET, ORALLY DISINTEGRATING ORAL EVERY 8 HOURS PRN
Status: CANCELLED | OUTPATIENT
Start: 2024-02-28

## 2024-02-28 RX ORDER — ROCURONIUM BROMIDE 10 MG/ML
INJECTION, SOLUTION INTRAVENOUS
Status: DISCONTINUED | OUTPATIENT
Start: 2024-02-28 | End: 2024-02-28

## 2024-02-28 RX ORDER — DIPHENHYDRAMINE HYDROCHLORIDE 50 MG/ML
25 INJECTION INTRAMUSCULAR; INTRAVENOUS EVERY 6 HOURS PRN
Status: DISCONTINUED | OUTPATIENT
Start: 2024-02-28 | End: 2024-02-28 | Stop reason: HOSPADM

## 2024-02-28 RX ORDER — FENTANYL CITRATE 50 UG/ML
INJECTION, SOLUTION INTRAMUSCULAR; INTRAVENOUS
Status: DISCONTINUED | OUTPATIENT
Start: 2024-02-28 | End: 2024-02-28

## 2024-02-28 RX ORDER — OXYCODONE HYDROCHLORIDE 5 MG/1
10 TABLET ORAL
Status: DISCONTINUED | OUTPATIENT
Start: 2024-02-28 | End: 2024-02-28 | Stop reason: HOSPADM

## 2024-02-28 RX ORDER — ACETAMINOPHEN 10 MG/ML
1000 INJECTION, SOLUTION INTRAVENOUS ONCE
Status: COMPLETED | OUTPATIENT
Start: 2024-02-28 | End: 2024-02-28

## 2024-02-28 RX ADMIN — SUGAMMADEX 200 MG: 100 INJECTION, SOLUTION INTRAVENOUS at 11:02

## 2024-02-28 RX ADMIN — GABAPENTIN 300 MG: 300 CAPSULE ORAL at 08:02

## 2024-02-28 RX ADMIN — PROPOFOL 40 MG: 10 INJECTION, EMULSION INTRAVENOUS at 10:02

## 2024-02-28 RX ADMIN — HYDROMORPHONE HYDROCHLORIDE 0.2 MG: 2 INJECTION INTRAMUSCULAR; INTRAVENOUS; SUBCUTANEOUS at 12:02

## 2024-02-28 RX ADMIN — PROPOFOL 20 MG: 10 INJECTION, EMULSION INTRAVENOUS at 10:02

## 2024-02-28 RX ADMIN — HYDRALAZINE HYDROCHLORIDE 5 MG: 20 INJECTION INTRAMUSCULAR; INTRAVENOUS at 10:02

## 2024-02-28 RX ADMIN — ACETAMINOPHEN 1000 MG: 10 INJECTION INTRAVENOUS at 12:02

## 2024-02-28 RX ADMIN — DEXAMETHASONE SODIUM PHOSPHATE 8 MG: 4 INJECTION, SOLUTION INTRA-ARTICULAR; INTRALESIONAL; INTRAMUSCULAR; INTRAVENOUS; SOFT TISSUE at 09:02

## 2024-02-28 RX ADMIN — ONDANSETRON 4 MG: 2 INJECTION INTRAMUSCULAR; INTRAVENOUS at 11:02

## 2024-02-28 RX ADMIN — FENTANYL CITRATE 100 MCG: 50 INJECTION, SOLUTION INTRAMUSCULAR; INTRAVENOUS at 09:02

## 2024-02-28 RX ADMIN — SODIUM CHLORIDE, SODIUM LACTATE, POTASSIUM CHLORIDE, AND CALCIUM CHLORIDE: .6; .31; .03; .02 INJECTION, SOLUTION INTRAVENOUS at 09:02

## 2024-02-28 RX ADMIN — HYDROMORPHONE HYDROCHLORIDE 0.2 MG: 2 INJECTION INTRAMUSCULAR; INTRAVENOUS; SUBCUTANEOUS at 11:02

## 2024-02-28 RX ADMIN — Medication 25 MG: at 10:02

## 2024-02-28 RX ADMIN — LABETALOL HYDROCHLORIDE 10 MG: 5 INJECTION, SOLUTION INTRAVENOUS at 10:02

## 2024-02-28 RX ADMIN — CEFAZOLIN SODIUM 2 G: 2 SOLUTION INTRAVENOUS at 09:02

## 2024-02-28 RX ADMIN — PROPOFOL 100 MG: 10 INJECTION, EMULSION INTRAVENOUS at 09:02

## 2024-02-28 RX ADMIN — FENTANYL CITRATE 50 MCG: 50 INJECTION, SOLUTION INTRAMUSCULAR; INTRAVENOUS at 10:02

## 2024-02-28 RX ADMIN — OXYCODONE HYDROCHLORIDE 10 MG: 5 TABLET ORAL at 01:02

## 2024-02-28 RX ADMIN — TRANEXAMIC ACID 1000 MG: 10 INJECTION, SOLUTION INTRAVENOUS at 09:02

## 2024-02-28 RX ADMIN — CHLORHEXIDINE GLUCONATE 0.12% ORAL RINSE 10 ML: 1.2 LIQUID ORAL at 08:02

## 2024-02-28 RX ADMIN — LIDOCAINE HYDROCHLORIDE 50 MG: 10 SOLUTION INTRAVENOUS at 09:02

## 2024-02-28 RX ADMIN — TRANEXAMIC ACID 1000 MG: 10 INJECTION, SOLUTION INTRAVENOUS at 11:02

## 2024-02-28 RX ADMIN — ROCURONIUM BROMIDE 50 MG: 10 INJECTION, SOLUTION INTRAVENOUS at 09:02

## 2024-02-28 NOTE — ANESTHESIA PROCEDURE NOTES
Intubation    Date/Time: 2/28/2024 9:49 AM    Performed by: Erica Glaser CRNA  Authorized by: Sd Segura MD    Intubation:     Induction:  Intravenous    Intubated:  Postinduction    Mask Ventilation:  Easy mask    Attempts:  1    Attempted By:  CRNA    Method of Intubation:  Direct    Blade:  Kayleen 3    Laryngeal View Grade: Grade I - full view of cords      Difficult Airway Encountered?: No      Complications:  None    Airway Device:  Oral endotracheal tube    Airway Device Size:  7.0    Style/Cuff Inflation:  Cuffed (inflated to minimal occlusive pressure)    Tube secured:  20    Secured at:  The lips    Placement Verified By:  Capnometry and Revisualization with laryngoscopy    Complicating Factors:  None    Findings Post-Intubation:  BS equal bilateral and atraumatic/condition of teeth unchanged

## 2024-02-28 NOTE — PLAN OF CARE
[facility]  Discharge Assessment    Primary Care Provider: Vince Jefferson MD     Discharge Assessment (most recent)       BRIEF DISCHARGE ASSESSMENT - 02/28/24 1031          Discharge Planning    Assessment Type Discharge Planning Brief Assessment     Resource/Environmental Concerns none     Support Systems Children     Equipment Currently Used at Home rollator     Current Living Arrangements apartment     Patient/Family Anticipates Transition to home with help/services     Patient/Family Anticipated Services at Transition home health care     DME Needed Upon Discharge  none     Discharge Plan A Home Health                   Spoke with DaughterAntionette, for brief assessment.   Help at home after discharge: family   Transportation: personal vehicle   DME needed: none - received rollator in 2023   Discharge plan:  Home Health - orders sent to [facility] for nursing/PT.

## 2024-02-28 NOTE — CARE UPDATE
Notified of potential foot drop  Patient examined in postop day   Patient noted to have positive loss of dorsiflexion to the left foot status post left TKA   Patient does have 1st webspace feeling completely with sensation fully intact and is able to differentiate points of palpation    Have contacted DME representative   AFOs to be brought to bedside   Patient instructed to wear AFO at all times when up and ambulating   She can take it off at night or at rest   She may participate with PT today   She may go home with home health services

## 2024-02-28 NOTE — TRANSFER OF CARE
"Anesthesia Transfer of Care Note    Patient: Kay Rosas    Procedure(s) Performed: Procedure(s) (LRB):  ARTHROPLASTY, KNEE, TOTAL (Left)    Patient location: PACU    Anesthesia Type: general    Transport from OR: Transported from OR on room air with adequate spontaneous ventilation    Post pain: adequate analgesia    Post assessment: no apparent anesthetic complications    Post vital signs: stable    Level of consciousness: sedated    Nausea/Vomiting: no nausea/vomiting    Complications: none    Transfer of care protocol was followed    Last vitals: Visit Vitals  BP (!) 198/81   Pulse 62   Temp 36.4 °C (97.5 °F) (Temporal)   Resp 20   Ht 5' 6" (1.676 m)   Wt 111.5 kg (245 lb 13 oz)   SpO2 96%   Breastfeeding No   BMI 39.68 kg/m²     "

## 2024-02-28 NOTE — DISCHARGE INSTRUCTIONS
Patient instructions for joint replacement      After surgery at home  1.  Take Tylenol 650 mg 3 times a day for 14 days then as needed for mild pain  2.  Take gabapentin 300 mg nightly for 6 weeks  3.  Take all your home meds including Eliquis  4.  Must take Eliquis    5.  Must wear compressive stockings for 6 weeks minimum to decrease the risk of blood clot and swelling  6.  Hydrocodone/Norco or oxycodone/Percocet will be prescribed to take every 6 hr as needed for breakthrough pain  7.  May apply ice on the knee to help with decreasing pain  8.  Keep wound dry for 2 weeks until stitches/staples removed than you will be allowed to shower in 24 hr and get the wound wet.  No soaking of the wound in the tub or swimming for 4 weeks after surgery  9.  No driving for 4 weeks unless specified by physician  10.  Avoid touching the wound or surrounding area /at least 2 inches on each side of the surgical incision until staples are removed/stitches   11.  May change the surgical dressing if extremely bloody or has drainage on it. May clean the wound with peroxide or Betadine and apply sterile dressing and Ace wrap over it  12.  Leave hospital dressing on for 3 days then may change by applying sterile 4 x 4 and Ace wrap after cleaning with Betadine or peroxide.  May leave this dressing change to home health nurses

## 2024-02-28 NOTE — DISCHARGE SUMMARY
[facility]  Discharge Note  Short Stay    Procedure(s) (LRB):  ARTHROPLASTY, KNEE, TOTAL (Left)      OUTCOME: Patient tolerated treatment/procedure well without complication and is now ready for discharge.    DISPOSITION: Home-Health Care [facility]    FINAL DIAGNOSIS:  <principal problem not specified>  Arthritis of left knee  FOLLOWUP: In clinic    DISCHARGE INSTRUCTIONS:    Discharge Procedure Orders   Basic Metabolic Panel   Standing Status: Future Number of Occurrences: 1 Standing Exp. Date: 04/12/25     CBC Auto Differential   Standing Status: Future Number of Occurrences: 1 Standing Exp. Date: 04/12/25     Urinalysis   Standing Status: Future Standing Exp. Date: 04/12/25     Order Specific Question Answer Comments   Collection Type Urine, Clean Catch         TIME SPENT ON DISCHARGE:  25 minutes

## 2024-02-28 NOTE — H&P
Subjective:     Patient ID: Kay Rosas is a 78 y.o. female.    Chief Complaint: Pain of the Right Knee and Pain of the Left Knee    HPI:  Left knee pain   01/11/2024  Patient stated the right knee still hurting her she had a right total knee replacement done by Dr. Cadena at [facility].  She said she had the 1st 1 done on 03/01/2021 and then on 03/22/2023 she had a revision.  She thought the problem was with the kneecap however I reviewed the electronic records from [facility] where he only change the poly insert from size 10 to size 13/DJO total knee tibia base plate size 5.  She said she is doing okay with the knee but still having hypersensitivity and tenderness anteriorly.  She does have history of lumbar fusion and reviewed x-rays in the electronic record from 2016 showing hardware in the lumbar spine with fractured facet screws.  She said she can manage with the back.  There is no workup of numbness or tingling in the legs with nerve conduction studies.  She said she received numerous injections in her knees on both of them she wants the left total knee replaced.  She takes ibuprofen 800 she is on Eliquis she has history of bilateral lower extremities blood clots even prior to having her knee replacement.  She takes ibuprofen 800 and Eliquis.  She knows she is stops the Eliquis for it for surgery.  She complains of burning and anterior right knee pain I did tell her every total knee is numb on the outside of the knee and she can use Voltaren cream to help with that.  Instruct her how to use it.  As far as the left knee is concerned I told her that she is a candidate for total knee replacement however can not get rid of all the pain in the knees.  She does have back issues and lumbar fusion she could have pinched nerves going down the legs that could be causing some of her pains.  She lives alone but has children around town and states they all work.  I did tell her the need  to step up and help after surgery if she undergoes total knee replacement because it is outpatient surgery she goes home the same day.  She needs help from the family members they should take turns to help her out.  She has using a Rollator to walk around.  Other treatments included Kenalog injection 08/24/2023, also other steroid prior to that and Euflexxa injections 06/24/2020.  Also she had genicular nerve block done on 09/15/2023/  MEMO vera which did not help at all    I did tell her some of her pains could be coming from her back and total knee replacement might not solve that problem they will only help inside the knee joint.    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    MVP (mitral valve prolapse)     On home oxygen therapy     3 liters as needed at night    Osteoporosis     feet    Thyroid condition      Past Surgical History:   Procedure Laterality Date    BACK SURGERY  2012    CATARACT EXTRACTION Bilateral 10/2012    HYSTERECTOMY Left 1978    INJECTION OF ANESTHETIC AGENT INTO SACROILIAC JOINT Bilateral 11/11/2019    Procedure: Bilateral GT bursa + SIJ injection;  Surgeon: Noe Pleitez MD;  Location: [facility] PAIN MGT;  Service: Pain Management;  Laterality: Bilateral;    INJECTION OF JOINT Bilateral 11/11/2019    Procedure: Bilateral GT bursa + SIJ injection;  Surgeon: Noe Pleitez MD;  Location: [facility] PAIN MGT;  Service: Pain Management;  Laterality: Bilateral;    JOINT REPLACEMENT Right 2021    knee    ROBOT-ASSISTED LAPAROSCOPIC LYSIS OF ADHESIONS USING DA SP XI N/A 12/05/2022    Procedure: XI ROBOTIC LYSIS, ADHESIONS;  Surgeon: Den Barragan MD;   Location: [facility] OR;  Service: Oncology;  Laterality: N/A;    ROBOT-ASSISTED LAPAROSCOPIC SALPINGO-OOPHORECTOMY USING DA SP XI Left 2022    Procedure: XI ROBOTIC SALPINGO-OOPHORECTOMY;  Surgeon: Den Barragan MD;  Location: [facility] [place] OR;  Service: Oncology;  Laterality: Left;    SURGICAL REMOVAL OF BONE SPUR Left     left elbow    VASCULAR SURGERY Right     high ligation due to blood clot      Family History   Problem Relation Age of Onset    Heart disease Mother     Kidney disease Mother     Arthritis Mother     Breast cancer Mother     Cancer Father     Heart disease Father     Cancer Sister     Ovarian cancer Sister     Alzheimer's disease Sister      Social History     Socioeconomic History    Marital status:    Tobacco Use    Smoking status: Former     Current packs/day: 0.00     Average packs/day: 1 pack/day for 53.5 years (53.5 ttl pk-yrs)     Types: Cigarettes     Start date: 1962     Quit date: 2015     Years since quittin.5    Smokeless tobacco: Never   Substance and Sexual Activity    Alcohol use: No    Drug use: No    Sexual activity: Yes     Partners: Male     Birth control/protection: None     Social Determinants of Health     Financial Resource Strain: Low Risk  (2023)    Overall Financial Resource Strain (CARDIA)     Difficulty of Paying Living Expenses: Not very hard   Food Insecurity: Food Insecurity Present (2023)    Hunger Vital Sign     Worried About Running Out of Food in the Last Year: Sometimes true     Ran Out of Food in the Last Year: Sometimes true   Transportation Needs: No Transportation Needs (2023)    PRAPARE - Transportation     Lack of Transportation (Medical): No     Lack of Transportation (Non-Medical): No   Physical Activity: Inactive (2023)    Exercise Vital Sign     Days of Exercise per Week: 0 days     Minutes of Exercise per Session: 0 min   Stress: Stress Concern Present (2023)    [employer] -  Occupational Stress Questionnaire     Feeling of Stress : To some extent   Social Connections: Moderately Integrated (9/26/2023)    Social Connection and Isolation Panel [NHANES]     Frequency of Communication with Friends and Family: More than three times a week     Frequency of Social Gatherings with Friends and Family: Once a week     Attends Buddhist Services: More than 4 times per year     Active Member of Clubs or Organizations: Yes     Attends Club or Organization Meetings: More than 4 times per year     Marital Status:    Housing Stability: Low Risk  (9/26/2023)    Housing Stability Vital Sign     Unable to Pay for Housing in the Last Year: No     Number of Places Lived in the Last Year: 1     Unstable Housing in the Last Year: No     Medication List with Changes/Refills   New Medications    APIXABAN (ELIQUIS) 5 MG TAB    Take 1 tablet (5 mg total) by mouth 2 (two) times daily.   Current Medications    ACETAMINOPHEN (TYLENOL) 500 MG TABLET    Take 2 tablets (1,000 mg total) by mouth every 6 (six) hours as needed for Pain.    ALBUTEROL (PROVENTIL/VENTOLIN HFA) 90 MCG/ACTUATION INHALER    INHALE 2 PUFFS INTO THE LUNGS EVERY 4 HOURS AS NEEDED FOR WHEEZING OR SHORTNESS OF BREATH.    APIXABAN (ELIQUIS) 5 MG TAB    Take 5 mg by mouth 2 (two) times daily.    ERGOCALCIFEROL (ERGOCALCIFEROL) 50,000 UNIT CAP    Take 1 capsule (50,000 Units total) by mouth every 7 days.    EZETIMIBE (ZETIA) 10 MG TABLET    TAKE 1 TABLET BY MOUTH EVERY DAY    IBUPROFEN (ADVIL,MOTRIN) 800 MG TABLET    Take by mouth.    LEVOTHYROXINE (SYNTHROID) 50 MCG TABLET    TAKE 1 TABLET BY MOUTH EVERY DAY    LOSARTAN-HYDROCHLOROTHIAZIDE 100-25 MG (HYZAAR) 100-25 MG PER TABLET    TAKE 1 TABLET BY MOUTH EVERY DAY    SERTRALINE (ZOLOFT) 100 MG TABLET    TAKE 1 TABLET BY MOUTH EVERY DAY    SERTRALINE (ZOLOFT) 50 MG TABLET    TAKE 1 TABLET BY MOUTH EVERY DAY    TIOTROPIUM-OLODATEROL (STIOLTO RESPIMAT) 2.5-2.5 MCG/ACTUATION MIST    Inhale 2 puffs  into the lungs once daily. Controller    TRAMADOL (ULTRAM) 50 MG TABLET    Take 50 mg by mouth.    TRAZODONE (DESYREL) 150 MG TABLET    Take 1 tablet (150 mg total) by mouth every evening.     Review of patient's allergies indicates:   Allergen Reactions    Lipitor [atorvastatin]      Severe Leg cramps    Nsaids (non-steroidal anti-inflammatory drug)      CKD     Review of Systems   Constitutional: Negative for decreased appetite.   HENT:  Negative for tinnitus.    Eyes:  Negative for double vision.   Cardiovascular:  Negative for chest pain.   Respiratory:  Negative for wheezing.    Hematologic/Lymphatic: Negative for bleeding problem.   Skin:  Negative for dry skin.   Musculoskeletal:  Positive for arthritis, back pain, joint pain and myalgias. Negative for gout, neck pain and stiffness.   Gastrointestinal:  Negative for abdominal pain.   Genitourinary:  Negative for bladder incontinence.   Neurological:  Negative for numbness, paresthesias and sensory change.   Psychiatric/Behavioral:  Negative for altered mental status.        Objective:   Body mass index is 40.51 kg/m².  There were no vitals filed for this visit.       General    Constitutional: She is oriented to person, place, and time. She appears well-developed.   HENT:   Head: Atraumatic.   Eyes: EOM are normal.   Pulmonary/Chest: Effort normal.   Neurological: She is alert and oriented to person, place, and time.   Psychiatric: Judgment normal.           Ambulating with a Rollator  Pelvis is level  Bilateral hips passive motion no pain   There is tenderness in the lumbar spine there is questionable straight leg raising mild bilaterally   There is no pain in the greater trochanters to palpation   Hip flexors, abductors adductors quads hamstrings ankle extensors and flexors slightly weak at 5-/5  Right TKA surgical incision healed well she has 0 to 120° of flexion.  She is stable in extension to varus and valgus stressing.  Very minor instability at 45°.   There is decreased sensation in the lateral aspect of the knee joint consistent with infrapatellar branch of the saphenous nerve distribution.  There is no defect in the patella or quadriceps tendon there is no swelling that has not warm to touch  Left knee with medial joint severe pain.  There is minimal swelling.  There is crepitus with motion.  Collaterals and cruciates are stable.  There is no defect in the patella or quadriceps tendon.  Active motion is 0-110 degrees.  Calves are soft nontender with some varicosities bilaterally  Around the ankle there is some mild swelling  Skin is warm to touch no obvious lesions    Relevant imaging results reviewed and interpreted by me, discussed with the patient and / or family today     X-ray 10/16/2023 right TKA/DJO cruciate sparing with a stemmed tibial component patella midline no evidence of failure alignment okay, left knee with complete loss of medial joint space with marginal osteophytic changes varus deformity consistent with severe arthritis  Assessment:     Encounter Diagnoses   Name Primary?    Arthritis of knee, left Yes    Acquired varus deformity knee, left     History of lumbar fusion     Current long-term use of anticoagulant medication with history of deep venous thrombosis (DVT)     Obesity, unspecified classification, unspecified obesity type, unspecified whether serious comorbidity present     History of total right knee replacement         Plan:   Arthritis of knee, left    Acquired varus deformity knee, left    History of lumbar fusion    Current long-term use of anticoagulant medication with history of deep venous thrombosis (DVT)    Obesity, unspecified classification, unspecified obesity type, unspecified whether serious comorbidity present    History of total right knee replacement         Patient Instructions   I reviewed the operative report from Dr. Cadena where he took her back 2nd time around change the plastic insert from 10-13 to give you  more stability/components our DJO size 5-13 insert  You having some numbness which is normal to have after total knee on the outside   You can try Voltaren cream or gel apply around 2 in 3 to 4 times a day over that area to help desensitize it   The left knee x-ray show complete loss of joint space with severe arthritis.  You are experiencing giving way and catching   You already received injections in you can not take any anti-inflammatories because you are taking Eliquis for blood clot in each legs  Blood clot in each leg was before surgery   You live by herself you using a Rollator to get around and you taking care of a mother that is 101 years  You do have children around that could help in case you had surgery  Surgery on the left knee is considered outpatient surgery you will have you surgery go home the same day.  You need help from the children some people go to the children's house and stayed there for a couple weeks  Surgery itself is around 2 hours you go home afterwards we get you up in recovery room make you walk with the therapist.  We will arrange for home physical therapy and we will have home health nurse I will come check on your wound and check on you see how you doing.  She will change the dressing for you.  After 2 weeks we see you in the office we take stitches out if we have to and then you can get in the shower after that  You need to see the cardiologist to make sure you heart could withstand the operation  There is a mandatory class you have to take in the hospital  I do procedures at [facility] on [address]  Procedure, common risks and benefits,alternatives discussed in details.All questions answered.Patient expressed understanding.Patient instructed to call for any questions that could arise in the future.    Most common Risks:  Infection/less than 2% chance  Leg-length discrepancy    Neuro-vascular injury ( resulting in loss of motor and sensory functions)/you will be numb  on the outside of the knee.  Very rarely we do get somebody we have a footdrop that means they can not bring the foot up.  If that happens we give you a brace to wear.  Almost 80% of foot drops recuperate within 6 months to a year's  Pain  Blood clot/will restart you on Eliquis the next day.  You need to stop her Eliquis 2 days prior to surgery  Fat clot  Loss of motion/we heal with scar tissue.  You need to start exercising immediately even though it hurts.  Work hard with the physical therapist.  Fracture of bone  Failure of procedure to achieve its intended purpose/total knee replacement is 80% successful in decreasing pain and increasing function.  It has not perfect you might ache here and there afterwards with changes of the weather.  You might feel some clicking from metal touching plastic  Failure of hardware/the metal and plastic components they will last on the average 15 years  Non-union or mal-union of bone  Malalignment  Death/you need to see the cardiologist before surgery    Patient instructions for joint replacement    Before surgery  1.  Shower with Hibiclens soap/antibacterial for 3 days prior to surgery to decrease risk of infection  2..  Stop all blood thinners/aspirin, Coumadin, warfarin, Plavix, Eliquis, Xarelto etc 7days prior to surgery.  You are on Eliquis you only need to stop it for 2 days prior to surgery.  If you are taking vitamins the only when you allowed to take his vitamin-D otherwise you need to stop all other vitamins including omega-3 or natural products that you are taking 7 days prior to surgery  3.  No eating or drinking after midnight the night before surgery.  I would like you to drink a bottle of Gatorade or Powerade or Pedialyte the night before surgery prior to midnight so you do not get dehydrated waiting for the surgery  4. I would like you to take Tylenol 650 mg or a 1000 mg the night before surgery prior to midnight    Ask physicians for prescription of Celebrex or  Mobic if needed    After surgery at home  1.  Take Tylenol 650 mg 3 times a day for 14 days then as needed for mild pain  2.  Take gabapentin 300 mg nightly for 6 weeks  3.  You will resume all your medications after surgery  4.  Must take aspirin 81 mg twice a day for 6 weeks unless you are on other blood thinners/Plavix, Eliquis, Xarelto, Coumadin etc  5.  Must wear compressive stockings for 6 weeks minimum to decrease the risk of blood clot and swelling  6.  Hydrocodone/Norco or oxycodone/Percocet will be prescribed to take every 6 hr as needed for breakthrough pain  7.  May apply ice on the knee to help with decreasing pain  8.  Keep wound dry for 2 weeks until stitches/staples removed than you will be allowed to shower in 24 hr and get the wound wet.  No soaking of the wound in the tub or swimming for 4 weeks after surgery  9.  No driving for 4 weeks unless specified by physician  10.  Avoid touching the wound or surrounding area /at least 2 inches on each side of the surgical incision until staples are removed/stitches   11.  May change the surgical dressing if extremely bloody or has drainage on it. May clean the wound with peroxide or Betadine and apply sterile dressing and Ace wrap over it  12.  Leave hospital dressing on for 3 days then may change by applying sterile 4 x 4 and Ace wrap after cleaning with Betadine or peroxide.  May leave this dressing change to home health nurses        Proceed with left TKA  All questions asked and answered  Disclaimer: This note was prepared using a voice recognition system and is likely to have sound alike errors within the text.

## 2024-02-28 NOTE — PT/OT/SLP EVAL
Physical Therapy Evaluation and Treatment    Patient Name: Kay Rosas   MRN: [id number]  Recent Surgery: Procedure(s) (LRB):  ARTHROPLASTY, KNEE, TOTAL (Left) Day of Surgery    Recommendations:     Discharge Recommendations: Low Intensity Therapy   Discharge Equipment Recommendations: none   Barriers to discharge: None    Assessment:     Kay Rosas is a 78 y.o. female admitted with a medical diagnosis of <principal problem not specified>. She presents with the following impairments/functional limitations: weakness, impaired endurance, impaired functional mobility, gait instability, impaired balance, pain, decreased safety awareness, decreased lower extremity function, decreased ROM, orthopedic precautions.    Rehab Prognosis: Good; patient would benefit from acute PT services to address these deficits and reach maximum level of function.    Plan:     During this hospitalization, patient to be seen 3 x/week to address the above listed problems via gait training, therapeutic activities, therapeutic exercises    Plan of Care Expires: 03/13/24    Subjective     Chief Complaint: Pt is motivated to participate  Patient Comments/Goals: none stated  Pain/Comfort:  Pain Rating 1: 2/10  Location - Side 1: Left  Location - Orientation 1: generalized  Location 1: knee  Pain Addressed 1: Reposition, Distraction  Pain Rating Post-Intervention 1: 2/10    Social History:  Living Environment: Patient lives alone in a fourth floor apartment with number of outside stair(s): 0 and elevator  Prior Level of Function: Prior to admission, patient was modified independent, driving and retired, and ambulated household and community distances using rollator  Equipment Used at Home: rollator, walker, rolling, shower chair, bedside commode, grab bar  DME owned (not currently used): none  Assistance Upon Discharge: family    Objective:     Communicated with nurse and epic chart review prior to session. Patient found supine with  "peripheral IV, wound vac upon PT entry to room.    General Precautions: Standard, fall   Orthopedic Precautions: LLE weight bearing as tolerated   Braces: AFO    Respiratory Status: Room air    Exams:  Cognition: Patient is oriented to Person, Place, Time, Situation  RLE ROM: WFL  RLE Strength:  Grossly 4/5  LLE ROM: WFL except at ankle, L LE foot drop, new onset post sx, PA aware  LLE Strength:  NT due to sx  Sensation:    -       Impaired L LE  Skin Integrity/Edema:     -       Skin integrity: Visible skin intact    Functional Mobility:  Gait belt applied - Yes  Bed Mobility  Rolling Left: contact guard assistance  Scooting: contact guard assistance  Supine to Sit: contact guard assistance  Transfers  Sit to Stand: contact guard assistance with rolling walker and with cues for weight bearing precautions  Toilet Transfer: contact guard assistance with rolling walker and with cues for weight bearing precautions using Step Transfer  Gait  Patient ambulated 100ft with rolling walker and contact guard assistance. Patient demonstrates occasional unsteady gait. No c/o dizziness or SOB, 1x LOB but able to recover without assistance. Cuing for RW management to increase safety. Educated on compensation for L LE foot drop, AFO donned prior to ambulation. All lines remained intact throughout ambulation trail.  Balance  Sitting: contact guard assistance  Standing: contact guard assistance    Therapeutic Activities and Exercises:   Pt educated on role of PT in acute care and POC. Educated on L LE WBAT, use of ice, and proper positioning of LE. Educated on use of AFO, AFO donned prior to ambulation. Educated on importance of OOB activities, activity pacing, and HEP (marching/hip flex, hip abd, heel slides/LAQ, quad sets, ankle pumps) in order to maintain/regain strength. Encouraged to sit up in chair for all meals. Educated on proper use of RW for safety and to reduce risk of falling. Educated on "call don't fall" policy and " increased risk of falling due to weakness, instructed to utilize call bell for assistance with all transfers. Pt agreeable to all requests.    AM-PAC 6 CLICK MOBILITY  Total Score:16    Patient left sitting edge of bed with all lines intact, call button in reach, and RN present.    GOALS:   Multidisciplinary Problems       Physical Therapy Goals          Problem: Physical Therapy    Goal Priority Disciplines Outcome Goal Variances Interventions   Physical Therapy Goal     PT, PT/OT      Description: Goals to be met by 3/13/24.  1. Pt will complete bed mobility MOD I.  2. Pt will complete sit to stand MOD I.  3. Pt will ambulate 200ft MOD I using RW.  4. Pt will increase AMPAC score by 2 points to progress functional mobility.                       History:     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    MVP (mitral valve prolapse)     On home oxygen therapy     3 liters as needed at night    Osteoporosis     feet    Thyroid condition        Past Surgical History:   Procedure Laterality Date    BACK SURGERY  2012    CATARACT EXTRACTION Bilateral 10/2012    HYSTERECTOMY Left 1978    INJECTION OF ANESTHETIC AGENT INTO SACROILIAC JOINT Bilateral 11/11/2019    Procedure: Bilateral GT bursa + SIJ injection;  Surgeon: Noe Pleitez MD;  Location: [facility] PAIN T;  Service: Pain Management;  Laterality: Bilateral;    INJECTION OF JOINT Bilateral 11/11/2019    Procedure: Bilateral GT bursa + SIJ injection;  Surgeon: Noe Pleitez MD;  Location: [facility] PAIN MGT;  Service: Pain Management;  Laterality: Bilateral;    JOINT REPLACEMENT Right 2021    knee     ROBOT-ASSISTED LAPAROSCOPIC LYSIS OF ADHESIONS USING DA SP XI N/A 12/05/2022    Procedure: XI ROBOTIC LYSIS, ADHESIONS;  Surgeon: Den Barragan MD;  Location: [facility] OR;  Service: Oncology;  Laterality: N/A;    ROBOT-ASSISTED LAPAROSCOPIC SALPINGO-OOPHORECTOMY USING DA SP XI Left 12/05/2022    Procedure: XI ROBOTIC SALPINGO-OOPHORECTOMY;  Surgeon: Den Barragan MD;  Location: [facility] OR;  Service: Oncology;  Laterality: Left;    SURGICAL REMOVAL OF BONE SPUR Left     left elbow    VASCULAR SURGERY Right     high ligation due to blood clot        Time Tracking:     PT Received On: 02/28/24  PT Start Time: 1440  PT Stop Time: 1505  PT Total Time (min): 25 min     Billable Minutes: Evaluation 15min and Gait Training 10min    2/28/2024

## 2024-02-28 NOTE — PLAN OF CARE
Gave home care instructions to patient and daughter, verbalized understanding.   All questions answered to the satisfaction of both.

## 2024-02-28 NOTE — TREATMENT PLAN
Patient with very weak dorsiflexion to left foot. AMANDEEP Briscoe notified via telephone. States will come to bedside.

## 2024-02-28 NOTE — OP NOTE
[facility] - [facility] ([facility])  Orthopedic Surgery  Operative Note    SUMMARY     Date of Procedure: 2/28/2024     Procedure: Procedure(s) (LRB):  ARTHROPLASTY, KNEE, TOTAL (Left)       Surgeon(s) and Role:     * Terrance Urena MD - Primary     * Rachna Garcia PA - Assisting        Pre-Operative Diagnosis: Arthritis of knee, left [M17.12]    Post-Operative Diagnosis: Post-Op Diagnosis Codes:     * Arthritis of knee, left [M17.12]    Anesthesia: General    Injections/Meds: ORLY with 40cc NS    Tourniquet time:  No tourniquet    Significant Surgical Tasks Conducted by the Assistant(s), if Applicable:    To hold multiple retractors to protect ligaments and neurovascular structures in order to perform surgery safely and efficiently.    Complications: none     Estimated Blood Loss (EBL):  200 mL           Implants:  Depuy attune posterior stabilize femur size 4., tibial tray size 4. Revision, tibial insert 10 mm PS, 38 offset patella, gentamicin cement from [employer]    Specimens: None           Condition: Good    Disposition: PACU - hemodynamically stable.    Attestation: I performed the procedure.    Description:  Medial parapatellar approach used to perform left TKA.  After patient received antibiotics and preop meds the knee was prepped and draped in usual sterile fashion. Midline incision was made 2 fingers above the superior pole of the patella to 2 fingers below.  Full-thickness skin flap raised medially and partially laterally.  Medial parapatellar incision was made to medial tibial tubercle  Medial release off the medial tibial flare perform subperiosteal elevating the tissues to the posterior medial corner of the tibia.  Patellar fat pad resected partially.  Superior capsulectomy performed.  Osteophytes removed mediolateral meniscus removed. Patella was resurfaced after measuring and free hand technique used.  Partial lateral facetectomy performed.  Patella button trial was applied and the measured and  reconstituted thickness. The patella was moved laterally the knee hyperflexed.  Quarter-inch drill bit used to open the canal into the femur and the canal was suctioned.  Irrigated and suctioned again.  Canal finer inserted an intramedullary alignment kajal inserted into the femoral canal and pinned our cutting block at 5° of valgus cut. The kajal was removed and distal femur was cut through the cutting block. We measured the size of the femur and marked our rotation and then 1 cutting block applied and pinned in place and proceeded cutting the anterior condyle posterior condyle and chamfer cuts followed by box cut. The femoral canal was bone grafted.  Trial was applied on the femur and posterior osteophytes removed. Directed attention to the tibia quarter-inch drill bit used to open the canal into the tibia.  Canal Finders inserted. A gated the canal and suctioned it. Fluted intramedullary alignment kajal applied and using the depth gauge and the cutting block on the tibia.  Proceeded with multiple bent Homans protecting the collateral ligaments and posterior neurovascular structures and using the oscillating saw cut the tibia.  Excess osteophytes removed. Measured the tibia, marked the rotation on the base plate , pinned in place and punched our Smita.  Trials placed into the knee and put the knee through range of motion checking gap in extension , flexion at 45° of flexion.  Come from the sizes.    The knee was pulse lavaged then was injected in the surrounding soft tissues for postop pain control.  Prosthesis was cemented in place and excess cement was removed. Once cement has hardened the knee was placed through range of motion confirming stability. Once prosthesis was checked the Closure of the knee performed with 1.  Vicryl and figure-of-eight and running fashion. Subcutaneous tissues were irrigated and then closed using 1.  Vicryl inverted stitch and skin running subcuticular fashion  Sterile dressing applied.

## 2024-02-28 NOTE — ANESTHESIA PREPROCEDURE EVALUATION
Patient Active Problem List   Diagnosis    Primary hypertension    Acquired hypothyroidism    Hyperlipidemia    Arthritis    B12 deficiency    Depression    Lumbar spondylosis    COPD, moderate    PAD (peripheral artery disease)    Osteoarthritis of both knees    Chronic bilateral low back pain with right-sided sciatica    Lumbar radiculopathy    Greater trochanteric bursitis of both hips    Morbid obesity    Secondary hyperparathyroidism of renal origin    History of DVT (deep vein thrombosis) lower extremity on 2 different occasions    Primary insomnia    Iron deficiency anemia due to chronic blood loss    Embolism and thrombosis of unspecified artery    Major depressive disorder, recurrent, mild    Statin myopathy    Malignant neoplasm of left ovary    Stage 3b chronic kidney disease    Aortic calcification    Pulmonary nodule    Low vitamin D level    Hypertriglyceridemia    Pulmonary emphysema    Lung granuloma    History of diabetes mellitus    Financial difficulties    Ex-smoker    Nicotine dependence, cigarettes, in remission    Chronic anticoagulation- Apixaban    Debility    Dependence on supplemental oxygen    Sinus congestion                                                                                                                 02/28/2024  Kay Rosas is a 78 y.o., female.      Pre-op Assessment    I have reviewed the Patient Summary Reports.    I have reviewed the NPO Status.   I have reviewed the Medications.     Review of Systems  Cardiovascular:     Hypertension                                        Pulmonary:   COPD         Chronic Obstructive Pulmonary Disease (COPD):                      Renal/:  Chronic Renal Disease                Musculoskeletal:  Arthritis               Neurological:    Neuromuscular Disease,                                   Endocrine:   Hypothyroidism          Psych:  Psychiatric History                  Physical Exam  General: Well  nourished    Airway:  Mallampati: II   Mouth Opening: Normal  TM Distance: Normal  Tongue: Normal  Neck ROM: Normal ROM    Dental:  Intact    Chest/Lungs:  Clear to auscultation, Normal Respiratory Rate    Heart:  Rate: Normal  Rhythm: Regular Rhythm        Anesthesia Plan  Type of Anesthesia, risks & benefits discussed:    Anesthesia Type: Gen ETT  Intra-op Monitoring Plan: Standard ASA Monitors  Post Op Pain Control Plan: multimodal analgesia  Induction:  IV  Airway Plan: Direct  Informed Consent: Informed consent signed with the Patient and all parties understand the risks and agree with anesthesia plan.  All questions answered.   ASA Score: 3  Day of Surgery Review of History & Physical: H&P Update referred to the surgeon/provider.    Ready For Surgery From Anesthesia Perspective.     .  Lab Results   Component Value Date    WBC 6.51 02/12/2024    HGB 12.8 02/12/2024    HCT 40.7 02/12/2024    MCV 89 02/12/2024     02/12/2024          Chemistry        Component Value Date/Time     02/12/2024 1107    K 4.9 02/12/2024 1107     02/12/2024 1107    CO2 26 02/12/2024 1107    BUN 26 (H) 02/12/2024 1107    CREATININE 1.5 (H) 02/12/2024 1107    GLU 92 02/12/2024 1107        Component Value Date/Time    CALCIUM 10.5 02/12/2024 1107    ALKPHOS 72 01/11/2024 1255    AST 17 01/11/2024 1255    ALT 22 01/11/2024 1255    BILITOT 0.5 01/11/2024 1255    ESTGFRAFRICA 31 03/06/2023 0840    ESTGFRAFRICA 34 (A) 07/08/2022 1146    EGFRNONAA 29 (A) 07/08/2022 1146

## 2024-02-29 VITALS
WEIGHT: 245.81 LBS | HEIGHT: 66 IN | HEART RATE: 58 BPM | DIASTOLIC BLOOD PRESSURE: 68 MMHG | RESPIRATION RATE: 13 BRPM | SYSTOLIC BLOOD PRESSURE: 152 MMHG | OXYGEN SATURATION: 96 % | BODY MASS INDEX: 39.51 KG/M2 | TEMPERATURE: 98 F

## 2024-02-29 PROCEDURE — G0180 MD CERTIFICATION HHA PATIENT: HCPCS | Mod: ,,, | Performed by: ORTHOPAEDIC SURGERY

## 2024-02-29 NOTE — ANESTHESIA POSTPROCEDURE EVALUATION
Anesthesia Post Evaluation    Patient: Kay Rosas    Procedure(s) Performed: Procedure(s) (LRB):  ARTHROPLASTY, KNEE, TOTAL (Left)    Final Anesthesia Type: general      Patient location during evaluation: PACU  Patient participation: Yes- Able to Participate  Level of consciousness: awake and alert  Post-procedure vital signs: reviewed and stable  Pain management: adequate  Airway patency: patent  JOSEFINA mitigation strategies: Verification of full reversal of neuromuscular block  PONV status at discharge: No PONV  Anesthetic complications: no      Cardiovascular status: hemodynamically stable  Respiratory status: spontaneous ventilation  Hydration status: euvolemic  Follow-up not needed.              Vitals Value Taken Time   /68 02/28/24 1330   Temp 36.4 °C (97.5 °F) 02/28/24 1135   Pulse 77 02/28/24 1338   Resp 74 02/28/24 1336   SpO2 89 % 02/28/24 1338   Vitals shown include unvalidated device data.      Event Time   Out of Recovery 13:38:25         Pain/Don Score: Pain Rating Prior to Med Admin: 5 (2/28/2024  1:03 PM)  Don Score: 10 (2/28/2024  1:38 PM)

## 2024-03-12 RX ORDER — OXYCODONE AND ACETAMINOPHEN 10; 325 MG/1; MG/1
1 TABLET ORAL EVERY 8 HOURS PRN
Qty: 21 TABLET | Refills: 0 | Status: SHIPPED | OUTPATIENT
Start: 2024-03-12 | End: 2024-03-14

## 2024-03-14 ENCOUNTER — OFFICE VISIT (OUTPATIENT)
Dept: ORTHOPEDICS | Facility: CLINIC | Age: 79
End: 2024-03-14
Payer: MEDICARE

## 2024-03-14 ENCOUNTER — HOSPITAL ENCOUNTER (OUTPATIENT)
Dept: RADIOLOGY | Facility: HOSPITAL | Age: 79
Discharge: HOME | End: 2024-03-14
Attending: ORTHOPAEDIC SURGERY
Payer: MEDICARE

## 2024-03-14 VITALS — HEIGHT: 66 IN | BODY MASS INDEX: 39.51 KG/M2 | WEIGHT: 245.81 LBS

## 2024-03-14 DIAGNOSIS — Z96.652 STATUS POST TOTAL LEFT KNEE REPLACEMENT USING CEMENT: Primary | ICD-10-CM

## 2024-03-14 DIAGNOSIS — M25.562 LEFT KNEE PAIN, UNSPECIFIED CHRONICITY: ICD-10-CM

## 2024-03-14 PROCEDURE — 99024 POSTOP FOLLOW-UP VISIT: CPT | Mod: S$GLB,,, | Performed by: PHYSICIAN ASSISTANT

## 2024-03-14 PROCEDURE — 73562 X-RAY EXAM OF KNEE 3: CPT | Mod: 26,59,RT, | Performed by: RADIOLOGY

## 2024-03-14 PROCEDURE — 73562 X-RAY EXAM OF KNEE 3: CPT | Mod: TC,RT

## 2024-03-14 PROCEDURE — 73564 X-RAY EXAM KNEE 4 OR MORE: CPT | Mod: 26,LT,, | Performed by: RADIOLOGY

## 2024-03-14 PROCEDURE — 99999 PR PBB SHADOW E&M-EST. PATIENT-LVL III: CPT | Mod: PBBFAC,,, | Performed by: PHYSICIAN ASSISTANT

## 2024-03-14 RX ORDER — HYDROCODONE BITARTRATE AND ACETAMINOPHEN 10; 325 MG/1; MG/1
1 TABLET ORAL EVERY 6 HOURS PRN
Qty: 28 TABLET | Refills: 0 | Status: SHIPPED | OUTPATIENT
Start: 2024-03-14 | End: 2024-04-01 | Stop reason: SDUPTHER

## 2024-03-14 NOTE — PROGRESS NOTES
Patient ID: Kay Rosas is a 78 y.o. female.    Chief Complaint: Pain and Post-op Evaluation of the Left Knee      HPI: Kay Rosas  is a 78 y.o. female who c/o Pain and Post-op Evaluation of the Left Knee     Post op visit 1   Patient notes pain is  8/10   The patient is doing okay since surgery she still notes quite a bit of pain although she has been taking the Percocet and medications as directed   She asked if there is anything more that can be done as she does not feel the Percocet is working that well for her.  We discussed stopping the Percocet and switching her to Norco   She denies any muscle spasms but I did tell her that I may add this to her regimen for further relief should she need in the future   She has using a rolling walker to assist with ambulation  She has been compliant with home health.  She is nervous as home health is ending on Friday and she states all her children work and live out of town she would like to know if this can be extended until she is able to drive.  I will do this for her today.  She is compliant with DVT prophylaxis    Patient is presently denying any shortness of breath, chest pain, fever/chills, nausea/vomiting, loss of taste or smell, numbness/tingling or sensation changes, loss of bladder or bowel function, loss of taste/smell.     Surgery: Left Total Knee    Surgery Date:  02/28/2024    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    MVP (mitral valve prolapse)     On home oxygen therapy     3 liters as needed at night    Osteoporosis     feet    Thyroid condition      Past Surgical History:    Procedure Laterality Date    BACK SURGERY  2012    CATARACT EXTRACTION Bilateral 10/2012    HYSTERECTOMY Left 1978    INJECTION OF ANESTHETIC AGENT INTO SACROILIAC JOINT Bilateral 2019    Procedure: Bilateral GT bursa + SIJ injection;  Surgeon: Noe Pleitez MD;  Location: [facility];  Service: Pain Management;  Laterality: Bilateral;    INJECTION OF JOINT Bilateral 2019    Procedure: Bilateral GT bursa + SIJ injection;  Surgeon: Noe Pleitez MD;  Location: [facility];  Service: Pain Management;  Laterality: Bilateral;    JOINT REPLACEMENT Right     knee    ROBOT-ASSISTED LAPAROSCOPIC LYSIS OF ADHESIONS USING DA SP XI N/A 2022    Procedure: XI ROBOTIC LYSIS, ADHESIONS;  Surgeon: Den Barragan MD;  Location: [facility];  Service: Oncology;  Laterality: N/A;    ROBOT-ASSISTED LAPAROSCOPIC SALPINGO-OOPHORECTOMY USING DA SP XI Left 2022    Procedure: XI ROBOTIC SALPINGO-OOPHORECTOMY;  Surgeon: Den Barragan MD;  Location: [facility];  Service: Oncology;  Laterality: Left;    SURGICAL REMOVAL OF BONE SPUR Left     left elbow    TOTAL KNEE ARTHROPLASTY Left 2024    Procedure: ARTHROPLASTY, KNEE, TOTAL;  Surgeon: Terrance Urena MD;  Location: [facility];  Service: Orthopedics;  Laterality: Left;    VASCULAR SURGERY Right     high ligation due to blood clot      Family History   Problem Relation Age of Onset    Heart disease Mother     Kidney disease Mother     Arthritis Mother     Breast cancer Mother     Cancer Father     Heart disease Father     Cancer Sister     Ovarian cancer Sister     Alzheimer's disease Sister      Social History     Socioeconomic History    Marital status:    Tobacco Use    Smoking status: Former     Current packs/day: 0.00     Average packs/day: 1 pack/day for 53.5 years (53.5 ttl pk-yrs)     Types: Cigarettes     Start date: 1962     Quit date: 2015     Years since quittin.7    Smokeless tobacco: Never   Substance and  Sexual Activity    Alcohol use: No    Drug use: No    Sexual activity: Yes     Partners: Male     Birth control/protection: None     Social Determinants of Health     Financial Resource Strain: Low Risk  (9/26/2023)    Overall Financial Resource Strain (CARDIA)     Difficulty of Paying Living Expenses: Not very hard   Food Insecurity: Food Insecurity Present (9/26/2023)    Hunger Vital Sign     Worried About Running Out of Food in the Last Year: Sometimes true     Ran Out of Food in the Last Year: Sometimes true   Transportation Needs: No Transportation Needs (9/26/2023)    PRAPARE - Transportation     Lack of Transportation (Medical): No     Lack of Transportation (Non-Medical): No   Physical Activity: Inactive (9/26/2023)    Exercise Vital Sign     Days of Exercise per Week: 0 days     Minutes of Exercise per Session: 0 min   Stress: Stress Concern Present (9/26/2023)    [employer] - Occupational Stress Questionnaire     Feeling of Stress : To some extent   Social Connections: Moderately Integrated (9/26/2023)    Social Connection and Isolation Panel [NHANES]     Frequency of Communication with Friends and Family: More than three times a week     Frequency of Social Gatherings with Friends and Family: Once a week     Attends [employer] Services: More than 4 times per year     Active Member of Clubs or Organizations: Yes     Attends Club or Organization Meetings: More than 4 times per year     Marital Status:    Housing Stability: Low Risk  (9/26/2023)    Housing Stability Vital Sign     Unable to Pay for Housing in the Last Year: No     Number of Places Lived in the Last Year: 1     Unstable Housing in the Last Year: No     Medication List with Changes/Refills   Current Medications    ALBUTEROL (PROVENTIL/VENTOLIN HFA) 90 MCG/ACTUATION INHALER    INHALE 2 PUFFS INTO THE LUNGS EVERY 4 HOURS AS NEEDED FOR WHEEZING OR SHORTNESS OF BREATH.    APIXABAN (ELIQUIS) 5 MG TAB    Take 5 mg  by mouth 2 (two) times daily.    ERGOCALCIFEROL (ERGOCALCIFEROL) 50,000 UNIT CAP    Take 1 capsule (50,000 Units total) by mouth every 7 days.    EZETIMIBE (ZETIA) 10 MG TABLET    TAKE 1 TABLET BY MOUTH EVERY DAY    GABAPENTIN (NEURONTIN) 300 MG CAPSULE    Take 1 capsule (300 mg total) by mouth every evening.    LEVOTHYROXINE (SYNTHROID) 50 MCG TABLET    TAKE 1 TABLET BY MOUTH EVERY DAY    LOSARTAN-HYDROCHLOROTHIAZIDE 100-25 MG (HYZAAR) 100-25 MG PER TABLET    TAKE 1 TABLET BY MOUTH EVERY DAY    ONDANSETRON (ZOFRAN-ODT) 4 MG TBDL    Take 2 tablets (8 mg total) by mouth every 8 (eight) hours as needed (Nausea).    OXYCODONE-ACETAMINOPHEN (PERCOCET)  MG PER TABLET    Take 1 tablet by mouth every 8 (eight) hours as needed for Pain.    SERTRALINE (ZOLOFT) 100 MG TABLET    TAKE 1 TABLET BY MOUTH EVERY DAY    SERTRALINE (ZOLOFT) 50 MG TABLET    TAKE 1 TABLET BY MOUTH EVERY DAY    TRAZODONE (DESYREL) 150 MG TABLET    Take 1 tablet (150 mg total) by mouth every evening.     Review of patient's allergies indicates:   Allergen Reactions    Lipitor [atorvastatin]      Severe Leg cramps    Nsaids (non-steroidal anti-inflammatory drug)      CKD       Objective:     Left Lower Extremity  NVI  WWP foot  Comp soft  Cap refill < 2 sec  Calf NT, soft  (-) Barrett sign  SUKI  ROM : Patient is able to easily exhibit full flexion and extension on passive range of motion.   Wiggles toes  DF/PF intact  Sensation intact  Inc C/D/I; subcu closure noted  No SOI    IMAGING  FINDINGS: The patient has bilateral knee arthroplasties with no periprosthetic fracture. No dislocation. Components align appropriately.     Assessment:       Encounter Diagnosis   Name Primary?    Status post total left knee replacement using cement Yes          Plan:       Kay was seen today for pain and post-op evaluation.    Diagnoses and all orders for this visit:    Status post total left knee replacement using cement        Kay Rosas is an established  pt here for postop follow-up after left total knee replacement by Dr. Urena.   The pain medication was switched from Percocet to [place].  Were removed today as patient is noted to have subcu closure.  They should remain in place until they fall off in approximately 1-2 weeks. The patient was instructed not to soak the incision in standing water but may clean the incision with clean running water and antibacterial soap.  Patient should notify the office of any signs or symptoms of infection including fevers, erythema, purulent drainage, increasing pain.  Patient will continue with DVT prophylaxis.  Patient will start outpatient physical therapy.  Will follow-up in 4-6 weeks.  Patient verbalized understanding of all instructions and agreed with the above plan.    No follow-ups on file.    The patient understands, chooses and consents to this plan and accepts all   the risks which include but are not limited to the risks mentioned above.     Disclaimer: This note was prepared using a voice recognition system and is likely to have sound alike errors within the text.

## 2024-04-02 RX ORDER — HYDROCODONE BITARTRATE AND ACETAMINOPHEN 10; 325 MG/1; MG/1
1 TABLET ORAL EVERY 6 HOURS PRN
Qty: 28 TABLET | Refills: 0 | Status: SHIPPED | OUTPATIENT
Start: 2024-04-02 | End: 2024-05-10

## 2024-04-12 ENCOUNTER — LAB VISIT (OUTPATIENT)
Dept: LAB | Facility: HOSPITAL | Age: 79
End: 2024-04-12
Attending: NURSE PRACTITIONER
Payer: MEDICARE

## 2024-04-12 DIAGNOSIS — E53.8 FOLATE DEFICIENCY: ICD-10-CM

## 2024-04-12 DIAGNOSIS — D50.0 IRON DEFICIENCY ANEMIA DUE TO CHRONIC BLOOD LOSS: ICD-10-CM

## 2024-04-12 DIAGNOSIS — D64.9 ANEMIA, UNSPECIFIED TYPE: ICD-10-CM

## 2024-04-12 DIAGNOSIS — I74.9 EMBOLISM AND THROMBOSIS OF UNSPECIFIED ARTERY: ICD-10-CM

## 2024-04-12 DIAGNOSIS — E53.8 B12 DEFICIENCY: ICD-10-CM

## 2024-04-12 LAB
ALBUMIN SERPL BCP-MCNC: 3.9 G/DL (ref 3.5–5.2)
ALP SERPL-CCNC: 83 U/L (ref 55–135)
ALT SERPL W/O P-5'-P-CCNC: 12 U/L (ref 10–44)
ANION GAP SERPL CALC-SCNC: 10 MMOL/L (ref 8–16)
AST SERPL-CCNC: 13 U/L (ref 10–40)
BASOPHILS # BLD AUTO: 0.03 K/UL (ref 0–0.2)
BASOPHILS NFR BLD: 0.6 % (ref 0–1.9)
BILIRUB SERPL-MCNC: 0.4 MG/DL (ref 0.1–1)
BUN SERPL-MCNC: 23 MG/DL (ref 8–23)
CALCIUM SERPL-MCNC: 9.9 MG/DL (ref 8.7–10.5)
CHLORIDE SERPL-SCNC: 105 MMOL/L (ref 95–110)
CO2 SERPL-SCNC: 26 MMOL/L (ref 23–29)
CREAT SERPL-MCNC: 1.5 MG/DL (ref 0.5–1.4)
DIFFERENTIAL METHOD BLD: ABNORMAL
EOSINOPHIL # BLD AUTO: 0.1 K/UL (ref 0–0.5)
EOSINOPHIL NFR BLD: 2.4 % (ref 0–8)
ERYTHROCYTE [DISTWIDTH] IN BLOOD BY AUTOMATED COUNT: 14.7 % (ref 11.5–14.5)
EST. GFR  (NO RACE VARIABLE): 36 ML/MIN/1.73 M^2
FERRITIN SERPL-MCNC: 66 NG/ML (ref 20–300)
FOLATE SERPL-MCNC: 4.7 NG/ML (ref 4–24)
GLUCOSE SERPL-MCNC: 134 MG/DL (ref 70–110)
HCT VFR BLD AUTO: 36.2 % (ref 37–48.5)
HGB BLD-MCNC: 11.3 G/DL (ref 12–16)
IMM GRANULOCYTES # BLD AUTO: 0.02 K/UL (ref 0–0.04)
IMM GRANULOCYTES NFR BLD AUTO: 0.4 % (ref 0–0.5)
IRON SERPL-MCNC: 52 UG/DL (ref 30–160)
LYMPHOCYTES # BLD AUTO: 1.3 K/UL (ref 1–4.8)
LYMPHOCYTES NFR BLD: 25.7 % (ref 18–48)
MCH RBC QN AUTO: 27.9 PG (ref 27–31)
MCHC RBC AUTO-ENTMCNC: 31.2 G/DL (ref 32–36)
MCV RBC AUTO: 89 FL (ref 82–98)
MONOCYTES # BLD AUTO: 0.2 K/UL (ref 0.3–1)
MONOCYTES NFR BLD: 4.9 % (ref 4–15)
NEUTROPHILS # BLD AUTO: 3.2 K/UL (ref 1.8–7.7)
NEUTROPHILS NFR BLD: 66 % (ref 38–73)
NRBC BLD-RTO: 0 /100 WBC
PLATELET # BLD AUTO: 231 K/UL (ref 150–450)
PMV BLD AUTO: 9.7 FL (ref 9.2–12.9)
POTASSIUM SERPL-SCNC: 3.9 MMOL/L (ref 3.5–5.1)
PROT SERPL-MCNC: 7.3 G/DL (ref 6–8.4)
RBC # BLD AUTO: 4.05 M/UL (ref 4–5.4)
SATURATED IRON: 12 % (ref 20–50)
SODIUM SERPL-SCNC: 141 MMOL/L (ref 136–145)
TOTAL IRON BINDING CAPACITY: 428 UG/DL (ref 250–450)
TRANSFERRIN SERPL-MCNC: 289 MG/DL (ref 200–375)
VIT B12 SERPL-MCNC: 307 PG/ML (ref 210–950)
WBC # BLD AUTO: 4.91 K/UL (ref 3.9–12.7)

## 2024-04-12 PROCEDURE — 80053 COMPREHEN METABOLIC PANEL: CPT | Performed by: NURSE PRACTITIONER

## 2024-04-12 PROCEDURE — 83540 ASSAY OF IRON: CPT | Performed by: NURSE PRACTITIONER

## 2024-04-12 PROCEDURE — 82728 ASSAY OF FERRITIN: CPT | Performed by: NURSE PRACTITIONER

## 2024-04-12 PROCEDURE — 36415 COLL VENOUS BLD VENIPUNCTURE: CPT | Performed by: NURSE PRACTITIONER

## 2024-04-12 PROCEDURE — 82746 ASSAY OF FOLIC ACID SERUM: CPT | Performed by: NURSE PRACTITIONER

## 2024-04-12 PROCEDURE — 85025 COMPLETE CBC W/AUTO DIFF WBC: CPT | Performed by: NURSE PRACTITIONER

## 2024-04-12 PROCEDURE — 82607 VITAMIN B-12: CPT | Performed by: NURSE PRACTITIONER

## 2024-04-15 ENCOUNTER — EXTERNAL HOME HEALTH (OUTPATIENT)
Dept: HOME HEALTH SERVICES | Facility: HOSPITAL | Age: 79
End: 2024-04-15
Payer: MEDICARE

## 2024-04-16 ENCOUNTER — OFFICE VISIT (OUTPATIENT)
Dept: HEMATOLOGY/ONCOLOGY | Facility: CLINIC | Age: 79
End: 2024-04-16
Payer: MEDICARE

## 2024-04-16 VITALS
BODY MASS INDEX: 39.41 KG/M2 | OXYGEN SATURATION: 96 % | DIASTOLIC BLOOD PRESSURE: 58 MMHG | TEMPERATURE: 98 F | WEIGHT: 245.25 LBS | HEART RATE: 102 BPM | HEIGHT: 66 IN | SYSTOLIC BLOOD PRESSURE: 130 MMHG

## 2024-04-16 DIAGNOSIS — D50.0 IRON DEFICIENCY ANEMIA DUE TO CHRONIC BLOOD LOSS: Primary | ICD-10-CM

## 2024-04-16 PROCEDURE — 3078F DIAST BP <80 MM HG: CPT | Mod: CPTII,S$GLB,, | Performed by: NURSE PRACTITIONER

## 2024-04-16 PROCEDURE — 1160F RVW MEDS BY RX/DR IN RCRD: CPT | Mod: CPTII,S$GLB,, | Performed by: NURSE PRACTITIONER

## 2024-04-16 PROCEDURE — 3075F SYST BP GE 130 - 139MM HG: CPT | Mod: CPTII,S$GLB,, | Performed by: NURSE PRACTITIONER

## 2024-04-16 PROCEDURE — 99214 OFFICE O/P EST MOD 30 MIN: CPT | Mod: S$GLB,,, | Performed by: NURSE PRACTITIONER

## 2024-04-16 PROCEDURE — 3288F FALL RISK ASSESSMENT DOCD: CPT | Mod: CPTII,S$GLB,, | Performed by: NURSE PRACTITIONER

## 2024-04-16 PROCEDURE — 1159F MED LIST DOCD IN RCRD: CPT | Mod: CPTII,S$GLB,, | Performed by: NURSE PRACTITIONER

## 2024-04-16 PROCEDURE — 99999 PR PBB SHADOW E&M-EST. PATIENT-LVL IV: CPT | Mod: PBBFAC,,, | Performed by: NURSE PRACTITIONER

## 2024-04-16 PROCEDURE — 1126F AMNT PAIN NOTED NONE PRSNT: CPT | Mod: CPTII,S$GLB,, | Performed by: NURSE PRACTITIONER

## 2024-04-16 PROCEDURE — 1101F PT FALLS ASSESS-DOCD LE1/YR: CPT | Mod: CPTII,S$GLB,, | Performed by: NURSE PRACTITIONER

## 2024-04-16 RX ORDER — SODIUM CHLORIDE 0.9 % (FLUSH) 0.9 %
10 SYRINGE (ML) INJECTION
Status: CANCELLED | OUTPATIENT
Start: 2024-04-27

## 2024-04-16 RX ORDER — HEPARIN 100 UNIT/ML
500 SYRINGE INTRAVENOUS
Status: CANCELLED | OUTPATIENT
Start: 2024-04-16

## 2024-04-16 RX ORDER — HEPARIN 100 UNIT/ML
500 SYRINGE INTRAVENOUS
Status: CANCELLED | OUTPATIENT
Start: 2024-04-27

## 2024-04-16 RX ORDER — SODIUM CHLORIDE 0.9 % (FLUSH) 0.9 %
10 SYRINGE (ML) INJECTION
Status: CANCELLED | OUTPATIENT
Start: 2024-04-16

## 2024-04-16 NOTE — PROGRESS NOTES
Subjective:       Patient ID: Kay Rosas is a 78 y.o. female.    Chief Complaint: h/o recurrent DVT. H/o anemia    HPI: 78 y.o female with pre-diabetes, HTN, HLD, OA, s/p TIM and R oophorectomy in  (patient unclear on reason noted to have cyst (, no known history of malignancy).  She was followed in hematology clinic by Dr. Cartwright in past due to thromboembolic disease initial episode right lower extremity DVT circa  with recurrence in  left lower extremity DVT at which point indefinite anticoagulation was recommended however she discontinued after 1 year due to easy bruisability.  She was lost to follow up in Hematology Clinic for a couple years.  Of note patient had CT abdomen w/o contrast 2022 with primary care due to abdominal/pelvic pain which revealed left adnexal abnormality and follow-up pelvic ultrasound.  22 pelvic US:  Exophytic/immediately adjacent to the left ovary is a heterogeneous more solid appearing lesion corresponding to CT.  This measures 3.4 x 3.1 x 3.1 cm  Ultimately underwent biopsy with benign pathology.     During prior visit she was asked to resume Eliquis which she agreed. She reports initially taking BID but for unclear reasons has been taking daily. She admits to not following with primary care routinely. Taking care of sick mom. We discussed preventative screenings but she would like to revisit this at a later date.     Today's visit  Patient notes taking Eliquis BID as prescribed. Denies missed doses. Denies abnormal bleeding. She notes fatigue. Interval left knee surgery.   Social History     Socioeconomic History    Marital status:    Tobacco Use    Smoking status: Former     Current packs/day: 0.00     Average packs/day: 1 pack/day for 53.5 years (53.5 ttl pk-yrs)     Types: Cigarettes     Start date: 1962     Quit date: 2015     Years since quittin.8    Smokeless tobacco: Never   Substance and Sexual Activity    Alcohol use: No    Drug  use: No    Sexual activity: Yes     Partners: Male     Birth control/protection: None     Social Determinants of Health     Financial Resource Strain: High Risk (4/9/2024)    Overall Financial Resource Strain (CARDIA)     Difficulty of Paying Living Expenses: Hard   Food Insecurity: Food Insecurity Present (4/9/2024)    Hunger Vital Sign     Worried About Running Out of Food in the Last Year: Sometimes true     Ran Out of Food in the Last Year: Sometimes true   Transportation Needs: No Transportation Needs (4/9/2024)    PRAPARE - Transportation     Lack of Transportation (Medical): No     Lack of Transportation (Non-Medical): No   Physical Activity: Insufficiently Active (4/9/2024)    Exercise Vital Sign     Days of Exercise per Week: 2 days     Minutes of Exercise per Session: 30 min   Stress: Patient Declined (4/9/2024)    [employer] - Occupational Stress Questionnaire     Feeling of Stress : Patient declined   Social Connections: Moderately Isolated (4/9/2024)    Social Connection and Isolation Panel [NHANES]     Frequency of Communication with Friends and Family: Three times a week     Frequency of Social Gatherings with Friends and Family: Patient declined     Attends Voodoo Services: More than 4 times per year     Active Member of Clubs or Organizations: No     Attends Club or Organization Meetings: Never     Marital Status:    Housing Stability: Low Risk  (4/9/2024)    Housing Stability Vital Sign     Unable to Pay for Housing in the Last Year: No     Number of Places Lived in the Last Year: 1     Unstable Housing in the Last Year: No       Past Medical History:   Diagnosis Date    Anticoagulant long-term use     Arthritis     Cataract- bilateral with extraction- patient reports no lens implants     Deep vein thrombosis     Degenerative disc disease, cervical     Disorder of kidney and ureter     CKD stage 3    DVT (deep venous thrombosis)     Emphysema of lung      Hyperlipidemia     Hypertension     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    MVP (mitral valve prolapse)     On home oxygen therapy     3 liters as needed at night    Osteoporosis     feet    Thyroid condition        Family History   Problem Relation Name Age of Onset    Heart disease Mother Loida Moura     Kidney disease Mother Loida Moura     Arthritis Mother Loida Moura     Breast cancer Mother Loida Moura     Cancer Father ALLAN Brand     Heart disease Father ALLAN Brand     Cancer Sister Lung cancer     Ovarian cancer Sister Lung cancer     Alzheimer's disease Sister         Past Surgical History:   Procedure Laterality Date    BACK SURGERY  2012    CATARACT EXTRACTION Bilateral 10/2012    HYSTERECTOMY Left 1978    INJECTION OF ANESTHETIC AGENT INTO SACROILIAC JOINT Bilateral 11/11/2019    Procedure: Bilateral GT bursa + SIJ injection;  Surgeon: Noe Pleitez MD;  Location: [facility]IN [facility];  Service: Pain Management;  Laterality: Bilateral;    INJECTION OF JOINT Bilateral 11/11/2019    Procedure: Bilateral GT bursa + SIJ injection;  Surgeon: Noe Pleitez MD;  Location: [facility];  Service: Pain Management;  Laterality: Bilateral;    JOINT REPLACEMENT Right 2021    knee    ROBOT-ASSISTED LAPAROSCOPIC LYSIS OF ADHESIONS USING DA SP XI N/A 12/05/2022    Procedure: XI ROBOTIC LYSIS, ADHESIONS;  Surgeon: Den Barragan MD;  Location: [facility], [place], operated by [facility] OR;  Service: Oncology;  Laterality: N/A;    ROBOT-ASSISTED LAPAROSCOPIC SALPINGO-OOPHORECTOMY USING DA SP XI Left 12/05/2022    Procedure: XI ROBOTIC SALPINGO-OOPHORECTOMY;  Surgeon: Den Barragan MD;  Location: [facility], [place], operated by [facility] OR;  Service: Oncology;  Laterality: Left;    SURGICAL REMOVAL OF BONE SPUR Left     left elbow    TOTAL KNEE ARTHROPLASTY Left 2/28/2024    Procedure: ARTHROPLASTY, KNEE, TOTAL;  Surgeon: Terrance Urena MD;  Location: [facility] OR;  Service: Orthopedics;  Laterality: Left;    VASCULAR SURGERY Right     high ligation  due to blood clot        Review of Systems   Constitutional:  Negative for activity change, appetite change, chills, fatigue, fever and unexpected weight change.   HENT:  Negative for congestion, mouth sores, nosebleeds, sore throat, trouble swallowing and voice change.    Respiratory:  Negative for cough, chest tightness, shortness of breath and wheezing.    Cardiovascular:  Negative for chest pain and leg swelling.   Gastrointestinal:  Negative for abdominal distention, abdominal pain, blood in stool, constipation, diarrhea, nausea and vomiting.   Genitourinary:  Negative for difficulty urinating, dysuria and hematuria.   Musculoskeletal:  Positive for gait problem (recently s/p left knee surgery. utilizing walker). Negative for arthralgias, back pain and myalgias.   Skin:  Negative for pallor, rash and wound.   Neurological:  Negative for dizziness, syncope, weakness and headaches.   Hematological:  Negative for adenopathy. Does not bruise/bleed easily.   Psychiatric/Behavioral:  The patient is nervous/anxious.          Medication List with Changes/Refills   Current Medications    ALBUTEROL (PROVENTIL/VENTOLIN HFA) 90 MCG/ACTUATION INHALER    INHALE 2 PUFFS INTO THE LUNGS EVERY 4 HOURS AS NEEDED FOR WHEEZING OR SHORTNESS OF BREATH.    APIXABAN (ELIQUIS) 5 MG TAB    Take 5 mg by mouth 2 (two) times daily.    ERGOCALCIFEROL (ERGOCALCIFEROL) 50,000 UNIT CAP    Take 1 capsule (50,000 Units total) by mouth every 7 days.    EZETIMIBE (ZETIA) 10 MG TABLET    TAKE 1 TABLET BY MOUTH EVERY DAY    GABAPENTIN (NEURONTIN) 300 MG CAPSULE    Take 1 capsule (300 mg total) by mouth every evening.    HYDROCODONE-ACETAMINOPHEN (NORCO)  MG PER TABLET    Take 1 tablet by mouth every 6 (six) hours as needed for Pain.    LEVOTHYROXINE (SYNTHROID) 50 MCG TABLET    TAKE 1 TABLET BY MOUTH EVERY DAY    LOSARTAN-HYDROCHLOROTHIAZIDE 100-25 MG (HYZAAR) 100-25 MG PER TABLET    TAKE 1 TABLET BY MOUTH EVERY DAY    ONDANSETRON (ZOFRAN-ODT)  4 MG TBDL    Take 2 tablets (8 mg total) by mouth every 8 (eight) hours as needed (Nausea).    SERTRALINE (ZOLOFT) 100 MG TABLET    TAKE 1 TABLET BY MOUTH EVERY DAY    SERTRALINE (ZOLOFT) 50 MG TABLET    TAKE 1 TABLET BY MOUTH EVERY DAY    TRAZODONE (DESYREL) 150 MG TABLET    Take 1 tablet (150 mg total) by mouth every evening.     Objective:     Vitals:    04/16/24 1258   BP: (!) 130/58   Pulse: 102   Temp: 97.6 °F (36.4 °C)     Lab Results   Component Value Date    WBC 4.91 04/12/2024    HGB 11.3 (L) 04/12/2024    HCT 36.2 (L) 04/12/2024    MCV 89 04/12/2024     04/12/2024       BMP  Lab Results   Component Value Date     04/12/2024    K 3.9 04/12/2024     04/12/2024    CO2 26 04/12/2024    BUN 23 04/12/2024    CREATININE 1.5 (H) 04/12/2024    CALCIUM 9.9 04/12/2024    ANIONGAP 10 04/12/2024    EGFRNORACEVR 36 (A) 04/12/2024     Lab Results   Component Value Date    IRON 52 04/12/2024    TRANSFERRIN 289 04/12/2024    TIBC 428 04/12/2024    FESATURATED 12 (L) 04/12/2024      Lab Results   Component Value Date    CHXKGGTP92 307 04/12/2024     Lab Results   Component Value Date    FOLATE 4.7 04/12/2024         Physical Exam  Vitals reviewed.   Constitutional:       Appearance: She is well-developed.   HENT:      Head: Normocephalic.      Right Ear: External ear normal.      Left Ear: External ear normal.      Nose: Nose normal.   Eyes:      General: Lids are normal. No scleral icterus.        Right eye: No discharge.         Left eye: No discharge.      Conjunctiva/sclera: Conjunctivae normal.   Neck:      Thyroid: No thyroid mass.   Cardiovascular:      Rate and Rhythm: Normal rate and regular rhythm.      Heart sounds: Normal heart sounds.   Pulmonary:      Effort: Pulmonary effort is normal. No respiratory distress.      Breath sounds: Normal breath sounds. No wheezing or rales.   Abdominal:      General: There is no distension.   Genitourinary:     Comments: deferred  Musculoskeletal:          General: Normal range of motion.      Cervical back: Normal range of motion.   Skin:     General: Skin is warm and dry.   Neurological:      Mental Status: She is alert and oriented to person, place, and time.   Psychiatric:         Speech: Speech normal.         Behavior: Behavior normal. Behavior is cooperative.         Thought Content: Thought content normal.        Assessment:     Problem List Items Addressed This Visit          Oncology    Iron deficiency anemia due to chronic blood loss - Primary     Recurrent iron deficiency anemia. Intolerant to oral iron    Arrange weekly feraheme x 2. F/u 2 months with cbc iron ferritin. Declines endoscopic evaluation at present time         Relevant Orders    CBC Auto Differential    Iron and TIBC    Ferritin         Plan:     Iron deficiency anemia due to chronic blood loss  -     CBC Auto Differential; Future; Expected date: 04/16/2024  -     Iron and TIBC; Future; Expected date: 04/16/2024  -     Ferritin; Future; Expected date: 04/16/2024    Other orders  -     ferumoxytoL (FERAHEME) 510 mg in dextrose 5 % (D5W) 117 mL IVPB  -     sodium chloride 0.9% 250 mL flush bag  -     sodium chloride 0.9% flush 10 mL  -     heparin, porcine (PF) 100 unit/mL injection flush 500 Units  -     alteplase injection 2 mg  -     ferumoxytoL (FERAHEME) 510 mg in dextrose 5 % (D5W) 117 mL IVPB  -     sodium chloride 0.9% 250 mL flush bag  -     sodium chloride 0.9% flush 10 mL  -     heparin, porcine (PF) 100 unit/mL injection flush 500 Units  -     alteplase injection 2 mg          Med Onc Chart Routing      Follow up with physician    Follow up with JAKE 2 months.   Infusion scheduling note   weekly feraheme x 2   Injection scheduling note    Labs CBC, ferritin and iron and TIBC   Scheduling:  Preferred lab:  Lab interval:  1-2 days prior   Imaging None      Pharmacy appointment No pharmacy appointment needed      Other referrals       No additional referrals needed           Neda  TAIWO Marx

## 2024-04-16 NOTE — ASSESSMENT & PLAN NOTE
Recurrent iron deficiency anemia. Intolerant to oral iron    Arrange weekly feraheme x 2. F/u 2 months with cbc iron ferritin. Declines endoscopic evaluation at present time

## 2024-04-23 ENCOUNTER — DOCUMENT SCAN (OUTPATIENT)
Dept: HOME HEALTH SERVICES | Facility: HOSPITAL | Age: 79
End: 2024-04-23
Payer: MEDICARE

## 2024-04-25 ENCOUNTER — OFFICE VISIT (OUTPATIENT)
Dept: ORTHOPEDICS | Facility: CLINIC | Age: 79
End: 2024-04-25
Payer: MEDICARE

## 2024-04-25 VITALS — HEIGHT: 66 IN | WEIGHT: 245.38 LBS | BODY MASS INDEX: 39.43 KG/M2

## 2024-04-25 DIAGNOSIS — Z96.652 STATUS POST TOTAL LEFT KNEE REPLACEMENT USING CEMENT: Primary | ICD-10-CM

## 2024-04-25 PROCEDURE — 3288F FALL RISK ASSESSMENT DOCD: CPT | Mod: CPTII,S$GLB,, | Performed by: PHYSICIAN ASSISTANT

## 2024-04-25 PROCEDURE — 1160F RVW MEDS BY RX/DR IN RCRD: CPT | Mod: CPTII,S$GLB,, | Performed by: PHYSICIAN ASSISTANT

## 2024-04-25 PROCEDURE — 1125F AMNT PAIN NOTED PAIN PRSNT: CPT | Mod: CPTII,S$GLB,, | Performed by: PHYSICIAN ASSISTANT

## 2024-04-25 PROCEDURE — 99999 PR PBB SHADOW E&M-EST. PATIENT-LVL III: CPT | Mod: PBBFAC,,, | Performed by: PHYSICIAN ASSISTANT

## 2024-04-25 PROCEDURE — 1159F MED LIST DOCD IN RCRD: CPT | Mod: CPTII,S$GLB,, | Performed by: PHYSICIAN ASSISTANT

## 2024-04-25 PROCEDURE — 1101F PT FALLS ASSESS-DOCD LE1/YR: CPT | Mod: CPTII,S$GLB,, | Performed by: PHYSICIAN ASSISTANT

## 2024-04-25 PROCEDURE — 99024 POSTOP FOLLOW-UP VISIT: CPT | Mod: S$GLB,,, | Performed by: PHYSICIAN ASSISTANT

## 2024-04-25 NOTE — PROGRESS NOTES
Patient ID: Kay Rosas is a 78 y.o. female.    Chief Complaint: Post-op Evaluation (Left TKA DOS: 2/28/24)      HPI: Kay Rosas  is a 78 y.o. female who c/o Post-op Evaluation (Left TKA DOS: 2/28/24)     Post op visit 2  Patient notes pain is 2/10   The patient is doing quite well since surgery and is pleased with the progress she is made   She notes she has been able to advance activity of daily living and thus her quality of life   She has using a Rollator but this is more so for other comorbidities including respiratory issues for which she has home oxygen  She states she just finished PT with home health and would like to go to outpatient PT at [facility]  She has no longer taking any narcotic pain medication will use Tylenol if she absolutely needs to   She is thankful for us for having performed the surgery    Patient is presently denying any shortness of breath, chest pain, fever/chills, nausea/vomiting, loss of taste or smell, numbness/tingling or sensation changes, loss of bladder or bowel function, loss of taste/smell.     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    MVP (mitral valve prolapse)     On home oxygen therapy     3 liters as needed at night    Osteoporosis     feet    Thyroid condition      Past Surgical History:   Procedure Laterality Date    BACK SURGERY  2012    CATARACT EXTRACTION Bilateral 10/2012    HYSTERECTOMY Left 1978    INJECTION OF ANESTHETIC AGENT INTO SACROILIAC JOINT Bilateral 11/11/2019    Procedure: Bilateral GT bursa + SIJ injection;   Surgeon: Noe Pleitez MD;  Location: [facility] PAIN T;  Service: Pain Management;  Laterality: Bilateral;    INJECTION OF JOINT Bilateral 2019    Procedure: Bilateral GT bursa + SIJ injection;  Surgeon: Noe Pleitez MD;  Location: [facility] PAIN T;  Service: Pain Management;  Laterality: Bilateral;    JOINT REPLACEMENT Right     knee    ROBOT-ASSISTED LAPAROSCOPIC LYSIS OF ADHESIONS USING DA SP XI N/A 2022    Procedure: XI ROBOTIC LYSIS, ADHESIONS;  Surgeon: Den Barragan MD;  Location: [facility];  Service: Oncology;  Laterality: N/A;    ROBOT-ASSISTED LAPAROSCOPIC SALPINGO-OOPHORECTOMY USING DA SP XI Left 2022    Procedure: XI ROBOTIC SALPINGO-OOPHORECTOMY;  Surgeon: Den Barragan MD;  Location: [facility];  Service: Oncology;  Laterality: Left;    SURGICAL REMOVAL OF BONE SPUR Left     left elbow    TOTAL KNEE ARTHROPLASTY Left 2024    Procedure: ARTHROPLASTY, KNEE, TOTAL;  Surgeon: Terrance Urena MD;  Location: [facility];  Service: Orthopedics;  Laterality: Left;    VASCULAR SURGERY Right     high ligation due to blood clot      Family History   Problem Relation Name Age of Onset    Heart disease Mother Loida Moura     Kidney disease Mother Loida Moura     Arthritis Mother Loida Moura     Breast cancer Mother Loida Moura     Cancer Father ALLAN Brand     Heart disease Father ALLAN Brand     Cancer Sister Lung cancer     Ovarian cancer Sister Lung cancer     Alzheimer's disease Sister       Social History     Socioeconomic History    Marital status:    Tobacco Use    Smoking status: Former     Current packs/day: 0.00     Average packs/day: 1 pack/day for 53.5 years (53.5 ttl pk-yrs)     Types: Cigarettes     Start date: 1962     Quit date: 2015     Years since quittin.8    Smokeless tobacco: Never   Substance and Sexual Activity    Alcohol use: No    Drug use: No    Sexual activity: Yes     Partners: Male     Birth  control/protection: None     Social Determinants of Health     Financial Resource Strain: High Risk (4/9/2024)    Overall Financial Resource Strain (CARDIA)     Difficulty of Paying Living Expenses: Hard   Food Insecurity: Food Insecurity Present (4/9/2024)    Hunger Vital Sign     Worried About Running Out of Food in the Last Year: Sometimes true     Ran Out of Food in the Last Year: Sometimes true   Transportation Needs: No Transportation Needs (4/9/2024)    PRAPARE - Transportation     Lack of Transportation (Medical): No     Lack of Transportation (Non-Medical): No   Physical Activity: Insufficiently Active (4/9/2024)    Exercise Vital Sign     Days of Exercise per Week: 2 days     Minutes of Exercise per Session: 30 min   Stress: Patient Declined (4/9/2024)    [employer] - Occupational Stress Questionnaire     Feeling of Stress : Patient declined   Social Connections: Moderately Isolated (4/9/2024)    Social Connection and Isolation Panel [NHANES]     Frequency of Communication with Friends and Family: Three times a week     Frequency of Social Gatherings with Friends and Family: Patient declined     Attends Restoration Services: More than 4 times per year     Active Member of Clubs or Organizations: No     Attends Club or Organization Meetings: Never     Marital Status:    Housing Stability: Low Risk  (4/9/2024)    Housing Stability Vital Sign     Unable to Pay for Housing in the Last Year: No     Number of Places Lived in the Last Year: 1     Unstable Housing in the Last Year: No     Medication List with Changes/Refills   Current Medications    ALBUTEROL (PROVENTIL/VENTOLIN HFA) 90 MCG/ACTUATION INHALER    INHALE 2 PUFFS INTO THE LUNGS EVERY 4 HOURS AS NEEDED FOR WHEEZING OR SHORTNESS OF BREATH.    APIXABAN (ELIQUIS) 5 MG TAB    Take 5 mg by mouth 2 (two) times daily.    ERGOCALCIFEROL (ERGOCALCIFEROL) 50,000 UNIT CAP    Take 1 capsule (50,000 Units total) by mouth every 7  days.    EZETIMIBE (ZETIA) 10 MG TABLET    TAKE 1 TABLET BY MOUTH EVERY DAY    GABAPENTIN (NEURONTIN) 300 MG CAPSULE    Take 1 capsule (300 mg total) by mouth every evening.    HYDROCODONE-ACETAMINOPHEN (NORCO)  MG PER TABLET    Take 1 tablet by mouth every 6 (six) hours as needed for Pain.    LEVOTHYROXINE (SYNTHROID) 50 MCG TABLET    TAKE 1 TABLET BY MOUTH EVERY DAY    LOSARTAN-HYDROCHLOROTHIAZIDE 100-25 MG (HYZAAR) 100-25 MG PER TABLET    TAKE 1 TABLET BY MOUTH EVERY DAY    ONDANSETRON (ZOFRAN-ODT) 4 MG TBDL    Take 2 tablets (8 mg total) by mouth every 8 (eight) hours as needed (Nausea).    SERTRALINE (ZOLOFT) 100 MG TABLET    TAKE 1 TABLET BY MOUTH EVERY DAY    SERTRALINE (ZOLOFT) 50 MG TABLET    TAKE 1 TABLET BY MOUTH EVERY DAY    TRAZODONE (DESYREL) 150 MG TABLET    Take 1 tablet (150 mg total) by mouth every evening.     Review of patient's allergies indicates:   Allergen Reactions    Lipitor [atorvastatin]      Severe Leg cramps    Nsaids (non-steroidal anti-inflammatory drug)      CKD       Objective:     Left Lower Extremity  NVI  WWP foot  Comp soft  Cap refill < 2 sec  Calf NT, soft  (-) Barrett sign  SUKI  ROM : Patient is able to easily exhibit full flexion and extension on passive range of motion.   Wiggles toes  DF/PF intact  Sensation intact  Inc C/D/I  No SOI    Imaging:    No imaging on file    Assessment:       Encounter Diagnosis   Name Primary?    Status post total left knee replacement using cement Yes          Plan:       Kay was seen today for post-op evaluation.    Diagnoses and all orders for this visit:    Status post total left knee replacement using cement        Kay Rosas is an established pt here for postop follow-up after left total knee replacement by Dr. Urena. The patient will continue with the current medication regimen and treatment plan.  Patient should notify the office of any signs or symptoms of infection including fevers, erythema, purulent drainage,  increasing pain.  Patient will continue with DVT prophylaxis until at least 6 weeks postop.  Patient will continue outpatient physical therapy.  Will follow-up as scheduled. Patient verbalized understanding of all instructions and agreed with the above plan.    No follow-ups on file.    The patient understands, chooses and consents to this plan and accepts all   the risks which include but are not limited to the risks mentioned above.     Disclaimer: This note was prepared using a voice recognition system and is likely to have sound alike errors within the text.

## 2024-04-26 ENCOUNTER — PATIENT MESSAGE (OUTPATIENT)
Dept: INFUSION THERAPY | Facility: HOSPITAL | Age: 79
End: 2024-04-26

## 2024-04-26 ENCOUNTER — INFUSION (OUTPATIENT)
Dept: INFUSION THERAPY | Facility: HOSPITAL | Age: 79
End: 2024-04-26
Attending: INTERNAL MEDICINE
Payer: MEDICARE

## 2024-04-26 VITALS
OXYGEN SATURATION: 98 % | RESPIRATION RATE: 18 BRPM | SYSTOLIC BLOOD PRESSURE: 156 MMHG | DIASTOLIC BLOOD PRESSURE: 75 MMHG | TEMPERATURE: 98 F | HEART RATE: 70 BPM

## 2024-04-26 DIAGNOSIS — D50.0 IRON DEFICIENCY ANEMIA DUE TO CHRONIC BLOOD LOSS: Primary | ICD-10-CM

## 2024-04-26 PROCEDURE — 96374 THER/PROPH/DIAG INJ IV PUSH: CPT

## 2024-04-26 PROCEDURE — 63600175 PHARM REV CODE 636 W HCPCS: Mod: JZ,JG | Performed by: NURSE PRACTITIONER

## 2024-04-26 PROCEDURE — 25000003 PHARM REV CODE 250: Performed by: NURSE PRACTITIONER

## 2024-04-26 RX ADMIN — FERUMOXYTOL 510 MG: 510 INJECTION INTRAVENOUS at 12:04

## 2024-04-26 NOTE — PLAN OF CARE
Problem: Adult Inpatient Plan of Care  Goal: Plan of Care Review  Outcome: Progressing  Flowsheets (Taken 4/26/2024 1231)  Plan of Care Reviewed With: patient  Goal: Optimal Comfort and Wellbeing  Outcome: Progressing  Intervention: Provide Person-Centered Care  Flowsheets (Taken 4/26/2024 1231)  Trust Relationship/Rapport:   care explained   choices provided   emotional support provided   empathic listening provided   questions answered   questions encouraged   thoughts/feelings acknowledged   reassurance provided

## 2024-05-03 ENCOUNTER — INFUSION (OUTPATIENT)
Dept: INFUSION THERAPY | Facility: HOSPITAL | Age: 79
End: 2024-05-03
Attending: INTERNAL MEDICINE
Payer: MEDICARE

## 2024-05-03 VITALS
OXYGEN SATURATION: 98 % | TEMPERATURE: 97 F | SYSTOLIC BLOOD PRESSURE: 134 MMHG | DIASTOLIC BLOOD PRESSURE: 72 MMHG | HEART RATE: 64 BPM | RESPIRATION RATE: 18 BRPM

## 2024-05-03 DIAGNOSIS — D50.0 IRON DEFICIENCY ANEMIA DUE TO CHRONIC BLOOD LOSS: Primary | ICD-10-CM

## 2024-05-03 PROCEDURE — 96365 THER/PROPH/DIAG IV INF INIT: CPT

## 2024-05-03 PROCEDURE — 25000003 PHARM REV CODE 250: Performed by: NURSE PRACTITIONER

## 2024-05-03 PROCEDURE — 63600175 PHARM REV CODE 636 W HCPCS: Mod: JZ,JG | Performed by: NURSE PRACTITIONER

## 2024-05-03 RX ADMIN — FERUMOXYTOL 510 MG: 510 INJECTION INTRAVENOUS at 01:05

## 2024-05-03 RX ADMIN — SODIUM CHLORIDE: 9 INJECTION, SOLUTION INTRAVENOUS at 01:05

## 2024-05-03 NOTE — PLAN OF CARE
Discussed plan of care with pt. Addressed any and ongoing concerns. Pt denies   Problem: Adult Inpatient Plan of Care  Goal: Plan of Care Review  Outcome: Progressing  Goal: Patient-Specific Goal (Individualized)  Outcome: Progressing  Flowsheets (Taken 5/3/2024 1334)  Individualized Care Needs: In reclined position, warm blanket and pillow  Anxieties, Fears or Concerns: Recovering from recent knee replacement  Goal: Absence of Hospital-Acquired Illness or Injury  Outcome: Progressing  Intervention: Identify and Manage Fall Risk  Flowsheets (Taken 5/3/2024 1334)  Safety Promotion/Fall Prevention: in recliner, wheels locked  Intervention: Prevent Infection  Flowsheets (Taken 5/3/2024 1334)  Infection Prevention:   equipment surfaces disinfected   hand hygiene promoted   personal protective equipment utilized  Goal: Optimal Comfort and Wellbeing  Outcome: Progressing  Intervention: Provide Person-Centered Care  Flowsheets (Taken 5/3/2024 1334)  Trust Relationship/Rapport:   care explained   questions encouraged   choices provided   reassurance provided   emotional support provided   thoughts/feelings acknowledged   empathic listening provided   questions answered

## 2024-05-10 ENCOUNTER — OFFICE VISIT (OUTPATIENT)
Dept: FAMILY MEDICINE | Facility: CLINIC | Age: 79
End: 2024-05-10
Payer: MEDICARE

## 2024-05-10 VITALS
RESPIRATION RATE: 18 BRPM | SYSTOLIC BLOOD PRESSURE: 118 MMHG | HEART RATE: 67 BPM | TEMPERATURE: 98 F | HEIGHT: 66 IN | DIASTOLIC BLOOD PRESSURE: 72 MMHG | OXYGEN SATURATION: 97 % | WEIGHT: 249.13 LBS | BODY MASS INDEX: 40.04 KG/M2

## 2024-05-10 DIAGNOSIS — R30.0 DYSURIA: Primary | ICD-10-CM

## 2024-05-10 LAB
BILIRUB UR QL STRIP: NEGATIVE
CLARITY UR REFRACT.AUTO: CLEAR
COLOR UR AUTO: YELLOW
GLUCOSE UR QL STRIP: NEGATIVE
HGB UR QL STRIP: NEGATIVE
KETONES UR QL STRIP: NEGATIVE
LEUKOCYTE ESTERASE UR QL STRIP: NEGATIVE
NITRITE UR QL STRIP: NEGATIVE
PH UR STRIP: 7 [PH] (ref 5–8)
PROT UR QL STRIP: ABNORMAL
SP GR UR STRIP: 1.02 (ref 1–1.03)
URN SPEC COLLECT METH UR: ABNORMAL

## 2024-05-10 PROCEDURE — 1160F RVW MEDS BY RX/DR IN RCRD: CPT | Mod: CPTII,S$GLB,, | Performed by: NURSE PRACTITIONER

## 2024-05-10 PROCEDURE — 99999 PR PBB SHADOW E&M-EST. PATIENT-LVL V: CPT | Mod: PBBFAC,,, | Performed by: NURSE PRACTITIONER

## 2024-05-10 PROCEDURE — 3074F SYST BP LT 130 MM HG: CPT | Mod: CPTII,S$GLB,, | Performed by: NURSE PRACTITIONER

## 2024-05-10 PROCEDURE — 81003 URINALYSIS AUTO W/O SCOPE: CPT | Performed by: NURSE PRACTITIONER

## 2024-05-10 PROCEDURE — 3288F FALL RISK ASSESSMENT DOCD: CPT | Mod: CPTII,S$GLB,, | Performed by: NURSE PRACTITIONER

## 2024-05-10 PROCEDURE — 99214 OFFICE O/P EST MOD 30 MIN: CPT | Mod: S$GLB,,, | Performed by: NURSE PRACTITIONER

## 2024-05-10 PROCEDURE — 1159F MED LIST DOCD IN RCRD: CPT | Mod: CPTII,S$GLB,, | Performed by: NURSE PRACTITIONER

## 2024-05-10 PROCEDURE — 1101F PT FALLS ASSESS-DOCD LE1/YR: CPT | Mod: CPTII,S$GLB,, | Performed by: NURSE PRACTITIONER

## 2024-05-10 PROCEDURE — 3078F DIAST BP <80 MM HG: CPT | Mod: CPTII,S$GLB,, | Performed by: NURSE PRACTITIONER

## 2024-05-10 RX ORDER — CIPROFLOXACIN 500 MG/1
500 TABLET ORAL 2 TIMES DAILY
Qty: 14 TABLET | Refills: 0 | Status: SHIPPED | OUTPATIENT
Start: 2024-05-10

## 2024-05-10 NOTE — PROGRESS NOTES
Kay MONSIVAIS Rosas  05/10/2024  [id number]    Vince Jefferson MD  Patient Care Team:  Vince Jefferson MD as PCP - General (Internal Medicine)  Lc Carroll MD as Consulting Physician (Cardiology)  Macho Prado MD as Consulting Physician (Orthopedic Surgery)  Joshua Spivey MD as Consulting Physician (Nephrology)  Michoacano Wilder MD as Consulting Physician (Pulmonary Disease)  William Fernández NP as Nurse Practitioner (Family Medicine)  Sincere Cartwright MD as Consulting Physician (Hematology and Oncology)          Visit Type:an urgent visit for a new problem    Chief Complaint:  Chief Complaint   Patient presents with    Cystitis       History of Present Illness:  78 year old female presents with complaint of burning with urination, lower back pain, urinary frequency and urgency and urinary pressure for three weeks.  Patient denies CVA tenderness, fever, body aches, chills, abdominal pain, vaginal discharge or vaginal pain.     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    MVP (mitral valve prolapse)     On home oxygen therapy     3 liters as needed at night    Osteoporosis     feet    Thyroid condition      Past Surgical History:   Procedure Laterality Date    BACK SURGERY  2012    CATARACT EXTRACTION Bilateral 10/2012    HYSTERECTOMY Left 1978    INJECTION OF ANESTHETIC AGENT INTO SACROILIAC JOINT Bilateral 11/11/2019    Procedure: Bilateral GT bursa + SIJ injection;  Surgeon: Noe Pleitez MD;  Location: [facility];  Service: Pain Management;  Laterality: Bilateral;    INJECTION OF JOINT  Bilateral 2019    Procedure: Bilateral GT bursa + SIJ injection;  Surgeon: Noe Pleitez MD;  Location: [facility]IN T;  Service: Pain Management;  Laterality: Bilateral;    JOINT REPLACEMENT Right     knee    ROBOT-ASSISTED LAPAROSCOPIC LYSIS OF ADHESIONS USING DA SP XI N/A 2022    Procedure: XI ROBOTIC LYSIS, ADHESIONS;  Surgeon: Den Barragan MD;  Location: [facility] OR;  Service: Oncology;  Laterality: N/A;    ROBOT-ASSISTED LAPAROSCOPIC SALPINGO-OOPHORECTOMY USING DA SP XI Left 2022    Procedure: XI ROBOTIC SALPINGO-OOPHORECTOMY;  Surgeon: Den Barragan MD;  Location: [facility] OR;  Service: Oncology;  Laterality: Left;    SURGICAL REMOVAL OF BONE SPUR Left     left elbow    TOTAL KNEE ARTHROPLASTY Left 2024    Procedure: ARTHROPLASTY, KNEE, TOTAL;  Surgeon: Terrance Urena MD;  Location: [facility] OR;  Service: Orthopedics;  Laterality: Left;    VASCULAR SURGERY Right     high ligation due to blood clot      Family History   Problem Relation Name Age of Onset    Heart disease Mother Loida Moura     Kidney disease Mother Loida Moura     Arthritis Mother Loida Moura     Breast cancer Mother Loida Moura     Cancer Father ALLAN Brand     Heart disease Father ALLAN Brand     Cancer Sister Lung cancer     Ovarian cancer Sister Lung cancer     Alzheimer's disease Sister       Social History     Socioeconomic History    Marital status:    Tobacco Use    Smoking status: Former     Current packs/day: 0.00     Average packs/day: 1 pack/day for 53.5 years (53.5 ttl pk-yrs)     Types: Cigarettes     Start date: 1962     Quit date: 2015     Years since quittin.8    Smokeless tobacco: Never   Substance and Sexual Activity    Alcohol use: No    Drug use: No    Sexual activity: Yes     Partners: Male     Birth control/protection: None     Social Determinants of Health     Financial Resource Strain: High Risk (2024)    Overall Financial Resource Strain  (CARDIA)     Difficulty of Paying Living Expenses: Hard   Food Insecurity: Food Insecurity Present (4/9/2024)    Hunger Vital Sign     Worried About Running Out of Food in the Last Year: Sometimes true     Ran Out of Food in the Last Year: Sometimes true   Transportation Needs: No Transportation Needs (4/9/2024)    PRAPARE - Transportation     Lack of Transportation (Medical): No     Lack of Transportation (Non-Medical): No   Physical Activity: Insufficiently Active (4/9/2024)    Exercise Vital Sign     Days of Exercise per Week: 2 days     Minutes of Exercise per Session: 30 min   Stress: Patient Declined (4/9/2024)    [place] [employer] [employer] - Occupational Stress Questionnaire     Feeling of Stress : Patient declined   Housing Stability: Low Risk  (4/9/2024)    Housing Stability Vital Sign     Unable to Pay for Housing in the Last Year: No     Number of Places Lived in the Last Year: 1     Unstable Housing in the Last Year: No     Patient Active Problem List   Diagnosis    Primary hypertension    Acquired hypothyroidism    Hyperlipidemia    Arthritis    B12 deficiency    Depression    Lumbar spondylosis    COPD, moderate    PAD (peripheral artery disease)    Osteoarthritis of both knees    Chronic bilateral low back pain with right-sided sciatica    Lumbar radiculopathy    Greater trochanteric bursitis of both hips    Morbid obesity    Secondary hyperparathyroidism of renal origin    History of DVT (deep vein thrombosis) lower extremity on 2 different occasions    Primary insomnia    Iron deficiency anemia due to chronic blood loss    Embolism and thrombosis of unspecified artery    Major depressive disorder, recurrent, mild    Statin myopathy    Malignant neoplasm of left ovary    Stage 3b chronic kidney disease    Aortic calcification    Pulmonary nodule    Low vitamin D level    Hypertriglyceridemia    Pulmonary emphysema    Lung granuloma    History of diabetes mellitus    Financial  difficulties    Ex-smoker    Nicotine dependence, cigarettes, in remission    Chronic anticoagulation- Apixaban    Debility    Dependence on supplemental oxygen    Sinus congestion     Review of patient's allergies indicates:   Allergen Reactions    Lipitor [atorvastatin]      Severe Leg cramps    Nsaids (non-steroidal anti-inflammatory drug)      CKD       The following were reviewed at this visit: active problem list, medication list, allergies, family history, social history, and health maintenance.    Medications:  Current Outpatient Medications on File Prior to Visit   Medication Sig Dispense Refill    albuterol (PROVENTIL/VENTOLIN HFA) 90 mcg/actuation inhaler INHALE 2 PUFFS INTO THE LUNGS EVERY 4 HOURS AS NEEDED FOR WHEEZING OR SHORTNESS OF BREATH. 6.7 g 11    apixaban (ELIQUIS) 5 mg Tab Take 5 mg by mouth 2 (two) times daily.      ergocalciferol (ERGOCALCIFEROL) 50,000 unit Cap Take 1 capsule (50,000 Units total) by mouth every 7 days. 12 capsule 3    ezetimibe (ZETIA) 10 mg tablet TAKE 1 TABLET BY MOUTH EVERY DAY 90 tablet 1    gabapentin (NEURONTIN) 300 MG capsule Take 1 capsule (300 mg total) by mouth every evening. 30 capsule 11    levothyroxine (SYNTHROID) 50 MCG tablet TAKE 1 TABLET BY MOUTH EVERY DAY 90 tablet 0    losartan-hydrochlorothiazide 100-25 mg (HYZAAR) 100-25 mg per tablet TAKE 1 TABLET BY MOUTH EVERY DAY 90 tablet 1    ondansetron (ZOFRAN-ODT) 4 MG TbDL Take 2 tablets (8 mg total) by mouth every 8 (eight) hours as needed (Nausea). 20 tablet 0    sertraline (ZOLOFT) 100 MG tablet TAKE 1 TABLET BY MOUTH EVERY DAY 90 tablet 0    sertraline (ZOLOFT) 50 MG tablet TAKE 1 TABLET BY MOUTH EVERY DAY 90 tablet 0    traZODone (DESYREL) 150 MG tablet Take 1 tablet (150 mg total) by mouth every evening. 90 tablet 1    HYDROcodone-acetaminophen (NORCO)  mg per tablet Take 1 tablet by mouth every 6 (six) hours as needed for Pain. 28 tablet 0     Current Facility-Administered Medications on File  Prior to Visit   Medication Dose Route Frequency Provider Last Rate Last Admin    0.9%  NaCl infusion   Intravenous Continuous Terrance Urena MD        acetaminophen tablet 650 mg  650 mg Oral Q8H PRN Terrance Urena MD        chlorhexidine 0.12 % solution 10 mL  10 mL Mouth/Throat On Call Procedure Terrance Urena MD   10 mL at 02/28/24 0837    dexAMETHasone injection 8 mg  8 mg Intravenous On Call Procedure Terrance Urena MD   8 mg at 02/28/24 0957    gabapentin capsule 300 mg  300 mg Oral Daily Terrance Urena MD   300 mg at 02/28/24 0837       Medications have been reviewed and reconciled with patient at this visit.  Barriers to medications reviewed with patient.    Adverse reactions to current medications reviewed with patient..    Over the counter medications reviewed and reconciled with patient.    Exam:  Wt Readings from Last 3 Encounters:   05/10/24 113 kg (249 lb 1.9 oz)   04/25/24 111.3 kg (245 lb 6 oz)   04/16/24 111.3 kg (245 lb 4.2 oz)     Temp Readings from Last 3 Encounters:   05/10/24 97.7 °F (36.5 °C) (Tympanic)   05/03/24 97.4 °F (36.3 °C)   04/26/24 98 °F (36.7 °C)     BP Readings from Last 3 Encounters:   05/10/24 118/72   05/03/24 134/72   04/26/24 (!) 156/75     Pulse Readings from Last 3 Encounters:   05/10/24 67   05/03/24 64   04/26/24 70     Body mass index is 40.21 kg/m².      Review of Systems   Constitutional:  Negative for fever.   Respiratory:  Negative for cough, shortness of breath and wheezing.    Cardiovascular:  Negative for chest pain and palpitations.   Gastrointestinal:  Negative for nausea.   Genitourinary:  Positive for dysuria, frequency and urgency.   Musculoskeletal:  Positive for back pain.   Neurological:  Negative for speech change, weakness and headaches.   All other systems reviewed and are negative.    Physical Exam  Vitals and nursing note reviewed.   Constitutional:       Appearance: Normal appearance. She is obese.   HENT:       Head: Normocephalic and atraumatic.      Right Ear: Tympanic membrane, ear canal and external ear normal.      Left Ear: Tympanic membrane, ear canal and external ear normal.      Nose: Nose normal.      Mouth/Throat:      Mouth: Mucous membranes are moist.      Pharynx: Oropharynx is clear.   Eyes:      Extraocular Movements: Extraocular movements intact.      Conjunctiva/sclera: Conjunctivae normal.      Pupils: Pupils are equal, round, and reactive to light.   Cardiovascular:      Rate and Rhythm: Normal rate and regular rhythm.      Pulses: Normal pulses.      Heart sounds: Normal heart sounds.   Pulmonary:      Effort: Pulmonary effort is normal.      Breath sounds: Normal breath sounds.   Abdominal:      General: Bowel sounds are normal.      Palpations: Abdomen is soft.   Musculoskeletal:         General: Normal range of motion.      Cervical back: Normal range of motion and neck supple.   Skin:     General: Skin is warm and dry.      Capillary Refill: Capillary refill takes less than 2 seconds.   Neurological:      General: No focal deficit present.      Mental Status: She is alert and oriented to person, place, and time.   Psychiatric:         Mood and Affect: Mood normal.         Behavior: Behavior normal.         Thought Content: Thought content normal.         Judgment: Judgment normal.         Laboratory Reviewed ({Yes)  Lab Results   Component Value Date    WBC 4.91 04/12/2024    HGB 11.3 (L) 04/12/2024    HCT 36.2 (L) 04/12/2024     04/12/2024    CHOL 201 (H) 07/26/2023    TRIG 162 (H) 07/26/2023    HDL 52 07/26/2023    ALT 12 04/12/2024    AST 13 04/12/2024     04/12/2024    K 3.9 04/12/2024     04/12/2024    CREATININE 1.5 (H) 04/12/2024    BUN 23 04/12/2024    CO2 26 04/12/2024    TSH 2.085 07/26/2023    INR 1.0 03/06/2023    HGBA1C 5.8 03/06/2023       Kay was seen today for cystitis.    Diagnoses and all orders for this visit:    Dysuria  -     Urinalysis  -     POCT  URINALYSIS  The following orders have not been finalized:  -     ciprofloxacin HCl (CIPRO) 500 MG tablet        Plan   Start Cipro      Care Plan/Goals: Reviewed    Goals    None     Kay was seen today for cystitis.    Diagnoses and all orders for this visit:    Dysuria  -     Urinalysis  -     POCT URINALYSIS  The following orders have not been finalized:  -     ciprofloxacin HCl (CIPRO) 500 MG tablet         Follow up: Follow up if symptoms worsen or fail to improve.    After visit summary was printed and given to patient upon discharge today.  Patient goals and care plan are included in After Visit Summary.

## 2024-05-21 DIAGNOSIS — N25.81 SECONDARY HYPERPARATHYROIDISM OF RENAL ORIGIN: ICD-10-CM

## 2024-05-21 RX ORDER — ERGOCALCIFEROL 1.25 MG/1
50000 CAPSULE ORAL
Qty: 12 CAPSULE | Refills: 3 | Status: SHIPPED | OUTPATIENT
Start: 2024-05-21

## 2024-05-22 DIAGNOSIS — F33.0 MAJOR DEPRESSIVE DISORDER, RECURRENT, MILD: ICD-10-CM

## 2024-05-22 RX ORDER — SERTRALINE HYDROCHLORIDE 100 MG/1
100 TABLET, FILM COATED ORAL DAILY
Qty: 90 TABLET | Refills: 0 | Status: SHIPPED | OUTPATIENT
Start: 2024-05-22

## 2024-05-22 NOTE — TELEPHONE ENCOUNTER
No care due was identified.  [facility] Embedded Care Due Messages. Reference number: [id number].   5/22/2024 9:17:51 AM CDT

## 2024-05-27 ENCOUNTER — OFFICE VISIT (OUTPATIENT)
Dept: ORTHOPEDICS | Facility: CLINIC | Age: 79
End: 2024-05-27
Payer: MEDICARE

## 2024-05-27 ENCOUNTER — HOSPITAL ENCOUNTER (OUTPATIENT)
Dept: RADIOLOGY | Facility: HOSPITAL | Age: 79
Discharge: HOME | End: 2024-05-27
Attending: ORTHOPAEDIC SURGERY
Payer: MEDICARE

## 2024-05-27 VITALS — BODY MASS INDEX: 40.04 KG/M2 | HEIGHT: 66 IN | WEIGHT: 249.13 LBS

## 2024-05-27 DIAGNOSIS — Z98.1 HISTORY OF LUMBAR FUSION: ICD-10-CM

## 2024-05-27 DIAGNOSIS — Z86.718 CURRENT LONG-TERM USE OF ANTICOAGULANT MEDICATION WITH HISTORY OF DEEP VENOUS THROMBOSIS (DVT): ICD-10-CM

## 2024-05-27 DIAGNOSIS — M25.562 LEFT KNEE PAIN, UNSPECIFIED CHRONICITY: ICD-10-CM

## 2024-05-27 DIAGNOSIS — Z96.651 HISTORY OF TOTAL RIGHT KNEE REPLACEMENT: ICD-10-CM

## 2024-05-27 DIAGNOSIS — I73.9 PERIPHERAL ARTERIAL DISEASE: ICD-10-CM

## 2024-05-27 DIAGNOSIS — Z79.01 CURRENT LONG-TERM USE OF ANTICOAGULANT MEDICATION WITH HISTORY OF DEEP VENOUS THROMBOSIS (DVT): ICD-10-CM

## 2024-05-27 DIAGNOSIS — E66.01 MORBID OBESITY WITH BMI OF 40.0-44.9, ADULT: ICD-10-CM

## 2024-05-27 DIAGNOSIS — Z96.652 STATUS POST TOTAL LEFT KNEE REPLACEMENT USING CEMENT: Primary | ICD-10-CM

## 2024-05-27 PROCEDURE — 73562 X-RAY EXAM OF KNEE 3: CPT | Mod: 26,59,RT, | Performed by: RADIOLOGY

## 2024-05-27 PROCEDURE — 1159F MED LIST DOCD IN RCRD: CPT | Mod: CPTII,S$GLB,, | Performed by: ORTHOPAEDIC SURGERY

## 2024-05-27 PROCEDURE — 99024 POSTOP FOLLOW-UP VISIT: CPT | Mod: S$GLB,,, | Performed by: ORTHOPAEDIC SURGERY

## 2024-05-27 PROCEDURE — 73564 X-RAY EXAM KNEE 4 OR MORE: CPT | Mod: TC,LT

## 2024-05-27 PROCEDURE — 99999 PR PBB SHADOW E&M-EST. PATIENT-LVL III: CPT | Mod: PBBFAC,,, | Performed by: ORTHOPAEDIC SURGERY

## 2024-05-27 PROCEDURE — 73564 X-RAY EXAM KNEE 4 OR MORE: CPT | Mod: 26,LT,, | Performed by: RADIOLOGY

## 2024-05-27 PROCEDURE — 3288F FALL RISK ASSESSMENT DOCD: CPT | Mod: CPTII,S$GLB,, | Performed by: ORTHOPAEDIC SURGERY

## 2024-05-27 PROCEDURE — 1101F PT FALLS ASSESS-DOCD LE1/YR: CPT | Mod: CPTII,S$GLB,, | Performed by: ORTHOPAEDIC SURGERY

## 2024-05-27 PROCEDURE — 73562 X-RAY EXAM OF KNEE 3: CPT | Mod: TC,59,RT

## 2024-05-27 PROCEDURE — 1125F AMNT PAIN NOTED PAIN PRSNT: CPT | Mod: CPTII,S$GLB,, | Performed by: ORTHOPAEDIC SURGERY

## 2024-05-27 NOTE — PATIENT INSTRUCTIONS
I would recommend that you take gabapentin 600 mg at night instead of 300 which will help with the nerve pain as well as help with the asleep   Try to avoid the trazodone to sleep  You have full range of motion which is excellent  You feel it get stiff when you sit for awhile however this hopefully will improve with time it will get better up to 18 months after surgery  Continue doing her independent exercise and walking to build the muscles  I will see you back in around 4 months to see how things went with you  You can not take anti-inflammatories because you have stage 3 kidney disease like Advil or a leave or Motrin or ibuprofen or meloxicam or Celebrex all does could affect her kidney  Prednisone might make you gained weight and you already are looking forward to lose some more weight.  If you lose 15-20 lb it will take off 3 times as much of her knees

## 2024-05-27 NOTE — PROGRESS NOTES
Subjective:     Patient ID: Kay Rosas is a 78 y.o. female.    Chief Complaint: Pain of the Left Knee    HPI:  Left knee pain   01/11/2024  Patient stated the right knee still hurting her she had a right total knee replacement done by Dr. Cadena at [facility].  She said she had the 1st 1 done on 03/01/2021 and then on 03/22/2023 she had a revision.  She thought the problem was with the kneecap however I reviewed the electronic records from [facility] where he only change the poly insert from size 10 to size 13/DJO total knee tibia base plate size 5.  She said she is doing okay with the knee but still having hypersensitivity and tenderness anteriorly.  She does have history of lumbar fusion and reviewed x-rays in the electronic record from 2016 showing hardware in the lumbar spine with fractured facet screws.  She said she can manage with the back.  There is no workup of numbness or tingling in the legs with nerve conduction studies.  She said she received numerous injections in her knees on both of them she wants the left total knee replaced.  She takes ibuprofen 800 she is on Eliquis she has history of bilateral lower extremities blood clots even prior to having her knee replacement.  She takes ibuprofen 800 and Eliquis.  She knows she is stops the Eliquis for it for surgery.  She complains of burning and anterior right knee pain I did tell her every total knee is numb on the outside of the knee and she can use Voltaren cream to help with that.  Instruct her how to use it.  As far as the left knee is concerned I told her that she is a candidate for total knee replacement however can not get rid of all the pain in the knees.  She does have back issues and lumbar fusion she could have pinched nerves going down the legs that could be causing some of her pains.  She lives alone but has children around town and states they all work.  I did tell her the need to step up and help after  surgery if she undergoes total knee replacement because it is outpatient surgery she goes home the same day.  She needs help from the family members they should take turns to help her out.  She has using a Rollator to walk around.  Other treatments included Kenalog injection 08/24/2023, also other steroid prior to that and Euflexxa injections 06/24/2020.  Also she had genicular nerve block done on 09/15/2023/  IO vera which did not help at all    I did tell her some of her pains could be coming from her back and total knee replacement might not solve that problem they will only help inside the knee joint.      05/27/2024   Left TKA 02/28/2024.  Patient stated she is better than before surgery but she still have some soreness when she sits for awhile becomes stiff however when she walks she does not have pain unable to walk better.  She has still using the cane.  Her pain is basically stiffness that 5/10.  No fever no chills no shortness of breath.  She finished her physical therapy she is doing her own independent exercises and she walks quite a bit and does not hurt just the stiffness is the part that seems to be the problem that occur after she sits for awhile.  We discussed that in might take 6 months to a year before complete healing occur end up to 18 months it will get better with time.  She stated everything is good even she can move her foot up and down apparently after surgery she had a little difficulty with a mild peroneal palsy no other she said the foot is working really well no problem  She is doing independent exercise program you  You like what they told her on physical therapy  Past Medical History:   Diagnosis Date    Anticoagulant long-term use     Arthritis     Cataract- bilateral with extraction- patient reports no lens implants     Deep vein thrombosis     Degenerative disc disease, cervical     Disorder of kidney and ureter     CKD stage 3    DVT (deep venous thrombosis)     Emphysema of  lung     Hyperlipidemia     Hypertension     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    MVP (mitral valve prolapse)     On home oxygen therapy     3 liters as needed at night    Osteoporosis     feet    Thyroid condition      Past Surgical History:   Procedure Laterality Date    BACK SURGERY  2012    CATARACT EXTRACTION Bilateral 10/2012    HYSTERECTOMY Left 1978    INJECTION OF ANESTHETIC AGENT INTO SACROILIAC JOINT Bilateral 11/11/2019    Procedure: Bilateral GT bursa + SIJ injection;  Surgeon: Noe Pleitez MD;  Location: [facility];  Service: Pain Management;  Laterality: Bilateral;    INJECTION OF JOINT Bilateral 11/11/2019    Procedure: Bilateral GT bursa + SIJ injection;  Surgeon: Noe Pleitez MD;  Location: [facility];  Service: Pain Management;  Laterality: Bilateral;    JOINT REPLACEMENT Right 2021    knee    ROBOT-ASSISTED LAPAROSCOPIC LYSIS OF ADHESIONS USING DA SP XI N/A 12/05/2022    Procedure: XI ROBOTIC LYSIS, ADHESIONS;  Surgeon: Den Barragan MD;  Location: [facility];  Service: Oncology;  Laterality: N/A;    ROBOT-ASSISTED LAPAROSCOPIC SALPINGO-OOPHORECTOMY USING DA SP XI Left 12/05/2022    Procedure: XI ROBOTIC SALPINGO-OOPHORECTOMY;  Surgeon: Den Barragan MD;  Location: [facility];  Service: Oncology;  Laterality: Left;    SURGICAL REMOVAL OF BONE SPUR Left     left elbow    TOTAL KNEE ARTHROPLASTY Left 2/28/2024    Procedure: ARTHROPLASTY, KNEE, TOTAL;  Surgeon: Terrance Urena MD;  Location: [facility];  Service: Orthopedics;  Laterality: Left;    VASCULAR SURGERY Right     high ligation due to blood clot      Family History   Problem Relation Name Age of Onset    Heart disease Mother Loida Moura     Kidney disease Mother Loida Moura     Arthritis Mother Loida Moura     Breast cancer Mother Loida Moura     Cancer Father ALLAN Brand     Heart disease Father ALLAN Brand     Cancer Sister Lung cancer     Ovarian cancer Sister Lung cancer      Alzheimer's disease Sister       Social History     Socioeconomic History    Marital status:    Tobacco Use    Smoking status: Former     Current packs/day: 0.00     Average packs/day: 1 pack/day for 53.5 years (53.5 ttl pk-yrs)     Types: Cigarettes     Start date: 1962     Quit date: 2015     Years since quittin.9    Smokeless tobacco: Never   Substance and Sexual Activity    Alcohol use: No    Drug use: No    Sexual activity: Yes     Partners: Male     Birth control/protection: None     Social Determinants of Health     Financial Resource Strain: High Risk (2024)    Overall Financial Resource Strain (CARDIA)     Difficulty of Paying Living Expenses: Hard   Food Insecurity: Food Insecurity Present (2024)    Hunger Vital Sign     Worried About Running Out of Food in the Last Year: Sometimes true     Ran Out of Food in the Last Year: Sometimes true   Transportation Needs: No Transportation Needs (2024)    PRAPARE - Transportation     Lack of Transportation (Medical): No     Lack of Transportation (Non-Medical): No   Physical Activity: Insufficiently Active (2024)    Exercise Vital Sign     Days of Exercise per Week: 2 days     Minutes of Exercise per Session: 30 min   Stress: Patient Declined (2024)    Citizen of [place] of Occupational Health - Occupational Stress Questionnaire     Feeling of Stress : Patient declined   Housing Stability: Low Risk  (2024)    Housing Stability Vital Sign     Unable to Pay for Housing in the Last Year: No     Number of Places Lived in the Last Year: 1     Unstable Housing in the Last Year: No     Medication List with Changes/Refills   Current Medications    ALBUTEROL (PROVENTIL/VENTOLIN HFA) 90 MCG/ACTUATION INHALER    INHALE 2 PUFFS INTO THE LUNGS EVERY 4 HOURS AS NEEDED FOR WHEEZING OR SHORTNESS OF BREATH.    APIXABAN (ELIQUIS) 5 MG TAB    Take 5 mg by mouth 2 (two) times daily.    CIPROFLOXACIN HCL (CIPRO) 500 MG TABLET    Take 1 tablet  (500 mg total) by mouth 2 (two) times daily.    ERGOCALCIFEROL (ERGOCALCIFEROL) 50,000 UNIT CAP    TAKE 1 CAPSULE BY MOUTH ONE TIME PER WEEK    EZETIMIBE (ZETIA) 10 MG TABLET    TAKE 1 TABLET BY MOUTH EVERY DAY    GABAPENTIN (NEURONTIN) 300 MG CAPSULE    Take 1 capsule (300 mg total) by mouth every evening.    LEVOTHYROXINE (SYNTHROID) 50 MCG TABLET    TAKE 1 TABLET BY MOUTH EVERY DAY    LOSARTAN-HYDROCHLOROTHIAZIDE 100-25 MG (HYZAAR) 100-25 MG PER TABLET    TAKE 1 TABLET BY MOUTH EVERY DAY    ONDANSETRON (ZOFRAN-ODT) 4 MG TBDL    Take 2 tablets (8 mg total) by mouth every 8 (eight) hours as needed (Nausea).    SERTRALINE (ZOLOFT) 100 MG TABLET    Take 1 tablet (100 mg total) by mouth once daily.    SERTRALINE (ZOLOFT) 50 MG TABLET    TAKE 1 TABLET BY MOUTH EVERY DAY    TRAZODONE (DESYREL) 150 MG TABLET    Take 1 tablet (150 mg total) by mouth every evening.     Review of patient's allergies indicates:   Allergen Reactions    Lipitor [atorvastatin]      Severe Leg cramps    Nsaids (non-steroidal anti-inflammatory drug)      CKD     Review of Systems   Constitutional: Negative for decreased appetite.   HENT:  Negative for tinnitus.    Eyes:  Negative for double vision.   Cardiovascular:  Negative for chest pain.   Respiratory:  Negative for wheezing.    Hematologic/Lymphatic: Negative for bleeding problem.   Skin:  Negative for dry skin.   Musculoskeletal:  Positive for arthritis, back pain, joint pain and myalgias. Negative for gout, neck pain and stiffness.   Gastrointestinal:  Negative for abdominal pain.   Genitourinary:  Negative for bladder incontinence.   Neurological:  Negative for numbness, paresthesias and sensory change.   Psychiatric/Behavioral:  Negative for altered mental status.        Objective:   Body mass index is 40.21 kg/m².  There were no vitals filed for this visit.       General    Constitutional: She is oriented to person, place, and time. She appears well-developed.   HENT:   Head:  Atraumatic.   Eyes: EOM are normal.   Pulmonary/Chest: Effort normal.   Neurological: She is alert and oriented to person, place, and time.   Psychiatric: Judgment normal.           Ambulating with a Rollator  Pelvis is level  Bilateral hips passive motion no pain   There is tenderness in the lumbar spine there is questionable straight leg raising mild bilaterally   There is no pain in the greater trochanters to palpation   Hip flexors, abductors adductors quads hamstrings ankle extensors and flexors slightly weak at 5-/5  Right TKA surgical incision healed well she has 0 to 120° of flexion.  She is stable in extension to varus and valgus stressing.  Very minor instability at 45°.  There is decreased sensation in the lateral aspect of the knee joint consistent with infrapatellar branch of the saphenous nerve distribution.  There is no defect in the patella or quadriceps tendon there is no swelling that has not warm to touch  Left knee preop with medial joint severe pain.  There is minimal swelling.  There is crepitus with motion.  Collaterals and cruciates are stable.  There is no defect in the patella or quadriceps tendon.  Active motion is 0-110 degrees.  Left knee postop TKA surgical incision healed well.  She has 0-130 degrees of flexion.  There is no defect in the patella or quadriceps tendon.  Stable in extension and flexion.  There is no swelling no effusion not warm to touch  Calves are soft nontender with some varicosities bilaterally  Around the ankle there is some mild swelling  Able on the left ankle to bring the ankle up and down without difficulty and strength is 5/5  Skin is warm to touch no obvious lesions    Relevant imaging results reviewed and interpreted by me, discussed with the patient and / or family today     X-ray 05/27/2024 left TKA/Depuy you posterior stabilize attune knee in excellent alignment patella midline.  The left total knee done by another provider still in good alignment    X-ray  10/16/2023 right TKA/DJO cruciate sparing with a stemmed tibial component patella midline no evidence of failure alignment okay, left knee with complete loss of medial joint space with marginal osteophytic changes varus deformity consistent with severe arthritis  Assessment:     Encounter Diagnoses   Name Primary?    Status post total left knee replacement using cement Yes    History of total right knee replacement     History of lumbar fusion     Peripheral arterial disease     Current long-term use of anticoagulant medication with history of deep venous thrombosis (DVT)     Morbid obesity with BMI of 40.0-44.9, adult         Plan:   Status post total left knee replacement using cement    History of total right knee replacement    History of lumbar fusion    Peripheral arterial disease    Current long-term use of anticoagulant medication with history of deep venous thrombosis (DVT)    Morbid obesity with BMI of 40.0-44.9, adult         Patient Instructions   I would recommend that you take gabapentin 600 mg at night instead of 300 which will help with the nerve pain as well as help with the asleep   Try to avoid the trazodone to sleep  You have full range of motion which is excellent  You feel it get stiff when you sit for awhile however this hopefully will improve with time it will get better up to 18 months after surgery  Continue doing her independent exercise and walking to build the muscles  I will see you back in around 4 months to see how things went with you  You can not take anti-inflammatories because you have stage 3 kidney disease like Advil or a leave or Motrin or ibuprofen or meloxicam or Celebrex all does could affect her kidney  Prednisone might make you gained weight and you already are looking forward to lose some more weight.  If you lose 15-20 lb it will take off 3 times as much of her knees        Disclaimer: This note was prepared using a voice recognition system and is likely to have sound  alike errors within the text.

## 2024-06-11 ENCOUNTER — OFFICE VISIT (OUTPATIENT)
Dept: FAMILY MEDICINE | Facility: CLINIC | Age: 79
End: 2024-06-11
Payer: MEDICARE

## 2024-06-11 VITALS
HEIGHT: 66 IN | HEART RATE: 74 BPM | TEMPERATURE: 98 F | WEIGHT: 252.19 LBS | SYSTOLIC BLOOD PRESSURE: 116 MMHG | OXYGEN SATURATION: 98 % | DIASTOLIC BLOOD PRESSURE: 70 MMHG | BODY MASS INDEX: 40.53 KG/M2

## 2024-06-11 DIAGNOSIS — Z86.718 HISTORY OF DVT (DEEP VEIN THROMBOSIS): ICD-10-CM

## 2024-06-11 DIAGNOSIS — Z00.00 WELLNESS EXAMINATION: Primary | ICD-10-CM

## 2024-06-11 DIAGNOSIS — I10 PRIMARY HYPERTENSION: ICD-10-CM

## 2024-06-11 DIAGNOSIS — E66.01 CLASS 3 SEVERE OBESITY WITH SERIOUS COMORBIDITY AND BODY MASS INDEX (BMI) OF 40.0 TO 44.9 IN ADULT, UNSPECIFIED OBESITY TYPE: ICD-10-CM

## 2024-06-11 DIAGNOSIS — R79.9 ABNORMAL FINDING OF BLOOD CHEMISTRY, UNSPECIFIED: ICD-10-CM

## 2024-06-11 DIAGNOSIS — E78.5 HYPERLIPIDEMIA, UNSPECIFIED HYPERLIPIDEMIA TYPE: ICD-10-CM

## 2024-06-11 DIAGNOSIS — E78.1 HYPERTRIGLYCERIDEMIA: ICD-10-CM

## 2024-06-11 PROBLEM — E66.813 CLASS 3 SEVERE OBESITY DUE TO EXCESS CALORIES WITH SERIOUS COMORBIDITY AND BODY MASS INDEX (BMI) OF 40.0 TO 44.9 IN ADULT: Status: ACTIVE | Noted: 2020-02-13

## 2024-06-11 PROCEDURE — 1159F MED LIST DOCD IN RCRD: CPT | Mod: CPTII,S$GLB,, | Performed by: NURSE PRACTITIONER

## 2024-06-11 PROCEDURE — 3078F DIAST BP <80 MM HG: CPT | Mod: CPTII,S$GLB,, | Performed by: NURSE PRACTITIONER

## 2024-06-11 PROCEDURE — 1126F AMNT PAIN NOTED NONE PRSNT: CPT | Mod: CPTII,S$GLB,, | Performed by: NURSE PRACTITIONER

## 2024-06-11 PROCEDURE — 99214 OFFICE O/P EST MOD 30 MIN: CPT | Mod: S$GLB,,, | Performed by: NURSE PRACTITIONER

## 2024-06-11 PROCEDURE — 3074F SYST BP LT 130 MM HG: CPT | Mod: CPTII,S$GLB,, | Performed by: NURSE PRACTITIONER

## 2024-06-11 PROCEDURE — 99999 PR PBB SHADOW E&M-EST. PATIENT-LVL IV: CPT | Mod: PBBFAC,,, | Performed by: NURSE PRACTITIONER

## 2024-06-11 PROCEDURE — 1160F RVW MEDS BY RX/DR IN RCRD: CPT | Mod: CPTII,S$GLB,, | Performed by: NURSE PRACTITIONER

## 2024-06-11 RX ORDER — TIRZEPATIDE 2.5 MG/.5ML
2.5 INJECTION, SOLUTION SUBCUTANEOUS
Qty: 4 PEN | Refills: 10 | Status: SHIPPED | OUTPATIENT
Start: 2024-06-11

## 2024-06-11 NOTE — PROGRESS NOTES
Kay Dueñaswell  06/11/2024  [id number]    Vince Jefferson MD  Patient Care Team:  Vince Jefferson MD as PCP - General (Internal Medicine)  Lc Carroll MD as Consulting Physician (Cardiology)  Macho Prado MD as Consulting Physician (Orthopedic Surgery)  Joshua Spivey MD as Consulting Physician (Nephrology)  Michoacano Wilder MD as Consulting Physician (Pulmonary Disease)  William Fernández NP as Nurse Practitioner (Family Medicine)  Sincere Cartwright MD as Consulting Physician (Hematology and Oncology)          Visit Type:an urgent visit for a new problem    Chief Complaint:  Chief Complaint   Patient presents with    Weight Loss       History of Present Illness:    77 yo female presents today requesting medication for weight loss.  She is requesting a medication refill for eiliquis    No other complaints experienced at this time      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    MVP (mitral valve prolapse)     On home oxygen therapy     3 liters as needed at night    Osteoporosis     feet    Thyroid condition      Past Surgical History:   Procedure Laterality Date    BACK SURGERY  2012    CATARACT EXTRACTION Bilateral 10/2012    HYSTERECTOMY Left 1978    INJECTION OF ANESTHETIC AGENT INTO SACROILIAC JOINT Bilateral 11/11/2019    Procedure: Bilateral GT bursa + SIJ injection;  Surgeon: Noe Pleitez MD;  Location: [facility];  Service: Pain Management;  Laterality: Bilateral;    INJECTION OF JOINT Bilateral 11/11/2019    Procedure: Bilateral GT bursa + SIJ injection;  Surgeon: Noe Pleitez  MD;  Location: [facility];  Service: Pain Management;  Laterality: Bilateral;    JOINT REPLACEMENT Right     knee    ROBOT-ASSISTED LAPAROSCOPIC LYSIS OF ADHESIONS USING DA SP XI N/A 2022    Procedure: XI ROBOTIC LYSIS, ADHESIONS;  Surgeon: Den Barragan MD;  Location: [facility] OR;  Service: Oncology;  Laterality: N/A;    ROBOT-ASSISTED LAPAROSCOPIC SALPINGO-OOPHORECTOMY USING DA SP XI Left 2022    Procedure: XI ROBOTIC SALPINGO-OOPHORECTOMY;  Surgeon: Den Barragan MD;  Location: [facility] OR;  Service: Oncology;  Laterality: Left;    SURGICAL REMOVAL OF BONE SPUR Left     left elbow    TOTAL KNEE ARTHROPLASTY Left 2024    Procedure: ARTHROPLASTY, KNEE, TOTAL;  Surgeon: Terrance Urena MD;  Location: [facility] OR;  Service: Orthopedics;  Laterality: Left;    VASCULAR SURGERY Right     high ligation due to blood clot      Family History   Problem Relation Name Age of Onset    Heart disease Mother Loida Moura     Kidney disease Mother Loida Moura     Arthritis Mother Loida Moura     Breast cancer Mother Loida Moura     Cancer Father ALLAN Brand     Heart disease Father ALLAN Brand     Cancer Sister Lung cancer     Ovarian cancer Sister Lung cancer     Alzheimer's disease Sister       Social History     Socioeconomic History    Marital status:    Tobacco Use    Smoking status: Former     Current packs/day: 0.00     Average packs/day: 1 pack/day for 53.5 years (53.5 ttl pk-yrs)     Types: Cigarettes     Start date: 1962     Quit date: 2015     Years since quittin.9    Smokeless tobacco: Never   Substance and Sexual Activity    Alcohol use: No    Drug use: No    Sexual activity: Yes     Partners: Male     Birth control/protection: None     Social Determinants of Health     Financial Resource Strain: High Risk (2024)    Overall Financial Resource Strain (CARDIA)     Difficulty of Paying Living Expenses: Hard   Food Insecurity: Food Insecurity Present  (4/9/2024)    Hunger Vital Sign     Worried About Running Out of Food in the Last Year: Sometimes true     Ran Out of Food in the Last Year: Sometimes true   Transportation Needs: No Transportation Needs (4/9/2024)    PRAPARE - Transportation     Lack of Transportation (Medical): No     Lack of Transportation (Non-Medical): No   Physical Activity: Insufficiently Active (4/9/2024)    Exercise Vital Sign     Days of Exercise per Week: 2 days     Minutes of Exercise per Session: 30 min   Stress: Patient Declined (4/9/2024)    [employer] - Occupational Stress Questionnaire     Feeling of Stress : Patient declined   Housing Stability: Low Risk  (4/9/2024)    Housing Stability Vital Sign     Unable to Pay for Housing in the Last Year: No     Number of Places Lived in the Last Year: 1     Unstable Housing in the Last Year: No     Patient Active Problem List   Diagnosis    Primary hypertension    Acquired hypothyroidism    Hyperlipidemia    Arthritis    B12 deficiency    Depression    Lumbar spondylosis    COPD, moderate    PAD (peripheral artery disease)    Osteoarthritis of both knees    Chronic bilateral low back pain with right-sided sciatica    Lumbar radiculopathy    Greater trochanteric bursitis of both hips    Class 3 severe obesity due to excess calories with serious comorbidity and body mass index (BMI) of 40.0 to 44.9 in adult    Secondary hyperparathyroidism of renal origin    History of DVT (deep vein thrombosis) lower extremity on 2 different occasions    Primary insomnia    Iron deficiency anemia due to chronic blood loss    Embolism and thrombosis of unspecified artery    Major depressive disorder, recurrent, mild    Statin myopathy    Malignant neoplasm of left ovary    Stage 3b chronic kidney disease    Aortic calcification    Pulmonary nodule    Low vitamin D level    Hypertriglyceridemia    Pulmonary emphysema    Lung granuloma    History of diabetes mellitus    Financial  difficulties    Ex-smoker    Nicotine dependence, cigarettes, in remission    Chronic anticoagulation- Apixaban    Debility    Dependence on supplemental oxygen    Sinus congestion     Review of patient's allergies indicates:   Allergen Reactions    Lipitor [atorvastatin]      Severe Leg cramps    Nsaids (non-steroidal anti-inflammatory drug)      CKD       The following were reviewed at this visit: active problem list, medication list, allergies, family history, social history, and health maintenance.    Medications:  Current Outpatient Medications on File Prior to Visit   Medication Sig Dispense Refill    albuterol (PROVENTIL/VENTOLIN HFA) 90 mcg/actuation inhaler INHALE 2 PUFFS INTO THE LUNGS EVERY 4 HOURS AS NEEDED FOR WHEEZING OR SHORTNESS OF BREATH. 6.7 g 11    ciprofloxacin HCl (CIPRO) 500 MG tablet Take 1 tablet (500 mg total) by mouth 2 (two) times daily. 14 tablet 0    ergocalciferol (ERGOCALCIFEROL) 50,000 unit Cap TAKE 1 CAPSULE BY MOUTH ONE TIME PER WEEK 12 capsule 3    ezetimibe (ZETIA) 10 mg tablet TAKE 1 TABLET BY MOUTH EVERY DAY 90 tablet 1    gabapentin (NEURONTIN) 300 MG capsule Take 1 capsule (300 mg total) by mouth every evening. 30 capsule 11    levothyroxine (SYNTHROID) 50 MCG tablet TAKE 1 TABLET BY MOUTH EVERY DAY 90 tablet 0    losartan-hydrochlorothiazide 100-25 mg (HYZAAR) 100-25 mg per tablet TAKE 1 TABLET BY MOUTH EVERY DAY 90 tablet 1    ondansetron (ZOFRAN-ODT) 4 MG TbDL Take 2 tablets (8 mg total) by mouth every 8 (eight) hours as needed (Nausea). 20 tablet 0    sertraline (ZOLOFT) 100 MG tablet Take 1 tablet (100 mg total) by mouth once daily. 90 tablet 0    sertraline (ZOLOFT) 50 MG tablet TAKE 1 TABLET BY MOUTH EVERY DAY 90 tablet 0    traZODone (DESYREL) 150 MG tablet Take 1 tablet (150 mg total) by mouth every evening. 90 tablet 1    [DISCONTINUED] apixaban (ELIQUIS) 5 mg Tab Take 5 mg by mouth 2 (two) times daily.       Current Facility-Administered Medications on File Prior to  Visit   Medication Dose Route Frequency Provider Last Rate Last Admin    0.9%  NaCl infusion   Intravenous Continuous Terrance Urena MD        acetaminophen tablet 650 mg  650 mg Oral Q8H PRN Terrance Urena MD        chlorhexidine 0.12 % solution 10 mL  10 mL Mouth/Throat On Call Procedure Terrance Urena MD   10 mL at 02/28/24 0837    dexAMETHasone injection 8 mg  8 mg Intravenous On Call Procedure Terrance Urena MD   8 mg at 02/28/24 0957    gabapentin capsule 300 mg  300 mg Oral Daily Terrance Urena MD   300 mg at 02/28/24 0837       Medications have been reviewed and reconciled with patient at this visit.  Barriers to medications reviewed with patient.    Adverse reactions to current medications reviewed with patient..    Over the counter medications reviewed and reconciled with patient.    Exam:  Wt Readings from Last 3 Encounters:   06/11/24 114.4 kg (252 lb 3.3 oz)   05/27/24 113 kg (249 lb 1.9 oz)   05/10/24 113 kg (249 lb 1.9 oz)     Temp Readings from Last 3 Encounters:   06/11/24 98.1 °F (36.7 °C) (Temporal)   05/10/24 97.7 °F (36.5 °C) (Tympanic)   05/03/24 97.4 °F (36.3 °C)     BP Readings from Last 3 Encounters:   06/11/24 116/70   05/10/24 118/72   05/03/24 134/72     Pulse Readings from Last 3 Encounters:   06/11/24 74   05/10/24 67   05/03/24 64     Body mass index is 40.71 kg/m².      Review of Systems   Constitutional:  Negative for fever.   Respiratory:  Negative for cough, shortness of breath and wheezing.    Cardiovascular:  Negative for chest pain and palpitations.   Gastrointestinal:  Negative for nausea.   Neurological:  Negative for speech change, weakness and headaches.   All other systems reviewed and are negative.    Physical Exam  Vitals and nursing note reviewed.   Constitutional:       Appearance: Normal appearance. She is obese.   HENT:      Head: Normocephalic and atraumatic.      Right Ear: Tympanic membrane, ear canal and external ear normal.       Left Ear: Tympanic membrane, ear canal and external ear normal.      Nose: Nose normal.      Mouth/Throat:      Mouth: Mucous membranes are moist.      Pharynx: Oropharynx is clear.   Eyes:      Extraocular Movements: Extraocular movements intact.      Conjunctiva/sclera: Conjunctivae normal.      Pupils: Pupils are equal, round, and reactive to light.   Cardiovascular:      Rate and Rhythm: Normal rate and regular rhythm.      Pulses: Normal pulses.      Heart sounds: Normal heart sounds.   Pulmonary:      Effort: Pulmonary effort is normal.      Breath sounds: Normal breath sounds.   Abdominal:      General: Bowel sounds are normal.      Palpations: Abdomen is soft.   Musculoskeletal:         General: Normal range of motion.      Cervical back: Normal range of motion and neck supple.   Skin:     General: Skin is warm and dry.      Capillary Refill: Capillary refill takes less than 2 seconds.   Neurological:      General: No focal deficit present.      Mental Status: She is alert and oriented to person, place, and time.   Psychiatric:         Mood and Affect: Mood normal.         Behavior: Behavior normal.         Thought Content: Thought content normal.         Judgment: Judgment normal.         Laboratory Reviewed ({Yes)  Lab Results   Component Value Date    WBC 4.91 04/12/2024    HGB 11.3 (L) 04/12/2024    HCT 36.2 (L) 04/12/2024     04/12/2024    CHOL 201 (H) 07/26/2023    TRIG 162 (H) 07/26/2023    HDL 52 07/26/2023    ALT 12 04/12/2024    AST 13 04/12/2024     04/12/2024    K 3.9 04/12/2024     04/12/2024    CREATININE 1.5 (H) 04/12/2024    BUN 23 04/12/2024    CO2 26 04/12/2024    TSH 2.085 07/26/2023    INR 1.0 03/06/2023    HGBA1C 5.8 03/06/2023       Kay was seen today for weight loss.    Diagnoses and all orders for this visit:    Wellness examination    Hyperlipidemia, unspecified hyperlipidemia type  -     Lipid Panel; Standing  -     Comprehensive Metabolic Panel; Standing  -      tirzepatide, weight loss, (ZEPBOUND) 2.5 mg/0.5 mL PnIj; Inject 2.5 mg into the skin every 7 days.    Hypertriglyceridemia  -     CBC Auto Differential; Standing  -     tirzepatide, weight loss, (ZEPBOUND) 2.5 mg/0.5 mL PnIj; Inject 2.5 mg into the skin every 7 days.    Primary hypertension  -     T4, Free; Standing  -     TSH; Standing  -     Hemoglobin A1C; Standing    Class 3 severe obesity with serious comorbidity and body mass index (BMI) of 40.0 to 44.9 in adult, unspecified obesity type  -     tirzepatide, weight loss, (ZEPBOUND) 2.5 mg/0.5 mL PnIj; Inject 2.5 mg into the skin every 7 days.    Abnormal finding of blood chemistry, unspecified  -     Hemoglobin A1C; Standing    History of DVT (deep vein thrombosis) lower extremity on 2 different occasions  -     apixaban (ELIQUIS) 5 mg Tab; Take 1 tablet (5 mg total) by mouth 2 (two) times daily.        Plan   The current medical regimen is effective;  continue present plan and medications.   Labs when fasting       Care Plan/Goals: Reviewed    Goals    None     Kay was seen today for weight loss.    Diagnoses and all orders for this visit:    Wellness examination    Hyperlipidemia, unspecified hyperlipidemia type  -     Lipid Panel; Standing  -     Comprehensive Metabolic Panel; Standing  -     tirzepatide, weight loss, (ZEPBOUND) 2.5 mg/0.5 mL PnIj; Inject 2.5 mg into the skin every 7 days.    Hypertriglyceridemia  -     CBC Auto Differential; Standing  -     tirzepatide, weight loss, (ZEPBOUND) 2.5 mg/0.5 mL PnIj; Inject 2.5 mg into the skin every 7 days.    Primary hypertension  -     T4, Free; Standing  -     TSH; Standing  -     Hemoglobin A1C; Standing    Class 3 severe obesity with serious comorbidity and body mass index (BMI) of 40.0 to 44.9 in adult, unspecified obesity type  -     tirzepatide, weight loss, (ZEPBOUND) 2.5 mg/0.5 mL PnIj; Inject 2.5 mg into the skin every 7 days.    Abnormal finding of blood chemistry, unspecified  -     Hemoglobin  A1C; Standing    History of DVT (deep vein thrombosis) lower extremity on 2 different occasions  -     apixaban (ELIQUIS) 5 mg Tab; Take 1 tablet (5 mg total) by mouth 2 (two) times daily.         Follow up: Follow up for follow up 6 months with Dr. Jefferson.    After visit summary was printed and given to patient upon discharge today.  Patient goals and care plan are included in After Visit Summary.

## 2024-06-13 ENCOUNTER — LAB VISIT (OUTPATIENT)
Dept: LAB | Facility: HOSPITAL | Age: 79
End: 2024-06-13
Attending: NURSE PRACTITIONER
Payer: MEDICARE

## 2024-06-13 DIAGNOSIS — D50.0 IRON DEFICIENCY ANEMIA DUE TO CHRONIC BLOOD LOSS: ICD-10-CM

## 2024-06-13 LAB
BASOPHILS # BLD AUTO: 0.04 K/UL (ref 0–0.2)
BASOPHILS NFR BLD: 0.9 % (ref 0–1.9)
DIFFERENTIAL METHOD BLD: NORMAL
EOSINOPHIL # BLD AUTO: 0.1 K/UL (ref 0–0.5)
EOSINOPHIL NFR BLD: 2.4 % (ref 0–8)
ERYTHROCYTE [DISTWIDTH] IN BLOOD BY AUTOMATED COUNT: 14.5 % (ref 11.5–14.5)
FERRITIN SERPL-MCNC: 245 NG/ML (ref 20–300)
HCT VFR BLD AUTO: 40.5 % (ref 37–48.5)
HGB BLD-MCNC: 13 G/DL (ref 12–16)
IMM GRANULOCYTES # BLD AUTO: 0.01 K/UL (ref 0–0.04)
IMM GRANULOCYTES NFR BLD AUTO: 0.2 % (ref 0–0.5)
IRON SERPL-MCNC: 73 UG/DL (ref 30–160)
LYMPHOCYTES # BLD AUTO: 1.3 K/UL (ref 1–4.8)
LYMPHOCYTES NFR BLD: 26.9 % (ref 18–48)
MCH RBC QN AUTO: 28.9 PG (ref 27–31)
MCHC RBC AUTO-ENTMCNC: 32.1 G/DL (ref 32–36)
MCV RBC AUTO: 90 FL (ref 82–98)
MONOCYTES # BLD AUTO: 0.4 K/UL (ref 0.3–1)
MONOCYTES NFR BLD: 7.8 % (ref 4–15)
NEUTROPHILS # BLD AUTO: 2.9 K/UL (ref 1.8–7.7)
NEUTROPHILS NFR BLD: 61.8 % (ref 38–73)
NRBC BLD-RTO: 0 /100 WBC
PLATELET # BLD AUTO: 213 K/UL (ref 150–450)
PMV BLD AUTO: 10.2 FL (ref 9.2–12.9)
RBC # BLD AUTO: 4.5 M/UL (ref 4–5.4)
SATURATED IRON: 20 % (ref 20–50)
TOTAL IRON BINDING CAPACITY: 373 UG/DL (ref 250–450)
TRANSFERRIN SERPL-MCNC: 252 MG/DL (ref 200–375)
WBC # BLD AUTO: 4.64 K/UL (ref 3.9–12.7)

## 2024-06-13 PROCEDURE — 85025 COMPLETE CBC W/AUTO DIFF WBC: CPT | Performed by: NURSE PRACTITIONER

## 2024-06-13 PROCEDURE — 36415 COLL VENOUS BLD VENIPUNCTURE: CPT | Performed by: NURSE PRACTITIONER

## 2024-06-13 PROCEDURE — 82728 ASSAY OF FERRITIN: CPT | Performed by: NURSE PRACTITIONER

## 2024-06-13 PROCEDURE — 83540 ASSAY OF IRON: CPT | Performed by: NURSE PRACTITIONER

## 2024-07-07 DIAGNOSIS — F33.0 MAJOR DEPRESSIVE DISORDER, RECURRENT, MILD: ICD-10-CM

## 2024-07-07 NOTE — TELEPHONE ENCOUNTER
No care due was identified.  [facility] Embedded Care Due Messages. Reference number: [id number].   7/07/2024 9:11:33 AM CDT

## 2024-07-08 RX ORDER — SERTRALINE HYDROCHLORIDE 100 MG/1
100 TABLET, FILM COATED ORAL
Qty: 90 TABLET | Refills: 1 | Status: SHIPPED | OUTPATIENT
Start: 2024-07-08

## 2024-07-08 NOTE — TELEPHONE ENCOUNTER
Refill Decision Note   Kay Rosas  is requesting a refill authorization.  Brief Assessment and Rationale for Refill:  Approve     Medication Therapy Plan:         Comments:     Note composed:7:10 AM 07/08/2024

## 2024-07-12 DIAGNOSIS — E78.5 HYPERLIPIDEMIA, UNSPECIFIED HYPERLIPIDEMIA TYPE: ICD-10-CM

## 2024-07-12 DIAGNOSIS — E66.01 CLASS 3 SEVERE OBESITY WITH SERIOUS COMORBIDITY AND BODY MASS INDEX (BMI) OF 40.0 TO 44.9 IN ADULT, UNSPECIFIED OBESITY TYPE: ICD-10-CM

## 2024-07-12 DIAGNOSIS — E78.1 HYPERTRIGLYCERIDEMIA: ICD-10-CM

## 2024-07-13 RX ORDER — TIRZEPATIDE 2.5 MG/.5ML
2.5 INJECTION, SOLUTION SUBCUTANEOUS
Qty: 12 PEN | Refills: 0 | OUTPATIENT
Start: 2024-07-13

## 2024-07-13 NOTE — TELEPHONE ENCOUNTER
Refill Routing Note   Medication(s) are not appropriate for processing by [facility] for the following reason(s):        No active prescription written by provider    [facility] action(s):  Defer               Appointments  past 12m or future 3m with PCP    Date Provider   Last Visit   10/12/2023 Vince Jefferson MD   Next Visit   12/16/2024 Vince Jefferson MD   ED visits in past 90 days: 0        Note composed:10:32 PM 07/12/2024

## 2024-07-13 NOTE — TELEPHONE ENCOUNTER
No care due was identified.  [facility] Embedded Care Due Messages. Reference number: [id number].   7/12/2024 10:07:57 PM CDT

## 2024-07-16 NOTE — TELEPHONE ENCOUNTER
Provider Staff:  Please note Refusal of medication.     Action required for this patient.      Requested Prescriptions     Refused Prescriptions Disp Refills    tirzepatide, weight loss, (ZEPBOUND) 2.5 mg/0.5 mL PnIj 12 Pen 0     Sig: Inject 2.5 mg into the skin every 7 days.     Refused By: LEA ARMSTRONG     Reason for Refusal: Patient needs an appointment      Thanks!  [facility]   Note composed: 07/16/2024 6:14 PM

## 2024-07-17 RX ORDER — TIRZEPATIDE 2.5 MG/.5ML
2.5 INJECTION, SOLUTION SUBCUTANEOUS
Qty: 4 PEN | Refills: 10 | Status: SHIPPED | OUTPATIENT
Start: 2024-07-17

## 2024-07-24 DIAGNOSIS — F51.01 PRIMARY INSOMNIA: ICD-10-CM

## 2024-07-24 RX ORDER — EZETIMIBE 10 MG/1
10 TABLET ORAL
Qty: 90 TABLET | Refills: 1 | Status: SHIPPED | OUTPATIENT
Start: 2024-07-24

## 2024-07-24 RX ORDER — TRAZODONE HYDROCHLORIDE 150 MG/1
150 TABLET ORAL NIGHTLY
Qty: 90 TABLET | Refills: 0 | Status: SHIPPED | OUTPATIENT
Start: 2024-07-24

## 2024-07-24 NOTE — TELEPHONE ENCOUNTER
Refill Decision Note   Kay Rosas  is requesting a refill authorization.  Brief Assessment and Rationale for Refill:  Approve     Medication Therapy Plan:         Comments:     Note composed:5:24 AM 07/24/2024

## 2024-07-24 NOTE — TELEPHONE ENCOUNTER
No care due was identified.  [facility] Embedded Care Due Messages. Reference number: [id number].   7/24/2024 12:33:30 AM CDT

## 2024-07-25 ENCOUNTER — OFFICE VISIT (OUTPATIENT)
Dept: DERMATOLOGY | Facility: CLINIC | Age: 79
End: 2024-07-25
Payer: MEDICARE

## 2024-07-25 DIAGNOSIS — L71.9 ROSACEA: ICD-10-CM

## 2024-07-25 DIAGNOSIS — L81.4 LENTIGO: ICD-10-CM

## 2024-07-25 DIAGNOSIS — L82.1 SEBORRHEIC KERATOSIS: ICD-10-CM

## 2024-07-25 DIAGNOSIS — L72.0 MILIA: ICD-10-CM

## 2024-07-25 DIAGNOSIS — L57.0 ACTINIC KERATOSES: Primary | ICD-10-CM

## 2024-07-25 DIAGNOSIS — D18.01 CHERRY ANGIOMA: ICD-10-CM

## 2024-07-25 PROCEDURE — 1159F MED LIST DOCD IN RCRD: CPT | Mod: CPTII,S$GLB,, | Performed by: DERMATOLOGY

## 2024-07-25 PROCEDURE — 17000 DESTRUCT PREMALG LESION: CPT | Mod: S$GLB,,, | Performed by: DERMATOLOGY

## 2024-07-25 PROCEDURE — 17003 DESTRUCT PREMALG LES 2-14: CPT | Mod: S$GLB,,, | Performed by: DERMATOLOGY

## 2024-07-25 PROCEDURE — 3288F FALL RISK ASSESSMENT DOCD: CPT | Mod: CPTII,S$GLB,, | Performed by: DERMATOLOGY

## 2024-07-25 PROCEDURE — 1126F AMNT PAIN NOTED NONE PRSNT: CPT | Mod: CPTII,S$GLB,, | Performed by: DERMATOLOGY

## 2024-07-25 PROCEDURE — 1160F RVW MEDS BY RX/DR IN RCRD: CPT | Mod: CPTII,S$GLB,, | Performed by: DERMATOLOGY

## 2024-07-25 PROCEDURE — 99999 PR PBB SHADOW E&M-EST. PATIENT-LVL III: CPT | Mod: PBBFAC,,, | Performed by: DERMATOLOGY

## 2024-07-25 PROCEDURE — 1101F PT FALLS ASSESS-DOCD LE1/YR: CPT | Mod: CPTII,S$GLB,, | Performed by: DERMATOLOGY

## 2024-07-25 PROCEDURE — 99204 OFFICE O/P NEW MOD 45 MIN: CPT | Mod: 25,S$GLB,, | Performed by: DERMATOLOGY

## 2024-07-25 RX ORDER — METRONIDAZOLE 7.5 MG/G
GEL TOPICAL 2 TIMES DAILY
Qty: 45 G | Refills: 5 | Status: SHIPPED | OUTPATIENT
Start: 2024-07-25 | End: 2025-07-25

## 2024-07-25 NOTE — PATIENT INSTRUCTIONS
OTC Sulfur products:  - Sulfo lo bar soap  - JoeSoef sulfur soap ([employer])      Face Skincare Recommendations for Rosacea:  Wash with lukewarm water and use soap-free cleansers that are pH balanced  Cleansers are applied gently with fingertips  Use sunscreens with both UVA and UVB protection and an SPF ?30  Sunscreens containing the inorganic filters titanium dioxide and/or zinc oxide are usually well tolerated  Use cosmetics and sunscreens that contain protective silicones  Water-soluble facial powder containing inert green pigment helps to neutralize the perception of erythema  Moisturizers containing humectants (e.g. glycerin) and occlusives (e.g. petrolatum) help to repair the epidermal barrier  Avoid astringents, toners, and abrasive exfoliators  Avoid cosmetics that contain alcohol, menthols, camphor, witch hazel, fragrance, peppermint, and eucalyptus oil  Avoid waterproof cosmetics and heavy foundations that are difficult to remove without irritating solvents or physical scrubbing  Avoid procedures such as glycolic peels or dermabrasion        Use a sunscreen with SPF of at least 30 daily, broad spectrum, with a physical (mineral) blocker (zinc, titanium). A tinted sunscreen would be best.    Chemical + Mineral Sunscreen  Not Tinted:  EltaMD UV Clear SPF 46 (good for acne prone skin; applies nicely under make up)  Tinted:  EltaMD UV Clear SPF 46 - Tinted   Revision Intellishade SPF 45 (original if dry; matte if oily; does not apply well under makeup)     Mineral Only Sunscreen  (good for very sensitive skin):   Not Tinted:  Cetaphil Sheer Mineral Face Liquid Sunscreen Broad Spectrum SPF 50  EltaMD UV Pure SPF 47  EltaMD UV Replenish Broad-Spectrum SPF 44  Blue Lizard Mineral Sunscreen  Neutrogena Sheer Zinc Dry Touch Suncreen SPF 50  La Roche Posay Anthelios Mineral Zinc Oxide Sunscreen SPF 50  Colorescience Sunforgettable Sport Stick SPF 50 (goes on white but becomes transparent)  Supergoop 100% Mineral  Sunscreen Stick SPF 50  ISDIN Eryfotona Actinica SPF 50  Tinted:  ColoreScience Sunforgettable Total Protection Face Shield SPF 50  Revision Intellishade TruPhysical SPF 45  EltaMD UV Physical SPF 41  EltaMD UV Elements SPF 44  La Roche Posay Anthelios SPF 50 Mineral Tinted Sunscreen  Alastin HydraTint Pro Mineral Broad Spectrum Sunscreen SPF 36 (may be too dark for very fair skin)        CRYOSURGERY      Your doctor has used a method called cryosurgery to treat your skin condition. Cryosurgery refers to the use of very cold substances to treat a variety of skin conditions such as warts, pre-skin cancers, molluscum contagiosum, sun spots, and several benign growths. The substance we use in cryosurgery is liquid nitrogen and is so cold (-195 degrees Celsius) that it burns when administered.     Following treatment in the office, the skin may immediately burn and become red. You may find the area around the lesion is affected as well. It is sometimes necessary to treat not only the lesion, but a small area of the surrounding normal skin to achieve a good response.     A blister, and even a blood filled blister, may form after treatment.   This is a normal response. If the blister is painful, it is acceptable to sterilize a needle with rubbing alcohol and gently pop the blister. It is important that you gently wash the area with soap and warm water as the blister fluid may contain wart virus if a wart was treated. Do not remove the roof of the blister.     The area treated can take anywhere from 1-3 weeks to heal. Healing time depends on the kind of skin lesion treated, the location, and how aggressively the lesion was treated. It is recommended that the areas treated are covered with Vaseline or bacitracin ointment and a band-aid. If a band-aid is not practical, just ointment applied several times per day will do. Keeping these areas moist will speed the healing time.    Treatment with liquid nitrogen can leave a  scar. In dark skin, it may be a light or dark scar, in light skin it may be a white or pink scar. These will generally fade with time, but may never go away completely.     If you have any concerns after your treatment, please feel free to call the office.       [facility] [place] [address]/ [phone] FAX/ [email or web address]

## 2024-07-25 NOTE — PROGRESS NOTES
Subjective:      Patient ID:  Kay Rosas is a 78 y.o. female who presents for   Chief Complaint   Patient presents with    Skin Check     Face and arms. No h/s of skin cancer.      History of Present Illness: The patient presents with chief complaint of spots.  Location: face  Duration: years  Signs/Symptoms: itchy spots; redness/dryness; change in shape of nose over time (feels like it looks like there is a bubble on the tip of her nose); sometimes red bumps or pus bumps; denies eye redness/pain; + dry eye (uses lubricating eye drops)    Prior treatments: none      Denies personal h/o skin cancer  Denies fam h/o skin cancer in 1st degree relative    Would like her face, arms, and back looked at          Review of Systems   Skin:  Negative for daily sunscreen use and activity-related sunscreen use.       Objective:   Physical Exam   Constitutional: She appears well-developed and well-nourished. No distress.   Neurological: She is alert and oriented to person, place, and time. She is not disoriented.   Psychiatric: She has a normal mood and affect.   Skin:   Areas Examined (abnormalities noted in diagram):   Scalp / Hair Palpated and Inspected  Head / Face Inspection Performed  Neck Inspection Performed  Chest / Axilla Inspection Performed  Abdomen Inspection Performed  Back Inspection Performed  RUE Inspected  LUE Inspection Performed                 Diagram Legend     Erythematous scaling macule/papule c/w actinic keratosis       Vascular papule c/w angioma      Pigmented verrucoid papule/plaque c/w seborrheic keratosis      Yellow umbilicated papule c/w sebaceous hyperplasia      Irregularly shaped tan macule c/w lentigo     1-2 mm smooth white papules consistent with Milia      Movable subcutaneous cyst with punctum c/w epidermal inclusion cyst      Subcutaneous movable cyst c/w pilar cyst      Firm pink to brown papule c/w dermatofibroma      Pedunculated fleshy papule(s) c/w skin tag(s)      Evenly  pigmented macule c/w junctional nevus     Mildly variegated pigmented, slightly irregular-bordered macule c/w mildly atypical nevus      Flesh colored to evenly pigmented papule c/w intradermal nevus       Pink pearly papule/plaque c/w basal cell carcinoma      Erythematous hyperkeratotic cursted plaque c/w SCC      Surgical scar with no sign of skin cancer recurrence      Open and closed comedones      Inflammatory papules and pustules      Verrucoid papule consistent consistent with wart     Erythematous eczematous patches and plaques     Dystrophic onycholytic nail with subungual debris c/w onychomycosis     Umbilicated papule    Erythematous-base heme-crusted tan verrucoid plaque consistent with inflamed seborrheic keratosis     Erythematous Silvery Scaling Plaque c/w Psoriasis     See annotation      Assessment / Plan:        Actinic keratoses  Cryosurgery Procedure Note    Verbal consent from the patient is obtained and the patient is aware of the precancerous quality and need for treatment of these lesions. Liquid nitrogen cryosurgery is applied to the 2 actinic keratoses, as detailed in the physical exam, to produce a freeze injury. The patient is aware that blisters may form and is instructed on wound care with gentle cleansing and use of vaseline ointment to keep moist until healed. The patient is supplied a handout on cryosurgery and is instructed to call if lesions do not completely resolve.    Patient instructed in importance in daily sun protection of at least spf 30. Mineral sunscreen ingredients preferred (Zinc +/- Titanium).   Patient encouraged to wear hat for all outdoor exposure.   Also discussed sun avoidance and use of protective clothing.    Rosacea  - +/- seb derm  - discussed trigger avoidance  -     metroNIDAZOLE (METROGEL) 0.75 % gel; Apply topically 2 (two) times daily.  Dispense: 45 g; Refill: 5  - consider [employer] Triple Cream, doxy    Seborrheic keratosis  These are benign  inherited growths without a malignant potential. Reassurance given to patient. No treatment is necessary.   Recommended OTC Amlactin BID     Milia  Reassurance given to patient. No treatment is necessary.   Treatment of benign, asymptomatic lesions may be considered cosmetic.    Lentigo  This is a benign hyperpigmented sun induced lesion. Daily sun protection will reduce the number of new lesions. Treatment of these benign lesions are considered cosmetic.    Cherry angioma  This is a benign vascular lesion. Reassurance given. No treatment required.              Follow up in about 3 months (around 10/25/2024).        LOS NUMBER AND COMPLEXITY OF PROBLEMS    COMPLEXITY OF DATA RISK TOTAL TIME (m)   02463  [id number] [] 1 self-limited or minor problem [x] Minimal to none [] No treatment recommended or patient to monitor. Reassurance.  15-29  10-19   47472  74745 Low  [] 2 or more self limited or minor problems  [] 1 stable chronic illness  [] 1 acute, uncomplicated illness or injury Limited (2)  [] Prior external notes from each unique source  [] Review result of each unique test  [] Order each unique test  OR [] Independent historian Low  []  OTC medications   []  Discussed/Decision for minor skin surgery (no risk factors) 30-44  20-29   97790  47169 Moderate  [x]  1 or more chronic unstable illness (not at goal or progression or exacerbation) or SE of treatment  []  2 or more stable chronic illnesses  []  1 acute illness with systemic symptoms  []  1 acute complicated injury  []  1 undiagnosed new problem with uncertain prognosis Moderate (1/3 below)  []  3 or more data items        *Now includes independent historian  []  Independent interpretation of a test  []  Discuss management/test with another provider Moderate  [x]  Prescription drug mgmt  []  Discussed/Decision for Minor surgery with risk factors  []  Mgmt limited by social determinates 45-59  30-39   42670  65278 High  []  1 or more chronic illness with severe  exacerbation, progression or SE of treatment  []  1 acute or chronic illness/injury that poses a threat to life or bodily function Extensive (2/3 below)  []  3 or more data items        *Now includes independent historian.  []  Independent interpretation of a test  []  Discuss management/test with another provider High  []  Major surgery with risk discussed  []  Drug therapy requiring intensive monitoring for toxicity  []  Hospitalization  []  Decision for DNR 60-74  40-54

## 2024-07-30 ENCOUNTER — PATIENT MESSAGE (OUTPATIENT)
Dept: OPHTHALMOLOGY | Facility: CLINIC | Age: 79
End: 2024-07-30

## 2024-07-30 ENCOUNTER — OFFICE VISIT (OUTPATIENT)
Dept: OPHTHALMOLOGY | Facility: CLINIC | Age: 79
End: 2024-07-30
Payer: MEDICARE

## 2024-07-30 DIAGNOSIS — E11.9 DIABETES MELLITUS TYPE 2 WITHOUT RETINOPATHY: ICD-10-CM

## 2024-07-30 DIAGNOSIS — Z96.1 PSEUDOPHAKIA OF BOTH EYES: Primary | ICD-10-CM

## 2024-07-30 DIAGNOSIS — H52.7 REFRACTIVE ERRORS: ICD-10-CM

## 2024-07-30 PROCEDURE — 92004 COMPRE OPH EXAM NEW PT 1/>: CPT | Mod: S$GLB,,, | Performed by: OPTOMETRIST

## 2024-07-30 PROCEDURE — 92015 DETERMINE REFRACTIVE STATE: CPT | Mod: S$GLB,,, | Performed by: OPTOMETRIST

## 2024-07-30 PROCEDURE — 2023F DILAT RTA XM W/O RTNOPTHY: CPT | Mod: CPTII,S$GLB,, | Performed by: OPTOMETRIST

## 2024-07-30 PROCEDURE — 99999 PR PBB SHADOW E&M-EST. PATIENT-LVL III: CPT | Mod: PBBFAC,,, | Performed by: OPTOMETRIST

## 2024-07-30 PROCEDURE — 1159F MED LIST DOCD IN RCRD: CPT | Mod: CPTII,S$GLB,, | Performed by: OPTOMETRIST

## 2024-07-30 NOTE — PROGRESS NOTES
SUBJECTIVE  Kay Rosas is 78 y.o. female  Uncorrected distance visual acuity was 20/40 in the right eye and 20/40 in the left eye.   Chief Complaint   Patient presents with    Diabetic Eye Exam     Lab Results       Component                Value               Date                       HGBA1C                   5.8                 03/06/2023                    HPI     Diabetic Eye Exam     Additional comments: Lab Results       Component                Value               Date                       HGBA1C                   5.8                 03/06/2023                     Comments    Patient notice a change with near vision.  Trouble with dry eyes, using otc drops prn.   Wear otc readers +2.50 but did not bring them today.           Last edited by Re Perez on 7/30/2024  2:25 PM.         Assessment /Plan :  1. Pseudophakia of both eyes   Well-centered, stable IOL OU. Monitor annually.      2. Diabetes mellitus type 2 without retinopathy   No Background Diabetic Retinopathy  Strict BG control, f/u w/ PCP, and annual DFE  Stressed importance of DM control to preserve vision      3. Refractive errors   Dispense Final Rx for glasses.  RTC 1 year  Discussed above and answered questions.

## 2024-08-02 ENCOUNTER — TELEPHONE (OUTPATIENT)
Dept: ORTHOPEDICS | Facility: CLINIC | Age: 79
End: 2024-08-02
Payer: MEDICARE

## 2024-08-02 NOTE — TELEPHONE ENCOUNTER
Spoke with the patient in regards to their appointment on 09/30/24 with Dr. Urena. Informed the patient that I need to reschedule their appointment due to the fact that Dr. Urena will be out of the office for two weeks. I got the patient reschedule for 11/25/24 at 9:20. Verbalized understanding.

## 2024-08-06 ENCOUNTER — OFFICE VISIT (OUTPATIENT)
Dept: FAMILY MEDICINE | Facility: CLINIC | Age: 79
End: 2024-08-06
Payer: MEDICARE

## 2024-08-06 ENCOUNTER — HOSPITAL ENCOUNTER (OUTPATIENT)
Dept: RADIOLOGY | Facility: HOSPITAL | Age: 79
Discharge: HOME | End: 2024-08-06
Attending: INTERNAL MEDICINE
Payer: MEDICARE

## 2024-08-06 VITALS
OXYGEN SATURATION: 95 % | BODY MASS INDEX: 40.99 KG/M2 | DIASTOLIC BLOOD PRESSURE: 64 MMHG | TEMPERATURE: 98 F | HEIGHT: 66 IN | RESPIRATION RATE: 18 BRPM | HEART RATE: 103 BPM | WEIGHT: 255.06 LBS | SYSTOLIC BLOOD PRESSURE: 114 MMHG

## 2024-08-06 DIAGNOSIS — M25.512 ACUTE PAIN OF BOTH SHOULDERS: Primary | ICD-10-CM

## 2024-08-06 DIAGNOSIS — M25.511 ACUTE PAIN OF BOTH SHOULDERS: Primary | ICD-10-CM

## 2024-08-06 DIAGNOSIS — M25.512 ACUTE PAIN OF BOTH SHOULDERS: ICD-10-CM

## 2024-08-06 DIAGNOSIS — E03.9 HYPOTHYROIDISM, UNSPECIFIED TYPE: ICD-10-CM

## 2024-08-06 DIAGNOSIS — M25.511 ACUTE PAIN OF BOTH SHOULDERS: ICD-10-CM

## 2024-08-06 DIAGNOSIS — M79.622 PAIN IN BOTH UPPER ARMS: ICD-10-CM

## 2024-08-06 DIAGNOSIS — M54.9 UPPER BACK PAIN: ICD-10-CM

## 2024-08-06 DIAGNOSIS — M79.621 PAIN IN BOTH UPPER ARMS: ICD-10-CM

## 2024-08-06 PROBLEM — R73.03 PREDIABETES: Status: ACTIVE | Noted: 2024-08-06

## 2024-08-06 PROBLEM — E78.5 DYSLIPIDEMIA: Status: ACTIVE | Noted: 2024-08-06

## 2024-08-06 PROCEDURE — 99214 OFFICE O/P EST MOD 30 MIN: CPT | Mod: S$GLB,,, | Performed by: INTERNAL MEDICINE

## 2024-08-06 PROCEDURE — 3074F SYST BP LT 130 MM HG: CPT | Mod: CPTII,S$GLB,, | Performed by: INTERNAL MEDICINE

## 2024-08-06 PROCEDURE — 1159F MED LIST DOCD IN RCRD: CPT | Mod: CPTII,S$GLB,, | Performed by: INTERNAL MEDICINE

## 2024-08-06 PROCEDURE — 99999 PR PBB SHADOW E&M-EST. PATIENT-LVL IV: CPT | Mod: PBBFAC,,, | Performed by: INTERNAL MEDICINE

## 2024-08-06 PROCEDURE — 3078F DIAST BP <80 MM HG: CPT | Mod: CPTII,S$GLB,, | Performed by: INTERNAL MEDICINE

## 2024-08-06 PROCEDURE — 73030 X-RAY EXAM OF SHOULDER: CPT | Mod: 26,50,, | Performed by: RADIOLOGY

## 2024-08-06 PROCEDURE — 73030 X-RAY EXAM OF SHOULDER: CPT | Mod: TC,50,FY,PO

## 2024-08-06 PROCEDURE — 1125F AMNT PAIN NOTED PAIN PRSNT: CPT | Mod: CPTII,S$GLB,, | Performed by: INTERNAL MEDICINE

## 2024-08-06 RX ORDER — TRAMADOL HYDROCHLORIDE 50 MG/1
50 TABLET ORAL 2 TIMES DAILY PRN
Qty: 14 EACH | Refills: 0 | Status: SHIPPED | OUTPATIENT
Start: 2024-08-06 | End: 2024-08-13

## 2024-08-08 ENCOUNTER — PATIENT MESSAGE (OUTPATIENT)
Dept: FAMILY MEDICINE | Facility: CLINIC | Age: 79
End: 2024-08-08
Payer: MEDICARE

## 2024-08-08 DIAGNOSIS — M25.511 ACUTE PAIN OF BOTH SHOULDERS: ICD-10-CM

## 2024-08-08 DIAGNOSIS — M25.512 ACUTE PAIN OF BOTH SHOULDERS: ICD-10-CM

## 2024-08-08 DIAGNOSIS — R70.0 ESR RAISED: Primary | ICD-10-CM

## 2024-08-08 RX ORDER — PREDNISONE 10 MG/1
10 TABLET ORAL DAILY
Qty: 5 TABLET | Refills: 0 | Status: SHIPPED | OUTPATIENT
Start: 2024-08-08 | End: 2024-08-09

## 2024-08-09 ENCOUNTER — LAB VISIT (OUTPATIENT)
Dept: LAB | Facility: HOSPITAL | Age: 79
End: 2024-08-09
Attending: STUDENT IN AN ORGANIZED HEALTH CARE EDUCATION/TRAINING PROGRAM
Payer: MEDICARE

## 2024-08-09 ENCOUNTER — OFFICE VISIT (OUTPATIENT)
Dept: RHEUMATOLOGY | Facility: CLINIC | Age: 79
End: 2024-08-09
Payer: MEDICARE

## 2024-08-09 VITALS
DIASTOLIC BLOOD PRESSURE: 78 MMHG | BODY MASS INDEX: 41.2 KG/M2 | HEART RATE: 94 BPM | HEIGHT: 66 IN | WEIGHT: 256.38 LBS | SYSTOLIC BLOOD PRESSURE: 153 MMHG

## 2024-08-09 DIAGNOSIS — Z79.899 IMMUNODEFICIENCY DUE TO DRUG THERAPY: ICD-10-CM

## 2024-08-09 DIAGNOSIS — R70.0 ESR RAISED: ICD-10-CM

## 2024-08-09 DIAGNOSIS — D84.821 IMMUNODEFICIENCY DUE TO DRUG THERAPY: ICD-10-CM

## 2024-08-09 DIAGNOSIS — D84.821 ENCOUNTER FOR MONITORING IMMUNOSUPPRESSIVE MEDICATION THERAPY CAUSING IMMUNODEFICIENCY: ICD-10-CM

## 2024-08-09 DIAGNOSIS — M35.3 POLYMYALGIA RHEUMATICA: ICD-10-CM

## 2024-08-09 DIAGNOSIS — Z51.81 ENCOUNTER FOR MONITORING IMMUNOSUPPRESSIVE MEDICATION THERAPY CAUSING IMMUNODEFICIENCY: ICD-10-CM

## 2024-08-09 DIAGNOSIS — Z79.60 ENCOUNTER FOR MONITORING IMMUNOSUPPRESSIVE MEDICATION THERAPY CAUSING IMMUNODEFICIENCY: ICD-10-CM

## 2024-08-09 DIAGNOSIS — M35.3 POLYMYALGIA RHEUMATICA: Primary | ICD-10-CM

## 2024-08-09 PROBLEM — C56.2 MALIGNANT NEOPLASM OF LEFT OVARY: Status: RESOLVED | Noted: 2023-07-26 | Resolved: 2024-08-09

## 2024-08-09 LAB
ALBUMIN SERPL BCP-MCNC: 4.1 G/DL (ref 3.5–5.2)
ALP SERPL-CCNC: 69 U/L (ref 55–135)
ALT SERPL W/O P-5'-P-CCNC: 14 U/L (ref 10–44)
ANION GAP SERPL CALC-SCNC: 11 MMOL/L (ref 8–16)
AST SERPL-CCNC: 16 U/L (ref 10–40)
BASOPHILS # BLD AUTO: 0.03 K/UL (ref 0–0.2)
BASOPHILS NFR BLD: 0.4 % (ref 0–1.9)
BILIRUB SERPL-MCNC: 0.4 MG/DL (ref 0.1–1)
BUN SERPL-MCNC: 22 MG/DL (ref 8–23)
CALCIUM SERPL-MCNC: 10.3 MG/DL (ref 8.7–10.5)
CHLORIDE SERPL-SCNC: 102 MMOL/L (ref 95–110)
CO2 SERPL-SCNC: 27 MMOL/L (ref 23–29)
CREAT SERPL-MCNC: 1.5 MG/DL (ref 0.5–1.4)
CRP SERPL-MCNC: 6.2 MG/L (ref 0–8.2)
DIFFERENTIAL METHOD BLD: ABNORMAL
EOSINOPHIL # BLD AUTO: 0 K/UL (ref 0–0.5)
EOSINOPHIL NFR BLD: 0.3 % (ref 0–8)
ERYTHROCYTE [DISTWIDTH] IN BLOOD BY AUTOMATED COUNT: 13.6 % (ref 11.5–14.5)
ERYTHROCYTE [SEDIMENTATION RATE] IN BLOOD BY PHOTOMETRIC METHOD: 58 MM/HR (ref 0–36)
EST. GFR  (NO RACE VARIABLE): 35.7 ML/MIN/1.73 M^2
GLUCOSE SERPL-MCNC: 100 MG/DL (ref 70–110)
HBV CORE AB SERPL QL IA: NORMAL
HCT VFR BLD AUTO: 38 % (ref 37–48.5)
HGB BLD-MCNC: 12.1 G/DL (ref 12–16)
IMM GRANULOCYTES # BLD AUTO: 0.02 K/UL (ref 0–0.04)
IMM GRANULOCYTES NFR BLD AUTO: 0.3 % (ref 0–0.5)
LYMPHOCYTES # BLD AUTO: 0.8 K/UL (ref 1–4.8)
LYMPHOCYTES NFR BLD: 10.9 % (ref 18–48)
MCH RBC QN AUTO: 29.5 PG (ref 27–31)
MCHC RBC AUTO-ENTMCNC: 31.8 G/DL (ref 32–36)
MCV RBC AUTO: 93 FL (ref 82–98)
MONOCYTES # BLD AUTO: 0.3 K/UL (ref 0.3–1)
MONOCYTES NFR BLD: 4 % (ref 4–15)
NEUTROPHILS # BLD AUTO: 6.2 K/UL (ref 1.8–7.7)
NEUTROPHILS NFR BLD: 84.1 % (ref 38–73)
NRBC BLD-RTO: 0 /100 WBC
PLATELET # BLD AUTO: 216 K/UL (ref 150–450)
PMV BLD AUTO: 12.4 FL (ref 9.2–12.9)
POTASSIUM SERPL-SCNC: 4.4 MMOL/L (ref 3.5–5.1)
PROT SERPL-MCNC: 8.1 G/DL (ref 6–8.4)
RBC # BLD AUTO: 4.1 M/UL (ref 4–5.4)
SODIUM SERPL-SCNC: 140 MMOL/L (ref 136–145)
WBC # BLD AUTO: 7.32 K/UL (ref 3.9–12.7)

## 2024-08-09 PROCEDURE — 36415 COLL VENOUS BLD VENIPUNCTURE: CPT | Performed by: STUDENT IN AN ORGANIZED HEALTH CARE EDUCATION/TRAINING PROGRAM

## 2024-08-09 PROCEDURE — 87340 HEPATITIS B SURFACE AG IA: CPT | Performed by: STUDENT IN AN ORGANIZED HEALTH CARE EDUCATION/TRAINING PROGRAM

## 2024-08-09 PROCEDURE — 80053 COMPREHEN METABOLIC PANEL: CPT | Performed by: STUDENT IN AN ORGANIZED HEALTH CARE EDUCATION/TRAINING PROGRAM

## 2024-08-09 PROCEDURE — 99999 PR PBB SHADOW E&M-EST. PATIENT-LVL IV: CPT | Mod: PBBFAC,,, | Performed by: STUDENT IN AN ORGANIZED HEALTH CARE EDUCATION/TRAINING PROGRAM

## 2024-08-09 PROCEDURE — 86140 C-REACTIVE PROTEIN: CPT | Performed by: STUDENT IN AN ORGANIZED HEALTH CARE EDUCATION/TRAINING PROGRAM

## 2024-08-09 PROCEDURE — 86480 TB TEST CELL IMMUN MEASURE: CPT | Performed by: STUDENT IN AN ORGANIZED HEALTH CARE EDUCATION/TRAINING PROGRAM

## 2024-08-09 PROCEDURE — 85025 COMPLETE CBC W/AUTO DIFF WBC: CPT | Performed by: STUDENT IN AN ORGANIZED HEALTH CARE EDUCATION/TRAINING PROGRAM

## 2024-08-09 PROCEDURE — 86704 HEP B CORE ANTIBODY TOTAL: CPT | Performed by: STUDENT IN AN ORGANIZED HEALTH CARE EDUCATION/TRAINING PROGRAM

## 2024-08-09 PROCEDURE — 85652 RBC SED RATE AUTOMATED: CPT | Performed by: STUDENT IN AN ORGANIZED HEALTH CARE EDUCATION/TRAINING PROGRAM

## 2024-08-09 RX ORDER — FOLIC ACID 1 MG/1
1 TABLET ORAL DAILY
Qty: 90 TABLET | Refills: 3 | Status: SHIPPED | OUTPATIENT
Start: 2024-08-09

## 2024-08-09 RX ORDER — PREDNISONE 5 MG/1
TABLET ORAL
Qty: 157 TABLET | Refills: 0 | Status: SHIPPED | OUTPATIENT
Start: 2024-08-09 | End: 2024-10-11

## 2024-08-09 RX ORDER — METHOTREXATE 2.5 MG/1
10 TABLET ORAL
Qty: 16 TABLET | Refills: 3 | Status: SHIPPED | OUTPATIENT
Start: 2024-08-09

## 2024-08-10 LAB — HBV SURFACE AG SERPL QL IA: NORMAL

## 2024-08-12 LAB
GAMMA INTERFERON BACKGROUND BLD IA-ACNC: 0 IU/ML
M TB IFN-G CD4+ BCKGRND COR BLD-ACNC: 0 IU/ML
M TB IFN-G CD4+ BCKGRND COR BLD-ACNC: 0 IU/ML
MITOGEN IGNF BCKGRD COR BLD-ACNC: 5.08 IU/ML
TB GOLD PLUS: NEGATIVE

## 2024-08-20 ENCOUNTER — OFFICE VISIT (OUTPATIENT)
Dept: FAMILY MEDICINE | Facility: CLINIC | Age: 79
End: 2024-08-20
Payer: MEDICARE

## 2024-08-20 VITALS
RESPIRATION RATE: 18 BRPM | HEART RATE: 86 BPM | HEIGHT: 66 IN | OXYGEN SATURATION: 95 % | BODY MASS INDEX: 41.31 KG/M2 | SYSTOLIC BLOOD PRESSURE: 138 MMHG | WEIGHT: 257.06 LBS | TEMPERATURE: 99 F | DIASTOLIC BLOOD PRESSURE: 66 MMHG

## 2024-08-20 DIAGNOSIS — E78.5 DYSLIPIDEMIA: ICD-10-CM

## 2024-08-20 DIAGNOSIS — T46.6X5A STATIN MYOPATHY: ICD-10-CM

## 2024-08-20 DIAGNOSIS — Z86.718 HISTORY OF DVT (DEEP VEIN THROMBOSIS): ICD-10-CM

## 2024-08-20 DIAGNOSIS — M54.41 CHRONIC BILATERAL LOW BACK PAIN WITH RIGHT-SIDED SCIATICA: ICD-10-CM

## 2024-08-20 DIAGNOSIS — G72.0 STATIN MYOPATHY: ICD-10-CM

## 2024-08-20 DIAGNOSIS — R73.03 PREDIABETES: ICD-10-CM

## 2024-08-20 DIAGNOSIS — Z79.01 CHRONIC ANTICOAGULATION: ICD-10-CM

## 2024-08-20 DIAGNOSIS — J44.9 COPD, MODERATE: ICD-10-CM

## 2024-08-20 DIAGNOSIS — N25.81 SECONDARY HYPERPARATHYROIDISM OF RENAL ORIGIN: ICD-10-CM

## 2024-08-20 DIAGNOSIS — G89.29 CHRONIC BILATERAL LOW BACK PAIN WITH RIGHT-SIDED SCIATICA: ICD-10-CM

## 2024-08-20 DIAGNOSIS — F33.0 MAJOR DEPRESSIVE DISORDER, RECURRENT, MILD: ICD-10-CM

## 2024-08-20 DIAGNOSIS — E78.5 HYPERLIPIDEMIA, UNSPECIFIED HYPERLIPIDEMIA TYPE: ICD-10-CM

## 2024-08-20 DIAGNOSIS — M35.3 PMR (POLYMYALGIA RHEUMATICA): ICD-10-CM

## 2024-08-20 DIAGNOSIS — Z12.31 ENCOUNTER FOR SCREENING MAMMOGRAM FOR BREAST CANCER: ICD-10-CM

## 2024-08-20 DIAGNOSIS — E66.01 CLASS 3 SEVERE OBESITY DUE TO EXCESS CALORIES WITH SERIOUS COMORBIDITY AND BODY MASS INDEX (BMI) OF 40.0 TO 44.9 IN ADULT: ICD-10-CM

## 2024-08-20 DIAGNOSIS — E03.9 ACQUIRED HYPOTHYROIDISM: Primary | ICD-10-CM

## 2024-08-20 DIAGNOSIS — I10 PRIMARY HYPERTENSION: ICD-10-CM

## 2024-08-20 DIAGNOSIS — N18.32 STAGE 3B CHRONIC KIDNEY DISEASE: ICD-10-CM

## 2024-08-20 PROCEDURE — 1126F AMNT PAIN NOTED NONE PRSNT: CPT | Mod: CPTII,S$GLB,, | Performed by: INTERNAL MEDICINE

## 2024-08-20 PROCEDURE — 3078F DIAST BP <80 MM HG: CPT | Mod: CPTII,S$GLB,, | Performed by: INTERNAL MEDICINE

## 2024-08-20 PROCEDURE — 1159F MED LIST DOCD IN RCRD: CPT | Mod: CPTII,S$GLB,, | Performed by: INTERNAL MEDICINE

## 2024-08-20 PROCEDURE — 3075F SYST BP GE 130 - 139MM HG: CPT | Mod: CPTII,S$GLB,, | Performed by: INTERNAL MEDICINE

## 2024-08-20 PROCEDURE — 99214 OFFICE O/P EST MOD 30 MIN: CPT | Mod: S$GLB,,, | Performed by: INTERNAL MEDICINE

## 2024-08-20 PROCEDURE — G2211 COMPLEX E/M VISIT ADD ON: HCPCS | Mod: S$GLB,,, | Performed by: INTERNAL MEDICINE

## 2024-08-20 PROCEDURE — 99999 PR PBB SHADOW E&M-EST. PATIENT-LVL V: CPT | Mod: PBBFAC,,, | Performed by: INTERNAL MEDICINE

## 2024-08-20 RX ORDER — TIRZEPATIDE 2.5 MG/.5ML
2.5 INJECTION, SOLUTION SUBCUTANEOUS
Qty: 4 PEN | Refills: 4 | Status: SHIPPED | OUTPATIENT
Start: 2024-08-20

## 2024-08-20 NOTE — PROGRESS NOTES
Subjective:       Patient ID: Kay Rosas is a 78 y.o. female.    Chief Complaint: Follow-up, Hypertension, Hyperlipidemia, Hypothyroidism, pmr, and Anticoagulation    F/u PMR---seen by rheumatology----much better --on prednisone, methotrexate .    Follow-up  Associated symptoms include arthralgias and myalgias. Pertinent negatives include no abdominal pain, chest pain, chills, congestion, coughing, diaphoresis, fatigue, fever, headaches, joint swelling, nausea, neck pain, numbness, rash, sore throat, vomiting or weakness.   Hypertension  Pertinent negatives include no chest pain, headaches, neck pain, palpitations or shortness of breath.   Hyperlipidemia  Associated symptoms include myalgias. Pertinent negatives include no chest pain or shortness of breath.     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    MVP (mitral valve prolapse)     On home oxygen therapy     3 liters as needed at night    Osteoporosis     feet    Thyroid condition      Past Surgical History:   Procedure Laterality Date    BACK SURGERY  2012    CATARACT EXTRACTION Bilateral 10/2012    EYE SURGERY  cataract    HYSTERECTOMY Left 1978    INJECTION OF ANESTHETIC AGENT INTO SACROILIAC JOINT Bilateral 11/11/2019    Procedure: Bilateral GT bursa + SIJ injection;  Surgeon: Noe Pleitez MD;  Location: [facility];  Service: Pain Management;  Laterality: Bilateral;    INJECTION OF JOINT Bilateral 11/11/2019    Procedure: Bilateral GT bursa + SIJ injection;  Surgeon: Noe Pleitez MD;  Location: [facility];  Service: Pain Management;  Laterality: Bilateral;    JOINT  REPLACEMENT Right     knee    ROBOT-ASSISTED LAPAROSCOPIC LYSIS OF ADHESIONS USING DA SP XI N/A 2022    Procedure: XI ROBOTIC LYSIS, ADHESIONS;  Surgeon: Den Barragan MD;  Location: [facility] OR;  Service: Oncology;  Laterality: N/A;    ROBOT-ASSISTED LAPAROSCOPIC SALPINGO-OOPHORECTOMY USING DA SP XI Left 2022    Procedure: XI ROBOTIC SALPINGO-OOPHORECTOMY;  Surgeon: Den Barragan MD;  Location: [facility] OR;  Service: Oncology;  Laterality: Left;    SURGICAL REMOVAL OF BONE SPUR Left     left elbow    TOTAL KNEE ARTHROPLASTY Left 2024    Procedure: ARTHROPLASTY, KNEE, TOTAL;  Surgeon: Terrance Urena MD;  Location: [facility] OR;  Service: Orthopedics;  Laterality: Left;    VASCULAR SURGERY Right     high ligation due to blood clot      Family History   Problem Relation Name Age of Onset    Heart disease Mother Loida Moura     Kidney disease Mother Loida Muora     Arthritis Mother Loida Moura     Breast cancer Mother Loida Moura     Cancer Father ALLAN Brand     Heart disease Father ALLAN Brand     Cancer Sister Lung cancer     Ovarian cancer Sister Lung cancer     Alzheimer's disease Sister       Social History     Socioeconomic History    Marital status:    Tobacco Use    Smoking status: Former     Current packs/day: 0.00     Average packs/day: 1 pack/day for 53.5 years (53.5 ttl pk-yrs)     Types: Cigarettes     Start date: 1962     Quit date: 2015     Years since quittin.1    Smokeless tobacco: Never   Substance and Sexual Activity    Alcohol use: No    Drug use: No    Sexual activity: Yes     Partners: Male     Birth control/protection: None     Social Determinants of Health     Financial Resource Strain: High Risk (2024)    Overall Financial Resource Strain (CARDIA)     Difficulty of Paying Living Expenses: Hard   Food Insecurity: Food Insecurity Present (2024)    Hunger Vital Sign     Worried About Running Out of Food in the Last Year:  Sometimes true     Ran Out of Food in the Last Year: Sometimes true   Transportation Needs: No Transportation Needs (4/9/2024)    PRAPARE - Transportation     Lack of Transportation (Medical): No     Lack of Transportation (Non-Medical): No   Physical Activity: Insufficiently Active (4/9/2024)    Exercise Vital Sign     Days of Exercise per Week: 2 days     Minutes of Exercise per Session: 30 min   Stress: Patient Declined (4/9/2024)    [employer] [place] [employer] - Occupational Stress Questionnaire     Feeling of Stress : Patient declined   Housing Stability: Low Risk  (4/9/2024)    Housing Stability Vital Sign     Unable to Pay for Housing in the Last Year: No     Number of Places Lived in the Last Year: 1     Unstable Housing in the Last Year: No     Review of Systems   Constitutional:  Negative for activity change, appetite change, chills, diaphoresis, fatigue, fever and unexpected weight change.   HENT:  Negative for congestion, drooling, ear discharge, ear pain, facial swelling, hearing loss, mouth sores, nosebleeds, postnasal drip, rhinorrhea, sinus pressure, sneezing, sore throat, tinnitus, trouble swallowing and voice change.    Eyes:  Negative for photophobia, redness and visual disturbance.   Respiratory:  Negative for apnea, cough, choking, chest tightness, shortness of breath and wheezing.    Cardiovascular:  Negative for chest pain, palpitations and leg swelling.   Gastrointestinal:  Negative for abdominal distention, abdominal pain, blood in stool, constipation, diarrhea, nausea and vomiting.   Endocrine: Negative for cold intolerance, heat intolerance, polydipsia, polyphagia and polyuria.   Genitourinary:  Negative for decreased urine volume, difficulty urinating, dysuria, flank pain, frequency, genital sores, hematuria, pelvic pain and urgency.   Musculoskeletal:  Positive for arthralgias and myalgias. Negative for back pain, gait problem, joint swelling, neck pain and neck stiffness.    Skin:  Negative for color change, pallor, rash and wound.   Allergic/Immunologic: Negative for food allergies and immunocompromised state.   Neurological:  Negative for dizziness, tremors, seizures, syncope, speech difficulty, weakness, light-headedness, numbness and headaches.   Hematological:  Negative for adenopathy. Does not bruise/bleed easily.   Psychiatric/Behavioral:  Negative for agitation, behavioral problems, confusion, decreased concentration, dysphoric mood, hallucinations, self-injury, sleep disturbance and suicidal ideas. The patient is not nervous/anxious and is not hyperactive.    All other systems reviewed and are negative.      Objective:      Physical Exam  Vitals and nursing note reviewed.   Constitutional:       General: She is not in acute distress.     Appearance: Normal appearance. She is well-developed. She is not diaphoretic.   HENT:      Head: Normocephalic and atraumatic.      Mouth/Throat:      Pharynx: No oropharyngeal exudate.   Eyes:      General: No scleral icterus.  Neck:      Thyroid: No thyromegaly.      Vascular: No carotid bruit or JVD.      Trachea: No tracheal deviation.   Cardiovascular:      Rate and Rhythm: Normal rate and regular rhythm.      Heart sounds: Normal heart sounds.   Pulmonary:      Effort: Pulmonary effort is normal. No respiratory distress.      Breath sounds: Normal breath sounds. No wheezing or rales.   Chest:      Chest wall: No tenderness.   Abdominal:      General: Bowel sounds are normal. There is no distension.      Palpations: Abdomen is soft.      Tenderness: There is no abdominal tenderness. There is no guarding or rebound.   Musculoskeletal:         General: No tenderness. Normal range of motion.      Cervical back: Normal range of motion and neck supple.   Lymphadenopathy:      Cervical: No cervical adenopathy.   Skin:     General: Skin is warm and dry.      Coloration: Skin is not pale.      Findings: No erythema or rash.   Neurological:       Mental Status: She is alert and oriented to person, place, and time.      Cranial Nerves: No cranial nerve deficit.      Coordination: Coordination normal.   Psychiatric:         Behavior: Behavior normal.         Thought Content: Thought content normal.         Judgment: Judgment normal.         CMP  Sodium   Date Value Ref Range Status   08/09/2024 140 136 - 145 mmol/L Final     Potassium   Date Value Ref Range Status   08/09/2024 4.4 3.5 - 5.1 mmol/L Final     Chloride   Date Value Ref Range Status   08/09/2024 102 95 - 110 mmol/L Final     CO2   Date Value Ref Range Status   08/09/2024 27 23 - 29 mmol/L Final     Glucose   Date Value Ref Range Status   08/09/2024 100 70 - 110 mg/dL Final     BUN   Date Value Ref Range Status   08/09/2024 22 8 - 23 mg/dL Final     Creatinine   Date Value Ref Range Status   08/09/2024 1.5 (H) 0.5 - 1.4 mg/dL Final     Calcium   Date Value Ref Range Status   08/09/2024 10.3 8.7 - 10.5 mg/dL Final     Total Protein   Date Value Ref Range Status   08/09/2024 8.1 6.0 - 8.4 g/dL Final     Albumin   Date Value Ref Range Status   08/09/2024 4.1 3.5 - 5.2 g/dL Final     Total Bilirubin   Date Value Ref Range Status   08/09/2024 0.4 0.1 - 1.0 mg/dL Final     Comment:     For infants and newborns, interpretation of results should be based  on gestational age, weight and in agreement with clinical  observations.    Premature Infant recommended reference ranges:  Up to 24 hours.............<8.0 mg/dL  Up to 48 hours............<12.0 mg/dL  3-5 days..................<15.0 mg/dL  6-29 days.................<15.0 mg/dL       Alkaline Phosphatase   Date Value Ref Range Status   08/09/2024 69 55 - 135 U/L Final     AST   Date Value Ref Range Status   08/09/2024 16 10 - 40 U/L Final     ALT   Date Value Ref Range Status   08/09/2024 14 10 - 44 U/L Final     Anion Gap   Date Value Ref Range Status   08/09/2024 11 8 - 16 mmol/L Final     eGFR if    Date Value Ref Range Status    07/08/2022 34 (A) >60 mL/min/1.73 m^2 Final     eGFR    Date Value Ref Range Status   03/06/2023 31 mL/min/1.73mSq Final     Comment:     In accordance with NKF-ASN Task Force recommendation, calculation based on the Chronic Kidney Disease Epidemiology Collaboration (CKD-EPI) equation without adjustment for race. eGFR adjusted for gender and age and calculated in ml/min/1.73mSquared. eGFR cannot be calculated if patient is under 18 years of age.     Reference Range:   >= 60 ml/min/1.73mSquared.     eGFR if non    Date Value Ref Range Status   07/08/2022 29 (A) >60 mL/min/1.73 m^2 Final     Comment:     Calculation used to obtain the estimated glomerular filtration  rate (eGFR) is the CKD-EPI equation.        Lab Results   Component Value Date    WBC 7.32 08/09/2024    HGB 12.1 08/09/2024    HCT 38.0 08/09/2024    MCV 93 08/09/2024     08/09/2024     Lab Results   Component Value Date    CHOL 201 (H) 07/26/2023     Lab Results   Component Value Date    HDL 52 07/26/2023     Lab Results   Component Value Date    LDLCALC 116.6 07/26/2023     Lab Results   Component Value Date    TRIG 162 (H) 07/26/2023     Lab Results   Component Value Date    CHOLHDL 25.9 07/26/2023     Lab Results   Component Value Date    TSH 1.659 08/06/2024     Lab Results   Component Value Date    HGBA1C 5.8 03/06/2023     Assessment:       1. Acquired hypothyroidism    2. Chronic anticoagulation- Apixaban    3. Chronic bilateral low back pain with right-sided sciatica    4. COPD, moderate    5. Dyslipidemia    6. History of DVT (deep vein thrombosis) lower extremity on 2 different occasions    7. Hyperlipidemia, unspecified hyperlipidemia type    8. Prediabetes    9. Primary hypertension    10. Stage 3b chronic kidney disease    11. Statin myopathy    12. PMR (polymyalgia rheumatica)    13. Secondary hyperparathyroidism of renal origin    14. Major depressive disorder, recurrent, mild    15. Encounter for  screening mammogram for breast cancer    16. Class 3 severe obesity due to excess calories with serious comorbidity and body mass index (BMI) of 40.0 to 44.9 in adult        Plan:   Acquired hypothyroidism    Chronic anticoagulation- Apixaban    Chronic bilateral low back pain with right-sided sciatica    COPD, moderate    Dyslipidemia------------consider restart zetia---------    History of DVT (deep vein thrombosis) lower extremity on 2 different occasions    Hyperlipidemia, unspecified hyperlipidemia type  -     tirzepatide, weight loss, (ZEPBOUND) 2.5 mg/0.5 mL PnIj; Inject 2.5 mg into the skin every 7 days.  Dispense: 4 Pen; Refill: 4    Prediabetes    Primary hypertension  -     tirzepatide, weight loss, (ZEPBOUND) 2.5 mg/0.5 mL PnIj; Inject 2.5 mg into the skin every 7 days.  Dispense: 4 Pen; Refill: 4    Stage 3b chronic kidney disease    Statin myopathy    PMR (polymyalgia rheumatica)-------------------------continue meds--per rheumatology------------------f/u rheumatology-------  Secondary hyperparathyroidism of renal origin    Major depressive disorder, recurrent, mild    Encounter for screening mammogram for breast cancer  -     Mammo Digital Screening Bilat w/ Curt; Future; Expected date: 08/20/2024    Class 3 severe obesity due to excess calories with serious comorbidity and body mass index (BMI) of 40.0 to 44.9 in adult  -     tirzepatide, weight loss, (ZEPBOUND) 2.5 mg/0.5 mL PnIj; Inject 2.5 mg into the skin every 7 days.  Dispense: 4 Pen; Refill: 4      Stable-----------continue meds------------------as above------------------f/u 3 months-----------

## 2024-09-05 DIAGNOSIS — Z79.899 IMMUNODEFICIENCY DUE TO DRUG THERAPY: ICD-10-CM

## 2024-09-05 DIAGNOSIS — D84.821 ENCOUNTER FOR MONITORING IMMUNOSUPPRESSIVE MEDICATION THERAPY CAUSING IMMUNODEFICIENCY: ICD-10-CM

## 2024-09-05 DIAGNOSIS — Z51.81 ENCOUNTER FOR MONITORING IMMUNOSUPPRESSIVE MEDICATION THERAPY CAUSING IMMUNODEFICIENCY: ICD-10-CM

## 2024-09-05 DIAGNOSIS — D84.821 IMMUNODEFICIENCY DUE TO DRUG THERAPY: ICD-10-CM

## 2024-09-05 DIAGNOSIS — Z79.60 ENCOUNTER FOR MONITORING IMMUNOSUPPRESSIVE MEDICATION THERAPY CAUSING IMMUNODEFICIENCY: ICD-10-CM

## 2024-09-05 DIAGNOSIS — M35.3 POLYMYALGIA RHEUMATICA: ICD-10-CM

## 2024-09-05 NOTE — TELEPHONE ENCOUNTER
----- Message from Milton Ludwig MD sent at 9/5/2024  1:53 PM CDT -----  Regarding: RE: please advise  Contact: Pt  [phone]  Please have her continue prednisone as instructed. We need to taper it slowly but only after her labs and visit. She needs her labs and follow up rescheduled for sooner if possible. Virtual is fine. Overriding a virtual slot to get her in person if needed is fine too. Thank you!  ----- Message -----  From: Jaki Garnett MA  Sent: 9/4/2024   2:37 PM CDT  To: Milton Ludwig MD  Subject: please advise                                    Pt states that you told her to continue the Prednisone until she has her visit. She missed her appointment to day and isn't scheduled to see you again until February.  Pt states that she doesn't want to continue the prednisone but she will if you suggests that she should.  ----- Message -----  From: Barbara Parrish  Sent: 9/4/2024  11:30 AM CDT  To: [facility] Staff    Would like to receive medical advice.    Would they like a call back or a response via [facility]:  call back     Additional information:  Pt missed her appt today.  I rescheduled for 02/14/2025.  Pt states that she is taking medications and was supposed to discuss it with the doctor today.  Please call to advise.

## 2024-09-06 RX ORDER — PREDNISONE 5 MG/1
10 TABLET ORAL DAILY
Qty: 30 TABLET | Refills: 1 | Status: SHIPPED | OUTPATIENT
Start: 2024-09-06

## 2024-09-10 ENCOUNTER — HOSPITAL ENCOUNTER (OUTPATIENT)
Dept: RADIOLOGY | Facility: HOSPITAL | Age: 79
Discharge: HOME | End: 2024-09-10
Attending: INTERNAL MEDICINE
Payer: MEDICARE

## 2024-09-10 ENCOUNTER — PATIENT MESSAGE (OUTPATIENT)
Dept: FAMILY MEDICINE | Facility: CLINIC | Age: 79
End: 2024-09-10
Payer: MEDICARE

## 2024-09-10 DIAGNOSIS — Z12.31 ENCOUNTER FOR SCREENING MAMMOGRAM FOR BREAST CANCER: ICD-10-CM

## 2024-09-10 PROCEDURE — 77067 SCR MAMMO BI INCL CAD: CPT | Mod: 26,,, | Performed by: RADIOLOGY

## 2024-09-10 PROCEDURE — 77063 BREAST TOMOSYNTHESIS BI: CPT | Mod: 26,,, | Performed by: RADIOLOGY

## 2024-09-10 PROCEDURE — 77063 BREAST TOMOSYNTHESIS BI: CPT | Mod: TC

## 2024-09-24 ENCOUNTER — OFFICE VISIT (OUTPATIENT)
Dept: RHEUMATOLOGY | Facility: CLINIC | Age: 79
End: 2024-09-24
Payer: MEDICARE

## 2024-09-24 DIAGNOSIS — D84.821 ENCOUNTER FOR MONITORING IMMUNOSUPPRESSIVE MEDICATION THERAPY CAUSING IMMUNODEFICIENCY: ICD-10-CM

## 2024-09-24 DIAGNOSIS — Z79.60 ENCOUNTER FOR MONITORING IMMUNOSUPPRESSIVE MEDICATION THERAPY CAUSING IMMUNODEFICIENCY: ICD-10-CM

## 2024-09-24 DIAGNOSIS — D84.821 IMMUNODEFICIENCY DUE TO DRUG THERAPY: ICD-10-CM

## 2024-09-24 DIAGNOSIS — Z51.81 ENCOUNTER FOR MONITORING IMMUNOSUPPRESSIVE MEDICATION THERAPY CAUSING IMMUNODEFICIENCY: ICD-10-CM

## 2024-09-24 DIAGNOSIS — Z79.899 HIGH RISK MEDICATION USE: ICD-10-CM

## 2024-09-24 DIAGNOSIS — N18.32 STAGE 3B CHRONIC KIDNEY DISEASE: ICD-10-CM

## 2024-09-24 DIAGNOSIS — R73.03 PREDIABETES: ICD-10-CM

## 2024-09-24 DIAGNOSIS — M35.3 POLYMYALGIA RHEUMATICA: Primary | ICD-10-CM

## 2024-09-24 DIAGNOSIS — E66.01 CLASS 3 SEVERE OBESITY DUE TO EXCESS CALORIES WITH SERIOUS COMORBIDITY AND BODY MASS INDEX (BMI) OF 40.0 TO 44.9 IN ADULT: ICD-10-CM

## 2024-09-24 DIAGNOSIS — Z79.899 IMMUNODEFICIENCY DUE TO DRUG THERAPY: ICD-10-CM

## 2024-09-24 PROCEDURE — 1160F RVW MEDS BY RX/DR IN RCRD: CPT | Mod: CPTII,95,, | Performed by: STUDENT IN AN ORGANIZED HEALTH CARE EDUCATION/TRAINING PROGRAM

## 2024-09-24 PROCEDURE — 1159F MED LIST DOCD IN RCRD: CPT | Mod: CPTII,95,, | Performed by: STUDENT IN AN ORGANIZED HEALTH CARE EDUCATION/TRAINING PROGRAM

## 2024-09-24 PROCEDURE — 99215 OFFICE O/P EST HI 40 MIN: CPT | Mod: 95,,, | Performed by: STUDENT IN AN ORGANIZED HEALTH CARE EDUCATION/TRAINING PROGRAM

## 2024-09-24 RX ORDER — SARILUMAB 200 MG/1.14ML
200 INJECTION, SOLUTION SUBCUTANEOUS
Qty: 2 PEN | Refills: 11 | Status: ACTIVE | OUTPATIENT
Start: 2024-09-24

## 2024-09-24 RX ORDER — PREDNISONE 5 MG/1
TABLET ORAL
Qty: 105 TABLET | Refills: 0 | Status: SHIPPED | OUTPATIENT
Start: 2024-09-24 | End: 2024-12-17

## 2024-09-24 NOTE — PROGRESS NOTES
The patient location is: [place]  The chief complaint leading to consultation is: PMR    Visit type: audiovisual and switched to telephone due to video call froze.    Face to Face time with patient: 12 minutes  40 minutes of total time spent on the encounter, which includes face to face time and non-face to face time preparing to see the patient (eg, review of tests), Obtaining and/or reviewing separately obtained history, Documenting clinical information in the electronic or other health record, Independently interpreting results (not separately reported) and communicating results to the patient/family/caregiver, or Care coordination (not separately reported).         Each patient to whom he or she provides medical services by telemedicine is:  (1) informed of the relationship between the physician and patient and the respective role of any other health care provider with respect to management of the patient; and (2) notified that he or she may decline to receive medical services by telemedicine and may withdraw from such care at any time.    Notes:       Subjective:      Patient ID: Kay Rosas is a 79 y.o. female.    Chief Complaint: PMR    HPI:   Patient with prediabetes, morbid obesity, former smoker, COPD on PM oxygen, degenerative arthritis of lumbar spine, HTN, HLD, PAD, CKD stage 3b, who presents for Rheumatology follow up for PMR. Doing very well currently other than mild shoulder pain with activity. The initial PMR symptoms of severe shoulder pain have resolved. Both shoulders ROM is intact. No hip symptoms, small joint symptoms, or GCA symptoms. No joint stiffness in small or large joints. She is on prednisone 10 mg daily. She took MTX 10 mg weekly x 4 doses but did not get refill from the pharmacy so she didn't take any more. Denies joint swelling, significant stiffness, skin rashes, oral ulcers, patchy alopecia, sicca symptoms, cardiopulmonary symptoms, eye inflammation, IBD, fevers, weight  loss, Raynaud's. Rheumatology review of systems is otherwise negative.      Initial Hx:  Referred to Rheumatology for shoulder pain with elevated ESR with suspicion of PMR. Reports a couple of months ago, had quick (over a few days) onset of right shoulder pain which spread through the shoulder girdle until right arm couldn't move due to the pain. Then left shoulder developed the same. Pain spreads down to elbows. Right side is worse. She's right handed. Pain is worse in the mornings. Denies stiffness. Presented to PCP who checked ESR which was 71. Started prednisone 10 mg daily since 08/06, which helped by day 2. Today is day 4 of prednisone. Still having 5/10 pain which worsens with movement.    Low back pain, chronic. S/p back surgery. No increase in pain. No hip girdle pain.    Left knee replacement this year. Right knee replacement in the past.    Chronic swelling of left lower extremity.    Gets chronic mild headaches which she attributes to changing glasses prescription. Denies jaw claudication, vision loss, joint swelling, significant stiffness, skin rashes, oral ulcers, patchy alopecia, sicca symptoms, cardiopulmonary symptoms, eye inflammation, IBD, fevers, weight loss, Raynaud's. Rheumatology review of systems is otherwise negative.    Social Hx: Former smoker of almost 2 PPD, quit >10 years ago.  Family Hx: No family history of autoimmune disease      Objective:   There were no vitals taken for this visit.  Physical Exam  Musculoskeletal:      Comments: Can raise arms without issues.             Assessment and Plan:     Problem List Items Addressed This Visit          Unprioritized    Prediabetes    Class 3 severe obesity due to excess calories with serious comorbidity and body mass index (BMI) of 40.0 to 44.9 in adult    Stage 3b chronic kidney disease    Relevant Medications    sarilumab (KEVZARA) 200 mg/1.14 mL PnIj     Other Visit Diagnoses       Polymyalgia rheumatica    -  Primary    Relevant  Medications    predniSONE (DELTASONE) 5 MG tablet    sarilumab (KEVZARA) 200 mg/1.14 mL PnIj    Immunodeficiency due to drug therapy        Relevant Medications    predniSONE (DELTASONE) 5 MG tablet    Encounter for monitoring immunosuppressive medication therapy causing immunodeficiency        Relevant Medications    predniSONE (DELTASONE) 5 MG tablet    High risk medication use                  Patient with prediabetes, morbid obesity, former smoker, COPD on PM oxygen, degenerative arthritis of lumbar spine, HTN, HLD, PAD, CKD stage 3b, who presents for Rheumatology follow up for PMR. Shoulder pain with elevated ESR of 71 with classic PMR symptoms in the shoulders (though pain > stiffness) with rapid improvement with prednisone is consistent with diagnosis of polymyalgia rheumatica. Only shoulder/arm involvement. No GCA signs or symptoms.    ESR 71 prior to prednisone.  CRP normal, only checked after she started prednisone 10 mg daily.    No hx of diverticulitis or bowel perf.    Doing well on current regimen. Given obesity, diabetes, HTN, and severe COPD which already significantly limits her mobility, I worry that weight gain with a long steroid taper will worsen her morbidity. Therefore added very low dose MTX 10 mg/wk given her GFR in the high 30s. She took it for 4 weeks but didn't get it refilled. Now GFR is <35 so will switch to Kevzara.      Plan:  Taper prednisone 10 mg daily for 3 weeks, then 7.5 mg daily for 3 weeks, then 5 mg daily for 3 weeks, then 2.5 mg daily for 3 weeks, then stop prednisone.  Start Kevzara as a steroid sparing agent for PMR.  Stay off methotrexate.  Stop folic acid.      High Risk Medication Monitoring encounter  Drug therapy requiring intensive monitoring for toxicity  No current medication related issues, no evidence of toxicity  Appropriate labs ordered for toxicity monitoring    Compromised immune system secondary to autoimmune disease and/or use of immunosuppressive drugs.   Monitor carefully for infections.  Advised patient to get immediate medical care if any infection arises.  Also advised strict adherence age-appropriate vaccinations and cancer screenings with PCP.    Patient advised to hold DMARD and/or biologic therapy for signs of infection or for surgery. If you are unsure what to do please call our office for instruction.[facility] [phone]      Follow up in about 3 months (around 12/24/2024).       I spent a total of 40 minutes on the day of the visit.  This includes face to face time and non-face to face time preparing to see the patient (eg, review of tests), obtaining and/or reviewing separately obtained history, documenting clinical information in the electronic or other health record, independently interpreting results and communicating results to the patient/family/caregiver, or care coordinator.

## 2024-09-24 NOTE — PATIENT INSTRUCTIONS
Taper prednisone 10 mg daily for 3 weeks, then 7.5 mg daily for 3 weeks, then 5 mg daily for 3 weeks, then 2.5 mg daily for 3 weeks, then stop prednisone.  Start Kevzara. Prescription sent to [facility]. They will be in contact.  Stay off methotrexate.  Stop folic acid.

## 2024-09-25 ENCOUNTER — PATIENT MESSAGE (OUTPATIENT)
Dept: FAMILY MEDICINE | Facility: CLINIC | Age: 79
End: 2024-09-25
Payer: MEDICARE

## 2024-09-25 DIAGNOSIS — I10 ESSENTIAL HYPERTENSION: ICD-10-CM

## 2024-09-25 RX ORDER — LOSARTAN POTASSIUM AND HYDROCHLOROTHIAZIDE 25; 100 MG/1; MG/1
1 TABLET ORAL DAILY
Qty: 90 TABLET | Refills: 1 | Status: SHIPPED | OUTPATIENT
Start: 2024-09-25

## 2024-09-25 NOTE — TELEPHONE ENCOUNTER
Care Due:                  Date            Visit Type   Department     Provider  --------------------------------------------------------------------------------                                EP -                              PRIMARY      [facility]  Last Visit: 08-      CARE ([facility])   MEDICINE       Vince Jefferson                              EP -                              PRIMARY      [facility]  Next Visit: 12-      CARE ([facility])   CASEY Jefferson                                                            Last  Test          Frequency    Reason                     Performed    Due Date  --------------------------------------------------------------------------------    HBA1C.......  6 months...  tirzepatide,.............  02- 08-    Health [facility] Embedded Care Due Messages. Reference number: [id number].   9/25/2024 8:31:30 AM CDT

## 2024-10-07 ENCOUNTER — OFFICE VISIT (OUTPATIENT)
Dept: FAMILY MEDICINE | Facility: CLINIC | Age: 79
End: 2024-10-07
Payer: MEDICARE

## 2024-10-07 ENCOUNTER — HOSPITAL ENCOUNTER (OUTPATIENT)
Dept: RADIOLOGY | Facility: HOSPITAL | Age: 79
Discharge: HOME | End: 2024-10-07
Attending: INTERNAL MEDICINE
Payer: MEDICARE

## 2024-10-07 VITALS
OXYGEN SATURATION: 92 % | BODY MASS INDEX: 42.87 KG/M2 | SYSTOLIC BLOOD PRESSURE: 126 MMHG | HEART RATE: 83 BPM | TEMPERATURE: 98 F | DIASTOLIC BLOOD PRESSURE: 66 MMHG | WEIGHT: 266.75 LBS | HEIGHT: 66 IN

## 2024-10-07 DIAGNOSIS — R10.9 ABDOMINAL PAIN, UNSPECIFIED ABDOMINAL LOCATION: ICD-10-CM

## 2024-10-07 DIAGNOSIS — R10.9 ABDOMINAL PAIN, UNSPECIFIED ABDOMINAL LOCATION: Primary | ICD-10-CM

## 2024-10-07 DIAGNOSIS — R13.10 DYSPHAGIA, UNSPECIFIED TYPE: ICD-10-CM

## 2024-10-07 PROCEDURE — 3288F FALL RISK ASSESSMENT DOCD: CPT | Mod: CPTII,S$GLB,, | Performed by: INTERNAL MEDICINE

## 2024-10-07 PROCEDURE — 3078F DIAST BP <80 MM HG: CPT | Mod: CPTII,S$GLB,, | Performed by: INTERNAL MEDICINE

## 2024-10-07 PROCEDURE — 74019 RADEX ABDOMEN 2 VIEWS: CPT | Mod: TC,FY,PO

## 2024-10-07 PROCEDURE — 1125F AMNT PAIN NOTED PAIN PRSNT: CPT | Mod: CPTII,S$GLB,, | Performed by: INTERNAL MEDICINE

## 2024-10-07 PROCEDURE — 74019 RADEX ABDOMEN 2 VIEWS: CPT | Mod: 26,,, | Performed by: RADIOLOGY

## 2024-10-07 PROCEDURE — 99214 OFFICE O/P EST MOD 30 MIN: CPT | Mod: S$GLB,,, | Performed by: INTERNAL MEDICINE

## 2024-10-07 PROCEDURE — 1101F PT FALLS ASSESS-DOCD LE1/YR: CPT | Mod: CPTII,S$GLB,, | Performed by: INTERNAL MEDICINE

## 2024-10-07 PROCEDURE — 1159F MED LIST DOCD IN RCRD: CPT | Mod: CPTII,S$GLB,, | Performed by: INTERNAL MEDICINE

## 2024-10-07 PROCEDURE — 99999 PR PBB SHADOW E&M-EST. PATIENT-LVL V: CPT | Mod: PBBFAC,,, | Performed by: INTERNAL MEDICINE

## 2024-10-07 PROCEDURE — 3074F SYST BP LT 130 MM HG: CPT | Mod: CPTII,S$GLB,, | Performed by: INTERNAL MEDICINE

## 2024-10-07 RX ORDER — PANTOPRAZOLE SODIUM 40 MG/1
40 TABLET, DELAYED RELEASE ORAL DAILY
Qty: 30 TABLET | Refills: 3 | Status: SHIPPED | OUTPATIENT
Start: 2024-10-07 | End: 2025-10-07

## 2024-10-07 NOTE — PROGRESS NOTES
Subjective:       Patient ID: Kay Rosas is a 79 y.o. female.    Chief Complaint: Abdominal Pain    6 months------Mid epigastric area abdominal pain associated with eating and drinking fluids------feels like gets stuck----------some nausea no vomiting-------normal stools-----    Abdominal Pain  Associated symptoms include arthralgias and nausea. Pertinent negatives include no constipation, diarrhea, dysuria, fever, frequency, headaches, hematuria, myalgias or vomiting.     Past Medical History:   Diagnosis Date    Anticoagulant long-term use     Arthritis     Cataract- bilateral with extraction- patient reports no lens implants     Deep vein thrombosis     Degenerative disc disease, cervical     Disorder of kidney and ureter     CKD stage 3    DVT (deep venous thrombosis)     Emphysema of lung     Hyperlipidemia     Hypertension     MVP (mitral valve prolapse)     On home oxygen therapy     3 liters as needed at night    Osteoporosis     feet    Thyroid condition      Past Surgical History:   Procedure Laterality Date    BACK SURGERY  2012    CATARACT EXTRACTION Bilateral 10/2012    EYE SURGERY  cataract    HYSTERECTOMY Left 1978    INJECTION OF ANESTHETIC AGENT INTO SACROILIAC JOINT Bilateral 11/11/2019    Procedure: Bilateral GT bursa + SIJ injection;  Surgeon: Noe Pleitez MD;  Location: [facility] PAIN [facility] [place];  Service: Pain Management;  Laterality: Bilateral;    INJECTION OF JOINT Bilateral 11/11/2019    Procedure: Bilateral GT bursa + SIJ injection;  Surgeon: Noe Pleitez MD;  Location: [facility] [place];  Service: Pain Management;  Laterality: Bilateral;    JOINT REPLACEMENT Right 2021    knee    ROBOT-ASSISTED LAPAROSCOPIC LYSIS OF ADHESIONS USING DA SP XI N/A 12/05/2022    Procedure: XI ROBOTIC LYSIS, ADHESIONS;  Surgeon: Den Barragan MD;  Location: [facility];  Service: Oncology;  Laterality: N/A;    ROBOT-ASSISTED LAPAROSCOPIC SALPINGO-OOPHORECTOMY USING DA SP XI Left 12/05/2022    Procedure: XI  ROBOTIC SALPINGO-OOPHORECTOMY;  Surgeon: Den Barragan MD;  Location: [facility] OR;  Service: Oncology;  Laterality: Left;    SURGICAL REMOVAL OF BONE SPUR Left     left elbow    TOTAL KNEE ARTHROPLASTY Left 2024    Procedure: ARTHROPLASTY, KNEE, TOTAL;  Surgeon: Terrance Urena MD;  Location: [facility] OR;  Service: Orthopedics;  Laterality: Left;    VASCULAR SURGERY Right     high ligation due to blood clot      Family History   Problem Relation Name Age of Onset    Heart disease Mother Loida Moura     Kidney disease Mother Loida Moura     Arthritis Mother Loida Moura     Breast cancer Mother Loida Moura     Cancer Father ALLAN Brand     Heart disease Father ALLAN Brand     Cancer Sister Lung cancer     Ovarian cancer Sister Lung cancer     Alzheimer's disease Sister       Social History     Socioeconomic History    Marital status:    Tobacco Use    Smoking status: Former     Current packs/day: 0.00     Average packs/day: 1 pack/day for 53.5 years (53.5 ttl pk-yrs)     Types: Cigarettes     Start date: 1962     Quit date: 2015     Years since quittin.3    Smokeless tobacco: Never   Substance and Sexual Activity    Alcohol use: No    Drug use: No    Sexual activity: Yes     Partners: Male     Birth control/protection: None     Social Drivers of Health     Financial Resource Strain: High Risk (2024)    Overall Financial Resource Strain (CARDIA)     Difficulty of Paying Living Expenses: Hard   Food Insecurity: Food Insecurity Present (2024)    Hunger Vital Sign     Worried About Running Out of Food in the Last Year: Sometimes true     Ran Out of Food in the Last Year: Sometimes true   Transportation Needs: No Transportation Needs (2024)    PRAPARE - Transportation     Lack of Transportation (Medical): No     Lack of Transportation (Non-Medical): No   Physical Activity: Insufficiently Active (2024)    Exercise Vital Sign     Days of Exercise per Week: 2  days     Minutes of Exercise per Session: 30 min   Stress: Patient Declined (4/9/2024)    [employer] - Occupational Stress Questionnaire     Feeling of Stress : Patient declined   Housing Stability: Low Risk  (4/9/2024)    Housing Stability Vital Sign     Unable to Pay for Housing in the Last Year: No     Number of Places Lived in the Last Year: 1     Unstable Housing in the Last Year: No     Review of Systems   Constitutional:  Negative for activity change, appetite change, chills, diaphoresis, fatigue, fever and unexpected weight change.   HENT:  Negative for congestion, drooling, ear discharge, ear pain, facial swelling, hearing loss, mouth sores, nosebleeds, postnasal drip, rhinorrhea, sinus pressure, sneezing, sore throat, tinnitus, trouble swallowing and voice change.    Eyes:  Negative for photophobia, redness and visual disturbance.   Respiratory:  Negative for apnea, cough, choking, chest tightness, shortness of breath and wheezing.    Cardiovascular:  Negative for chest pain, palpitations and leg swelling.   Gastrointestinal:  Positive for abdominal pain and nausea. Negative for abdominal distention, blood in stool, constipation, diarrhea and vomiting.   Endocrine: Negative for cold intolerance, heat intolerance, polydipsia, polyphagia and polyuria.   Genitourinary:  Negative for decreased urine volume, difficulty urinating, dysuria, flank pain, frequency, genital sores, hematuria, pelvic pain and urgency.   Musculoskeletal:  Positive for arthralgias. Negative for back pain, gait problem, joint swelling, myalgias, neck pain and neck stiffness.   Skin:  Negative for color change, pallor, rash and wound.   Allergic/Immunologic: Negative for food allergies and immunocompromised state.   Neurological:  Negative for dizziness, tremors, seizures, syncope, speech difficulty, weakness, light-headedness, numbness and headaches.   Hematological:  Negative for adenopathy. Does not  bruise/bleed easily.   Psychiatric/Behavioral:  Negative for agitation, behavioral problems, confusion, decreased concentration, dysphoric mood, hallucinations, self-injury, sleep disturbance and suicidal ideas. The patient is not nervous/anxious and is not hyperactive.    All other systems reviewed and are negative.      Objective:      Physical Exam  Vitals and nursing note reviewed.   Constitutional:       General: She is not in acute distress.     Appearance: Normal appearance. She is well-developed. She is not diaphoretic.   HENT:      Head: Normocephalic and atraumatic.      Mouth/Throat:      Pharynx: No oropharyngeal exudate.   Eyes:      General: No scleral icterus.  Neck:      Thyroid: No thyromegaly.      Vascular: No carotid bruit or JVD.      Trachea: No tracheal deviation.   Cardiovascular:      Rate and Rhythm: Normal rate and regular rhythm.      Heart sounds: Normal heart sounds.   Pulmonary:      Effort: Pulmonary effort is normal. No respiratory distress.      Breath sounds: Normal breath sounds. No wheezing or rales.   Chest:      Chest wall: No tenderness.   Abdominal:      General: Bowel sounds are normal. There is no distension.      Palpations: Abdomen is soft.      Tenderness: There is no abdominal tenderness. There is no guarding or rebound.   Musculoskeletal:         General: No tenderness. Normal range of motion.      Cervical back: Normal range of motion and neck supple.   Lymphadenopathy:      Cervical: No cervical adenopathy.   Skin:     General: Skin is warm and dry.      Coloration: Skin is not pale.      Findings: No erythema or rash.   Neurological:      Mental Status: She is alert and oriented to person, place, and time.      Cranial Nerves: No cranial nerve deficit.      Coordination: Coordination normal.   Psychiatric:         Behavior: Behavior normal.         Thought Content: Thought content normal.         Judgment: Judgment normal.         CMP  Sodium   Date Value Ref Range  Status   09/10/2024 144 136 - 145 mmol/L Final     Potassium   Date Value Ref Range Status   09/10/2024 5.3 (H) 3.5 - 5.1 mmol/L Final     Comment:     *No Visible Hemolysis     Chloride   Date Value Ref Range Status   09/10/2024 107 95 - 110 mmol/L Final     CO2   Date Value Ref Range Status   09/10/2024 28 23 - 29 mmol/L Final     Glucose   Date Value Ref Range Status   09/10/2024 88 70 - 110 mg/dL Final     BUN   Date Value Ref Range Status   09/10/2024 27 (H) 8 - 23 mg/dL Final     Creatinine   Date Value Ref Range Status   09/10/2024 1.6 (H) 0.5 - 1.4 mg/dL Final     Calcium   Date Value Ref Range Status   09/10/2024 10.3 8.7 - 10.5 mg/dL Final     Total Protein   Date Value Ref Range Status   09/10/2024 7.5 6.0 - 8.4 g/dL Final     Albumin   Date Value Ref Range Status   09/10/2024 3.8 3.5 - 5.2 g/dL Final     Total Bilirubin   Date Value Ref Range Status   09/10/2024 0.3 0.1 - 1.0 mg/dL Final     Comment:     For infants and newborns, interpretation of results should be based  on gestational age, weight and in agreement with clinical  observations.    Premature Infant recommended reference ranges:  Up to 24 hours.............<8.0 mg/dL  Up to 48 hours............<12.0 mg/dL  3-5 days..................<15.0 mg/dL  6-29 days.................<15.0 mg/dL       Alkaline Phosphatase   Date Value Ref Range Status   09/10/2024 68 55 - 135 U/L Final     AST   Date Value Ref Range Status   09/10/2024 14 10 - 40 U/L Final     ALT   Date Value Ref Range Status   09/10/2024 18 10 - 44 U/L Final     Anion Gap   Date Value Ref Range Status   09/10/2024 9 8 - 16 mmol/L Final     eGFR if    Date Value Ref Range Status   07/08/2022 34 (A) >60 mL/min/1.73 m^2 Final     eGFR    Date Value Ref Range Status   03/06/2023 31 mL/min/1.73mSq Final     Comment:     In accordance with NKF-ASN Task Force recommendation, calculation based on the Chronic Kidney Disease Epidemiology Collaboration (CKD-EPI)  equation without adjustment for race. eGFR adjusted for gender and age and calculated in ml/min/1.73mSquared. eGFR cannot be calculated if patient is under 18 years of age.     Reference Range:   >= 60 ml/min/1.73mSquared.     eGFR if non    Date Value Ref Range Status   07/08/2022 29 (A) >60 mL/min/1.73 m^2 Final     Comment:     Calculation used to obtain the estimated glomerular filtration  rate (eGFR) is the CKD-EPI equation.        Lab Results   Component Value Date    WBC 6.77 09/10/2024    HGB 11.9 (L) 09/10/2024    HCT 37.8 09/10/2024    MCV 96 09/10/2024     09/10/2024     Lab Results   Component Value Date    CHOL 201 (H) 07/26/2023     Lab Results   Component Value Date    HDL 52 07/26/2023     Lab Results   Component Value Date    LDLCALC 116.6 07/26/2023     Lab Results   Component Value Date    TRIG 162 (H) 07/26/2023     Lab Results   Component Value Date    CHOLHDL 25.9 07/26/2023     Lab Results   Component Value Date    TSH 1.659 08/06/2024     Lab Results   Component Value Date    HGBA1C 5.8 03/06/2023     Assessment:       1. Abdominal pain, unspecified abdominal location    2. Dysphagia, unspecified type        Plan:   Abdominal pain, unspecified abdominal location----------------------------trial protonix--------------------------  -     Comprehensive Metabolic Panel; Future; Expected date: 10/07/2024  -     CBC Auto Differential; Future; Expected date: 10/07/2024  -     X-Ray Abdomen Flat And Erect; Future; Expected date: 10/07/2024  -     Ambulatory referral/consult to Gastroenterology; Future; Expected date: 10/14/2024----------------------------------------[facility]--------------------------------  -     pantoprazole (PROTONIX) 40 MG tablet; Take 1 tablet (40 mg total) by mouth once daily.  Dispense: 30 tablet; Refill: 3  -     LIPASE; Future; Expected date: 10/07/2024    Dysphagia, unspecified type  -     Ambulatory referral/consult to Gastroenterology; Future;  Expected date: 10/14/2024  -     pantoprazole (PROTONIX) 40 MG tablet; Take 1 tablet (40 mg total) by mouth once daily.  Dispense: 30 tablet; Refill: 3      As above-----------f/u 1 month----------------go to er for worsening symptoms-----------------

## 2024-10-08 ENCOUNTER — TELEPHONE (OUTPATIENT)
Dept: FAMILY MEDICINE | Facility: CLINIC | Age: 79
End: 2024-10-08
Payer: MEDICARE

## 2024-10-08 NOTE — TELEPHONE ENCOUNTER
Pt said not yet but that she will do it today.   Continue Regimen: Clindamycin Benzoyl Peroxide QAM\\nTretinoin 0.025% QHS\\nSpironolactone 25mg QD Detail Level: Zone Render In Strict Bullet Format?: No

## 2024-10-19 DIAGNOSIS — F51.01 PRIMARY INSOMNIA: ICD-10-CM

## 2024-10-19 NOTE — TELEPHONE ENCOUNTER
No care due was identified.  [facility] Embedded Care Due Messages. Reference number: [id number].   10/19/2024 8:26:51 AM CDT

## 2024-10-20 RX ORDER — TRAZODONE HYDROCHLORIDE 150 MG/1
150 TABLET ORAL NIGHTLY
Qty: 90 TABLET | Refills: 3 | Status: SHIPPED | OUTPATIENT
Start: 2024-10-20

## 2024-10-20 NOTE — TELEPHONE ENCOUNTER
Refill Decision Note   Kay Rosas  is requesting a refill authorization.  Brief Assessment and Rationale for Refill:  Approve     Medication Therapy Plan:         Comments:     Note composed:11:58 AM 10/20/2024

## 2024-10-31 ENCOUNTER — OFFICE VISIT (OUTPATIENT)
Dept: ORTHOPEDICS | Facility: CLINIC | Age: 79
End: 2024-10-31
Payer: MEDICARE

## 2024-10-31 VITALS — WEIGHT: 266.75 LBS | HEIGHT: 66 IN | BODY MASS INDEX: 42.87 KG/M2

## 2024-10-31 DIAGNOSIS — I73.9 PERIPHERAL ARTERIAL DISEASE: ICD-10-CM

## 2024-10-31 DIAGNOSIS — Z79.01 CURRENT LONG-TERM USE OF ANTICOAGULANT MEDICATION WITH HISTORY OF DEEP VENOUS THROMBOSIS (DVT): ICD-10-CM

## 2024-10-31 DIAGNOSIS — Z96.651 HISTORY OF TOTAL RIGHT KNEE REPLACEMENT: ICD-10-CM

## 2024-10-31 DIAGNOSIS — E66.01 MORBID OBESITY WITH BMI OF 40.0-44.9, ADULT: ICD-10-CM

## 2024-10-31 DIAGNOSIS — Z98.1 HISTORY OF LUMBAR FUSION: ICD-10-CM

## 2024-10-31 DIAGNOSIS — Z96.652 HISTORY OF TOTAL LEFT KNEE REPLACEMENT: Primary | ICD-10-CM

## 2024-10-31 DIAGNOSIS — Z86.718 CURRENT LONG-TERM USE OF ANTICOAGULANT MEDICATION WITH HISTORY OF DEEP VENOUS THROMBOSIS (DVT): ICD-10-CM

## 2024-10-31 PROCEDURE — 99999 PR PBB SHADOW E&M-EST. PATIENT-LVL III: CPT | Mod: PBBFAC,,, | Performed by: ORTHOPAEDIC SURGERY

## 2024-11-06 ENCOUNTER — PATIENT MESSAGE (OUTPATIENT)
Dept: FAMILY MEDICINE | Facility: CLINIC | Age: 79
End: 2024-11-06
Payer: MEDICARE

## 2024-11-06 DIAGNOSIS — E66.9 OBESITY, UNSPECIFIED CLASS, UNSPECIFIED OBESITY TYPE, UNSPECIFIED WHETHER SERIOUS COMORBIDITY PRESENT: Primary | ICD-10-CM

## 2024-11-06 RX ORDER — TIRZEPATIDE 2.5 MG/.5ML
2.5 INJECTION, SOLUTION SUBCUTANEOUS
Qty: 4 PEN | Refills: 4 | Status: SHIPPED | OUTPATIENT
Start: 2024-11-06

## 2024-11-06 RX ORDER — TIRZEPATIDE 2.5 MG/.5ML
2.5 INJECTION, SOLUTION SUBCUTANEOUS
Qty: 4 PEN | Refills: 4 | Status: SHIPPED | OUTPATIENT
Start: 2024-11-06 | End: 2024-11-06 | Stop reason: SDUPTHER

## 2024-11-12 RX ORDER — SEMAGLUTIDE 0.25 MG/.5ML
0.25 INJECTION, SOLUTION SUBCUTANEOUS
Qty: 4 ML | Refills: 4 | Status: SHIPPED | OUTPATIENT
Start: 2024-11-12 | End: 2024-11-13

## 2024-11-12 NOTE — TELEPHONE ENCOUNTER
Refill Routing Note   Medication(s) are not appropriate for processing by [facility] for the following reason(s):        New or recently adjusted medication: Drug not covered    [facility] action(s):  Defer        Medication Therapy Plan: Pharmacy comment: Alternative Requested:PRESCRIPTIION IS NOT COVERED BY THE INSURANCE; PATIENT NEEDS A SUBSTITUTE.      Appointments  past 12m or future 3m with PCP    Date Provider   Last Visit   10/7/2024 Vince Jefferson MD   Next Visit   11/20/2024 Vince Jefferson MD   ED visits in past 90 days: 0        Note composed:5:05 PM 11/12/2024

## 2024-11-12 NOTE — TELEPHONE ENCOUNTER
No care due was identified.  Health [facility] Embedded Care Due Messages. Reference number: [id number].   11/12/2024 4:52:32 PM CST

## 2024-11-13 RX ORDER — SEMAGLUTIDE 0.68 MG/ML
0.25 INJECTION, SOLUTION SUBCUTANEOUS
Qty: 4 EACH | Refills: 4 | Status: SHIPPED | OUTPATIENT
Start: 2024-11-13

## 2024-11-13 RX ORDER — SEMAGLUTIDE 0.25 MG/.5ML
0.25 INJECTION, SOLUTION SUBCUTANEOUS
Refills: 4 | OUTPATIENT
Start: 2024-11-13

## 2024-11-13 NOTE — TELEPHONE ENCOUNTER
Spoke with patient informed of new Rx, states she saw notification from pharmacy and she had appointment with [facility] on last week but not sure of date.

## 2024-11-17 DIAGNOSIS — D84.821 ENCOUNTER FOR MONITORING IMMUNOSUPPRESSIVE MEDICATION THERAPY CAUSING IMMUNODEFICIENCY: ICD-10-CM

## 2024-11-17 DIAGNOSIS — Z51.81 ENCOUNTER FOR MONITORING IMMUNOSUPPRESSIVE MEDICATION THERAPY CAUSING IMMUNODEFICIENCY: ICD-10-CM

## 2024-11-17 DIAGNOSIS — Z79.60 ENCOUNTER FOR MONITORING IMMUNOSUPPRESSIVE MEDICATION THERAPY CAUSING IMMUNODEFICIENCY: ICD-10-CM

## 2024-11-17 DIAGNOSIS — D84.821 IMMUNODEFICIENCY DUE TO DRUG THERAPY: ICD-10-CM

## 2024-11-17 DIAGNOSIS — Z79.899 IMMUNODEFICIENCY DUE TO DRUG THERAPY: ICD-10-CM

## 2024-11-17 DIAGNOSIS — M35.3 POLYMYALGIA RHEUMATICA: ICD-10-CM

## 2024-11-18 RX ORDER — METHOTREXATE 2.5 MG/1
10 TABLET ORAL
Qty: 16 TABLET | Refills: 3 | OUTPATIENT
Start: 2024-11-18

## 2024-11-19 ENCOUNTER — PATIENT MESSAGE (OUTPATIENT)
Dept: ADMINISTRATIVE | Facility: HOSPITAL | Age: 79
End: 2024-11-19
Payer: MEDICARE

## 2024-11-20 ENCOUNTER — OFFICE VISIT (OUTPATIENT)
Dept: FAMILY MEDICINE | Facility: CLINIC | Age: 79
End: 2024-11-20
Payer: MEDICARE

## 2024-11-20 ENCOUNTER — LAB VISIT (OUTPATIENT)
Dept: LAB | Facility: HOSPITAL | Age: 79
End: 2024-11-20
Attending: INTERNAL MEDICINE
Payer: MEDICARE

## 2024-11-20 ENCOUNTER — PATIENT OUTREACH (OUTPATIENT)
Dept: ADMINISTRATIVE | Facility: HOSPITAL | Age: 79
End: 2024-11-20
Payer: MEDICARE

## 2024-11-20 VITALS
BODY MASS INDEX: 42.62 KG/M2 | WEIGHT: 265.19 LBS | RESPIRATION RATE: 18 BRPM | TEMPERATURE: 98 F | SYSTOLIC BLOOD PRESSURE: 128 MMHG | HEART RATE: 104 BPM | HEIGHT: 66 IN | DIASTOLIC BLOOD PRESSURE: 62 MMHG | OXYGEN SATURATION: 97 %

## 2024-11-20 DIAGNOSIS — T46.6X5A STATIN MYOPATHY: ICD-10-CM

## 2024-11-20 DIAGNOSIS — E78.5 DYSLIPIDEMIA: ICD-10-CM

## 2024-11-20 DIAGNOSIS — R73.03 PREDIABETES: ICD-10-CM

## 2024-11-20 DIAGNOSIS — M35.3 PMR (POLYMYALGIA RHEUMATICA): ICD-10-CM

## 2024-11-20 DIAGNOSIS — Z79.01 CHRONIC ANTICOAGULATION: ICD-10-CM

## 2024-11-20 DIAGNOSIS — Z99.81 DEPENDENCE ON SUPPLEMENTAL OXYGEN: ICD-10-CM

## 2024-11-20 DIAGNOSIS — E78.1 HYPERTRIGLYCERIDEMIA: ICD-10-CM

## 2024-11-20 DIAGNOSIS — I10 PRIMARY HYPERTENSION: ICD-10-CM

## 2024-11-20 DIAGNOSIS — Z86.718 HISTORY OF DVT (DEEP VEIN THROMBOSIS): ICD-10-CM

## 2024-11-20 DIAGNOSIS — E03.9 ACQUIRED HYPOTHYROIDISM: Primary | ICD-10-CM

## 2024-11-20 DIAGNOSIS — G72.0 STATIN MYOPATHY: ICD-10-CM

## 2024-11-20 DIAGNOSIS — N18.32 STAGE 3B CHRONIC KIDNEY DISEASE: ICD-10-CM

## 2024-11-20 DIAGNOSIS — K21.9 GASTROESOPHAGEAL REFLUX DISEASE WITHOUT ESOPHAGITIS: ICD-10-CM

## 2024-11-20 DIAGNOSIS — D64.9 ANEMIA, UNSPECIFIED TYPE: ICD-10-CM

## 2024-11-20 LAB
CHOLEST SERPL-MCNC: 190 MG/DL (ref 120–199)
CHOLEST/HDLC SERPL: 3.2 {RATIO} (ref 2–5)
ESTIMATED AVG GLUCOSE: 114 MG/DL (ref 68–131)
HBA1C MFR BLD: 5.6 % (ref 4–5.6)
HDLC SERPL-MCNC: 60 MG/DL (ref 40–75)
HDLC SERPL: 31.6 % (ref 20–50)
LDLC SERPL CALC-MCNC: 106.4 MG/DL (ref 63–159)
NONHDLC SERPL-MCNC: 130 MG/DL
TRIGL SERPL-MCNC: 118 MG/DL (ref 30–150)

## 2024-11-20 PROCEDURE — 99215 OFFICE O/P EST HI 40 MIN: CPT | Mod: S$GLB,,, | Performed by: INTERNAL MEDICINE

## 2024-11-20 PROCEDURE — 3078F DIAST BP <80 MM HG: CPT | Mod: CPTII,S$GLB,, | Performed by: INTERNAL MEDICINE

## 2024-11-20 PROCEDURE — 80061 LIPID PANEL: CPT | Performed by: INTERNAL MEDICINE

## 2024-11-20 PROCEDURE — 83036 HEMOGLOBIN GLYCOSYLATED A1C: CPT | Performed by: INTERNAL MEDICINE

## 2024-11-20 PROCEDURE — 1101F PT FALLS ASSESS-DOCD LE1/YR: CPT | Mod: CPTII,S$GLB,, | Performed by: INTERNAL MEDICINE

## 2024-11-20 PROCEDURE — 3074F SYST BP LT 130 MM HG: CPT | Mod: CPTII,S$GLB,, | Performed by: INTERNAL MEDICINE

## 2024-11-20 PROCEDURE — 99999 PR PBB SHADOW E&M-EST. PATIENT-LVL V: CPT | Mod: PBBFAC,,, | Performed by: INTERNAL MEDICINE

## 2024-11-20 PROCEDURE — 3288F FALL RISK ASSESSMENT DOCD: CPT | Mod: CPTII,S$GLB,, | Performed by: INTERNAL MEDICINE

## 2024-11-20 PROCEDURE — 36415 COLL VENOUS BLD VENIPUNCTURE: CPT | Mod: PO | Performed by: INTERNAL MEDICINE

## 2024-11-20 PROCEDURE — 1126F AMNT PAIN NOTED NONE PRSNT: CPT | Mod: CPTII,S$GLB,, | Performed by: INTERNAL MEDICINE

## 2024-11-20 PROCEDURE — G2211 COMPLEX E/M VISIT ADD ON: HCPCS | Mod: S$GLB,,, | Performed by: INTERNAL MEDICINE

## 2024-11-20 PROCEDURE — 1159F MED LIST DOCD IN RCRD: CPT | Mod: CPTII,S$GLB,, | Performed by: INTERNAL MEDICINE

## 2024-11-20 NOTE — PROGRESS NOTES
Replying to Campaign Questionnaire for Overdue HM:  LIPID PANEL, HEMOGLOBIN A1C    Scheduled from provider orders.

## 2024-11-20 NOTE — PROGRESS NOTES
Subjective:       Patient ID: Kay Rosas is a 79 y.o. female.    Chief Complaint: Follow-up, Hypertension, Hyperlipidemia, Anticoagulation, Chronic Kidney Disease, and Anemia    Gi symptoms resolved on protonix-------seen by gi asso------------------on ozempic-------------no new symptoms today-    Follow-up  Associated symptoms include arthralgias and myalgias. Pertinent negatives include no abdominal pain, chest pain, chills, congestion, coughing, diaphoresis, fatigue, fever, headaches, joint swelling, nausea, neck pain, numbness, rash, sore throat, vomiting or weakness.   Hypertension  Pertinent negatives include no chest pain, headaches, neck pain, palpitations or shortness of breath.   Hyperlipidemia  Associated symptoms include myalgias. Pertinent negatives include no chest pain or shortness of breath.   Anemia  There has been no abdominal pain, bruising/bleeding easily, confusion, fever, light-headedness, pallor or palpitations.     Past Medical History:   Diagnosis Date    Anticoagulant long-term use     Arthritis     Cataract- bilateral with extraction- patient reports no lens implants     Deep vein thrombosis     Degenerative disc disease, cervical     Disorder of kidney and ureter     CKD stage 3    DVT (deep venous thrombosis)     Emphysema of lung     Hyperlipidemia     Hypertension     MVP (mitral valve prolapse)     On home oxygen therapy     3 liters as needed at night    Osteoporosis     feet    Thyroid condition      Past Surgical History:   Procedure Laterality Date    BACK SURGERY  2012    CATARACT EXTRACTION Bilateral 10/2012    EYE SURGERY  cataract    HYSTERECTOMY Left 1978    INJECTION OF ANESTHETIC AGENT INTO SACROILIAC JOINT Bilateral 11/11/2019    Procedure: Bilateral GT bursa + SIJ injection;  Surgeon: Noe Pleitez MD;  Location: [facility];  Service: Pain Management;  Laterality: Bilateral;    INJECTION OF JOINT Bilateral 11/11/2019    Procedure: Bilateral GT bursa + SIJ  injection;  Surgeon: Noe Pleitez MD;  Location: [facility];  Service: Pain Management;  Laterality: Bilateral;    JOINT REPLACEMENT Right     knee    ROBOT-ASSISTED LAPAROSCOPIC LYSIS OF ADHESIONS USING DA SP XI N/A 2022    Procedure: XI ROBOTIC LYSIS, ADHESIONS;  Surgeon: Den Barragan MD;  Location: [facility];  Service: Oncology;  Laterality: N/A;    ROBOT-ASSISTED LAPAROSCOPIC SALPINGO-OOPHORECTOMY USING DA SP XI Left 2022    Procedure: XI ROBOTIC SALPINGO-OOPHORECTOMY;  Surgeon: Den Barragan MD;  Location: [facility] OR;  Service: Oncology;  Laterality: Left;    SURGICAL REMOVAL OF BONE SPUR Left     left elbow    TOTAL KNEE ARTHROPLASTY Left 2024    Procedure: ARTHROPLASTY, KNEE, TOTAL;  Surgeon: Terrance Urena MD;  Location: [facility];  Service: Orthopedics;  Laterality: Left;    VASCULAR SURGERY Right     high ligation due to blood clot      Family History   Problem Relation Name Age of Onset    Heart disease Mother Loida Moura     Kidney disease Mother Loida Moura     Arthritis Mother Loida Moura     Breast cancer Mother Loida Moura     Cancer Father ALLAN Brand     Heart disease Father ALLAN Brand     Cancer Sister Lung cancer     Ovarian cancer Sister Lung cancer     Alzheimer's disease Sister       Social History     Socioeconomic History    Marital status:    Tobacco Use    Smoking status: Former     Current packs/day: 0.00     Average packs/day: 1 pack/day for 53.5 years (53.5 ttl pk-yrs)     Types: Cigarettes     Start date: 1962     Quit date: 2015     Years since quittin.4    Smokeless tobacco: Never   Substance and Sexual Activity    Alcohol use: No    Drug use: No    Sexual activity: Yes     Partners: Male     Birth control/protection: None     Social Drivers of Health     Financial Resource Strain: High Risk (2024)    Overall Financial Resource Strain (CARDIA)     Difficulty of Paying Living Expenses: Hard   Food  Insecurity: Food Insecurity Present (4/9/2024)    Hunger Vital Sign     Worried About Running Out of Food in the Last Year: Sometimes true     Ran Out of Food in the Last Year: Sometimes true   Transportation Needs: No Transportation Needs (4/9/2024)    PRAPARE - Transportation     Lack of Transportation (Medical): No     Lack of Transportation (Non-Medical): No   Physical Activity: Insufficiently Active (4/9/2024)    Exercise Vital Sign     Days of Exercise per Week: 2 days     Minutes of Exercise per Session: 30 min   Stress: Patient Declined (4/9/2024)    [employer] [place] [employer] - Occupational Stress Questionnaire     Feeling of Stress : Patient declined   Housing Stability: Low Risk  (4/9/2024)    Housing Stability Vital Sign     Unable to Pay for Housing in the Last Year: No     Number of Places Lived in the Last Year: 1     Unstable Housing in the Last Year: No     Review of Systems   Constitutional:  Negative for activity change, appetite change, chills, diaphoresis, fatigue, fever and unexpected weight change.   HENT:  Negative for congestion, drooling, ear discharge, ear pain, facial swelling, hearing loss, mouth sores, nosebleeds, postnasal drip, rhinorrhea, sinus pressure, sneezing, sore throat, tinnitus, trouble swallowing and voice change.    Eyes:  Negative for photophobia, redness and visual disturbance.   Respiratory:  Negative for apnea, cough, choking, chest tightness, shortness of breath and wheezing.    Cardiovascular:  Negative for chest pain and palpitations.   Gastrointestinal:  Negative for abdominal distention, abdominal pain, blood in stool, constipation, diarrhea, nausea and vomiting.   Endocrine: Negative for cold intolerance, heat intolerance, polydipsia, polyphagia and polyuria.   Genitourinary:  Negative for decreased urine volume, difficulty urinating, dysuria, flank pain, frequency, genital sores, hematuria, pelvic pain and urgency.   Musculoskeletal:  Positive for  arthralgias and myalgias. Negative for back pain, gait problem, joint swelling, neck pain and neck stiffness.   Skin:  Negative for color change, pallor, rash and wound.   Allergic/Immunologic: Negative for food allergies and immunocompromised state.   Neurological:  Negative for dizziness, tremors, seizures, syncope, speech difficulty, weakness, light-headedness, numbness and headaches.   Hematological:  Negative for adenopathy. Does not bruise/bleed easily.   Psychiatric/Behavioral:  Negative for agitation, behavioral problems, confusion, decreased concentration, dysphoric mood, hallucinations, self-injury, sleep disturbance and suicidal ideas. The patient is not nervous/anxious and is not hyperactive.    All other systems reviewed and are negative.      Objective:      Physical Exam  Vitals and nursing note reviewed.   Constitutional:       General: She is not in acute distress.     Appearance: Normal appearance. She is well-developed. She is not diaphoretic.   HENT:      Head: Normocephalic and atraumatic.      Mouth/Throat:      Pharynx: No oropharyngeal exudate.   Eyes:      General: No scleral icterus.  Neck:      Thyroid: No thyromegaly.      Vascular: No carotid bruit or JVD.      Trachea: No tracheal deviation.   Cardiovascular:      Rate and Rhythm: Normal rate and regular rhythm.      Heart sounds: Normal heart sounds.   Pulmonary:      Effort: Pulmonary effort is normal. No respiratory distress.      Breath sounds: Normal breath sounds. No wheezing or rales.   Chest:      Chest wall: No tenderness.   Abdominal:      General: Bowel sounds are normal. There is no distension.      Palpations: Abdomen is soft.      Tenderness: There is no abdominal tenderness. There is no guarding or rebound.   Musculoskeletal:         General: No tenderness. Normal range of motion.      Cervical back: Normal range of motion and neck supple.   Lymphadenopathy:      Cervical: No cervical adenopathy.   Skin:     General: Skin  is warm and dry.      Coloration: Skin is not pale.      Findings: No erythema or rash.   Neurological:      Mental Status: She is alert and oriented to person, place, and time.      Cranial Nerves: No cranial nerve deficit.      Coordination: Coordination normal.   Psychiatric:         Behavior: Behavior normal.         Thought Content: Thought content normal.         Judgment: Judgment normal.         CMP  Sodium   Date Value Ref Range Status   10/07/2024 143 136 - 145 mmol/L Final     Potassium   Date Value Ref Range Status   10/07/2024 4.5 3.5 - 5.1 mmol/L Final     Chloride   Date Value Ref Range Status   10/07/2024 106 95 - 110 mmol/L Final     CO2   Date Value Ref Range Status   10/07/2024 29 23 - 29 mmol/L Final     Glucose   Date Value Ref Range Status   10/07/2024 97 70 - 110 mg/dL Final     BUN   Date Value Ref Range Status   10/07/2024 27 (H) 8 - 23 mg/dL Final     Creatinine   Date Value Ref Range Status   10/07/2024 1.4 0.5 - 1.4 mg/dL Final     Calcium   Date Value Ref Range Status   10/07/2024 9.9 8.7 - 10.5 mg/dL Final     Total Protein   Date Value Ref Range Status   10/07/2024 6.8 6.0 - 8.4 g/dL Final     Albumin   Date Value Ref Range Status   10/07/2024 3.5 3.5 - 5.2 g/dL Final     Total Bilirubin   Date Value Ref Range Status   10/07/2024 0.3 0.1 - 1.0 mg/dL Final     Comment:     For infants and newborns, interpretation of results should be based  on gestational age, weight and in agreement with clinical  observations.    Premature Infant recommended reference ranges:  Up to 24 hours.............<8.0 mg/dL  Up to 48 hours............<12.0 mg/dL  3-5 days..................<15.0 mg/dL  6-29 days.................<15.0 mg/dL       Alkaline Phosphatase   Date Value Ref Range Status   10/07/2024 61 55 - 135 U/L Final     AST   Date Value Ref Range Status   10/07/2024 15 10 - 40 U/L Final     ALT   Date Value Ref Range Status   10/07/2024 17 10 - 44 U/L Final     Anion Gap   Date Value Ref Range  Status   10/07/2024 8 8 - 16 mmol/L Final     eGFR if    Date Value Ref Range Status   07/08/2022 34 (A) >60 mL/min/1.73 m^2 Final     eGFR    Date Value Ref Range Status   03/06/2023 31 mL/min/1.73mSq Final     Comment:     In accordance with NKF-ASN Task Force recommendation, calculation based on the Chronic Kidney Disease Epidemiology Collaboration (CKD-EPI) equation without adjustment for race. eGFR adjusted for gender and age and calculated in ml/min/1.73mSquared. eGFR cannot be calculated if patient is under 18 years of age.     Reference Range:   >= 60 ml/min/1.73mSquared.     eGFR if non    Date Value Ref Range Status   07/08/2022 29 (A) >60 mL/min/1.73 m^2 Final     Comment:     Calculation used to obtain the estimated glomerular filtration  rate (eGFR) is the CKD-EPI equation.        Lab Results   Component Value Date    WBC 8.01 10/07/2024    HGB 11.0 (L) 10/07/2024    HCT 35.7 (L) 10/07/2024    MCV 96 10/07/2024     10/07/2024     Lab Results   Component Value Date    CHOL 201 (H) 07/26/2023     Lab Results   Component Value Date    HDL 52 07/26/2023     Lab Results   Component Value Date    LDLCALC 116.6 07/26/2023     Lab Results   Component Value Date    TRIG 162 (H) 07/26/2023     Lab Results   Component Value Date    CHOLHDL 25.9 07/26/2023     Lab Results   Component Value Date    TSH 1.659 08/06/2024     Lab Results   Component Value Date    HGBA1C 5.8 03/06/2023     Assessment:       1. Acquired hypothyroidism    2. Chronic anticoagulation- Apixaban    3. Dependence on supplemental oxygen    4. Dyslipidemia    5. History of DVT (deep vein thrombosis) lower extremity on 2 different occasions    6. Hypertriglyceridemia    7. PMR (polymyalgia rheumatica)    8. Primary hypertension    9. Prediabetes    10. Stage 3b chronic kidney disease    11. Statin myopathy    12. Gastroesophageal reflux disease without esophagitis    13. Anemia, unspecified  type        Plan:   Acquired hypothyroidism    Chronic anticoagulation- Apixaban    Dependence on supplemental oxygen    Dyslipidemia-------------zetia-----------  -     Lipid Panel; Future; Expected date: 11/20/2024    History of DVT (deep vein thrombosis) lower extremity on 2 different occasions    Hypertriglyceridemia    PMR (polymyalgia rheumatica)-----------improved---------------see rheumatology---------    Primary hypertension----------------controlled-------------continue meds-----------    Prediabetes-------------on ozempic---------------  -     Hemoglobin A1C; Future; Expected date: 11/20/2024    Stage 3b chronic kidney disease------------stable-------------follow------------------    Statin myopathy    Gastroesophageal reflux disease without esophagitis----------------PPI--------------sees gi asso--------------------------------    Anemia, unspecified type----------stable----------follow-------------      Stable---------------continue meds-----------------------as above-----------------------f/u 3 months-----------------------------

## 2024-11-21 ENCOUNTER — PATIENT MESSAGE (OUTPATIENT)
Dept: FAMILY MEDICINE | Facility: CLINIC | Age: 79
End: 2024-11-21
Payer: MEDICARE

## 2024-12-03 ENCOUNTER — TELEPHONE (OUTPATIENT)
Dept: RHEUMATOLOGY | Facility: CLINIC | Age: 79
End: 2024-12-03
Payer: MEDICARE

## 2024-12-03 NOTE — TELEPHONE ENCOUNTER
"Expressed to patient Dr. Ludwig's POC,     "Plan:  Taper prednisone 10 mg daily for 3 weeks, then 7.5 mg daily for 3 weeks, then 5 mg daily for 3 weeks, then 2.5 mg daily for 3 weeks, then stop prednisone.  Start Kevzara as a steroid sparing agent for PMR.  Stay off methotrexate.  Stop folic acid."      Patient verbalized understanding and was grateful for the call_DD  "

## 2024-12-15 DIAGNOSIS — Z79.60 ENCOUNTER FOR MONITORING IMMUNOSUPPRESSIVE MEDICATION THERAPY CAUSING IMMUNODEFICIENCY: ICD-10-CM

## 2024-12-15 DIAGNOSIS — Z79.899 IMMUNODEFICIENCY DUE TO DRUG THERAPY: ICD-10-CM

## 2024-12-15 DIAGNOSIS — D84.821 ENCOUNTER FOR MONITORING IMMUNOSUPPRESSIVE MEDICATION THERAPY CAUSING IMMUNODEFICIENCY: ICD-10-CM

## 2024-12-15 DIAGNOSIS — D84.821 IMMUNODEFICIENCY DUE TO DRUG THERAPY: ICD-10-CM

## 2024-12-15 DIAGNOSIS — M35.3 POLYMYALGIA RHEUMATICA: ICD-10-CM

## 2024-12-15 DIAGNOSIS — Z51.81 ENCOUNTER FOR MONITORING IMMUNOSUPPRESSIVE MEDICATION THERAPY CAUSING IMMUNODEFICIENCY: ICD-10-CM

## 2024-12-17 RX ORDER — PREDNISONE 5 MG/1
TABLET ORAL
Qty: 105 TABLET | Refills: 0 | OUTPATIENT
Start: 2024-12-17 | End: 2025-03-11

## 2025-01-02 DIAGNOSIS — R10.9 ABDOMINAL PAIN, UNSPECIFIED ABDOMINAL LOCATION: ICD-10-CM

## 2025-01-02 DIAGNOSIS — R13.10 DYSPHAGIA, UNSPECIFIED TYPE: ICD-10-CM

## 2025-01-02 NOTE — TELEPHONE ENCOUNTER
No care due was identified.  [facility] Embedded Care Due Messages. Reference number: [id number].   1/02/2025 12:43:46 AM CST

## 2025-01-03 RX ORDER — PANTOPRAZOLE SODIUM 40 MG/1
40 TABLET, DELAYED RELEASE ORAL
Qty: 90 TABLET | Refills: 1 | Status: SHIPPED | OUTPATIENT
Start: 2025-01-03

## 2025-01-03 NOTE — TELEPHONE ENCOUNTER
Refill Routing Note   Medication(s) are not appropriate for processing by [facility] for the following reason(s):        New or recently adjusted medication    [facility] action(s):  Defer             Appointments  past 12m or future 3m with PCP    Date Provider   Last Visit   11/20/2024 Vince Jefferson MD   Next Visit   2/20/2025 Vince Jefferson MD   ED visits in past 90 days: 0        Note composed:10:10 PM 01/02/2025

## 2025-01-16 RX ORDER — EZETIMIBE 10 MG/1
10 TABLET ORAL
Qty: 90 TABLET | Refills: 1 | OUTPATIENT
Start: 2025-01-16

## 2025-01-17 DIAGNOSIS — I10 ESSENTIAL HYPERTENSION: ICD-10-CM

## 2025-01-17 RX ORDER — LOSARTAN POTASSIUM AND HYDROCHLOROTHIAZIDE 25; 100 MG/1; MG/1
1 TABLET ORAL
Qty: 90 TABLET | Refills: 2 | Status: SHIPPED | OUTPATIENT
Start: 2025-01-17

## 2025-01-17 NOTE — TELEPHONE ENCOUNTER
Unable to retrieve patient chart and identify care due.  [facility] Embedded Care Due Messages. Reference number: [id number].   1/17/2025 12:22:24 AM CST

## 2025-01-17 NOTE — TELEPHONE ENCOUNTER
Refill Decision Note   Kay Rosas  is requesting a refill authorization.  Brief Assessment and Rationale for Refill:  Approve     Medication Therapy Plan:         Comments:     Note composed:4:09 AM 01/17/2025

## 2025-01-29 ENCOUNTER — TELEPHONE (OUTPATIENT)
Dept: FAMILY MEDICINE | Facility: CLINIC | Age: 80
End: 2025-01-29

## 2025-01-29 ENCOUNTER — OFFICE VISIT (OUTPATIENT)
Dept: FAMILY MEDICINE | Facility: CLINIC | Age: 80
End: 2025-01-29
Payer: MEDICARE

## 2025-01-29 VITALS
HEART RATE: 104 BPM | DIASTOLIC BLOOD PRESSURE: 62 MMHG | OXYGEN SATURATION: 96 % | TEMPERATURE: 99 F | RESPIRATION RATE: 18 BRPM | WEIGHT: 254.06 LBS | HEIGHT: 66 IN | BODY MASS INDEX: 40.83 KG/M2 | SYSTOLIC BLOOD PRESSURE: 144 MMHG

## 2025-01-29 DIAGNOSIS — N18.32 STAGE 3B CHRONIC KIDNEY DISEASE: ICD-10-CM

## 2025-01-29 DIAGNOSIS — J44.9 COPD, MODERATE: Primary | ICD-10-CM

## 2025-01-29 DIAGNOSIS — R09.89 SINUS SYMPTOM: ICD-10-CM

## 2025-01-29 DIAGNOSIS — G72.0 STATIN MYOPATHY: ICD-10-CM

## 2025-01-29 DIAGNOSIS — Z99.81 CHRONIC RESPIRATORY FAILURE WITH HYPOXIA, ON HOME O2 THERAPY: ICD-10-CM

## 2025-01-29 DIAGNOSIS — T46.6X5A STATIN MYOPATHY: ICD-10-CM

## 2025-01-29 DIAGNOSIS — E66.01 MORBID OBESITY WITH BMI OF 40.0-44.9, ADULT: ICD-10-CM

## 2025-01-29 DIAGNOSIS — R05.9 COUGH, UNSPECIFIED TYPE: ICD-10-CM

## 2025-01-29 DIAGNOSIS — J96.11 CHRONIC RESPIRATORY FAILURE WITH HYPOXIA, ON HOME O2 THERAPY: ICD-10-CM

## 2025-01-29 PROBLEM — R09.81 SINUS CONGESTION: Status: RESOLVED | Noted: 2023-10-12 | Resolved: 2025-01-29

## 2025-01-29 LAB
CTP QC/QA: YES
SARS-COV-2 RDRP RESP QL NAA+PROBE: NEGATIVE

## 2025-01-29 PROCEDURE — 3288F FALL RISK ASSESSMENT DOCD: CPT | Mod: CPTII,S$GLB,, | Performed by: REGISTERED NURSE

## 2025-01-29 PROCEDURE — G2211 COMPLEX E/M VISIT ADD ON: HCPCS | Mod: S$GLB,,, | Performed by: REGISTERED NURSE

## 2025-01-29 PROCEDURE — 87635 SARS-COV-2 COVID-19 AMP PRB: CPT | Mod: QW,S$GLB,, | Performed by: REGISTERED NURSE

## 2025-01-29 PROCEDURE — 99214 OFFICE O/P EST MOD 30 MIN: CPT | Mod: S$GLB,,, | Performed by: REGISTERED NURSE

## 2025-01-29 PROCEDURE — 3077F SYST BP >= 140 MM HG: CPT | Mod: CPTII,S$GLB,, | Performed by: REGISTERED NURSE

## 2025-01-29 PROCEDURE — 1125F AMNT PAIN NOTED PAIN PRSNT: CPT | Mod: CPTII,S$GLB,, | Performed by: REGISTERED NURSE

## 2025-01-29 PROCEDURE — 1101F PT FALLS ASSESS-DOCD LE1/YR: CPT | Mod: CPTII,S$GLB,, | Performed by: REGISTERED NURSE

## 2025-01-29 PROCEDURE — 3078F DIAST BP <80 MM HG: CPT | Mod: CPTII,S$GLB,, | Performed by: REGISTERED NURSE

## 2025-01-29 PROCEDURE — 99999 PR PBB SHADOW E&M-EST. PATIENT-LVL III: CPT | Mod: PBBFAC,,, | Performed by: REGISTERED NURSE

## 2025-01-29 PROCEDURE — 3072F LOW RISK FOR RETINOPATHY: CPT | Mod: CPTII,S$GLB,, | Performed by: REGISTERED NURSE

## 2025-01-29 RX ORDER — AZITHROMYCIN 250 MG/1
TABLET, FILM COATED ORAL
Qty: 6 TABLET | Refills: 0 | Status: SHIPPED | OUTPATIENT
Start: 2025-01-29

## 2025-01-29 RX ORDER — BENZONATATE 200 MG/1
200 CAPSULE ORAL 3 TIMES DAILY PRN
Qty: 30 CAPSULE | Refills: 0 | Status: SHIPPED | OUTPATIENT
Start: 2025-01-29

## 2025-01-29 NOTE — PROGRESS NOTES
"Kay Rosas  MRN:  [id number]  79 y.o. female      PCP:  Vince Jefferson MD    Patient Care Team:  Vince Jefferson MD as PCP - General (Internal Medicine)  Lc Carroll MD as Consulting Physician (Cardiology)  Macho Prado MD as Consulting Physician (Orthopedic Surgery)  Joshua Spivey MD as Consulting Physician (Nephrology)  Michoacano Wilder MD as Consulting Physician (Pulmonary Disease)  William Fernández NP as Nurse Practitioner (Family Medicine)  Sincere Cartwright MD as Consulting Physician (Hematology and Oncology)      SUBJECTIVE:     CHIEF COMPLAINT:  Cough, congestion, and sore throat    HPI:  Ms. Rosas reports onset of illness was a few days ago with a dry, nonproductive cough. Yesterday, symptoms worsened with increased coughing and sore throat development. This morning, she expectorated brownish sputum without blood and had onset of diarrhea.    Ms. Rosas has chest fatigue and shortness of breath, a chronic condition requiring supplemental oxygen. She uses portable oxygen at 2-3 L when out and a larger setup at home during the night.    Ms. Rosas denies fever but feels extremely cold due to anticoagulants. She has sinus pain and a history of severe sinus infections, and plans to consult an ENT specialist for chronic sinus problems.    Recent building remodeling, including floor work completed yesterday, has generated significant dust potentially triggering or exacerbating symptoms. Ms. Rosas has been unable to use her air conditioner during the day due to the remodeling.      Review of Systems   Constitutional:  Positive for chills ("I stay cold from blood thinners") and malaise/fatigue. Negative for diaphoresis and fever.   HENT:  Positive for congestion and sore throat. Negative for ear pain and tinnitus.    Eyes:  Negative for blurred vision, double vision and pain.   Respiratory:  Positive for cough, sputum production (brown) and shortness " of breath. Negative for hemoptysis.    Cardiovascular: Negative.    Gastrointestinal:  Positive for diarrhea (just today). Negative for nausea and vomiting.   Neurological:  Positive for weakness. Negative for dizziness and headaches.       Review of patient's allergies indicates:   Allergen Reactions    Lipitor [atorvastatin]      Severe Leg cramps    Nsaids (non-steroidal anti-inflammatory drug)      CKD         Patient Active Problem List   Diagnosis    Primary hypertension    Acquired hypothyroidism    Hyperlipidemia    Arthritis    B12 deficiency    Depression    Lumbar spondylosis    COPD, moderate    PAD (peripheral artery disease)    Osteoarthritis of both knees    Chronic bilateral low back pain with right-sided sciatica    Lumbar radiculopathy    Greater trochanteric bursitis of both hips    Class 3 severe obesity due to excess calories with serious comorbidity and body mass index (BMI) of 40.0 to 44.9 in adult    Secondary hyperparathyroidism of renal origin    History of DVT (deep vein thrombosis) lower extremity on 2 different occasions    Primary insomnia    Iron deficiency anemia due to chronic blood loss    Embolism and thrombosis of unspecified artery    Major depressive disorder, recurrent, mild    Statin myopathy    Stage 3b chronic kidney disease    Aortic calcification    Pulmonary nodule    Low vitamin D level    Hypertriglyceridemia    Pulmonary emphysema    Lung granuloma    History of diabetes mellitus    Financial difficulties    Ex-smoker    Nicotine dependence, cigarettes, in remission    Chronic anticoagulation- Apixaban    Debility    Dependence on supplemental oxygen    Dyslipidemia    Prediabetes    PMR (polymyalgia rheumatica)    Gastroesophageal reflux disease without esophagitis    Anemia    Chronic respiratory failure with hypoxia, on home O2 therapy       Current Outpatient Medications   Medication Instructions    albuterol (PROVENTIL/VENTOLIN HFA) 90 mcg/actuation inhaler INHALE  "2 PUFFS INTO THE LUNGS EVERY 4 HOURS AS NEEDED FOR WHEEZING OR SHORTNESS OF BREATH.    apixaban (ELIQUIS) 5 mg, Oral, 2 times daily    ergocalciferol (ERGOCALCIFEROL) 50,000 Units, Oral, Every 7 days    ezetimibe (ZETIA) 10 mg, Oral    gabapentin (NEURONTIN) 300 mg, Oral, Nightly    KEVZARA 200 mg, Subcutaneous, Every 14 days    levothyroxine (SYNTHROID) 50 MCG tablet TAKE 1 TABLET BY MOUTH EVERY DAY    losartan-hydrochlorothiazide 100-25 mg (HYZAAR) 100-25 mg per tablet 1 tablet, Oral    metroNIDAZOLE (METROGEL) 0.75 % gel Topical (Top), 2 times daily    OZEMPIC 0.25 mg, Subcutaneous, Every 7 days    pantoprazole (PROTONIX) 40 mg, Oral    sertraline (ZOLOFT) 50 MG tablet TAKE 1 TABLET BY MOUTH EVERY DAY    traZODone (DESYREL) 150 mg, Oral, Nightly         Past medical, surgical, family and social histories have been reviewed today.      OBJECTIVE:     Vitals:    01/29/25 1315   BP: (!) 144/62   Pulse: 104   Resp: 18   Temp: 98.8 °F (37.1 °C)   TempSrc: Tympanic   SpO2: 96%   Weight: 115.2 kg (254 lb 1.3 oz)   Height: 5' 6" (1.676 m)     Vitals:    01/29/25 1315   PainSc:   5   PainLoc: Throat       Physical Exam  Vitals reviewed.   HENT:      Head: Normocephalic and atraumatic.      Right Ear: Tympanic membrane normal.      Left Ear: Tympanic membrane normal.      Nose: Nose normal. No congestion or rhinorrhea.      Mouth/Throat:      Mouth: Mucous membranes are moist.      Pharynx: Oropharynx is clear.   Eyes:      Pupils: Pupils are equal, round, and reactive to light.   Cardiovascular:      Rate and Rhythm: Regular rhythm.      Comments: Slight tachy at 104  Pulmonary:      Effort: Pulmonary effort is normal.      Breath sounds: Normal breath sounds. No decreased air movement.   Lymphadenopathy:      Cervical: No cervical adenopathy.   Skin:     Capillary Refill: Capillary refill takes less than 2 seconds.   Neurological:      Mental Status: She is alert and oriented to person, place, and time. Mental status is " at baseline.   Psychiatric:         Mood and Affect: Mood normal.         Behavior: Behavior normal.         Thought Content: Thought content normal.         Judgment: Judgment normal.           Results for orders placed or performed in visit on 01/29/25   POCT COVID-19 Rapid Screening    Collection Time: 01/29/25  1:39 PM   Result Value Ref Range    POC Rapid COVID Negative Negative     Acceptable Yes        ASSESSMENT:     1. COPD, moderate ----- chronic issue, acute exacerbation.  Meds ordered, monitor.  -     azithromycin (ZITHROMAX Z-LAZARO) 250 MG tablet; Take 2 tab today, then 1 tab daily x 4 days.  Dispense: 6 tablet; Refill: 0  -     benzonatate (TESSALON) 200 MG capsule; Take 1 capsule (200 mg total) by mouth 3 (three) times daily as needed.  Dispense: 30 capsule; Refill: 0    2. Sinus symptom  -     POCT COVID-19 Rapid Screening    3. Cough, unspecified type  -     POCT COVID-19 Rapid Screening    4. Chronic respiratory failure with hypoxia, on home O2 therapy ----- uses home O2 ~ 2 to 3 lpm, followed/managed by Pulmonary    5. Stage 3b chronic kidney disease ---- chronic issue, stable, per Nephrology    6. Statin myopathy ---- unable to tolerate statins, on Zetia as ordered for HLD    7. Morbid obesity with BMI of 40.0-44.9, adult ---- healthy diet and lifestyle changes      PLAN:     As above.  Monitor.  Contact office back if s/s worsen or linger.  RTC as directed and/or prn.        BRANDAN Lin  [facility]       Future Appointments   Date Time Provider Department [place]   2/14/2025  3:30 PM Milton Ludwig MD [facility]   2/20/2025  3:00 PM Vince Jefferson MD [facility]

## 2025-01-29 NOTE — TELEPHONE ENCOUNTER
Spoke with patient informed her I spoke with [facility] pharmacy and was told they had received Rx and was filling it then, voiced understanding.

## 2025-01-29 NOTE — TELEPHONE ENCOUNTER
----- Message from Sheylaracielbernabe sent at 1/29/2025  3:05 PM CST -----  Contact: Kay  Type:  Needs Medical Advice    Who Called: Kay  Symptoms (please be specific): Pt stated [facility] did not receive the prescription for azithromycin (ZITHROMAX Z-LAZARO) 250 MG tablet, but they did receive the other that was sent in   Pharmacy name and phone #:    [id number] [place] - [address]  [place] [address]  Phone: [phone] Fax: [phone]      Would the patient rather a call back or a response via [facility]? call  Best Call Back Number: [phone]   Additional Information:

## 2025-02-05 NOTE — PROGRESS NOTES
----- Message from Bree Larose MD sent at 2/2/2025  6:01 PM CST -----  I requested appt with Irielle in Feb, same day as ortho, along with diabetic education, but neither were scheduled.  Pls schedule   Subjective:   Patient ID:  Kay Rosas is a 73 y.o. female who presents for evaluation of Foot Swelling (right foot) and Claudication      HPI  A 74 yo female with obesity diabetes neuropathy  Copd htn hlp is here for evaluation of leg pain and pvd. The patient has been noticing bilateral leg pain from the knees down with ambulation the legs feels heavy. She also noted that her legs hurt if she stands too long she cannot cook standing up. She has knee pain. She has some leg swelling too. Has rt leg p[ain at nite with cramps. Has pulling sensation in legs has chronic back pain had leg surgery. Has no new discoloration or ulceration in legs and feet. Her ekg shows sinus bradycardia.  Past Medical History:   Diagnosis Date    Arthritis     Degenerative disc disease, cervical     Diabetes mellitus, type 2     Emphysema of lung     Hyperlipidemia     Hypertension     MVP (mitral valve prolapse)     Osteoporosis     feet    Thyroid condition        Past Surgical History:   Procedure Laterality Date    BACK SURGERY      cataract surgery  Bilateral 10/ 2012    elbow spur removed Left     HYSTERECTOMY Left 1978    OOPHORECTOMY      OVARIAN CYST REMOVAL      two from back, one from axilla    VASCULAR SURGERY Right     high ligation due to blood clot        Social History     Tobacco Use    Smoking status: Former Smoker     Last attempt to quit: 2015     Years since quittin.1    Smokeless tobacco: Never Used   Substance Use Topics    Alcohol use: No     Alcohol/week: 0.0 oz    Drug use: No       Family History   Problem Relation Age of Onset    Heart disease Mother     Kidney disease Mother     Arthritis Mother     Breast cancer Mother     Cancer Father     Heart disease Father     Cancer Sister     Ovarian cancer Sister     Alzheimer's disease Sister        Current Outpatient Medications   Medication Sig    albuterol 90 mcg/actuation inhaler Inhale 2 puffs into the lungs every 6  (six) hours as needed.    ARIPiprazole (ABILIFY) 2 MG Tab Take 1 tablet (2 mg total) by mouth once daily.    azelastine (ASTELIN) 137 mcg (0.1 %) nasal spray 2 sprays (274 mcg total) by Nasal route 2 (two) times daily.    gabapentin (NEURONTIN) 300 MG capsule TAKE 1 CAPSULE (300 MG TOTAL) BY MOUTH 3 (THREE) TIMES DAILY.    levocetirizine (XYZAL) 5 MG tablet Take 1 tablet (5 mg total) by mouth every evening.    levothyroxine (SYNTHROID) 50 MCG tablet TAKE 1 TABLET (50 MCG TOTAL) BY MOUTH ONCE DAILY.    losartan-hydrochlorothiazide 100-25 mg (HYZAAR) 100-25 mg per tablet TAKE 1 TABLET BY MOUTH ONCE DAILY.    metFORMIN (GLUCOPHAGE) 1000 MG tablet TAKE 1 TABLET BY MOUTH TWICE A DAY (Patient taking differently: TAKE 0.5 TABLET BY MOUTH TWICE A DAY)    oxybutynin (DITROPAN-XL) 5 MG TR24 Take 1 tablet (5 mg total) by mouth once daily.    pantoprazole (PROTONIX) 40 MG tablet TAKE 1 TABLET BY MOUTH DAILY.    sertraline (ZOLOFT) 100 MG tablet TAKE 1 TABLET (100 MG TOTAL) BY MOUTH ONCE DAILY.    sertraline (ZOLOFT) 50 MG tablet Take 1 tablet (50 mg total) by mouth once daily.    traMADol (ULTRAM) 50 mg tablet Take 1 tablet (50 mg total) by mouth every 6 (six) hours as needed for Pain.    traZODone (DESYREL) 100 MG tablet TAKE 1 TABLET (100 MG TOTAL) BY MOUTH EVERY EVENING.    tiZANidine (ZANAFLEX) 4 MG tablet TAKE 1 TABLET DAILY     No current facility-administered medications for this visit.      Current Outpatient Medications on File Prior to Visit   Medication Sig    albuterol 90 mcg/actuation inhaler Inhale 2 puffs into the lungs every 6 (six) hours as needed.    ARIPiprazole (ABILIFY) 2 MG Tab Take 1 tablet (2 mg total) by mouth once daily.    azelastine (ASTELIN) 137 mcg (0.1 %) nasal spray 2 sprays (274 mcg total) by Nasal route 2 (two) times daily.    gabapentin (NEURONTIN) 300 MG capsule TAKE 1 CAPSULE (300 MG TOTAL) BY MOUTH 3 (THREE) TIMES DAILY.    levocetirizine (XYZAL) 5 MG tablet Take 1 tablet  (5 mg total) by mouth every evening.    levothyroxine (SYNTHROID) 50 MCG tablet TAKE 1 TABLET (50 MCG TOTAL) BY MOUTH ONCE DAILY.    losartan-hydrochlorothiazide 100-25 mg (HYZAAR) 100-25 mg per tablet TAKE 1 TABLET BY MOUTH ONCE DAILY.    metFORMIN (GLUCOPHAGE) 1000 MG tablet TAKE 1 TABLET BY MOUTH TWICE A DAY (Patient taking differently: TAKE 0.5 TABLET BY MOUTH TWICE A DAY)    oxybutynin (DITROPAN-XL) 5 MG TR24 Take 1 tablet (5 mg total) by mouth once daily.    pantoprazole (PROTONIX) 40 MG tablet TAKE 1 TABLET BY MOUTH DAILY.    sertraline (ZOLOFT) 100 MG tablet TAKE 1 TABLET (100 MG TOTAL) BY MOUTH ONCE DAILY.    sertraline (ZOLOFT) 50 MG tablet Take 1 tablet (50 mg total) by mouth once daily.    traMADol (ULTRAM) 50 mg tablet Take 1 tablet (50 mg total) by mouth every 6 (six) hours as needed for Pain.    traZODone (DESYREL) 100 MG tablet TAKE 1 TABLET (100 MG TOTAL) BY MOUTH EVERY EVENING.    tiZANidine (ZANAFLEX) 4 MG tablet TAKE 1 TABLET DAILY     No current facility-administered medications on file prior to visit.        Review of patient's allergies indicates:  No Known Allergies    Review of Systems   Constitution: Negative for diaphoresis, malaise/fatigue and weight gain.   HENT: Negative for hoarse voice.    Eyes: Negative for double vision and visual disturbance.   Cardiovascular: Positive for leg swelling. Negative for chest pain, claudication, cyanosis, dyspnea on exertion, irregular heartbeat, near-syncope, orthopnea, palpitations, paroxysmal nocturnal dyspnea and syncope.   Respiratory: Positive for snoring. Negative for cough, hemoptysis and shortness of breath.    Hematologic/Lymphatic: Negative for bleeding problem. Does not bruise/bleed easily.   Skin: Negative for color change and poor wound healing.   Musculoskeletal: Positive for arthritis and joint pain. Negative for muscle cramps, muscle weakness and myalgias.   Gastrointestinal: Negative for bloating, abdominal pain, change in  bowel habit, diarrhea, heartburn, hematemesis, hematochezia, melena and nausea.   Neurological: Negative for excessive daytime sleepiness, dizziness, headaches, light-headedness, loss of balance, numbness, paresthesias and weakness.        Has chronic leg pain.   Psychiatric/Behavioral: Negative for memory loss. The patient does not have insomnia.    Allergic/Immunologic: Negative for hives.       Objective:   Physical Exam   Constitutional: She is oriented to person, place, and time. She appears well-developed and well-nourished. She does not appear ill. No distress.   HENT:   Head: Normocephalic and atraumatic.   Eyes: Pupils are equal, round, and reactive to light. EOM are normal. No scleral icterus.   Neck: Normal range of motion. Neck supple. Normal carotid pulses, no hepatojugular reflux and no JVD present. Carotid bruit is not present. No tracheal deviation present. No thyromegaly present.   Cardiovascular: Normal rate, regular rhythm, normal heart sounds, intact distal pulses and normal pulses. Exam reveals no gallop and no friction rub.   No murmur heard.  Pulses:       Carotid pulses are 2+ on the right side, and 2+ on the left side.       Radial pulses are 2+ on the right side, and 2+ on the left side.        Femoral pulses are 2+ on the right side, and 2+ on the left side.       Popliteal pulses are 2+ on the right side, and 2+ on the left side.        Dorsalis pedis pulses are 2+ on the right side, and 2+ on the left side.        Posterior tibial pulses are 2+ on the right side, and 2+ on the left side.   Pulmonary/Chest: Effort normal and breath sounds normal. No respiratory distress. She has no wheezes. She has no rhonchi. She has no rales. She exhibits no tenderness.   Abdominal: Soft. Normal appearance, normal aorta and bowel sounds are normal. She exhibits no distension, no abdominal bruit, no ascites and no pulsatile midline mass. There is no hepatomegaly. There is no tenderness.   Obese abdomen.  "  Musculoskeletal: She exhibits no edema.        Right shoulder: She exhibits no deformity.   Neurological: She is alert and oriented to person, place, and time. She has normal strength. No cranial nerve deficit. Coordination normal.   Skin: Skin is warm and dry. No rash noted. She is not diaphoretic. No cyanosis or erythema. Nails show no clubbing.   Psychiatric: She has a normal mood and affect. Her speech is normal and behavior is normal.   Nursing note and vitals reviewed.    Vitals:    08/23/19 0801 08/23/19 0803   BP: 138/60 132/66   BP Location: Right arm Left arm   Patient Position: Sitting Sitting   BP Method: Large (Manual) Large (Manual)   Pulse: (!) 59    Weight: 106.6 kg (235 lb)    Height: 5' 6" (1.676 m)      Lab Results   Component Value Date    CHOL 172 02/18/2019    CHOL 144 10/25/2017    CHOL 174 02/29/2016     Lab Results   Component Value Date    HDL 57 02/18/2019    HDL 62 10/25/2017    HDL 58 02/29/2016     Lab Results   Component Value Date    LDLCALC 85.2 02/18/2019    LDLCALC 67.6 10/25/2017    LDLCALC 83.0 02/29/2016     Lab Results   Component Value Date    TRIG 149 02/18/2019    TRIG 72 10/25/2017    TRIG 165 (H) 02/29/2016     Lab Results   Component Value Date    CHOLHDL 33.1 02/18/2019    CHOLHDL 43.1 10/25/2017    CHOLHDL 33.3 02/29/2016       Chemistry        Component Value Date/Time     07/31/2019 1330    K 5.1 07/31/2019 1330     07/31/2019 1330    CO2 27 07/31/2019 1330    BUN 40 (H) 07/31/2019 1330    CREATININE 1.7 (H) 07/31/2019 1330    GLU 81 07/31/2019 1330        Component Value Date/Time    CALCIUM 10.3 07/31/2019 1330    ALKPHOS 80 02/18/2019 1439    AST 20 02/18/2019 1439    ALT 14 02/18/2019 1439    BILITOT 0.3 02/18/2019 1439    ESTGFRAFRICA 34.2 (A) 07/31/2019 1330    EGFRNONAA 29.7 (A) 07/31/2019 1330          Lab Results   Component Value Date    TSH 1.845 02/18/2019     No results found for: INR, PROTIME  Lab Results   Component Value Date    WBC 5.83 " 02/18/2019    HGB 10.1 (L) 02/18/2019    HCT 33.5 (L) 02/18/2019    MCV 90 02/18/2019     02/18/2019     BNP  @LABRCNTIP(BNP,BNPTRIAGEBLO)@  CrCl cannot be calculated (Patient's most recent lab result is older than the maximum 7 days allowed.).  Assessment:     1. Essential hypertension    2. Acquired hypothyroidism    3. Pure hypercholesterolemia    4. Controlled type 2 diabetes mellitus without complication, without long-term current use of insulin    5. Arthritis    6. Obstructive sleep apnea syndrome    7. Diabetic mononeuropathy associated with diabetes mellitus due to underlying condition    8. COPD, moderate    9. PAD (peripheral artery disease)      Reviewed brandy withe xercise no significant pad. Has a combination of neurogenic claudication osteoarthritis of knees and neuropathy.  Her leg swelling is related to varicosities and element of venous insufficiency.  Needs weight loss diet compliance ortho eval.  Leg elevation and compression stockings discussed.  Plan:   As per above.   Prn f/u.

## 2025-02-14 ENCOUNTER — LAB VISIT (OUTPATIENT)
Dept: LAB | Facility: HOSPITAL | Age: 80
End: 2025-02-14
Attending: STUDENT IN AN ORGANIZED HEALTH CARE EDUCATION/TRAINING PROGRAM
Payer: MEDICARE

## 2025-02-14 ENCOUNTER — OFFICE VISIT (OUTPATIENT)
Dept: RHEUMATOLOGY | Facility: CLINIC | Age: 80
End: 2025-02-14
Payer: MEDICARE

## 2025-02-14 VITALS
WEIGHT: 255.5 LBS | DIASTOLIC BLOOD PRESSURE: 70 MMHG | HEIGHT: 66 IN | HEART RATE: 89 BPM | SYSTOLIC BLOOD PRESSURE: 134 MMHG | BODY MASS INDEX: 41.06 KG/M2

## 2025-02-14 DIAGNOSIS — M35.3 POLYMYALGIA RHEUMATICA: Primary | ICD-10-CM

## 2025-02-14 DIAGNOSIS — M35.3 POLYMYALGIA RHEUMATICA: ICD-10-CM

## 2025-02-14 PROCEDURE — 85652 RBC SED RATE AUTOMATED: CPT | Performed by: STUDENT IN AN ORGANIZED HEALTH CARE EDUCATION/TRAINING PROGRAM

## 2025-02-14 PROCEDURE — 36415 COLL VENOUS BLD VENIPUNCTURE: CPT | Performed by: STUDENT IN AN ORGANIZED HEALTH CARE EDUCATION/TRAINING PROGRAM

## 2025-02-14 PROCEDURE — 85025 COMPLETE CBC W/AUTO DIFF WBC: CPT | Performed by: STUDENT IN AN ORGANIZED HEALTH CARE EDUCATION/TRAINING PROGRAM

## 2025-02-14 PROCEDURE — 86140 C-REACTIVE PROTEIN: CPT | Performed by: STUDENT IN AN ORGANIZED HEALTH CARE EDUCATION/TRAINING PROGRAM

## 2025-02-14 PROCEDURE — 80053 COMPREHEN METABOLIC PANEL: CPT | Performed by: STUDENT IN AN ORGANIZED HEALTH CARE EDUCATION/TRAINING PROGRAM

## 2025-02-14 PROCEDURE — 99999 PR PBB SHADOW E&M-EST. PATIENT-LVL IV: CPT | Mod: PBBFAC,,, | Performed by: STUDENT IN AN ORGANIZED HEALTH CARE EDUCATION/TRAINING PROGRAM

## 2025-02-14 NOTE — PROGRESS NOTES
"Subjective:      Patient ID: Kay Rosas is a 79 y.o. female.    Chief Complaint: PMR    HPI:   Patient with prediabetes, morbid obesity, former smoker, COPD on PM oxygen, degenerative arthritis of lumbar spine, HTN, HLD, PAD, CKD stage 3b, who presents for Rheumatology follow up for PMR which was diagnosed in 8/2024. She tapered off prednisone since last visit. She also got on Ozempic for weight loss. We started Kevara for PMR. She got 2 doses in 12/2024. Then PAP had to be redone for 2025 year so she submitted it 2 weeks ago. So she has been off Kevzara for 6 weeks after the initial 2 doses. She has no PMR symptoms currently. She has chronic right shoulder pain with mechanical features from AC and GH joint arthritis in that shoulder. Denies much morning stiffness. No hip girdle, peripheral joint, or GCA symptoms. Denies joint swelling, significant stiffness, skin rashes, oral ulcers, patchy alopecia, sicca symptoms, cardiopulmonary symptoms, eye inflammation, IBD, fevers, weight loss, Raynaud's. Rheumatology review of systems is otherwise negative.    Low back pain, chronic. S/p back surgery.   S/p bilateral knee replacements.  Chronic swelling of left lower extremity.  Gets chronic mild headaches which she attributes to changing glasses prescription.     Social Hx: Former smoker of almost 2 PPD, quit >10 years ago.  Family Hx: No family history of autoimmune disease      Objective:   /70 (BP Location: Left arm, Patient Position: Sitting)   Pulse 89   Ht 5' 6" (1.676 m)   Wt 115.9 kg (255 lb 8.2 oz)   BMI 41.24 kg/m²   Physical Exam  Constitutional:       General: She is not in acute distress.     Appearance: Normal appearance.   HENT:      Head: Normocephalic and atraumatic.      Mouth/Throat:      Mouth: Mucous membranes are moist.      Pharynx: Oropharynx is clear.   Cardiovascular:      Rate and Rhythm: Normal rate and regular rhythm.   Pulmonary:      Effort: Pulmonary effort is normal.      " Breath sounds: Normal breath sounds.   Abdominal:      Palpations: Abdomen is soft.      Tenderness: There is no abdominal tenderness.   Musculoskeletal:         General: No swelling.      Cervical back: Normal range of motion. No tenderness.      Comments: Right shoulder AC joint tenderness. ROM intact. Remainder of joint exam is unremarkable.   Skin:     General: Skin is warm and dry.   Neurological:      Mental Status: She is alert and oriented to person, place, and time. Mental status is at baseline.             Assessment and Plan:     Problem List Items Addressed This Visit    None  Visit Diagnoses       Polymyalgia rheumatica    -  Primary    Relevant Orders    C-Reactive Protein    CBC Auto Differential    Comprehensive Metabolic Panel    Sedimentation rate                Patient with prediabetes, morbid obesity, former smoker, COPD on PM oxygen, degenerative arthritis of lumbar spine, HTN, HLD, PAD, CKD stage 3b, who presents for Rheumatology follow up for PMR which was diagnosed in 8/2024.     ESR 71 prior to prednisone.  CRP normal, only checked after she started prednisone 10 mg daily.    No hx of diverticulitis or bowel perf.    Tapered off prednisone. She only got 2 doses of Kevzara before PAP ran out in 2024. Now awaiting new PAP approval for 2025. However she so far has done well without Kevzara for the past 6 weeks. PMR remains in remission.    Plan:  Labs today.  Tentatively plan to remain off meds for now. Complete the Kevzara PAP and keep the medication in case of flare since we don't want to use prednisone again due to significant weight gain.  Monitoring labs in 6 months.        Follow up in about 6 months (around 8/14/2025).       I spent a total of 30 minutes on the day of the visit.  This includes face to face time and non-face to face time preparing to see the patient (eg, review of tests), obtaining and/or reviewing separately obtained history, documenting clinical information in the  electronic or other health record, independently interpreting results and communicating results to the patient/family/caregiver, or care coordinator.      Today's visit is associated with current or anticipated ongoing medical care related to this patient's diagnosis of polymyalgia rheumatica, which is a chronic disease which will require regular follow up to manage symptoms and progression. The patient is to return to the office within a minimum of 3-6 months to review symptoms and function at that time. CPT code

## 2025-02-15 LAB
ALBUMIN SERPL BCP-MCNC: 3.8 G/DL (ref 3.5–5.2)
ALP SERPL-CCNC: 66 U/L (ref 40–150)
ALT SERPL W/O P-5'-P-CCNC: 16 U/L (ref 10–44)
ANION GAP SERPL CALC-SCNC: 11 MMOL/L (ref 8–16)
AST SERPL-CCNC: 18 U/L (ref 10–40)
BASOPHILS # BLD AUTO: 0.05 K/UL (ref 0–0.2)
BASOPHILS NFR BLD: 0.7 % (ref 0–1.9)
BILIRUB SERPL-MCNC: 0.3 MG/DL (ref 0.1–1)
BUN SERPL-MCNC: 27 MG/DL (ref 8–23)
CALCIUM SERPL-MCNC: 10.1 MG/DL (ref 8.7–10.5)
CHLORIDE SERPL-SCNC: 104 MMOL/L (ref 95–110)
CO2 SERPL-SCNC: 28 MMOL/L (ref 23–29)
CREAT SERPL-MCNC: 1.4 MG/DL (ref 0.5–1.4)
CRP SERPL-MCNC: 11.6 MG/L (ref 0–8.2)
DIFFERENTIAL METHOD BLD: ABNORMAL
EOSINOPHIL # BLD AUTO: 0.1 K/UL (ref 0–0.5)
EOSINOPHIL NFR BLD: 1.4 % (ref 0–8)
ERYTHROCYTE [DISTWIDTH] IN BLOOD BY AUTOMATED COUNT: 12.8 % (ref 11.5–14.5)
ERYTHROCYTE [SEDIMENTATION RATE] IN BLOOD BY PHOTOMETRIC METHOD: 38 MM/HR (ref 0–36)
EST. GFR  (NO RACE VARIABLE): 38.5 ML/MIN/1.73 M^2
GLUCOSE SERPL-MCNC: 88 MG/DL (ref 70–110)
HCT VFR BLD AUTO: 41.7 % (ref 37–48.5)
HGB BLD-MCNC: 13 G/DL (ref 12–16)
IMM GRANULOCYTES # BLD AUTO: 0.01 K/UL (ref 0–0.04)
IMM GRANULOCYTES NFR BLD AUTO: 0.1 % (ref 0–0.5)
LYMPHOCYTES # BLD AUTO: 1.4 K/UL (ref 1–4.8)
LYMPHOCYTES NFR BLD: 18.8 % (ref 18–48)
MCH RBC QN AUTO: 29.9 PG (ref 27–31)
MCHC RBC AUTO-ENTMCNC: 31.2 G/DL (ref 32–36)
MCV RBC AUTO: 96 FL (ref 82–98)
MONOCYTES # BLD AUTO: 0.6 K/UL (ref 0.3–1)
MONOCYTES NFR BLD: 8.1 % (ref 4–15)
NEUTROPHILS # BLD AUTO: 5.1 K/UL (ref 1.8–7.7)
NEUTROPHILS NFR BLD: 70.9 % (ref 38–73)
NRBC BLD-RTO: 0 /100 WBC
PLATELET # BLD AUTO: 214 K/UL (ref 150–450)
PMV BLD AUTO: 12.9 FL (ref 9.2–12.9)
POTASSIUM SERPL-SCNC: 4.7 MMOL/L (ref 3.5–5.1)
PROT SERPL-MCNC: 7.8 G/DL (ref 6–8.4)
RBC # BLD AUTO: 4.35 M/UL (ref 4–5.4)
SODIUM SERPL-SCNC: 143 MMOL/L (ref 136–145)
WBC # BLD AUTO: 7.2 K/UL (ref 3.9–12.7)

## 2025-02-20 ENCOUNTER — OFFICE VISIT (OUTPATIENT)
Dept: FAMILY MEDICINE | Facility: CLINIC | Age: 80
End: 2025-02-20
Payer: MEDICARE

## 2025-02-20 VITALS
SYSTOLIC BLOOD PRESSURE: 136 MMHG | WEIGHT: 254.88 LBS | TEMPERATURE: 97 F | BODY MASS INDEX: 40.96 KG/M2 | HEIGHT: 66 IN | HEART RATE: 99 BPM | OXYGEN SATURATION: 96 % | DIASTOLIC BLOOD PRESSURE: 72 MMHG

## 2025-02-20 DIAGNOSIS — Z86.718 HISTORY OF DVT (DEEP VEIN THROMBOSIS): ICD-10-CM

## 2025-02-20 DIAGNOSIS — M35.3 PMR (POLYMYALGIA RHEUMATICA): ICD-10-CM

## 2025-02-20 DIAGNOSIS — K21.9 GASTROESOPHAGEAL REFLUX DISEASE WITHOUT ESOPHAGITIS: ICD-10-CM

## 2025-02-20 DIAGNOSIS — N18.32 STAGE 3B CHRONIC KIDNEY DISEASE: ICD-10-CM

## 2025-02-20 DIAGNOSIS — Z79.01 CHRONIC ANTICOAGULATION: ICD-10-CM

## 2025-02-20 DIAGNOSIS — Z99.81 CHRONIC RESPIRATORY FAILURE WITH HYPOXIA, ON HOME O2 THERAPY: ICD-10-CM

## 2025-02-20 DIAGNOSIS — I10 PRIMARY HYPERTENSION: ICD-10-CM

## 2025-02-20 DIAGNOSIS — J96.11 CHRONIC RESPIRATORY FAILURE WITH HYPOXIA, ON HOME O2 THERAPY: ICD-10-CM

## 2025-02-20 DIAGNOSIS — F33.0 MAJOR DEPRESSIVE DISORDER, RECURRENT, MILD: ICD-10-CM

## 2025-02-20 DIAGNOSIS — R73.03 PREDIABETES: ICD-10-CM

## 2025-02-20 DIAGNOSIS — E78.5 DYSLIPIDEMIA: ICD-10-CM

## 2025-02-20 DIAGNOSIS — E03.9 ACQUIRED HYPOTHYROIDISM: Primary | ICD-10-CM

## 2025-02-20 RX ORDER — SERTRALINE HYDROCHLORIDE 50 MG/1
TABLET, FILM COATED ORAL
Qty: 90 TABLET | Refills: 0 | Status: SHIPPED | OUTPATIENT
Start: 2025-02-20

## 2025-02-20 RX ORDER — SERTRALINE HYDROCHLORIDE 50 MG/1
TABLET, FILM COATED ORAL
Qty: 90 TABLET | Refills: 0 | Status: CANCELLED | OUTPATIENT
Start: 2025-02-20

## 2025-02-20 NOTE — PROGRESS NOTES
Subjective:       Patient ID: Kay Rosas is a 79 y.o. female.    Chief Complaint: Follow-up (3 month), Hypertension, Hyperlipidemia, Hypothyroidism, Chronic Kidney Disease, and Anticoagulation    Follow-up  Associated symptoms include arthralgias and myalgias. Pertinent negatives include no abdominal pain, chest pain, chills, congestion, coughing, diaphoresis, fatigue, fever, headaches, joint swelling, nausea, neck pain, numbness, rash, sore throat, vomiting or weakness.   Hypertension  Pertinent negatives include no chest pain, headaches, neck pain, palpitations or shortness of breath.   Hyperlipidemia  Associated symptoms include myalgias. Pertinent negatives include no chest pain or shortness of breath.     Past Medical History:   Diagnosis Date    Anticoagulant long-term use     Arthritis     Cataract- bilateral with extraction- patient reports no lens implants     Deep vein thrombosis     Degenerative disc disease, cervical     Disorder of kidney and ureter     CKD stage 3    DVT (deep venous thrombosis)     Emphysema of lung     Hyperlipidemia     Hypertension     MVP (mitral valve prolapse)     On home oxygen therapy     3 liters as needed at night    Osteoporosis     feet    Thyroid condition      Past Surgical History:   Procedure Laterality Date    BACK SURGERY  2012    CATARACT EXTRACTION Bilateral 10/2012    EYE SURGERY  cataract    HYSTERECTOMY Left 1978    INJECTION OF ANESTHETIC AGENT INTO SACROILIAC JOINT Bilateral 11/11/2019    Procedure: Bilateral GT bursa + SIJ injection;  Surgeon: Noe Pleitez MD;  Location: [facility] PAIN MGT;  Service: Pain Management;  Laterality: Bilateral;    INJECTION OF JOINT Bilateral 11/11/2019    Procedure: Bilateral GT bursa + SIJ injection;  Surgeon: Noe Pleitez MD;  Location: [facility] PAIN [facility]GT;  Service: Pain Management;  Laterality: Bilateral;    JOINT REPLACEMENT Right 2021    knee    ROBOT-ASSISTED LAPAROSCOPIC LYSIS OF ADHESIONS USING DA SP XI N/A  12/05/2022    Procedure: XI ROBOTIC LYSIS, ADHESIONS;  Surgeon: Den Barragan MD;  Location: [facility] OR;  Service: Oncology;  Laterality: N/A;    ROBOT-ASSISTED LAPAROSCOPIC SALPINGO-OOPHORECTOMY USING DA SP XI Left 12/05/2022    Procedure: XI ROBOTIC SALPINGO-OOPHORECTOMY;  Surgeon: Den Barragan MD;  Location: [facility] OR;  Service: Oncology;  Laterality: Left;    SURGICAL REMOVAL OF BONE SPUR Left     left elbow    TOTAL KNEE ARTHROPLASTY Left 02/28/2024    Procedure: ARTHROPLASTY, KNEE, TOTAL;  Surgeon: Terrance Urena MD;  Location: [facility] OR;  Service: Orthopedics;  Laterality: Left;    VASCULAR SURGERY Right     high ligation due to blood clot      Family History   Problem Relation Name Age of Onset    Heart disease Mother Loida Moura     Kidney disease Mother Loida Moura     Arthritis Mother Loida Moura     Breast cancer Mother Loida Moura     Cancer Father YA.CARYL Brand     Heart disease Father YA.CARYL Brand     Cancer Sister Lung cancer     Ovarian cancer Sister Lung cancer     Alzheimer's disease Sister       Social History[1]  Review of Systems   Constitutional:  Negative for activity change, appetite change, chills, diaphoresis, fatigue, fever and unexpected weight change.   HENT:  Negative for congestion, drooling, ear discharge, ear pain, facial swelling, hearing loss, mouth sores, nosebleeds, postnasal drip, rhinorrhea, sinus pressure, sneezing, sore throat, tinnitus, trouble swallowing and voice change.    Eyes:  Negative for photophobia, redness and visual disturbance.   Respiratory:  Negative for apnea, cough, choking, chest tightness, shortness of breath and wheezing.    Cardiovascular:  Negative for chest pain and palpitations.   Gastrointestinal:  Negative for abdominal distention, abdominal pain, blood in stool, constipation, diarrhea, nausea and vomiting.   Endocrine: Negative for cold intolerance, heat intolerance, polydipsia, polyphagia and polyuria.   Genitourinary:   Negative for decreased urine volume, difficulty urinating, dysuria, flank pain, frequency, genital sores, hematuria, pelvic pain and urgency.   Musculoskeletal:  Positive for arthralgias and myalgias. Negative for back pain, gait problem, joint swelling, neck pain and neck stiffness.   Skin:  Negative for color change, pallor, rash and wound.   Allergic/Immunologic: Negative for food allergies and immunocompromised state.   Neurological:  Negative for dizziness, tremors, seizures, syncope, speech difficulty, weakness, light-headedness, numbness and headaches.   Hematological:  Negative for adenopathy. Does not bruise/bleed easily.   Psychiatric/Behavioral:  Negative for agitation, behavioral problems, confusion, decreased concentration, dysphoric mood, hallucinations, self-injury, sleep disturbance and suicidal ideas. The patient is not nervous/anxious and is not hyperactive.    All other systems reviewed and are negative.      Objective:      Physical Exam  Vitals and nursing note reviewed.   Constitutional:       General: She is not in acute distress.     Appearance: Normal appearance. She is well-developed. She is not diaphoretic.   HENT:      Head: Normocephalic and atraumatic.      Mouth/Throat:      Pharynx: No oropharyngeal exudate.   Eyes:      General: No scleral icterus.  Neck:      Thyroid: No thyromegaly.      Vascular: No carotid bruit or JVD.      Trachea: No tracheal deviation.   Cardiovascular:      Rate and Rhythm: Normal rate and regular rhythm.      Heart sounds: Normal heart sounds.   Pulmonary:      Effort: Pulmonary effort is normal. No respiratory distress.      Breath sounds: Normal breath sounds. No wheezing or rales.   Chest:      Chest wall: No tenderness.   Abdominal:      General: Bowel sounds are normal. There is no distension.      Palpations: Abdomen is soft.      Tenderness: There is no abdominal tenderness. There is no guarding or rebound.   Musculoskeletal:         General: No  tenderness. Normal range of motion.      Cervical back: Normal range of motion and neck supple.   Lymphadenopathy:      Cervical: No cervical adenopathy.   Skin:     General: Skin is warm and dry.      Coloration: Skin is not pale.      Findings: No erythema or rash.   Neurological:      Mental Status: She is alert and oriented to person, place, and time.      Cranial Nerves: No cranial nerve deficit.      Coordination: Coordination normal.   Psychiatric:         Behavior: Behavior normal.         Thought Content: Thought content normal.         Judgment: Judgment normal.         CMP  Sodium   Date Value Ref Range Status   02/14/2025 143 136 - 145 mmol/L Final     Potassium   Date Value Ref Range Status   02/14/2025 4.7 3.5 - 5.1 mmol/L Final     Chloride   Date Value Ref Range Status   02/14/2025 104 95 - 110 mmol/L Final     CO2   Date Value Ref Range Status   02/14/2025 28 23 - 29 mmol/L Final     Glucose   Date Value Ref Range Status   02/14/2025 88 70 - 110 mg/dL Final     BUN   Date Value Ref Range Status   02/14/2025 27 (H) 8 - 23 mg/dL Final     Creatinine   Date Value Ref Range Status   02/14/2025 1.4 0.5 - 1.4 mg/dL Final     Calcium   Date Value Ref Range Status   02/14/2025 10.1 8.7 - 10.5 mg/dL Final     Total Protein   Date Value Ref Range Status   02/14/2025 7.8 6.0 - 8.4 g/dL Final     Albumin   Date Value Ref Range Status   02/14/2025 3.8 3.5 - 5.2 g/dL Final     Total Bilirubin   Date Value Ref Range Status   02/14/2025 0.3 0.1 - 1.0 mg/dL Final     Comment:     For infants and newborns, interpretation of results should be based  on gestational age, weight and in agreement with clinical  observations.    Premature Infant recommended reference ranges:  Up to 24 hours.............<8.0 mg/dL  Up to 48 hours............<12.0 mg/dL  3-5 days..................<15.0 mg/dL  6-29 days.................<15.0 mg/dL       Alkaline Phosphatase   Date Value Ref Range Status   02/14/2025 66 40 - 150 U/L Final      AST   Date Value Ref Range Status   02/14/2025 18 10 - 40 U/L Final     ALT   Date Value Ref Range Status   02/14/2025 16 10 - 44 U/L Final     Anion Gap   Date Value Ref Range Status   02/14/2025 11 8 - 16 mmol/L Final     eGFR if    Date Value Ref Range Status   07/08/2022 34 (A) >60 mL/min/1.73 m^2 Final     eGFR    Date Value Ref Range Status   03/06/2023 31 mL/min/1.73mSq Final     Comment:     In accordance with NKF-ASN Task Force recommendation, calculation based on the Chronic Kidney Disease Epidemiology Collaboration (CKD-EPI) equation without adjustment for race. eGFR adjusted for gender and age and calculated in ml/min/1.73mSquared. eGFR cannot be calculated if patient is under 18 years of age.     Reference Range:   >= 60 ml/min/1.73mSquared.     eGFR if non    Date Value Ref Range Status   07/08/2022 29 (A) >60 mL/min/1.73 m^2 Final     Comment:     Calculation used to obtain the estimated glomerular filtration  rate (eGFR) is the CKD-EPI equation.        Lab Results   Component Value Date    WBC 7.20 02/14/2025    HGB 13.0 02/14/2025    HCT 41.7 02/14/2025    MCV 96 02/14/2025     02/14/2025     Lab Results   Component Value Date    CHOL 190 11/20/2024     Lab Results   Component Value Date    HDL 60 11/20/2024     Lab Results   Component Value Date    LDLCALC 106.4 11/20/2024     Lab Results   Component Value Date    TRIG 118 11/20/2024     Lab Results   Component Value Date    CHOLHDL 31.6 11/20/2024     Lab Results   Component Value Date    TSH 1.659 08/06/2024     Lab Results   Component Value Date    HGBA1C 5.6 11/20/2024     Assessment:       1. Acquired hypothyroidism    2. Major depressive disorder, recurrent, mild    3. Chronic anticoagulation- Apixaban    4. Chronic respiratory failure with hypoxia, on home O2 therapy    5. Dyslipidemia    6. Gastroesophageal reflux disease without esophagitis    7. History of DVT (deep vein  thrombosis) lower extremity on 2 different occasions    8. Prediabetes    9. PMR (polymyalgia rheumatica)    10. Stage 3b chronic kidney disease    11. Primary hypertension        Plan:   Acquired hypothyroidism    Major depressive disorder, recurrent, mild  -     sertraline (ZOLOFT) 50 MG tablet; TAKE 1 TABLET BY MOUTH EVERY DAY  Dispense: 90 tablet; Refill: 0    Chronic anticoagulation- Apixaban    Chronic respiratory failure with hypoxia, on home O2 therapy    Dyslipidemia    Gastroesophageal reflux disease without esophagitis----------sees gi asso--    History of DVT (deep vein thrombosis) lower extremity on 2 different occasions    Prediabetes    PMR (polymyalgia rheumatica)------improved--    Stage 3b chronic kidney disease    Primary hypertension      Stable----------------continue meds----------as above-------------------f/u 4 months-----------------       [1]   Social History  Socioeconomic History    Marital status:    Tobacco Use    Smoking status: Former     Current packs/day: 0.00     Average packs/day: 1 pack/day for 53.5 years (53.5 ttl pk-yrs)     Types: Cigarettes     Start date: 1962     Quit date: 2015     Years since quittin.6    Smokeless tobacco: Never   Substance and Sexual Activity    Alcohol use: No    Drug use: No    Sexual activity: Yes     Partners: Male     Birth control/protection: None     Social Drivers of Health     Financial Resource Strain: High Risk (2024)    Overall Financial Resource Strain (CARDIA)     Difficulty of Paying Living Expenses: Hard   Food Insecurity: Food Insecurity Present (2024)    Hunger Vital Sign     Worried About Running Out of Food in the Last Year: Sometimes true     Ran Out of Food in the Last Year: Sometimes true   Transportation Needs: No Transportation Needs (2024)    PRAPARE - Transportation     Lack of Transportation (Medical): No     Lack of Transportation (Non-Medical): No   Physical Activity: Insufficiently Active  (4/9/2024)    Exercise Vital Sign     Days of Exercise per Week: 2 days     Minutes of Exercise per Session: 30 min   Stress: Patient Declined (4/9/2024)    [employer] [place] [employer] - Occupational Stress Questionnaire     Feeling of Stress : Patient declined   Housing Stability: Low Risk  (4/9/2024)    Housing Stability Vital Sign     Unable to Pay for Housing in the Last Year: No     Number of Places Lived in the Last Year: 1     Unstable Housing in the Last Year: No

## 2025-02-20 NOTE — TELEPHONE ENCOUNTER
Care Due:                  Date            Visit Type   Department     Provider  --------------------------------------------------------------------------------                                EP -                              PRIMARY      [facility]  Last Visit: 02-      CARE ([facility])   MEDICINE       Vince Jefferson  Next Visit: None Scheduled  None         None Found                                                            Last  Test          Frequency    Reason                     Performed    Due Date  --------------------------------------------------------------------------------    HBA1C.......  6 months...  semaglutide..............  11- 05-    [facility] Embedded Care Due Messages. Reference number: [id number].   2/20/2025 2:55:54 PM CST

## 2025-02-24 ENCOUNTER — RESULTS FOLLOW-UP (OUTPATIENT)
Dept: RHEUMATOLOGY | Facility: CLINIC | Age: 80
End: 2025-02-24

## 2025-03-13 ENCOUNTER — PATIENT MESSAGE (OUTPATIENT)
Dept: RHEUMATOLOGY | Facility: CLINIC | Age: 80
End: 2025-03-13
Payer: MEDICARE

## 2025-03-13 DIAGNOSIS — Z86.718 HISTORY OF DVT (DEEP VEIN THROMBOSIS): ICD-10-CM

## 2025-03-13 DIAGNOSIS — E03.9 ACQUIRED HYPOTHYROIDISM: ICD-10-CM

## 2025-03-13 RX ORDER — LEVOTHYROXINE SODIUM 50 UG/1
50 TABLET ORAL DAILY
Qty: 90 TABLET | Refills: 0 | Status: SHIPPED | OUTPATIENT
Start: 2025-03-13

## 2025-03-13 NOTE — TELEPHONE ENCOUNTER
----- Message from Quinn sent at 3/13/2025 11:47 AM CDT -----  .Type:  RX Refill RequestWho Called: patient Refill or New Rx:refill RX Name and Strength:apixaban (ELIQUIS) 5 mg Tab , levothyroxine (SYNTHROID) 50 MCG tablet, and albuterol (PROVENTIL/VENTOLIN HFA) 90 mcg/actuation inhalerHow is the patient currently taking it? (ex. 1XDay):Is this a 30 day or 90 day RX:Preferred Pharmacy with phone number:.[facility]/pharmacy #[id number] - [place] - [address] [place] [place] [address]: [phone] Fax: [phone] or Mail Order:local Ordering Provider:Would the patient rather a call back or a response via [facility]? Call Best Call Back Number:.[phone] Information: ;;

## 2025-03-13 NOTE — TELEPHONE ENCOUNTER
No care due was identified.  [facility] Embedded Care Due Messages. Reference number: [id number].   3/13/2025 12:57:43 PM CDT

## 2025-03-24 DIAGNOSIS — Z00.00 ENCOUNTER FOR MEDICARE ANNUAL WELLNESS EXAM: ICD-10-CM

## 2025-04-01 ENCOUNTER — TELEPHONE (OUTPATIENT)
Dept: RHEUMATOLOGY | Facility: CLINIC | Age: 80
End: 2025-04-01
Payer: MEDICARE

## 2025-04-01 NOTE — TELEPHONE ENCOUNTER
After June 3rd Dr. Ludwig will no longer be with [facility] . We are referring out to [facility] or [facility]. You need to call your insurance to see who the cover, After you get that information call us and we will send over a referral.

## 2025-04-14 DIAGNOSIS — F33.0 MAJOR DEPRESSIVE DISORDER, RECURRENT, MILD: ICD-10-CM

## 2025-04-14 RX ORDER — SERTRALINE HYDROCHLORIDE 100 MG/1
100 TABLET, FILM COATED ORAL
Qty: 90 TABLET | Refills: 1 | OUTPATIENT
Start: 2025-04-14

## 2025-04-14 NOTE — TELEPHONE ENCOUNTER
Refill Decision Note   Kay Rosas  is requesting a refill authorization.  Brief Assessment and Rationale for Refill:        Medication Therapy Plan:            Comments:     Note composed:3:07 AM 04/14/2025

## 2025-04-14 NOTE — TELEPHONE ENCOUNTER
No care due was identified.  [facility] Embedded Care Due Messages. Reference number: [id number].   4/14/2025 12:16:42 AM CDT

## 2025-04-30 RX ORDER — SEMAGLUTIDE 0.68 MG/ML
0.25 INJECTION, SOLUTION SUBCUTANEOUS
Qty: 5 ML | Refills: 0 | Status: SHIPPED | OUTPATIENT
Start: 2025-04-30

## 2025-04-30 NOTE — TELEPHONE ENCOUNTER
Care Due:                  Date            Visit Type   Department     Provider  --------------------------------------------------------------------------------                                EP -                              PRIMARY      [facility]  Last Visit: 02-      CARE ([facility])   CASEY Jefferson                              EP -                              PRIMARY      [facility]  Next Visit: 06-      CARE ([facility])   CASEY Jefferson                                                            Last  Test          Frequency    Reason                     Performed    Due Date  --------------------------------------------------------------------------------    HBA1C.......  6 months...  semaglutide..............  11- 05-    [facility] Embedded Care Due Messages. Reference number: [id number].   4/30/2025 12:25:22 AM CDT

## 2025-04-30 NOTE — TELEPHONE ENCOUNTER
Provider Staff:  Action required for this patient    Requires labs      Please see care gap opportunities below in Care Due Message.    Thanks!  [facility]     Appointments      Date Provider   Last Visit   2/20/2025 Vince Jefferson MD   Next Visit   6/24/2025 Vince Jefferson MD     Refill Decision Note   Kay Rosas  is requesting a refill authorization.  Brief Assessment and Rationale for Refill:  Approve     Medication Therapy Plan:         Comments:     Note composed:7:19 AM 04/30/2025

## 2025-05-18 DIAGNOSIS — F33.0 MAJOR DEPRESSIVE DISORDER, RECURRENT, MILD: ICD-10-CM

## 2025-05-18 NOTE — TELEPHONE ENCOUNTER
Care Due:                  Date            Visit Type   Department     Provider  --------------------------------------------------------------------------------                                EP -                              PRIMARY      [facility]  Last Visit: 02-      CARE ([facility])   CASEY Jefferson                              EP -                              PRIMARY      [facility]  Next Visit: 06-      CARE ([facility])   CASEY Jefferson                                                            Last  Test          Frequency    Reason                     Performed    Due Date  --------------------------------------------------------------------------------    TSH.........  12 months..  levothyroxine............  08- 08-    [facility] Embedded Care Due Messages. Reference number: [id number].   5/18/2025 7:17:54 AM CDT

## 2025-05-19 RX ORDER — SERTRALINE HYDROCHLORIDE 50 MG/1
50 TABLET, FILM COATED ORAL
Qty: 90 TABLET | Refills: 3 | Status: SHIPPED | OUTPATIENT
Start: 2025-05-19

## 2025-05-19 NOTE — TELEPHONE ENCOUNTER
Refill Routing Note   Medication(s) are not appropriate for processing by [facility] for the following reason(s):        New or recently adjusted medication    [facility] action(s):  Defer   Requires labs : Yes               Appointments  past 12m or future 3m with PCP    Date Provider   Last Visit   2/20/2025 Vince Jefferson MD   Next Visit   6/24/2025 Vince Jefferson MD   ED visits in past 90 days: 0        Note composed:12:32 AM 05/19/2025

## 2025-05-25 NOTE — TELEPHONE ENCOUNTER
No care due was identified.  [facility] Embedded Care Due Messages. Reference number: [id number].   5/25/2025 7:10:26 AM CDT

## 2025-05-26 RX ORDER — SEMAGLUTIDE 0.68 MG/ML
0.25 INJECTION, SOLUTION SUBCUTANEOUS
Qty: 6 ML | Refills: 0 | Status: SHIPPED | OUTPATIENT
Start: 2025-05-26

## 2025-05-26 NOTE — TELEPHONE ENCOUNTER
Refill Routing Note   Medication(s) are not appropriate for processing by [facility] for the following reason(s):        Required labs outdated    [facility] action(s):  Defer               Appointments  past 12m or future 3m with PCP    Date Provider   Last Visit   2/20/2025 Vince Jefferson MD   Next Visit   6/24/2025 Vince Jefferson MD   ED visits in past 90 days: 0        Note composed:4:54 AM 05/26/2025

## 2025-05-27 DIAGNOSIS — M25.561 PAIN IN BOTH KNEES, UNSPECIFIED CHRONICITY: Primary | ICD-10-CM

## 2025-05-27 DIAGNOSIS — M25.562 PAIN IN BOTH KNEES, UNSPECIFIED CHRONICITY: Primary | ICD-10-CM

## 2025-05-27 RX ORDER — GABAPENTIN 300 MG/1
300 CAPSULE ORAL NIGHTLY
Qty: 30 CAPSULE | Refills: 11 | Status: SHIPPED | OUTPATIENT
Start: 2025-05-27 | End: 2026-05-27

## 2025-06-09 DIAGNOSIS — E03.9 ACQUIRED HYPOTHYROIDISM: ICD-10-CM

## 2025-06-09 RX ORDER — LEVOTHYROXINE SODIUM 50 UG/1
50 TABLET ORAL
Qty: 90 TABLET | Refills: 0 | Status: SHIPPED | OUTPATIENT
Start: 2025-06-09

## 2025-06-09 NOTE — TELEPHONE ENCOUNTER
Refill Routing Note   Medication(s) are not appropriate for processing by [facility] for the following reason(s):        Other  Epic adherence 48%    [facility] action(s):  Defer        Medication Therapy Plan: DEFER TO PCP      Appointments  past 12m or future 3m with PCP    Date Provider   Last Visit   2/20/2025 Vince Jefferson MD   Next Visit   6/24/2025 Vince Jefferson MD   ED visits in past 90 days: 0        Note composed:10:11 AM 06/09/2025

## 2025-06-09 NOTE — TELEPHONE ENCOUNTER
No care due was identified.  [facility] Embedded Care Due Messages. Reference number: [id number].   6/09/2025 12:14:30 AM CDT

## 2025-06-24 ENCOUNTER — LAB VISIT (OUTPATIENT)
Dept: LAB | Facility: HOSPITAL | Age: 80
End: 2025-06-24
Attending: INTERNAL MEDICINE
Payer: MEDICARE

## 2025-06-24 ENCOUNTER — OFFICE VISIT (OUTPATIENT)
Dept: FAMILY MEDICINE | Facility: CLINIC | Age: 80
End: 2025-06-24
Payer: MEDICARE

## 2025-06-24 VITALS
OXYGEN SATURATION: 96 % | WEIGHT: 238.56 LBS | DIASTOLIC BLOOD PRESSURE: 62 MMHG | TEMPERATURE: 98 F | HEIGHT: 66 IN | BODY MASS INDEX: 38.34 KG/M2 | SYSTOLIC BLOOD PRESSURE: 138 MMHG | HEART RATE: 78 BPM

## 2025-06-24 DIAGNOSIS — N18.32 STAGE 3B CHRONIC KIDNEY DISEASE: Primary | ICD-10-CM

## 2025-06-24 DIAGNOSIS — I10 PRIMARY HYPERTENSION: ICD-10-CM

## 2025-06-24 DIAGNOSIS — R73.03 PREDIABETES: ICD-10-CM

## 2025-06-24 DIAGNOSIS — Z79.01 CHRONIC ANTICOAGULATION: ICD-10-CM

## 2025-06-24 DIAGNOSIS — Z86.718 HISTORY OF DVT (DEEP VEIN THROMBOSIS): ICD-10-CM

## 2025-06-24 DIAGNOSIS — E03.9 ACQUIRED HYPOTHYROIDISM: ICD-10-CM

## 2025-06-24 DIAGNOSIS — E78.5 DYSLIPIDEMIA: ICD-10-CM

## 2025-06-24 DIAGNOSIS — N18.32 STAGE 3B CHRONIC KIDNEY DISEASE: ICD-10-CM

## 2025-06-24 DIAGNOSIS — Z99.81 DEPENDENCE ON SUPPLEMENTAL OXYGEN: ICD-10-CM

## 2025-06-24 LAB
ANION GAP (OHS): 11 MMOL/L (ref 8–16)
BUN SERPL-MCNC: 22 MG/DL (ref 8–23)
CALCIUM SERPL-MCNC: 9.4 MG/DL (ref 8.7–10.5)
CHLORIDE SERPL-SCNC: 104 MMOL/L (ref 95–110)
CO2 SERPL-SCNC: 27 MMOL/L (ref 23–29)
CREAT SERPL-MCNC: 1.7 MG/DL (ref 0.5–1.4)
GFR SERPLBLD CREATININE-BSD FMLA CKD-EPI: 31 ML/MIN/1.73/M2
GLUCOSE SERPL-MCNC: 99 MG/DL (ref 70–110)
POTASSIUM SERPL-SCNC: 4.1 MMOL/L (ref 3.5–5.1)
SODIUM SERPL-SCNC: 142 MMOL/L (ref 136–145)
TSH SERPL-ACNC: 1.75 UIU/ML (ref 0.4–4)

## 2025-06-24 PROCEDURE — 84443 ASSAY THYROID STIM HORMONE: CPT

## 2025-06-24 PROCEDURE — 1159F MED LIST DOCD IN RCRD: CPT | Mod: CPTII,S$GLB,, | Performed by: INTERNAL MEDICINE

## 2025-06-24 PROCEDURE — 99999 PR PBB SHADOW E&M-EST. PATIENT-LVL IV: CPT | Mod: PBBFAC,,, | Performed by: INTERNAL MEDICINE

## 2025-06-24 PROCEDURE — 1126F AMNT PAIN NOTED NONE PRSNT: CPT | Mod: CPTII,S$GLB,, | Performed by: INTERNAL MEDICINE

## 2025-06-24 PROCEDURE — 3075F SYST BP GE 130 - 139MM HG: CPT | Mod: CPTII,S$GLB,, | Performed by: INTERNAL MEDICINE

## 2025-06-24 PROCEDURE — 99214 OFFICE O/P EST MOD 30 MIN: CPT | Mod: S$GLB,,, | Performed by: INTERNAL MEDICINE

## 2025-06-24 PROCEDURE — 36415 COLL VENOUS BLD VENIPUNCTURE: CPT | Mod: PO

## 2025-06-24 PROCEDURE — 82310 ASSAY OF CALCIUM: CPT

## 2025-06-24 PROCEDURE — G2211 COMPLEX E/M VISIT ADD ON: HCPCS | Mod: S$GLB,,, | Performed by: INTERNAL MEDICINE

## 2025-06-24 PROCEDURE — 3078F DIAST BP <80 MM HG: CPT | Mod: CPTII,S$GLB,, | Performed by: INTERNAL MEDICINE

## 2025-06-24 NOTE — PROGRESS NOTES
Subjective:       Patient ID: Kay Rosas is a 79 y.o. female.    Chief Complaint: Follow-up (4 month), Hypertension, Hyperlipidemia, and Chronic Kidney Disease    Follow-up  Associated symptoms include arthralgias. Pertinent negatives include no abdominal pain, chest pain, chills, congestion, coughing, diaphoresis, fatigue, fever, headaches, joint swelling, myalgias, nausea, neck pain, numbness, rash, sore throat, vomiting or weakness.   Hypertension  Pertinent negatives include no chest pain, headaches, neck pain, palpitations or shortness of breath.   Hyperlipidemia  Pertinent negatives include no chest pain, myalgias or shortness of breath.     Past Medical History:   Diagnosis Date    Anticoagulant long-term use     Arthritis     Cataract- bilateral with extraction- patient reports no lens implants     Deep vein thrombosis     Degenerative disc disease, cervical     Disorder of kidney and ureter     CKD stage 3    DVT (deep venous thrombosis)     Emphysema of lung     Hyperlipidemia     Hypertension     MVP (mitral valve prolapse)     On home oxygen therapy     3 liters as needed at night    Osteoporosis     feet    Thyroid condition      Past Surgical History:   Procedure Laterality Date    BACK SURGERY  2012    CATARACT EXTRACTION Bilateral 10/2012    EYE SURGERY  cataract    HYSTERECTOMY Left 1978    INJECTION OF ANESTHETIC AGENT INTO SACROILIAC JOINT Bilateral 11/11/2019    Procedure: Bilateral GT bursa + SIJ injection;  Surgeon: Noe Pleitez MD;  Location: [facility];  Service: Pain Management;  Laterality: Bilateral;    INJECTION OF JOINT Bilateral 11/11/2019    Procedure: Bilateral GT bursa + SIJ injection;  Surgeon: Noe Pleitez MD;  Location: [facility];  Service: Pain Management;  Laterality: Bilateral;    JOINT REPLACEMENT Right 2021    knee    ROBOT-ASSISTED LAPAROSCOPIC LYSIS OF ADHESIONS USING DA SP XI N/A 12/05/2022    Procedure: XI ROBOTIC LYSIS, ADHESIONS;  Surgeon:  Den Barragan MD;  Location: [facility] OR;  Service: Oncology;  Laterality: N/A;    ROBOT-ASSISTED LAPAROSCOPIC SALPINGO-OOPHORECTOMY USING DA SP XI Left 12/05/2022    Procedure: XI ROBOTIC SALPINGO-OOPHORECTOMY;  Surgeon: Den Barragan MD;  Location: [facility] OR;  Service: Oncology;  Laterality: Left;    SURGICAL REMOVAL OF BONE SPUR Left     left elbow    TOTAL KNEE ARTHROPLASTY Left 02/28/2024    Procedure: ARTHROPLASTY, KNEE, TOTAL;  Surgeon: Terrance Urena MD;  Location: [facility] OR;  Service: Orthopedics;  Laterality: Left;    VASCULAR SURGERY Right     high ligation due to blood clot      Family History   Problem Relation Name Age of Onset    Heart disease Mother Loida Moura     Kidney disease Mother Loida Moura     Arthritis Mother Loida Moura     Breast cancer Mother Loida Moura     Cancer Father YA.CARYL Brand     Heart disease Father YA.CARYL Brand     Cancer Sister Lung cancer     Ovarian cancer Sister Lung cancer     Alzheimer's disease Sister       Social History[1]  Review of Systems   Constitutional:  Negative for activity change, appetite change, chills, diaphoresis, fatigue, fever and unexpected weight change.   HENT:  Negative for congestion, drooling, ear discharge, ear pain, facial swelling, hearing loss, mouth sores, nosebleeds, postnasal drip, rhinorrhea, sinus pressure, sneezing, sore throat, tinnitus, trouble swallowing and voice change.    Eyes:  Negative for photophobia, redness and visual disturbance.   Respiratory:  Negative for apnea, cough, choking, chest tightness, shortness of breath and wheezing.    Cardiovascular:  Negative for chest pain, palpitations and leg swelling.   Gastrointestinal:  Negative for abdominal distention, abdominal pain, blood in stool, constipation, diarrhea, nausea, rectal pain and vomiting.   Endocrine: Negative for cold intolerance, heat intolerance, polydipsia, polyphagia and polyuria.   Genitourinary:  Negative for decreased urine volume,  difficulty urinating, dysuria, flank pain, frequency, genital sores, hematuria, pelvic pain and urgency.   Musculoskeletal:  Positive for arthralgias. Negative for back pain, gait problem, joint swelling, myalgias, neck pain and neck stiffness.   Skin:  Negative for color change, pallor, rash and wound.   Allergic/Immunologic: Negative for food allergies and immunocompromised state.   Neurological:  Negative for dizziness, tremors, seizures, syncope, speech difficulty, weakness, light-headedness, numbness and headaches.   Hematological:  Negative for adenopathy. Does not bruise/bleed easily.   Psychiatric/Behavioral:  Negative for agitation, behavioral problems, confusion, decreased concentration, dysphoric mood, hallucinations, self-injury, sleep disturbance and suicidal ideas. The patient is not nervous/anxious and is not hyperactive.    All other systems reviewed and are negative.      Objective:      Physical Exam  Vitals and nursing note reviewed.   Constitutional:       General: She is not in acute distress.     Appearance: Normal appearance. She is well-developed. She is not diaphoretic.   HENT:      Head: Normocephalic and atraumatic.      Mouth/Throat:      Pharynx: No oropharyngeal exudate.   Eyes:      General: No scleral icterus.  Neck:      Thyroid: No thyromegaly.      Vascular: No carotid bruit or JVD.      Trachea: No tracheal deviation.   Cardiovascular:      Rate and Rhythm: Normal rate and regular rhythm.      Heart sounds: Normal heart sounds.   Pulmonary:      Effort: Pulmonary effort is normal. No respiratory distress.      Breath sounds: Normal breath sounds. No wheezing or rales.   Chest:      Chest wall: No tenderness.   Abdominal:      General: Bowel sounds are normal. There is no distension.      Palpations: Abdomen is soft.      Tenderness: There is no abdominal tenderness. There is no guarding or rebound.   Musculoskeletal:         General: No tenderness. Normal range of motion.       Cervical back: Normal range of motion and neck supple.   Lymphadenopathy:      Cervical: No cervical adenopathy.   Skin:     General: Skin is warm and dry.      Coloration: Skin is not pale.      Findings: No erythema or rash.   Neurological:      Mental Status: She is alert and oriented to person, place, and time.      Cranial Nerves: No cranial nerve deficit.      Coordination: Coordination normal.   Psychiatric:         Behavior: Behavior normal.         Thought Content: Thought content normal.         Judgment: Judgment normal.         CMP  Sodium   Date Value Ref Range Status   02/14/2025 143 136 - 145 mmol/L Final     Potassium   Date Value Ref Range Status   02/14/2025 4.7 3.5 - 5.1 mmol/L Final     Chloride   Date Value Ref Range Status   02/14/2025 104 95 - 110 mmol/L Final     CO2   Date Value Ref Range Status   02/14/2025 28 23 - 29 mmol/L Final     Glucose   Date Value Ref Range Status   02/14/2025 88 70 - 110 mg/dL Final     BUN   Date Value Ref Range Status   02/14/2025 27 (H) 8 - 23 mg/dL Final     Creatinine   Date Value Ref Range Status   02/14/2025 1.4 0.5 - 1.4 mg/dL Final     Calcium   Date Value Ref Range Status   02/14/2025 10.1 8.7 - 10.5 mg/dL Final     Total Protein   Date Value Ref Range Status   02/14/2025 7.8 6.0 - 8.4 g/dL Final     Albumin   Date Value Ref Range Status   02/14/2025 3.8 3.5 - 5.2 g/dL Final     Total Bilirubin   Date Value Ref Range Status   02/14/2025 0.3 0.1 - 1.0 mg/dL Final     Comment:     For infants and newborns, interpretation of results should be based  on gestational age, weight and in agreement with clinical  observations.    Premature Infant recommended reference ranges:  Up to 24 hours.............<8.0 mg/dL  Up to 48 hours............<12.0 mg/dL  3-5 days..................<15.0 mg/dL  6-29 days.................<15.0 mg/dL       Alkaline Phosphatase   Date Value Ref Range Status   02/14/2025 66 40 - 150 U/L Final     AST   Date Value Ref Range Status    02/14/2025 18 10 - 40 U/L Final     ALT   Date Value Ref Range Status   02/14/2025 16 10 - 44 U/L Final     Anion Gap   Date Value Ref Range Status   02/14/2025 11 8 - 16 mmol/L Final     eGFR if    Date Value Ref Range Status   07/08/2022 34 (A) >60 mL/min/1.73 m^2 Final     eGFR    Date Value Ref Range Status   03/06/2023 31 mL/min/1.73mSq Final     Comment:     In accordance with NKF-ASN Task Force recommendation, calculation based on the Chronic Kidney Disease Epidemiology Collaboration (CKD-EPI) equation without adjustment for race. eGFR adjusted for gender and age and calculated in ml/min/1.73mSquared. eGFR cannot be calculated if patient is under 18 years of age.     Reference Range:   >= 60 ml/min/1.73mSquared.     eGFR if non    Date Value Ref Range Status   07/08/2022 29 (A) >60 mL/min/1.73 m^2 Final     Comment:     Calculation used to obtain the estimated glomerular filtration  rate (eGFR) is the CKD-EPI equation.        Lab Results   Component Value Date    WBC 7.20 02/14/2025    HGB 13.0 02/14/2025    HCT 41.7 02/14/2025    MCV 96 02/14/2025     02/14/2025     Lab Results   Component Value Date    CHOL 190 11/20/2024     Lab Results   Component Value Date    HDL 60 11/20/2024     Lab Results   Component Value Date    LDLCALC 106.4 11/20/2024     Lab Results   Component Value Date    TRIG 118 11/20/2024     Lab Results   Component Value Date    CHOLHDL 31.6 11/20/2024     Lab Results   Component Value Date    TSH 1.659 08/06/2024     Lab Results   Component Value Date    HGBA1C 5.6 11/20/2024     Assessment:       1. Stage 3b chronic kidney disease    2. Prediabetes    3. History of DVT (deep vein thrombosis) lower extremity on 2 different occasions    4. Dyslipidemia    5. Dependence on supplemental oxygen    6. Acquired hypothyroidism    7. Chronic anticoagulation- Apixaban    8. Primary hypertension        Plan:   Stage 3b chronic kidney  disease  -     Basic Metabolic Panel; Future; Expected date: 06/24/2025    Prediabetes    History of DVT (deep vein thrombosis) lower extremity on 2 different occasions    Dyslipidemia    Dependence on supplemental oxygen    Acquired hypothyroidism  -     TSH; Future; Expected date: 06/24/2025    Chronic anticoagulation- Apixaban    Primary hypertension      Stable---------------continue meds-----------------------f/u 4 months-------------       [1]   Social History  Socioeconomic History    Marital status:    Tobacco Use    Smoking status: Former     Current packs/day: 0.00     Average packs/day: 1 pack/day for 53.5 years (53.5 ttl pk-yrs)     Types: Cigarettes     Start date: 1/1/1962     Quit date: 6/20/2015     Years since quitting: 10.0    Smokeless tobacco: Never   Substance and Sexual Activity    Alcohol use: No    Drug use: No    Sexual activity: Yes     Partners: Male     Birth control/protection: None     Social Drivers of Health     Financial Resource Strain: High Risk (4/9/2024)    Overall Financial Resource Strain (CARDIA)     Difficulty of Paying Living Expenses: Hard   Food Insecurity: Food Insecurity Present (4/9/2024)    Hunger Vital Sign     Worried About Running Out of Food in the Last Year: Sometimes true     Ran Out of Food in the Last Year: Sometimes true   Transportation Needs: No Transportation Needs (4/9/2024)    PRAPARE - Transportation     Lack of Transportation (Medical): No     Lack of Transportation (Non-Medical): No   Physical Activity: Insufficiently Active (4/9/2024)    Exercise Vital Sign     Days of Exercise per Week: 2 days     Minutes of Exercise per Session: 30 min   Stress: Patient Declined (4/9/2024)    [employer] - Occupational Stress Questionnaire     Feeling of Stress : Patient declined   Housing Stability: Low Risk  (4/9/2024)    Housing Stability Vital Sign     Unable to Pay for Housing in the Last Year: No     Number of Places Lived  in the Last Year: 1     Unstable Housing in the Last Year: No

## 2025-06-25 ENCOUNTER — TELEPHONE (OUTPATIENT)
Dept: FAMILY MEDICINE | Facility: CLINIC | Age: 80
End: 2025-06-25
Payer: MEDICARE

## 2025-06-25 DIAGNOSIS — N28.9 KIDNEY DISEASE: Primary | ICD-10-CM

## 2025-06-26 DIAGNOSIS — R13.10 DYSPHAGIA, UNSPECIFIED TYPE: ICD-10-CM

## 2025-06-26 DIAGNOSIS — N18.32 STAGE 3B CHRONIC KIDNEY DISEASE: Primary | ICD-10-CM

## 2025-06-26 DIAGNOSIS — R10.9 ABDOMINAL PAIN, UNSPECIFIED ABDOMINAL LOCATION: ICD-10-CM

## 2025-06-26 RX ORDER — PANTOPRAZOLE SODIUM 40 MG/1
40 TABLET, DELAYED RELEASE ORAL
Qty: 90 TABLET | Refills: 3 | Status: SHIPPED | OUTPATIENT
Start: 2025-06-26

## 2025-06-26 NOTE — TELEPHONE ENCOUNTER
No care due was identified.  [facility] Embedded Care Due Messages. Reference number: [id number].   6/26/2025 12:26:18 AM CDT

## 2025-06-26 NOTE — TELEPHONE ENCOUNTER
Refill Decision Note   Kay Rosas  is requesting a refill authorization.  Brief Assessment and Rationale for Refill:  Approve     Medication Therapy Plan:        Comments:     Note composed:2:04 AM 06/26/2025

## 2025-07-10 ENCOUNTER — LAB VISIT (OUTPATIENT)
Dept: LAB | Facility: HOSPITAL | Age: 80
End: 2025-07-10
Attending: INTERNAL MEDICINE
Payer: MEDICARE

## 2025-07-10 DIAGNOSIS — N18.32 STAGE 3B CHRONIC KIDNEY DISEASE: ICD-10-CM

## 2025-07-10 LAB
ALBUMIN SERPL BCP-MCNC: 4 G/DL (ref 3.5–5.2)
ANION GAP (OHS): 9 MMOL/L (ref 8–16)
BILIRUB UR QL STRIP.AUTO: NEGATIVE
BUN SERPL-MCNC: 21 MG/DL (ref 8–23)
CALCIUM SERPL-MCNC: 9.4 MG/DL (ref 8.7–10.5)
CHLORIDE SERPL-SCNC: 105 MMOL/L (ref 95–110)
CLARITY UR: CLEAR
CO2 SERPL-SCNC: 28 MMOL/L (ref 23–29)
COLOR UR AUTO: YELLOW
CREAT SERPL-MCNC: 1.6 MG/DL (ref 0.5–1.4)
CREAT UR-MCNC: 75 MG/DL (ref 15–325)
GFR SERPLBLD CREATININE-BSD FMLA CKD-EPI: 33 ML/MIN/1.73/M2
GLUCOSE SERPL-MCNC: 80 MG/DL (ref 70–110)
GLUCOSE UR QL STRIP: NEGATIVE
HGB UR QL STRIP: NEGATIVE
KETONES UR QL STRIP: NEGATIVE
LEUKOCYTE ESTERASE UR QL STRIP: NEGATIVE
NITRITE UR QL STRIP: NEGATIVE
PH UR STRIP: 6 [PH]
PHOSPHATE SERPL-MCNC: 2.6 MG/DL (ref 2.7–4.5)
POTASSIUM SERPL-SCNC: 4.2 MMOL/L (ref 3.5–5.1)
PROT UR QL STRIP: NEGATIVE
PROT UR-MCNC: <7 MG/DL
PROT/CREAT UR: NORMAL MG/G{CREAT}
SODIUM SERPL-SCNC: 142 MMOL/L (ref 136–145)
SP GR UR STRIP: 1.01

## 2025-07-10 PROCEDURE — 36415 COLL VENOUS BLD VENIPUNCTURE: CPT

## 2025-07-10 PROCEDURE — 81003 URINALYSIS AUTO W/O SCOPE: CPT

## 2025-07-10 PROCEDURE — 80069 RENAL FUNCTION PANEL: CPT

## 2025-07-10 PROCEDURE — 84156 ASSAY OF PROTEIN URINE: CPT

## 2025-07-17 ENCOUNTER — OFFICE VISIT (OUTPATIENT)
Dept: NEPHROLOGY | Facility: CLINIC | Age: 80
End: 2025-07-17
Payer: MEDICARE

## 2025-07-17 VITALS
RESPIRATION RATE: 22 BRPM | HEIGHT: 66 IN | WEIGHT: 242.75 LBS | HEART RATE: 78 BPM | BODY MASS INDEX: 39.01 KG/M2 | OXYGEN SATURATION: 92 % | DIASTOLIC BLOOD PRESSURE: 80 MMHG | SYSTOLIC BLOOD PRESSURE: 122 MMHG

## 2025-07-17 DIAGNOSIS — I10 PRIMARY HYPERTENSION: ICD-10-CM

## 2025-07-17 DIAGNOSIS — N25.81 SECONDARY HYPERPARATHYROIDISM OF RENAL ORIGIN: ICD-10-CM

## 2025-07-17 DIAGNOSIS — N18.32 STAGE 3B CHRONIC KIDNEY DISEASE: Primary | ICD-10-CM

## 2025-07-17 DIAGNOSIS — N28.9 KIDNEY DISEASE: ICD-10-CM

## 2025-07-17 PROCEDURE — 3074F SYST BP LT 130 MM HG: CPT | Mod: CPTII,S$GLB,, | Performed by: INTERNAL MEDICINE

## 2025-07-17 PROCEDURE — 3079F DIAST BP 80-89 MM HG: CPT | Mod: CPTII,S$GLB,, | Performed by: INTERNAL MEDICINE

## 2025-07-17 PROCEDURE — 99214 OFFICE O/P EST MOD 30 MIN: CPT | Mod: S$GLB,,, | Performed by: INTERNAL MEDICINE

## 2025-07-17 PROCEDURE — 3288F FALL RISK ASSESSMENT DOCD: CPT | Mod: CPTII,S$GLB,, | Performed by: INTERNAL MEDICINE

## 2025-07-17 PROCEDURE — 99999 PR PBB SHADOW E&M-EST. PATIENT-LVL V: CPT | Mod: PBBFAC,,, | Performed by: INTERNAL MEDICINE

## 2025-07-17 PROCEDURE — 1159F MED LIST DOCD IN RCRD: CPT | Mod: CPTII,S$GLB,, | Performed by: INTERNAL MEDICINE

## 2025-07-17 PROCEDURE — 1160F RVW MEDS BY RX/DR IN RCRD: CPT | Mod: CPTII,S$GLB,, | Performed by: INTERNAL MEDICINE

## 2025-07-17 PROCEDURE — 1126F AMNT PAIN NOTED NONE PRSNT: CPT | Mod: CPTII,S$GLB,, | Performed by: INTERNAL MEDICINE

## 2025-07-17 PROCEDURE — 1101F PT FALLS ASSESS-DOCD LE1/YR: CPT | Mod: CPTII,S$GLB,, | Performed by: INTERNAL MEDICINE

## 2025-07-17 RX ORDER — SEMAGLUTIDE 0.68 MG/ML
0.25 INJECTION, SOLUTION SUBCUTANEOUS
Qty: 6 ML | Refills: 0 | Status: SHIPPED | OUTPATIENT
Start: 2025-07-17

## 2025-07-17 NOTE — TELEPHONE ENCOUNTER
Refill Routing Note   Medication(s) are not appropriate for processing by [facility] for the following reason(s):        Required labs outdated    [facility] action(s):  Defer     Requires labs : Yes             Appointments  past 12m or future 3m with PCP    Date Provider   Last Visit   6/24/2025 Vince Jefferson MD   Next Visit   10/27/2025 Vince Jefferson MD   ED visits in past 90 days: 0        Note composed:5:04 AM 07/17/2025

## 2025-07-17 NOTE — TELEPHONE ENCOUNTER
Care Due:                  Date            Visit Type   Department     Provider  --------------------------------------------------------------------------------                                EP -                              PRIMARY      [facility]  Last Visit: 06-      CARE ([facility])   CASEY Jefferson                              EP -                              PRIMARY      [facility]  Next Visit: 10-      CARE ([facility])   CASEY Jefferson                                                            Last  Test          Frequency    Reason                     Performed    Due Date  --------------------------------------------------------------------------------    HBA1C.......  6 months...  OZEMPIC..................  11- 05-    [employer] Embedded Care Due Messages. Reference number: [id number].   7/17/2025 12:28:38 AM CDT

## 2025-07-17 NOTE — PROGRESS NOTES
"Subjective:       Patient ID: Kay Rosas is a 79 y.o. female.    Chief Complaint:  CKD, hypertension    HPI    She presents to clinic today for routine follow-up.  Since her last office visit she has been doing well.  She continues to have discomfort in her knees bilaterally.  She does not take nonsteroidal anti-inflammatory agents.  She states she only uses Tylenol products.  Her laboratory studies and medications were reviewed.  All Nephrology related questions were answered to her satisfaction.      Review of Systems   Constitutional: Negative.    HENT: Negative.     Respiratory: Negative.     Cardiovascular: Negative.    Gastrointestinal: Negative.    Genitourinary: Negative.    Musculoskeletal: Negative.    Skin: Negative.        /80   Pulse 78   Resp (!) 22   Ht 5' 6" (1.676 m)   Wt 110.1 kg (242 lb 11.6 oz)   SpO2 (!) 92%   BMI 39.18 kg/m²     Lab Results   Component Value Date    WBC 7.20 02/14/2025    HGB 13.0 02/14/2025    HCT 41.7 02/14/2025    MCV 96 02/14/2025     02/14/2025      BMP  Lab Results   Component Value Date     07/10/2025    K 4.2 07/10/2025     07/10/2025    CO2 28 07/10/2025    BUN 21 07/10/2025    CREATININE 1.6 (H) 07/10/2025    CALCIUM 9.4 07/10/2025    ANIONGAP 9 07/10/2025    ESTGFRAFRICA 31 03/06/2023    EGFRNONAA 29 (A) 07/08/2022     CMP  Sodium   Date Value Ref Range Status   07/10/2025 142 136 - 145 mmol/L Final   02/14/2025 143 136 - 145 mmol/L Final     Potassium   Date Value Ref Range Status   07/10/2025 4.2 3.5 - 5.1 mmol/L Final   02/14/2025 4.7 3.5 - 5.1 mmol/L Final     Chloride   Date Value Ref Range Status   07/10/2025 105 95 - 110 mmol/L Final   02/14/2025 104 95 - 110 mmol/L Final     CO2   Date Value Ref Range Status   07/10/2025 28 23 - 29 mmol/L Final   02/14/2025 28 23 - 29 mmol/L Final     Glucose   Date Value Ref Range Status   07/10/2025 80 70 - 110 mg/dL Final   02/14/2025 88 70 - 110 mg/dL Final     BUN   Date Value Ref " Range Status   07/10/2025 21 8 - 23 mg/dL Final     Creatinine   Date Value Ref Range Status   07/10/2025 1.6 (H) 0.5 - 1.4 mg/dL Final     Calcium   Date Value Ref Range Status   07/10/2025 9.4 8.7 - 10.5 mg/dL Final   02/14/2025 10.1 8.7 - 10.5 mg/dL Final     Total Protein   Date Value Ref Range Status   02/14/2025 7.8 6.0 - 8.4 g/dL Final     Albumin   Date Value Ref Range Status   07/10/2025 4.0 3.5 - 5.2 g/dL Final   02/14/2025 3.8 3.5 - 5.2 g/dL Final     Total Bilirubin   Date Value Ref Range Status   02/14/2025 0.3 0.1 - 1.0 mg/dL Final     Comment:     For infants and newborns, interpretation of results should be based  on gestational age, weight and in agreement with clinical  observations.    Premature Infant recommended reference ranges:  Up to 24 hours.............<8.0 mg/dL  Up to 48 hours............<12.0 mg/dL  3-5 days..................<15.0 mg/dL  6-29 days.................<15.0 mg/dL       Alkaline Phosphatase   Date Value Ref Range Status   02/14/2025 66 40 - 150 U/L Final     AST   Date Value Ref Range Status   02/14/2025 18 10 - 40 U/L Final     ALT   Date Value Ref Range Status   02/14/2025 16 10 - 44 U/L Final     Anion Gap   Date Value Ref Range Status   07/10/2025 9 8 - 16 mmol/L Final     eGFR if    Date Value Ref Range Status   07/08/2022 34 (A) >60 mL/min/1.73 m^2 Final     eGFR    Date Value Ref Range Status   03/06/2023 31 mL/min/1.73mSq Final     Comment:     In accordance with NKF-ASN Task Force recommendation, calculation based on the Chronic Kidney Disease Epidemiology Collaboration (CKD-EPI) equation without adjustment for race. eGFR adjusted for gender and age and calculated in ml/min/1.73mSquared. eGFR cannot be calculated if patient is under 18 years of age.     Reference Range:   >= 60 ml/min/1.73mSquared.     eGFR if non    Date Value Ref Range Status   07/08/2022 29 (A) >60 mL/min/1.73 m^2 Final     Comment:     Calculation  used to obtain the estimated glomerular filtration  rate (eGFR) is the CKD-EPI equation.        Medications Ordered Prior to Encounter[1]         Objective:            Physical Exam  Constitutional:       Appearance: Normal appearance.   HENT:      Head: Normocephalic and atraumatic.   Eyes:      General: No scleral icterus.     Extraocular Movements: Extraocular movements intact.      Pupils: Pupils are equal, round, and reactive to light.   Pulmonary:      Effort: Pulmonary effort is normal.      Breath sounds: No stridor.   Musculoskeletal:      Right lower leg: No edema.      Left lower leg: No edema.   Skin:     General: Skin is warm.   Neurological:      General: No focal deficit present.      Mental Status: She is alert and oriented to person, place, and time.   Psychiatric:         Mood and Affect: Mood normal.         Behavior: Behavior normal.       Assessment:       1. Stage 3b chronic kidney disease    2. Primary hypertension    3. Secondary hyperparathyroidism of renal origin    4. Kidney disease        Plan:       1. Creatinine has remained relatively stable ranging between 1.4 and 1.7 for the past year.  This fluctuation is hemodynamic in nature.  Urinalysis is bland without evidence of proteinuria.  She is on a RAAS inhibitor as well as a GLP 1 agonist.    2. Blood pressure is well controlled on current regimen.  Continue RAAS inhibitor.    3. Phosphorus is stable at 2.6.  Calcium is stable at 9.4 with an albumin of 4.0.  Will check an intact PTH and vitamin-D prior to her next office visit.    4. Duplicate    Follow-up labs: Renal function panel, PC ratio, intact PTH, vitamin-D.      Joshua Spivey MD         [1]   Current Outpatient Medications on File Prior to Visit   Medication Sig Dispense Refill    albuterol (PROVENTIL/VENTOLIN HFA) 90 mcg/actuation inhaler INHALE 2 PUFFS INTO THE LUNGS EVERY 4 HOURS AS NEEDED FOR WHEEZING OR SHORTNESS OF BREATH. 6.7 g 11    apixaban (ELIQUIS) 5 mg Tab Take 1  tablet (5 mg total) by mouth 2 (two) times daily. 60 tablet 3    ezetimibe (ZETIA) 10 mg tablet TAKE 1 TABLET BY MOUTH EVERY DAY 90 tablet 1    gabapentin (NEURONTIN) 300 MG capsule Take 1 capsule (300 mg total) by mouth every evening. 30 capsule 11    levothyroxine (SYNTHROID) 50 MCG tablet TAKE 1 TABLET BY MOUTH EVERY DAY 90 tablet 0    losartan-hydrochlorothiazide 100-25 mg (HYZAAR) 100-25 mg per tablet TAKE 1 TABLET BY MOUTH EVERY DAY 90 tablet 2    pantoprazole (PROTONIX) 40 MG tablet TAKE 1 TABLET BY MOUTH EVERY DAY 90 tablet 3    sertraline (ZOLOFT) 50 MG tablet TAKE 1 TABLET BY MOUTH EVERY DAY 90 tablet 3    traZODone (DESYREL) 150 MG tablet Take 1 tablet (150 mg total) by mouth every evening. 90 tablet 3    azithromycin (ZITHROMAX Z-LAZARO) 250 MG tablet Take 2 tab today, then 1 tab daily x 4 days. 6 tablet 0    benzonatate (TESSALON) 200 MG capsule Take 1 capsule (200 mg total) by mouth 3 (three) times daily as needed. 30 capsule 0    ergocalciferol (ERGOCALCIFEROL) 50,000 unit Cap TAKE 1 CAPSULE BY MOUTH ONE TIME PER WEEK (Patient not taking: Reported on 7/17/2025) 12 capsule 3    metroNIDAZOLE (METROGEL) 0.75 % gel Apply topically 2 (two) times daily. (Patient not taking: Reported on 7/17/2025) 45 g 5    sarilumab (KEVZARA) 200 mg/1.14 mL PnIj Inject 1.14 mLs (200 mg total) into the skin every 14 (fourteen) days. 2 pen 11    [DISCONTINUED] OZEMPIC 0.25 mg or 0.5 mg (2 mg/3 mL) pen injector INJECT 0.25 MG INTO THE SKIN EVERY 7 DAYS 6 mL 0     No current facility-administered medications on file prior to visit.

## 2025-07-19 DIAGNOSIS — Z86.718 HISTORY OF DVT (DEEP VEIN THROMBOSIS): ICD-10-CM

## 2025-07-19 NOTE — TELEPHONE ENCOUNTER
No care due was identified.  [facility] Embedded Care Due Messages. Reference number: [id number].   7/19/2025 7:19:53 AM CDT

## 2025-07-21 ENCOUNTER — OFFICE VISIT (OUTPATIENT)
Dept: ORTHOPEDICS | Facility: CLINIC | Age: 80
End: 2025-07-21
Payer: MEDICARE

## 2025-07-21 ENCOUNTER — HOSPITAL ENCOUNTER (OUTPATIENT)
Dept: RADIOLOGY | Facility: HOSPITAL | Age: 80
Discharge: HOME | End: 2025-07-21
Attending: ORTHOPAEDIC SURGERY
Payer: MEDICARE

## 2025-07-21 VITALS — BODY MASS INDEX: 39.01 KG/M2 | WEIGHT: 242.75 LBS | HEIGHT: 66 IN

## 2025-07-21 DIAGNOSIS — M70.52 PES ANSERINUS BURSITIS OF BOTH KNEES: ICD-10-CM

## 2025-07-21 DIAGNOSIS — Z79.01 CURRENT LONG-TERM USE OF ANTICOAGULANT MEDICATION WITH HISTORY OF DEEP VENOUS THROMBOSIS (DVT): ICD-10-CM

## 2025-07-21 DIAGNOSIS — M25.561 PAIN IN BOTH KNEES, UNSPECIFIED CHRONICITY: ICD-10-CM

## 2025-07-21 DIAGNOSIS — M25.562 PAIN IN BOTH KNEES, UNSPECIFIED CHRONICITY: ICD-10-CM

## 2025-07-21 DIAGNOSIS — Z96.651 HISTORY OF TOTAL RIGHT KNEE REPLACEMENT: ICD-10-CM

## 2025-07-21 DIAGNOSIS — Z98.1 HISTORY OF LUMBAR FUSION: ICD-10-CM

## 2025-07-21 DIAGNOSIS — Z86.718 CURRENT LONG-TERM USE OF ANTICOAGULANT MEDICATION WITH HISTORY OF DEEP VENOUS THROMBOSIS (DVT): ICD-10-CM

## 2025-07-21 DIAGNOSIS — Z96.652 HISTORY OF TOTAL LEFT KNEE REPLACEMENT: Primary | ICD-10-CM

## 2025-07-21 DIAGNOSIS — E66.01 MORBID OBESITY WITH BMI OF 40.0-44.9, ADULT: ICD-10-CM

## 2025-07-21 DIAGNOSIS — M70.51 PES ANSERINUS BURSITIS OF BOTH KNEES: ICD-10-CM

## 2025-07-21 DIAGNOSIS — I73.9 PERIPHERAL ARTERIAL DISEASE: ICD-10-CM

## 2025-07-21 PROCEDURE — 1125F AMNT PAIN NOTED PAIN PRSNT: CPT | Mod: CPTII,S$GLB,, | Performed by: ORTHOPAEDIC SURGERY

## 2025-07-21 PROCEDURE — 1101F PT FALLS ASSESS-DOCD LE1/YR: CPT | Mod: CPTII,S$GLB,, | Performed by: ORTHOPAEDIC SURGERY

## 2025-07-21 PROCEDURE — 73564 X-RAY EXAM KNEE 4 OR MORE: CPT | Mod: 26,50,, | Performed by: STUDENT IN AN ORGANIZED HEALTH CARE EDUCATION/TRAINING PROGRAM

## 2025-07-21 PROCEDURE — 99214 OFFICE O/P EST MOD 30 MIN: CPT | Mod: S$GLB,,, | Performed by: ORTHOPAEDIC SURGERY

## 2025-07-21 PROCEDURE — 3288F FALL RISK ASSESSMENT DOCD: CPT | Mod: CPTII,S$GLB,, | Performed by: ORTHOPAEDIC SURGERY

## 2025-07-21 PROCEDURE — 99999 PR PBB SHADOW E&M-EST. PATIENT-LVL III: CPT | Mod: PBBFAC,,, | Performed by: ORTHOPAEDIC SURGERY

## 2025-07-21 PROCEDURE — 1160F RVW MEDS BY RX/DR IN RCRD: CPT | Mod: CPTII,S$GLB,, | Performed by: ORTHOPAEDIC SURGERY

## 2025-07-21 PROCEDURE — 1159F MED LIST DOCD IN RCRD: CPT | Mod: CPTII,S$GLB,, | Performed by: ORTHOPAEDIC SURGERY

## 2025-07-21 PROCEDURE — 73564 X-RAY EXAM KNEE 4 OR MORE: CPT | Mod: TC,50

## 2025-07-21 RX ORDER — PREDNISONE 5 MG/1
5 TABLET ORAL DAILY
Qty: 30 TABLET | Refills: 3 | Status: SHIPPED | OUTPATIENT
Start: 2025-07-21

## 2025-07-21 RX ORDER — APIXABAN 5 MG/1
5 TABLET, FILM COATED ORAL 2 TIMES DAILY
Qty: 60 TABLET | Refills: 3 | Status: SHIPPED | OUTPATIENT
Start: 2025-07-21

## 2025-07-21 NOTE — PATIENT INSTRUCTIONS
Right total knee done by Dr. Cadena in 03/01/2021   Left total knee done by me 02/28/2024   You having pain in both areas on the medial tibial flare and we called it pes anserinus bursitis   You have excellent range of motion   You are way much better than before surgery   You stated that you can ice them and they help  You are taking gabapentin at night as well I you are on Eliquis so you can not take a leave or Advil or Motrin  I recommend you take prednisone 5 mg tablets once daily for the next 7-10 days and then only if absolutely hurting down the road you can take 1 or 2 days of it to minimize the pain   You can use over-the-counter Voltaren/diclofenac cream or gel apply an inch and a half to 2 up to 3 to 4 times a day for a week to help minimize the inflammation.  5% of that cream gets absorbed in the blood so it is very safe for people taking Eliquis  You doing extremely well your x-rays are excellent alignment no evidence of failure   We will see you in 1 year unless you having severe pain you want to come in earlier

## 2025-07-21 NOTE — PROGRESS NOTES
Subjective:     Patient ID: Kay Rosas is a 79 y.o. female.    Chief Complaint: Pain of the Right Knee and Pain of the Left Knee    HPI:  Left knee pain   01/11/2024  Patient stated the right knee still hurting her she had a right total knee replacement done by Dr. Cadena at [facility].  She said she had the 1st 1 done on 03/01/2021 and then on 03/22/2023 she had a revision.  She thought the problem was with the kneecap however I reviewed the electronic records from [facility] where he only change the poly insert from size 10 to size 13/DJO total knee tibia base plate size 5.  She said she is doing okay with the knee but still having hypersensitivity and tenderness anteriorly.  She does have history of lumbar fusion and reviewed x-rays in the electronic record from 2016 showing hardware in the lumbar spine with fractured facet screws.  She said she can manage with the back.  There is no workup of numbness or tingling in the legs with nerve conduction studies.  She said she received numerous injections in her knees on both of them she wants the left total knee replaced.  She takes ibuprofen 800 she is on Eliquis she has history of bilateral lower extremities blood clots even prior to having her knee replacement.  She takes ibuprofen 800 and Eliquis.  She knows she is stops the Eliquis for it for surgery.  She complains of burning and anterior right knee pain I did tell her every total knee is numb on the outside of the knee and she can use Voltaren cream to help with that.  Instruct her how to use it.  As far as the left knee is concerned I told her that she is a candidate for total knee replacement however can not get rid of all the pain in the knees.  She does have back issues and lumbar fusion she could have pinched nerves going down the legs that could be causing some of her pains.  She lives alone but has children around town and states they all work.  I did tell her the need  to step up and help after surgery if she undergoes total knee replacement because it is outpatient surgery she goes home the same day.  She needs help from the family members they should take turns to help her out.  She has using a Rollator to walk around.  Other treatments included Kenalog injection 08/24/2023, also other steroid prior to that and Euflexxa injections 06/24/2020.  Also she had genicular nerve block done on 09/15/2023/  IO vera which did not help at all    I did tell her some of her pains could be coming from her back and total knee replacement might not solve that problem they will only help inside the knee joint.      05/27/2024   Left TKA 02/28/2024.  Patient stated she is better than before surgery but she still have some soreness when she sits for awhile becomes stiff however when she walks she does not have pain unable to walk better.  She has still using the cane.  Her pain is basically stiffness that 5/10.  No fever no chills no shortness of breath.  She finished her physical therapy she is doing her own independent exercises and she walks quite a bit and does not hurt just the stiffness is the part that seems to be the problem that occur after she sits for awhile.  We discussed that in might take 6 months to a year before complete healing occur end up to 18 months it will get better with time.  She stated everything is good even she can move her foot up and down apparently after surgery she had a little difficulty with a mild peroneal palsy no other she said the foot is working really well no problem  She is doing independent exercise program you  You like what they told her on physical therapy    10/30/2024  Left TKA 02/28/2024   Previous right knee replacement by another provider   Patient states the knees are not bothering her at all.  What she has difficulty walking distances because of back issues as well as rheumatological issues that seems to be triggering in.  She has stenosis of  the spine she stated and I did tell her with that that means pinched nerve when she walks she will get severe pain in the legs in the knees become very weak and she sits down.  She does not want to go anymore to therapy.  She has things also her weight gain because being on prednisone for rheumatoid arthritis makes her gain weight and she gained around 30 lb.  Insurance will not approve her to have injections do lose weight.  I did tell her if she wants to pay for it on her own she can ask her primary care to give him a prescription and she can call around the see if she can afford to be on it and she thought that was a great idea.  No fever no chills no shortness of breath or difficulty with chewing swallowing loss of bowel bladder control    She is using the Rollator to ambulate as safety    7/21/25   Left TKA 02/28/2024   Right TKA 03/01/2021 Dr. Warren bell  Pain is occasionally 2/10 some days you can not barely move.  She does have other issues.  She has hard time kneeling to get into the bed and I did tell her most people who have total knee replacement have hard time kneeling on their knees.  And they all have some numbness and tingling in the outside of the knee which is normal for total knees.  Total knees are not perfect the around 80% successful in decreasing pain increasing function.  She does have gabapentin which she takes and she does have Eliquis as a blood thinner.  We discussed to avoid NSAIDs by mouth.  Discussed Voltaren cream which is only 5% absorbed in the blood stream and how to apply it.  And she needs to use it for at least 3-4 days in a row of the area that hurts.  As far as also getting something else as an anti-inflammatory we can use prednisone for a short periods of time.  Exam today she is ambulating without assistive devices compared to previously using a Rollator   She stated that she is definitely much better compared to before surgery  No fever no chills no shortness of breath or  difficulty with chewing swallowing loss of bowel bladder control  Past Medical History:   Diagnosis Date    Anticoagulant long-term use     Arthritis     Cataract- bilateral with extraction- patient reports no lens implants     Deep vein thrombosis     Degenerative disc disease, cervical     Disorder of kidney and ureter     CKD stage 3    DVT (deep venous thrombosis)     Emphysema of lung     Hyperlipidemia     Hypertension     MVP (mitral valve prolapse)     On home oxygen therapy     3 liters as needed at night    Osteoporosis     feet    Thyroid condition      Past Surgical History:   Procedure Laterality Date    BACK SURGERY  2012    CATARACT EXTRACTION Bilateral 10/2012    EYE SURGERY  cataract    HYSTERECTOMY Left 1978    INJECTION OF ANESTHETIC AGENT INTO SACROILIAC JOINT Bilateral 11/11/2019    Procedure: Bilateral GT bursa + SIJ injection;  Surgeon: Noe Pleitez MD;  Location: [facility];  Service: Pain Management;  Laterality: Bilateral;    INJECTION OF JOINT Bilateral 11/11/2019    Procedure: Bilateral GT bursa + SIJ injection;  Surgeon: Noe Pleitez MD;  Location: [facility];  Service: Pain Management;  Laterality: Bilateral;    JOINT REPLACEMENT Right 2021    knee    ROBOT-ASSISTED LAPAROSCOPIC LYSIS OF ADHESIONS USING DA SP XI N/A 12/05/2022    Procedure: XI ROBOTIC LYSIS, ADHESIONS;  Surgeon: Den Barragan MD;  Location: [facility];  Service: Oncology;  Laterality: N/A;    ROBOT-ASSISTED LAPAROSCOPIC SALPINGO-OOPHORECTOMY USING DA SP XI Left 12/05/2022    Procedure: XI ROBOTIC SALPINGO-OOPHORECTOMY;  Surgeon: Den Barragan MD;  Location: [facility] OR;  Service: Oncology;  Laterality: Left;    SURGICAL REMOVAL OF BONE SPUR Left     left elbow    TOTAL KNEE ARTHROPLASTY Left 02/28/2024    Procedure: ARTHROPLASTY, KNEE, TOTAL;  Surgeon: Terrance Urena MD;  Location: [facility];  Service: Orthopedics;  Laterality: Left;    VASCULAR SURGERY Right     high ligation due to blood clot       Family History   Problem Relation Name Age of Onset    Heart disease Mother Loida Moura     Kidney disease Mother Loida Moura     Arthritis Mother Loida Moura     Breast cancer Mother Loida Moura     Cancer Father ALLAN Brand     Heart disease Father ALLAN Brand     Cancer Sister Lung cancer     Ovarian cancer Sister Lung cancer     Alzheimer's disease Sister       Social History     Socioeconomic History    Marital status:    Tobacco Use    Smoking status: Former     Current packs/day: 0.00     Average packs/day: 1 pack/day for 53.5 years (53.5 ttl pk-yrs)     Types: Cigarettes     Start date: 1/1/1962     Quit date: 6/20/2015     Years since quitting: 10.0    Smokeless tobacco: Never   Substance and Sexual Activity    Alcohol use: No    Drug use: No    Sexual activity: Yes     Partners: Male     Birth control/protection: None     Social Drivers of Health     Financial Resource Strain: High Risk (4/9/2024)    Overall Financial Resource Strain (CARDIA)     Difficulty of Paying Living Expenses: Hard   Food Insecurity: Food Insecurity Present (4/9/2024)    Hunger Vital Sign     Worried About Running Out of Food in the Last Year: Sometimes true     Ran Out of Food in the Last Year: Sometimes true   Transportation Needs: No Transportation Needs (4/9/2024)    PRAPARE - Transportation     Lack of Transportation (Medical): No     Lack of Transportation (Non-Medical): No   Physical Activity: Insufficiently Active (4/9/2024)    Exercise Vital Sign     Days of Exercise per Week: 2 days     Minutes of Exercise per Session: 30 min   Stress: Patient Declined (4/9/2024)    Citizen of [place] of [employer] - Occupational Stress Questionnaire     Feeling of Stress : Patient declined   Housing Stability: Low Risk  (4/9/2024)    Housing Stability Vital Sign     Unable to Pay for Housing in the Last Year: No     Number of Places Lived in the Last Year: 1     Unstable Housing in the Last  Year: No     Medication List with Changes/Refills   New Medications    PREDNISONE (DELTASONE) 5 MG TABLET    Take 1 tablet (5 mg total) by mouth once daily.   Current Medications    ALBUTEROL (PROVENTIL/VENTOLIN HFA) 90 MCG/ACTUATION INHALER    INHALE 2 PUFFS INTO THE LUNGS EVERY 4 HOURS AS NEEDED FOR WHEEZING OR SHORTNESS OF BREATH.    ELIQUIS 5 MG TAB    TAKE 1 TABLET BY MOUTH TWICE A DAY    ERGOCALCIFEROL (ERGOCALCIFEROL) 50,000 UNIT CAP    TAKE 1 CAPSULE BY MOUTH ONE TIME PER WEEK    EZETIMIBE (ZETIA) 10 MG TABLET    TAKE 1 TABLET BY MOUTH EVERY DAY    GABAPENTIN (NEURONTIN) 300 MG CAPSULE    Take 1 capsule (300 mg total) by mouth every evening.    LEVOTHYROXINE (SYNTHROID) 50 MCG TABLET    TAKE 1 TABLET BY MOUTH EVERY DAY    LOSARTAN-HYDROCHLOROTHIAZIDE 100-25 MG (HYZAAR) 100-25 MG PER TABLET    TAKE 1 TABLET BY MOUTH EVERY DAY    METRONIDAZOLE (METROGEL) 0.75 % GEL    Apply topically 2 (two) times daily.    OZEMPIC 0.25 MG OR 0.5 MG (2 MG/3 ML) PEN INJECTOR    INJECT 0.25 MG INTO THE SKIN EVERY 7 DAYS    PANTOPRAZOLE (PROTONIX) 40 MG TABLET    TAKE 1 TABLET BY MOUTH EVERY DAY    SERTRALINE (ZOLOFT) 50 MG TABLET    TAKE 1 TABLET BY MOUTH EVERY DAY    TRAZODONE (DESYREL) 150 MG TABLET    Take 1 tablet (150 mg total) by mouth every evening.   Discontinued Medications    AZITHROMYCIN (ZITHROMAX Z-LAZARO) 250 MG TABLET    Take 2 tab today, then 1 tab daily x 4 days.    BENZONATATE (TESSALON) 200 MG CAPSULE    Take 1 capsule (200 mg total) by mouth 3 (three) times daily as needed.    SARILUMAB (KEVZARA) 200 MG/1.14 ML PNIJ    Inject 1.14 mLs (200 mg total) into the skin every 14 (fourteen) days.     Review of patient's allergies indicates:   Allergen Reactions    Lipitor [atorvastatin]      Severe Leg cramps    Nsaids (non-steroidal anti-inflammatory drug)      CKD     Review of Systems   Constitutional: Negative for decreased appetite.   HENT:  Negative for tinnitus.    Eyes:  Negative for double vision.    Cardiovascular:  Negative for chest pain.   Respiratory:  Negative for wheezing.    Hematologic/Lymphatic: Negative for bleeding problem.   Skin:  Negative for dry skin.   Musculoskeletal:  Positive for arthritis, back pain, joint pain and myalgias. Negative for gout, neck pain and stiffness.   Gastrointestinal:  Negative for abdominal pain.   Genitourinary:  Negative for bladder incontinence.   Neurological:  Negative for numbness, paresthesias and sensory change.   Psychiatric/Behavioral:  Negative for altered mental status.        Objective:   Body mass index is 39.18 kg/m².  There were no vitals filed for this visit.       General    Constitutional: She is oriented to person, place, and time. She appears well-developed.   HENT:   Head: Atraumatic.   Eyes: EOM are normal.   Pulmonary/Chest: Effort normal.   Neurological: She is alert and oriented to person, place, and time.   Psychiatric: Judgment normal.           Ambulating with a Rollator last visit today on 07/21/2025 ambulating without assistive devices  Pelvis is level  Bilateral hips passive motion no pain   There is tenderness in the lumbar spine there is questionable straight leg raising mild bilaterally   There is no pain in the greater trochanters to palpation   Hip flexors, abductors adductors quads hamstrings ankle extensors and flexors slightly weak at 5-/5  Right TKA surgical incision healed well she has 0 to 120° of flexion.  She is stable in extension to varus and valgus stressing.  Very minor instability at 45°.  There is decreased sensation in the lateral aspect of the knee joint consistent with infrapatellar branch of the saphenous nerve distribution.  There is no defect in the patella or quadriceps tendon there is no swelling that has not warm to touch.  There is tenderness over the medial tibial flare consistent with pes anserinus bursitis  Left knee preop with medial joint severe pain.  There is minimal swelling.  There is crepitus with  motion.  Collaterals and cruciates are stable.  There is no defect in the patella or quadriceps tendon.  Active motion is 0-110 degrees.  Left knee postop TKA surgical incision healed well.  She has 0-130 degrees of flexion.  There is no defect in the patella or quadriceps tendon.  Stable in extension and flexion.  There is no swelling no effusion not warm to touch.  There is tenderness of the medial tibial flare consistent with pes anserinus bursitis  Calves are soft nontender with some varicosities bilaterally  Around the ankle there is some mild swelling  Able on the left ankle to bring the ankle up and down without difficulty and strength is 5/5  Skin is warm to touch no obvious lesions    Relevant imaging results reviewed and interpreted by me, discussed with the patient and / or family today   X-ray 07/21/2025 left TKA excellent alignment/Depuy you attune knee posterior stabilize.  The right knee I did not recognize the brand however is excellent alignment/could be DJO cruciate sparing with stemmed tibial component alignment is also excellent no evidence of failure  X-ray 05/27/2024 left TKA/Depuy you posterior stabilize attune knee in excellent alignment patella midline.  The left total knee done by another provider still in good alignment    X-ray 10/16/2023 right TKA/DJO cruciate sparing with a stemmed tibial component patella midline no evidence of failure alignment okay, left knee with complete loss of medial joint space with marginal osteophytic changes varus deformity consistent with severe arthritis  Assessment:     Encounter Diagnoses   Name Primary?    History of total left knee replacement Yes    History of total right knee replacement     History of lumbar fusion     Peripheral arterial disease     Morbid obesity with BMI of 40.0-44.9, adult     Current long-term use of anticoagulant medication with history of deep venous thrombosis (DVT)     Pes anserinus bursitis of both knees         Plan:    History of total left knee replacement    History of total right knee replacement    History of lumbar fusion    Peripheral arterial disease    Morbid obesity with BMI of 40.0-44.9, adult    Current long-term use of anticoagulant medication with history of deep venous thrombosis (DVT)    Pes anserinus bursitis of both knees    Other orders  -     predniSONE (DELTASONE) 5 MG tablet; Take 1 tablet (5 mg total) by mouth once daily.  Dispense: 30 tablet; Refill: 3         Patient Instructions   Right total knee done by Dr. Cadena in 03/01/2021   Left total knee done by me 02/28/2024   You having pain in both areas on the medial tibial flare and we called it pes anserinus bursitis   You have excellent range of motion   You are way much better than before surgery   You stated that you can ice them and they help  You are taking gabapentin at night as well I you are on Eliquis so you can not take a leave or Advil or Motrin  I recommend you take prednisone 5 mg tablets once daily for the next 7-10 days and then only if absolutely hurting down the road you can take 1 or 2 days of it to minimize the pain   You can use over-the-counter Voltaren/diclofenac cream or gel apply an inch and a half to 2 up to 3 to 4 times a day for a week to help minimize the inflammation.  5% of that cream gets absorbed in the blood so it is very safe for people taking Eliquis  You doing extremely well your x-rays are excellent alignment no evidence of failure   We will see you in 1 year unless you having severe pain you want to come in earlier        Disclaimer: This note was prepared using a voice recognition system and is likely to have sound alike errors within the text.

## 2025-09-02 ENCOUNTER — LAB VISIT (OUTPATIENT)
Dept: LAB | Facility: HOSPITAL | Age: 80
End: 2025-09-02
Attending: INTERNAL MEDICINE
Payer: MEDICARE

## 2025-09-02 DIAGNOSIS — M35.3 POLYMYALGIA RHEUMATICA: ICD-10-CM

## 2025-09-02 LAB
ABSOLUTE EOSINOPHIL (OHS): 0.17 K/UL
ABSOLUTE MONOCYTE (OHS): 0.46 K/UL (ref 0.3–1)
ABSOLUTE NEUTROPHIL COUNT (OHS): 3.4 K/UL (ref 1.8–7.7)
ALBUMIN SERPL BCP-MCNC: 3.8 G/DL (ref 3.5–5.2)
ALP SERPL-CCNC: 69 UNIT/L (ref 40–150)
ALT SERPL W/O P-5'-P-CCNC: 16 UNIT/L (ref 0–55)
ANION GAP (OHS): 12 MMOL/L (ref 8–16)
AST SERPL-CCNC: 22 UNIT/L (ref 0–50)
BASOPHILS # BLD AUTO: 0.03 K/UL
BASOPHILS NFR BLD AUTO: 0.6 %
BILIRUB SERPL-MCNC: 0.3 MG/DL (ref 0.1–1)
BUN SERPL-MCNC: 32 MG/DL (ref 8–23)
CALCIUM SERPL-MCNC: 9.7 MG/DL (ref 8.7–10.5)
CHLORIDE SERPL-SCNC: 102 MMOL/L (ref 95–110)
CO2 SERPL-SCNC: 28 MMOL/L (ref 23–29)
CREAT SERPL-MCNC: 1.7 MG/DL (ref 0.5–1.4)
CRP SERPL-MCNC: 6.6 MG/L
ERYTHROCYTE [DISTWIDTH] IN BLOOD BY AUTOMATED COUNT: 14.3 % (ref 11.5–14.5)
ERYTHROCYTE [SEDIMENTATION RATE] IN BLOOD BY PHOTOMETRIC METHOD: 68 MM/HR
GFR SERPLBLD CREATININE-BSD FMLA CKD-EPI: 31 ML/MIN/1.73/M2
GLUCOSE SERPL-MCNC: 90 MG/DL (ref 70–110)
HCT VFR BLD AUTO: 36.4 % (ref 37–48.5)
HGB BLD-MCNC: 11.5 GM/DL (ref 12–16)
IMM GRANULOCYTES # BLD AUTO: 0.02 K/UL (ref 0–0.04)
IMM GRANULOCYTES NFR BLD AUTO: 0.4 % (ref 0–0.5)
LYMPHOCYTES # BLD AUTO: 1.11 K/UL (ref 1–4.8)
MCH RBC QN AUTO: 28.3 PG (ref 27–31)
MCHC RBC AUTO-ENTMCNC: 31.6 G/DL (ref 32–36)
MCV RBC AUTO: 89 FL (ref 82–98)
NUCLEATED RBC (/100WBC) (OHS): 0 /100 WBC
PLATELET # BLD AUTO: 215 K/UL (ref 150–450)
PMV BLD AUTO: 12.7 FL (ref 9.2–12.9)
POTASSIUM SERPL-SCNC: 4.6 MMOL/L (ref 3.5–5.1)
PROT SERPL-MCNC: 7.3 GM/DL (ref 6–8.4)
RBC # BLD AUTO: 4.07 M/UL (ref 4–5.4)
RELATIVE EOSINOPHIL (OHS): 3.3 %
RELATIVE LYMPHOCYTE (OHS): 21.4 % (ref 18–48)
RELATIVE MONOCYTE (OHS): 8.9 % (ref 4–15)
RELATIVE NEUTROPHIL (OHS): 65.4 % (ref 38–73)
SODIUM SERPL-SCNC: 142 MMOL/L (ref 136–145)
WBC # BLD AUTO: 5.19 K/UL (ref 3.9–12.7)

## 2025-09-02 PROCEDURE — 85025 COMPLETE CBC W/AUTO DIFF WBC: CPT

## 2025-09-02 PROCEDURE — 80053 COMPREHEN METABOLIC PANEL: CPT

## 2025-09-02 PROCEDURE — 86140 C-REACTIVE PROTEIN: CPT

## 2025-09-02 PROCEDURE — 85652 RBC SED RATE AUTOMATED: CPT

## 2025-09-02 PROCEDURE — 36415 COLL VENOUS BLD VENIPUNCTURE: CPT | Mod: PO

## (undated) DEVICE — SYS SEE SHARP SCP ANTIFG LNG

## (undated) DEVICE — BLADE RECIP DOUBLE SIDED

## (undated) DEVICE — ALCOHOL 70% ANTISEPTIC ISO 4OZ

## (undated) DEVICE — MIXER BONE CEMENT

## (undated) DEVICE — DRAPE ORTH SPLIT 77X108IN

## (undated) DEVICE — SUT VICRYL 1 OB 36 CTX

## (undated) DEVICE — TUBING MEDI-VAC 20FT .25IN

## (undated) DEVICE — STAPLER SKIN PROXIMATE WIDE

## (undated) DEVICE — JELLY SURGILUBE 5GR

## (undated) DEVICE — SET TRI-LUMEN FILTERED TUBE

## (undated) DEVICE — HOOD FLYTE PEELWY STERISHIELD

## (undated) DEVICE — BANDAGE ACE DOUBLE STER 6IN

## (undated) DEVICE — PORT ACCESS 8MM W/120MM LOW

## (undated) DEVICE — COVER PROXIMA MAYO STAND

## (undated) DEVICE — PAD ABDOMINAL STERILE 8X10IN

## (undated) DEVICE — SOL POVIDONE PREP IODINE 4 OZ

## (undated) DEVICE — BLADE SAG 18.0X1.27X100

## (undated) DEVICE — EVACUATOR PENCIL SMOKE NEPTUNE

## (undated) DEVICE — SUT 0 27IN COATED VICRYL CP

## (undated) DEVICE — TOWEL OR DISP STRL BLUE 4/PK

## (undated) DEVICE — CONTAINER SPECIMEN OR STER 4OZ

## (undated) DEVICE — SUT MCRYL PLUS 4-0 PS2 27IN

## (undated) DEVICE — GLOVE SURG PLYSPHRN ORTH SZ7.5

## (undated) DEVICE — SUT VICRYL 2-0 27 CT-1

## (undated) DEVICE — KIT PROCEDURE STER INLET CLOSU

## (undated) DEVICE — SYR 30CC LUER LOCK

## (undated) DEVICE — SPONGE LAP 18X18 PREWASHED

## (undated) DEVICE — KIT WING PAD POSITIONING

## (undated) DEVICE — ELECTRODE BLADE TEFLON 6

## (undated) DEVICE — KIT IRR SUCTION HND PIECE

## (undated) DEVICE — APPLICATOR CHLORAPREP ORN 26ML

## (undated) DEVICE — HEADREST ROUND DISP FOAM 9IN

## (undated) DEVICE — SUT VICRYL+ 27 UR-6 VIOL

## (undated) DEVICE — DRAPE U SPLIT SHEET 54X76IN

## (undated) DEVICE — COVER LIGHT HANDLE 80/CA

## (undated) DEVICE — MANIFOLD 4 PORT

## (undated) DEVICE — DRAPE INCISE IOBAN 2 23X33IN

## (undated) DEVICE — NDL SPINAL 18GX3.5 SPINOCAN

## (undated) DEVICE — UNDERGLOVES BIOGEL PI SZ 7 LF

## (undated) DEVICE — DRAPE THREE-QTR REINF 53X77IN

## (undated) DEVICE — GOWN NONREINF SET-IN SLV 2XL

## (undated) DEVICE — SUT STRATAFIX PGAPCL 3 FS-1

## (undated) DEVICE — ELECTRODE REM PLYHSV RETURN 9

## (undated) DEVICE — BNDG COFLEX FOAM LF2 ST 4X5YD

## (undated) DEVICE — IRRIGATOR ENDOSCOPY DISP.

## (undated) DEVICE — OBTURATOR BLADELESS 8MM XI CLR

## (undated) DEVICE — GRASPER EPIX 5X20MM 45CM

## (undated) DEVICE — GOWN POLY REINF X-LONG XL

## (undated) DEVICE — DRAPE INCISE IOBAN 2 23X17IN

## (undated) DEVICE — POUCH INSTRUMENT 2 POCKET

## (undated) DEVICE — SYR 50ML CATH TIP

## (undated) DEVICE — TROCAR KII BLLN 12MM 10CM

## (undated) DEVICE — SOL PVP-I SCRUB 7.5% 4OZ

## (undated) DEVICE — SEAL UNIVERSAL 5MM-8MM XI

## (undated) DEVICE — TAPE SILK 3IN

## (undated) DEVICE — INSERT CUSHIONPRONE VIEW LARGE

## (undated) DEVICE — DRAPE T EXTRM SURG 121X128X90

## (undated) DEVICE — BLADE EZ CLEAN 2 1/2

## (undated) DEVICE — UNDERGLOVE BIOGEL PI SZ 6.5 LF

## (undated) DEVICE — GOWN NONREINF SET-IN SLV XL

## (undated) DEVICE — DRESSING PICO 7 TWO 10X30CM

## (undated) DEVICE — DRAPE COLUMN DAVINCI XI

## (undated) DEVICE — YANKAUER FLEX NO VENT REG CAP

## (undated) DEVICE — SPONGE COTTON TRAY 4X4IN

## (undated) DEVICE — SOCKINETTE IMPERVIOUS 12X48IN

## (undated) DEVICE — SUT VICRYL 2-0 CT-2 VCP269H

## (undated) DEVICE — TRAY DO THE ROBOT

## (undated) DEVICE — SOL ELECTROLUBE ANTI-STIC

## (undated) DEVICE — SOL NS 1000CC

## (undated) DEVICE — DRAPE FULL SHEET 70X100IN

## (undated) DEVICE — PACK BASIC SETUP SC BR

## (undated) DEVICE — DRAPE ARM DAVINCI XI

## (undated) DEVICE — SOL WATER STRL IRR 1000ML

## (undated) DEVICE — COVER TABLE HVY DTY 60X90IN

## (undated) DEVICE — SYR 10CC LUER LOCK

## (undated) DEVICE — SOL NACL IRR 3000ML

## (undated) DEVICE — DRAPE STERI U-SHAPED 47X51IN